# Patient Record
Sex: FEMALE | Race: WHITE | NOT HISPANIC OR LATINO | Employment: OTHER | URBAN - METROPOLITAN AREA
[De-identification: names, ages, dates, MRNs, and addresses within clinical notes are randomized per-mention and may not be internally consistent; named-entity substitution may affect disease eponyms.]

---

## 2017-05-25 ENCOUNTER — HOSPITAL ENCOUNTER (OUTPATIENT)
Dept: FAMILY MEDICINE CLINIC | Facility: CLINIC | Age: 82
Setting detail: SPECIMEN
Discharge: HOME | End: 2017-05-25
Attending: FAMILY MEDICINE | Admitting: FAMILY MEDICINE

## 2017-05-25 LAB
ALBUMIN SERPL-MCNC: 3.6 G/DL (ref 3.5–4.8)
ALBUMIN/GLOB SERPL: 1.1 {RATIO} (ref 1–1.7)
ALP SERPL-CCNC: 85 IU/L (ref 32–91)
ALT SERPL-CCNC: 15 IU/L (ref 14–54)
ANION GAP SERPL CALC-SCNC: 12.6 MMOL/L (ref 10–20)
AST SERPL-CCNC: 23 IU/L (ref 15–41)
BASOPHILS # BLD AUTO: 0 10*3/UL (ref 0–0.2)
BASOPHILS NFR BLD AUTO: 1 % (ref 0–2)
BILIRUB SERPL-MCNC: 0.4 MG/DL (ref 0.3–1.2)
BUN SERPL-MCNC: 23 MG/DL (ref 8–20)
BUN/CREAT SERPL: 17.7 (ref 5.4–26.2)
CALCIUM SERPL-MCNC: 9.3 MG/DL (ref 8.9–10.3)
CHLORIDE SERPL-SCNC: 105 MMOL/L (ref 101–111)
CHOLEST SERPL-MCNC: 161 MG/DL
CHOLEST/HDLC SERPL: 2.1 {RATIO}
CONV CO2: 27 MMOL/L (ref 22–32)
CONV LDL CHOLESTEROL DIRECT: 62 MG/DL (ref 0–100)
CONV TOTAL PROTEIN: 6.8 G/DL (ref 6.1–7.9)
CREAT UR-MCNC: 1.3 MG/DL (ref 0.4–1)
DIFFERENTIAL METHOD BLD: (no result)
EOSINOPHIL # BLD AUTO: 0.2 10*3/UL (ref 0–0.3)
EOSINOPHIL # BLD AUTO: 2 % (ref 0–3)
ERYTHROCYTE [DISTWIDTH] IN BLOOD BY AUTOMATED COUNT: 15.9 % (ref 11.5–14.5)
GLOBULIN UR ELPH-MCNC: 3.2 G/DL (ref 2.5–3.8)
GLUCOSE SERPL-MCNC: 126 MG/DL (ref 65–99)
HCT VFR BLD AUTO: 35.6 % (ref 35–49)
HDLC SERPL-MCNC: 77 MG/DL
HGB BLD-MCNC: 11.6 G/DL (ref 12–15)
LDLC/HDLC SERPL: 0.8 {RATIO}
LIPID INTERPRETATION: ABNORMAL
LYMPHOCYTES # BLD AUTO: 1.6 10*3/UL (ref 0.8–4.8)
LYMPHOCYTES NFR BLD AUTO: 24 % (ref 18–42)
MCH RBC QN AUTO: 27 PG (ref 26–32)
MCHC RBC AUTO-ENTMCNC: 32.5 G/DL (ref 32–36)
MCV RBC AUTO: 83.1 FL (ref 80–94)
MONOCYTES # BLD AUTO: 0.5 10*3/UL (ref 0.1–1.3)
MONOCYTES NFR BLD AUTO: 7 % (ref 2–11)
NEUTROPHILS # BLD AUTO: 4.3 10*3/UL (ref 2.3–8.6)
NEUTROPHILS NFR BLD AUTO: 66 % (ref 50–75)
NRBC BLD AUTO-RTO: 0 /100{WBCS}
NRBC/RBC NFR BLD MANUAL: 0 10*3/UL
PLATELET # BLD AUTO: 233 10*3/UL (ref 150–450)
PMV BLD AUTO: 9.8 FL (ref 7.4–10.4)
POTASSIUM SERPL-SCNC: 4.6 MMOL/L (ref 3.6–5.1)
RBC # BLD AUTO: 4.28 10*6/UL (ref 4–5.4)
SODIUM SERPL-SCNC: 140 MMOL/L (ref 136–144)
TRIGL SERPL-MCNC: 185 MG/DL
VIT B12 SERPL-MCNC: 401 PG/ML (ref 180–914)
VLDLC SERPL CALC-MCNC: 22.3 MG/DL
WBC # BLD AUTO: 6.6 10*3/UL (ref 4.5–11.5)

## 2017-06-12 ENCOUNTER — HOSPITAL ENCOUNTER (OUTPATIENT)
Dept: FAMILY MEDICINE CLINIC | Facility: CLINIC | Age: 82
Setting detail: SPECIMEN
Discharge: HOME | End: 2017-06-12
Attending: FAMILY MEDICINE | Admitting: FAMILY MEDICINE

## 2017-06-12 LAB
ALBUMIN SERPL-MCNC: 3.8 G/DL (ref 3.5–4.8)
ALBUMIN/GLOB SERPL: 1.2 {RATIO} (ref 1–1.7)
ALP SERPL-CCNC: 84 IU/L (ref 32–91)
ALT SERPL-CCNC: 17 IU/L (ref 14–54)
ANION GAP SERPL CALC-SCNC: 12.9 MMOL/L (ref 10–20)
AST SERPL-CCNC: 25 IU/L (ref 15–41)
BASOPHILS # BLD AUTO: 0.1 10*3/UL (ref 0–0.2)
BASOPHILS NFR BLD AUTO: 1 % (ref 0–2)
BILIRUB SERPL-MCNC: 0.7 MG/DL (ref 0.3–1.2)
BUN SERPL-MCNC: 23 MG/DL (ref 8–20)
BUN/CREAT SERPL: 19.2 (ref 5.4–26.2)
CALCIUM SERPL-MCNC: 9.7 MG/DL (ref 8.9–10.3)
CHLORIDE SERPL-SCNC: 106 MMOL/L (ref 101–111)
CONV CO2: 25 MMOL/L (ref 22–32)
CONV TOTAL PROTEIN: 6.9 G/DL (ref 6.1–7.9)
CREAT UR-MCNC: 1.2 MG/DL (ref 0.4–1)
DIFFERENTIAL METHOD BLD: (no result)
EOSINOPHIL # BLD AUTO: 0.2 10*3/UL (ref 0–0.3)
EOSINOPHIL # BLD AUTO: 2 % (ref 0–3)
ERYTHROCYTE [DISTWIDTH] IN BLOOD BY AUTOMATED COUNT: 15.7 % (ref 11.5–14.5)
GLOBULIN UR ELPH-MCNC: 3.1 G/DL (ref 2.5–3.8)
GLUCOSE SERPL-MCNC: 128 MG/DL (ref 65–99)
HCT VFR BLD AUTO: 37.2 % (ref 35–49)
HGB BLD-MCNC: 11.9 G/DL (ref 12–15)
LYMPHOCYTES # BLD AUTO: 2 10*3/UL (ref 0.8–4.8)
LYMPHOCYTES NFR BLD AUTO: 30 % (ref 18–42)
MCH RBC QN AUTO: 26.3 PG (ref 26–32)
MCHC RBC AUTO-ENTMCNC: 31.9 G/DL (ref 32–36)
MCV RBC AUTO: 82.3 FL (ref 80–94)
MONOCYTES # BLD AUTO: 0.6 10*3/UL (ref 0.1–1.3)
MONOCYTES NFR BLD AUTO: 8 % (ref 2–11)
NEUTROPHILS # BLD AUTO: 4.1 10*3/UL (ref 2.3–8.6)
NEUTROPHILS NFR BLD AUTO: 59 % (ref 50–75)
NRBC BLD AUTO-RTO: 0 /100{WBCS}
NRBC/RBC NFR BLD MANUAL: 0 10*3/UL
PLATELET # BLD AUTO: 264 10*3/UL (ref 150–450)
PMV BLD AUTO: 9.6 FL (ref 7.4–10.4)
POTASSIUM SERPL-SCNC: 3.9 MMOL/L (ref 3.6–5.1)
RBC # BLD AUTO: 4.52 10*6/UL (ref 4–5.4)
SODIUM SERPL-SCNC: 140 MMOL/L (ref 136–144)
WBC # BLD AUTO: 6.9 10*3/UL (ref 4.5–11.5)

## 2017-07-07 ENCOUNTER — HOSPITAL ENCOUNTER (OUTPATIENT)
Dept: OTHER | Facility: HOSPITAL | Age: 82
Discharge: HOME | End: 2017-07-07
Attending: FAMILY MEDICINE | Admitting: FAMILY MEDICINE

## 2017-07-07 LAB
ANION GAP SERPL CALC-SCNC: 16.9 MMOL/L (ref 10–20)
BASOPHILS # BLD AUTO: 0.1 10*3/UL (ref 0–0.2)
BASOPHILS NFR BLD AUTO: 1 % (ref 0–2)
BNP SERPL-MCNC: 166 PG/ML
BUN SERPL-MCNC: 20 MG/DL (ref 8–20)
BUN/CREAT SERPL: 15.4 (ref 5.4–26.2)
CALCIUM SERPL-MCNC: 9.5 MG/DL (ref 8.9–10.3)
CHLORIDE SERPL-SCNC: 101 MMOL/L (ref 101–111)
CONV CO2: 24 MMOL/L (ref 22–32)
CREAT UR-MCNC: 1.3 MG/DL (ref 0.4–1)
DIFFERENTIAL METHOD BLD: (no result)
EOSINOPHIL # BLD AUTO: 0.1 10*3/UL (ref 0–0.3)
EOSINOPHIL # BLD AUTO: 1 % (ref 0–3)
ERYTHROCYTE [DISTWIDTH] IN BLOOD BY AUTOMATED COUNT: 15.3 % (ref 11.5–14.5)
GLUCOSE SERPL-MCNC: 124 MG/DL (ref 65–99)
HCT VFR BLD AUTO: 36.2 % (ref 35–49)
HGB BLD-MCNC: 11.6 G/DL (ref 12–15)
LYMPHOCYTES # BLD AUTO: 1.8 10*3/UL (ref 0.8–4.8)
LYMPHOCYTES NFR BLD AUTO: 21 % (ref 18–42)
MCH RBC QN AUTO: 26.4 PG (ref 26–32)
MCHC RBC AUTO-ENTMCNC: 32.1 G/DL (ref 32–36)
MCV RBC AUTO: 82.1 FL (ref 80–94)
MONOCYTES # BLD AUTO: 0.7 10*3/UL (ref 0.1–1.3)
MONOCYTES NFR BLD AUTO: 9 % (ref 2–11)
NEUTROPHILS # BLD AUTO: 5.8 10*3/UL (ref 2.3–8.6)
NEUTROPHILS NFR BLD AUTO: 68 % (ref 50–75)
NRBC BLD AUTO-RTO: 0 /100{WBCS}
NRBC/RBC NFR BLD MANUAL: 0 10*3/UL
PLATELET # BLD AUTO: 303 10*3/UL (ref 150–450)
PMV BLD AUTO: 8.7 FL (ref 7.4–10.4)
POTASSIUM SERPL-SCNC: 3.9 MMOL/L (ref 3.6–5.1)
RBC # BLD AUTO: 4.4 10*6/UL (ref 4–5.4)
SODIUM SERPL-SCNC: 138 MMOL/L (ref 136–144)
WBC # BLD AUTO: 8.4 10*3/UL (ref 4.5–11.5)

## 2017-07-29 ENCOUNTER — HOSPITAL ENCOUNTER (OUTPATIENT)
Dept: URGENT CARE | Facility: CLINIC | Age: 82
Discharge: HOME | End: 2017-07-29
Attending: FAMILY MEDICINE | Admitting: FAMILY MEDICINE

## 2017-09-12 ENCOUNTER — HOSPITAL ENCOUNTER (OUTPATIENT)
Dept: FAMILY MEDICINE CLINIC | Facility: CLINIC | Age: 82
Setting detail: SPECIMEN
Discharge: HOME | End: 2017-09-12
Attending: FAMILY MEDICINE | Admitting: FAMILY MEDICINE

## 2018-03-27 ENCOUNTER — HOSPITAL ENCOUNTER (OUTPATIENT)
Dept: FAMILY MEDICINE CLINIC | Facility: CLINIC | Age: 83
Setting detail: SPECIMEN
Discharge: HOME | End: 2018-03-27
Attending: FAMILY MEDICINE | Admitting: FAMILY MEDICINE

## 2018-04-02 ENCOUNTER — OFFICE (AMBULATORY)
Dept: URBAN - METROPOLITAN AREA CLINIC 64 | Facility: CLINIC | Age: 83
End: 2018-04-02

## 2018-04-02 VITALS
HEIGHT: 65 IN | HEART RATE: 82 BPM | DIASTOLIC BLOOD PRESSURE: 90 MMHG | SYSTOLIC BLOOD PRESSURE: 133 MMHG | WEIGHT: 188 LBS

## 2018-04-02 DIAGNOSIS — R15.0 INCOMPLETE DEFECATION: ICD-10-CM

## 2018-04-02 DIAGNOSIS — Z85.038 PERSONAL HISTORY OF OTHER MALIGNANT NEOPLASM OF LARGE INTEST: ICD-10-CM

## 2018-04-02 DIAGNOSIS — R15.1 FECAL SMEARING: ICD-10-CM

## 2018-04-02 PROCEDURE — 99203 OFFICE O/P NEW LOW 30 MIN: CPT | Performed by: NURSE PRACTITIONER

## 2018-04-03 ENCOUNTER — HOSPITAL ENCOUNTER (OUTPATIENT)
Dept: CT IMAGING | Facility: HOSPITAL | Age: 83
Discharge: HOME | End: 2018-04-03
Attending: INTERNAL MEDICINE | Admitting: INTERNAL MEDICINE

## 2018-04-16 ENCOUNTER — ON CAMPUS - OUTPATIENT (AMBULATORY)
Dept: URBAN - METROPOLITAN AREA HOSPITAL 2 | Facility: HOSPITAL | Age: 83
End: 2018-04-16
Payer: COMMERCIAL

## 2018-04-16 VITALS
HEIGHT: 65 IN | HEART RATE: 128 BPM | HEART RATE: 100 BPM | TEMPERATURE: 97.8 F | DIASTOLIC BLOOD PRESSURE: 59 MMHG | RESPIRATION RATE: 18 BRPM | SYSTOLIC BLOOD PRESSURE: 135 MMHG | SYSTOLIC BLOOD PRESSURE: 122 MMHG | RESPIRATION RATE: 16 BRPM | SYSTOLIC BLOOD PRESSURE: 102 MMHG | OXYGEN SATURATION: 99 % | DIASTOLIC BLOOD PRESSURE: 78 MMHG | HEART RATE: 101 BPM | HEART RATE: 113 BPM | SYSTOLIC BLOOD PRESSURE: 137 MMHG | OXYGEN SATURATION: 96 % | HEART RATE: 88 BPM | DIASTOLIC BLOOD PRESSURE: 74 MMHG | OXYGEN SATURATION: 98 % | SYSTOLIC BLOOD PRESSURE: 145 MMHG | SYSTOLIC BLOOD PRESSURE: 158 MMHG | DIASTOLIC BLOOD PRESSURE: 62 MMHG | DIASTOLIC BLOOD PRESSURE: 98 MMHG | HEART RATE: 87 BPM | DIASTOLIC BLOOD PRESSURE: 60 MMHG | DIASTOLIC BLOOD PRESSURE: 49 MMHG | OXYGEN SATURATION: 94 % | SYSTOLIC BLOOD PRESSURE: 147 MMHG | WEIGHT: 181 LBS | SYSTOLIC BLOOD PRESSURE: 114 MMHG | DIASTOLIC BLOOD PRESSURE: 92 MMHG | HEART RATE: 86 BPM | OXYGEN SATURATION: 100 % | OXYGEN SATURATION: 85 %

## 2018-04-16 DIAGNOSIS — R10.13 EPIGASTRIC PAIN: ICD-10-CM

## 2018-04-16 DIAGNOSIS — R93.5 ABNORMAL FINDINGS ON DIAGNOSTIC IMAGING OF OTHER ABDOMINAL R: ICD-10-CM

## 2018-04-16 DIAGNOSIS — K57.30 DIVERTICULOSIS OF LARGE INTESTINE WITHOUT PERFORATION OR ABS: ICD-10-CM

## 2018-04-16 DIAGNOSIS — K44.9 DIAPHRAGMATIC HERNIA WITHOUT OBSTRUCTION OR GANGRENE: ICD-10-CM

## 2018-04-16 DIAGNOSIS — Z85.038 PERSONAL HISTORY OF OTHER MALIGNANT NEOPLASM OF LARGE INTEST: ICD-10-CM

## 2018-04-16 PROCEDURE — 43235 EGD DIAGNOSTIC BRUSH WASH: CPT | Performed by: INTERNAL MEDICINE

## 2018-04-16 PROCEDURE — G0105 COLORECTAL SCRN; HI RISK IND: HCPCS | Performed by: INTERNAL MEDICINE

## 2018-04-16 RX ADMIN — PROPOFOL: 10 INJECTION, EMULSION INTRAVENOUS at 14:17

## 2018-07-02 ENCOUNTER — HOSPITAL ENCOUNTER (OUTPATIENT)
Dept: FAMILY MEDICINE CLINIC | Facility: CLINIC | Age: 83
Setting detail: SPECIMEN
Discharge: HOME | End: 2018-07-02
Attending: FAMILY MEDICINE | Admitting: FAMILY MEDICINE

## 2018-07-02 LAB
ALBUMIN SERPL-MCNC: 3.8 G/DL (ref 3.5–4.8)
ALBUMIN/GLOB SERPL: 1 {RATIO} (ref 1–1.7)
ALP SERPL-CCNC: 95 IU/L (ref 32–91)
ALT SERPL-CCNC: 15 IU/L (ref 14–54)
ANION GAP SERPL CALC-SCNC: 11.3 MMOL/L (ref 10–20)
AST SERPL-CCNC: 22 IU/L (ref 15–41)
BASOPHILS # BLD AUTO: 0.1 10*3/UL (ref 0–0.2)
BASOPHILS NFR BLD AUTO: 1 % (ref 0–2)
BILIRUB SERPL-MCNC: 0.7 MG/DL (ref 0.3–1.2)
BUN SERPL-MCNC: 33 MG/DL (ref 8–20)
BUN/CREAT SERPL: 23.6 (ref 5.4–26.2)
CALCIUM SERPL-MCNC: 9.6 MG/DL (ref 8.9–10.3)
CHLORIDE SERPL-SCNC: 104 MMOL/L (ref 101–111)
CONV CO2: 25 MMOL/L (ref 22–32)
CONV TOTAL PROTEIN: 7.6 G/DL (ref 6.1–7.9)
CREAT UR-MCNC: 1.4 MG/DL (ref 0.4–1)
DIFFERENTIAL METHOD BLD: (no result)
EOSINOPHIL # BLD AUTO: 0.2 10*3/UL (ref 0–0.3)
EOSINOPHIL # BLD AUTO: 3 % (ref 0–3)
ERYTHROCYTE [DISTWIDTH] IN BLOOD BY AUTOMATED COUNT: 15.7 % (ref 11.5–14.5)
GLOBULIN UR ELPH-MCNC: 3.8 G/DL (ref 2.5–3.8)
GLUCOSE SERPL-MCNC: 118 MG/DL (ref 65–99)
HCT VFR BLD AUTO: 33.9 % (ref 35–49)
HGB BLD-MCNC: 10.5 G/DL (ref 12–15)
LYMPHOCYTES # BLD AUTO: 2.5 10*3/UL (ref 0.8–4.8)
LYMPHOCYTES NFR BLD AUTO: 33 % (ref 18–42)
MCH RBC QN AUTO: 24.6 PG (ref 26–32)
MCHC RBC AUTO-ENTMCNC: 31.1 G/DL (ref 32–36)
MCV RBC AUTO: 79.1 FL (ref 80–94)
MONOCYTES # BLD AUTO: 0.8 10*3/UL (ref 0.1–1.3)
MONOCYTES NFR BLD AUTO: 10 % (ref 2–11)
NEUTROPHILS # BLD AUTO: 4.1 10*3/UL (ref 2.3–8.6)
NEUTROPHILS NFR BLD AUTO: 53 % (ref 50–75)
NRBC BLD AUTO-RTO: 0 /100{WBCS}
NRBC/RBC NFR BLD MANUAL: 0 10*3/UL
PLATELET # BLD AUTO: 302 10*3/UL (ref 150–450)
PMV BLD AUTO: 8.9 FL (ref 7.4–10.4)
POTASSIUM SERPL-SCNC: 4.3 MMOL/L (ref 3.6–5.1)
RBC # BLD AUTO: 4.29 10*6/UL (ref 4–5.4)
SODIUM SERPL-SCNC: 136 MMOL/L (ref 136–144)
WBC # BLD AUTO: 7.8 10*3/UL (ref 4.5–11.5)

## 2018-07-10 ENCOUNTER — HOSPITAL ENCOUNTER (OUTPATIENT)
Dept: URGENT CARE | Facility: CLINIC | Age: 83
Discharge: HOME | End: 2018-07-10
Attending: FAMILY MEDICINE | Admitting: FAMILY MEDICINE

## 2018-09-24 ENCOUNTER — HOSPITAL ENCOUNTER (OUTPATIENT)
Dept: OTHER | Facility: HOSPITAL | Age: 83
Discharge: HOME | End: 2018-09-24
Attending: INTERNAL MEDICINE | Admitting: INTERNAL MEDICINE

## 2018-09-24 LAB
ANION GAP SERPL CALC-SCNC: 13.6 MMOL/L (ref 10–20)
APTT BLD: 20.2 SEC (ref 24–31)
BASOPHILS # BLD AUTO: 0 10*3/UL (ref 0–0.2)
BASOPHILS NFR BLD AUTO: 1 % (ref 0–2)
BUN SERPL-MCNC: 20 MG/DL (ref 8–20)
BUN/CREAT SERPL: 15.4 (ref 5.4–26.2)
CALCIUM SERPL-MCNC: 9.2 MG/DL (ref 8.9–10.3)
CHLORIDE SERPL-SCNC: 102 MMOL/L (ref 101–111)
CONV CO2: 28 MMOL/L (ref 22–32)
CREAT UR-MCNC: 1.3 MG/DL (ref 0.4–1)
DIFFERENTIAL METHOD BLD: (no result)
EOSINOPHIL # BLD AUTO: 0.1 10*3/UL (ref 0–0.3)
EOSINOPHIL # BLD AUTO: 1 % (ref 0–3)
ERYTHROCYTE [DISTWIDTH] IN BLOOD BY AUTOMATED COUNT: 17.6 % (ref 11.5–14.5)
GLUCOSE SERPL-MCNC: 141 MG/DL (ref 65–99)
HCT VFR BLD AUTO: 34.5 % (ref 35–49)
HGB BLD-MCNC: 10.9 G/DL (ref 12–15)
INR PPP: 0.9
LYMPHOCYTES # BLD AUTO: 1.8 10*3/UL (ref 0.8–4.8)
LYMPHOCYTES NFR BLD AUTO: 22 % (ref 18–42)
MCH RBC QN AUTO: 25.4 PG (ref 26–32)
MCHC RBC AUTO-ENTMCNC: 31.7 G/DL (ref 32–36)
MCV RBC AUTO: 80.2 FL (ref 80–94)
MONOCYTES # BLD AUTO: 0.6 10*3/UL (ref 0.1–1.3)
MONOCYTES NFR BLD AUTO: 8 % (ref 2–11)
NEUTROPHILS # BLD AUTO: 5.6 10*3/UL (ref 2.3–8.6)
NEUTROPHILS NFR BLD AUTO: 68 % (ref 50–75)
NRBC BLD AUTO-RTO: 0 /100{WBCS}
NRBC/RBC NFR BLD MANUAL: 0 10*3/UL
PLATELET # BLD AUTO: 287 10*3/UL (ref 150–450)
PMV BLD AUTO: 8.1 FL (ref 7.4–10.4)
POTASSIUM SERPL-SCNC: 4.6 MMOL/L (ref 3.6–5.1)
PROTHROMBIN TIME: 10 SEC (ref 9.6–11.7)
RBC # BLD AUTO: 4.3 10*6/UL (ref 4–5.4)
SODIUM SERPL-SCNC: 139 MMOL/L (ref 136–144)
WBC # BLD AUTO: 8.1 10*3/UL (ref 4.5–11.5)

## 2018-12-26 ENCOUNTER — HOSPITAL ENCOUNTER (OUTPATIENT)
Dept: FAMILY MEDICINE CLINIC | Facility: CLINIC | Age: 83
Setting detail: SPECIMEN
Discharge: HOME | End: 2018-12-26
Attending: FAMILY MEDICINE | Admitting: FAMILY MEDICINE

## 2018-12-26 LAB
ALBUMIN SERPL-MCNC: 3.3 G/DL (ref 3.5–4.8)
ALBUMIN/GLOB SERPL: 1.1 {RATIO} (ref 1–1.7)
ALP SERPL-CCNC: 82 IU/L (ref 32–91)
ALT SERPL-CCNC: 19 IU/L (ref 14–54)
ANION GAP SERPL CALC-SCNC: 14.3 MMOL/L (ref 10–20)
AST SERPL-CCNC: 25 IU/L (ref 15–41)
BASOPHILS # BLD AUTO: 0.1 10*3/UL (ref 0–0.2)
BASOPHILS NFR BLD AUTO: 2 % (ref 0–2)
BILIRUB SERPL-MCNC: 0.2 MG/DL (ref 0.3–1.2)
BNP SERPL-MCNC: 304 PG/ML
BUN SERPL-MCNC: 11 MG/DL (ref 8–20)
BUN/CREAT SERPL: 10 (ref 5.4–26.2)
CALCIUM SERPL-MCNC: 8.7 MG/DL (ref 8.9–10.3)
CHLORIDE SERPL-SCNC: 104 MMOL/L (ref 101–111)
CONV CO2: 26 MMOL/L (ref 22–32)
CONV TOTAL PROTEIN: 6.4 G/DL (ref 6.1–7.9)
CREAT UR-MCNC: 1.1 MG/DL (ref 0.4–1)
DIFFERENTIAL METHOD BLD: (no result)
EOSINOPHIL # BLD AUTO: 0.1 10*3/UL (ref 0–0.3)
EOSINOPHIL # BLD AUTO: 1 % (ref 0–3)
ERYTHROCYTE [DISTWIDTH] IN BLOOD BY AUTOMATED COUNT: 16.6 % (ref 11.5–14.5)
GLOBULIN UR ELPH-MCNC: 3.1 G/DL (ref 2.5–3.8)
GLUCOSE SERPL-MCNC: 148 MG/DL (ref 65–99)
HCT VFR BLD AUTO: 26.4 % (ref 35–49)
HGB BLD-MCNC: 8.2 G/DL (ref 12–15)
LYMPHOCYTES # BLD AUTO: 1.4 10*3/UL (ref 0.8–4.8)
LYMPHOCYTES NFR BLD AUTO: 18 % (ref 18–42)
MCH RBC QN AUTO: 24.3 PG (ref 26–32)
MCHC RBC AUTO-ENTMCNC: 31.1 G/DL (ref 32–36)
MCV RBC AUTO: 78 FL (ref 80–94)
MONOCYTES # BLD AUTO: 0.7 10*3/UL (ref 0.1–1.3)
MONOCYTES NFR BLD AUTO: 8 % (ref 2–11)
NEUTROPHILS # BLD AUTO: 5.5 10*3/UL (ref 2.3–8.6)
NEUTROPHILS NFR BLD AUTO: 71 % (ref 50–75)
NRBC BLD AUTO-RTO: 0 /100{WBCS}
NRBC/RBC NFR BLD MANUAL: 0 10*3/UL
PLATELET # BLD AUTO: 333 10*3/UL (ref 150–450)
PMV BLD AUTO: 8.5 FL (ref 7.4–10.4)
POTASSIUM SERPL-SCNC: 3.3 MMOL/L (ref 3.6–5.1)
RBC # BLD AUTO: 3.39 10*6/UL (ref 4–5.4)
SODIUM SERPL-SCNC: 141 MMOL/L (ref 136–144)
WBC # BLD AUTO: 7.8 10*3/UL (ref 4.5–11.5)

## 2018-12-28 ENCOUNTER — HOSPITAL ENCOUNTER (OUTPATIENT)
Dept: CARDIOLOGY | Facility: HOSPITAL | Age: 83
Discharge: HOME | End: 2018-12-28
Attending: FAMILY MEDICINE | Admitting: FAMILY MEDICINE

## 2018-12-31 ENCOUNTER — HOSPITAL ENCOUNTER (OUTPATIENT)
Dept: FAMILY MEDICINE CLINIC | Facility: CLINIC | Age: 83
Setting detail: SPECIMEN
Discharge: HOME | End: 2018-12-31
Attending: FAMILY MEDICINE | Admitting: FAMILY MEDICINE

## 2018-12-31 LAB
CONV ANISOCYTES: SLIGHT
CONV MICROCYTES IN BLOOD BY LIGHT MICROSCOPY: SLIGHT
CONV OVALOCYTES IN BLOOD BY LIGHT MICROSCOPY: (no result)
CONV PLATELETS GIANT IN BLOOD BY LIGHT MICROSCOPY: (no result)
CONV POIKILOCYTOSIS IN BLOOD BY LIGHT MICROSCOPY: SLIGHT
CONV POLYCHROMASIA IN BLOOD BY LIGHT MICROSCOPY: SLIGHT
DIFFERENTIAL METHOD BLD: (no result)
EOSINOPHIL # BLD AUTO: 0.2 10*3/UL (ref 0–0.3)
EOSINOPHIL # BLD AUTO: 2 % (ref 0–3)
ERYTHROCYTE [DISTWIDTH] IN BLOOD BY AUTOMATED COUNT: 17.1 % (ref 11.5–14.5)
HCT VFR BLD AUTO: 26.6 % (ref 35–49)
HGB BLD-MCNC: 8.3 G/DL (ref 12–15)
LYMPHOCYTES # BLD AUTO: 1.7 10*3/UL (ref 0.8–4.8)
LYMPHOCYTES NFR BLD AUTO: 22 % (ref 18–42)
MCH RBC QN AUTO: 24.1 PG (ref 26–32)
MCHC RBC AUTO-ENTMCNC: 31.2 G/DL (ref 32–36)
MCV RBC AUTO: 77.2 FL (ref 80–94)
MONOCYTES # BLD AUTO: 0.7 10*3/UL (ref 0.1–1.3)
MONOCYTES NFR BLD AUTO: 9 % (ref 2–11)
NEUTROPHILS # BLD AUTO: 4.9 10*3/UL (ref 2.3–8.6)
NEUTROPHILS NFR BLD AUTO: 67 % (ref 50–75)
PLATELET # BLD AUTO: 380 10*3/UL (ref 150–450)
PMV BLD AUTO: 8.4 FL (ref 7.4–10.4)
RBC # BLD AUTO: 3.44 10*6/UL (ref 4–5.4)
WBC # BLD AUTO: 7.5 10*3/UL (ref 4.5–11.5)

## 2019-01-09 ENCOUNTER — HOSPITAL ENCOUNTER (OUTPATIENT)
Dept: FAMILY MEDICINE CLINIC | Facility: CLINIC | Age: 84
Setting detail: SPECIMEN
Discharge: HOME | End: 2019-01-09
Attending: FAMILY MEDICINE | Admitting: FAMILY MEDICINE

## 2019-01-09 LAB
ANION GAP SERPL CALC-SCNC: 15.6 MMOL/L (ref 10–20)
BASOPHILS # BLD AUTO: 0 10*3/UL (ref 0–0.2)
BASOPHILS NFR BLD AUTO: 1 % (ref 0–2)
BUN SERPL-MCNC: 22 MG/DL (ref 8–20)
BUN/CREAT SERPL: 16.9 (ref 5.4–26.2)
CALCIUM SERPL-MCNC: 9.2 MG/DL (ref 8.9–10.3)
CHLORIDE SERPL-SCNC: 102 MMOL/L (ref 101–111)
CONV CO2: 26 MMOL/L (ref 22–32)
CREAT UR-MCNC: 1.3 MG/DL (ref 0.4–1)
DIFFERENTIAL METHOD BLD: (no result)
EOSINOPHIL # BLD AUTO: 0.2 10*3/UL (ref 0–0.3)
EOSINOPHIL # BLD AUTO: 2 % (ref 0–3)
ERYTHROCYTE [DISTWIDTH] IN BLOOD BY AUTOMATED COUNT: 21.1 % (ref 11.5–14.5)
GLUCOSE SERPL-MCNC: 146 MG/DL (ref 65–99)
HCT VFR BLD AUTO: 33.4 % (ref 35–49)
HGB BLD-MCNC: (no result) G/DL (ref 12–15)
LYMPHOCYTES # BLD AUTO: 1.7 10*3/UL (ref 0.8–4.8)
LYMPHOCYTES NFR BLD AUTO: 21 % (ref 18–42)
MCH RBC QN AUTO: 24.4 PG (ref 26–32)
MCHC RBC AUTO-ENTMCNC: 31 G/DL (ref 32–36)
MCV RBC AUTO: 78.8 FL (ref 80–94)
MONOCYTES # BLD AUTO: 0.9 10*3/UL (ref 0.1–1.3)
MONOCYTES NFR BLD AUTO: 10 % (ref 2–11)
NEUTROPHILS # BLD AUTO: 5.4 10*3/UL (ref 2.3–8.6)
NEUTROPHILS NFR BLD AUTO: 66 % (ref 50–75)
NRBC BLD AUTO-RTO: 0 /100{WBCS}
NRBC/RBC NFR BLD MANUAL: 0 10*3/UL
PLATELET # BLD AUTO: 348 10*3/UL (ref 150–450)
PMV BLD AUTO: 9.3 FL (ref 7.4–10.4)
POTASSIUM SERPL-SCNC: 4.6 MMOL/L (ref 3.6–5.1)
RBC # BLD AUTO: (no result) 10*6/UL (ref 4–5.4)
SODIUM SERPL-SCNC: 139 MMOL/L (ref 136–144)
WBC # BLD AUTO: 8.2 10*3/UL (ref 4.5–11.5)

## 2019-02-13 ENCOUNTER — HOSPITAL ENCOUNTER (OUTPATIENT)
Dept: FAMILY MEDICINE CLINIC | Facility: CLINIC | Age: 84
Setting detail: SPECIMEN
Discharge: HOME | End: 2019-02-13
Attending: FAMILY MEDICINE | Admitting: FAMILY MEDICINE

## 2019-02-13 LAB
BASOPHILS # BLD AUTO: 0.1 10*3/UL (ref 0–0.2)
BASOPHILS NFR BLD AUTO: 2 % (ref 0–2)
DIFFERENTIAL METHOD BLD: (no result)
EOSINOPHIL # BLD AUTO: 0.2 10*3/UL (ref 0–0.3)
EOSINOPHIL # BLD AUTO: 3 % (ref 0–3)
ERYTHROCYTE [DISTWIDTH] IN BLOOD BY AUTOMATED COUNT: 21.8 % (ref 11.5–14.5)
HCT VFR BLD AUTO: 36.1 % (ref 35–49)
HGB BLD-MCNC: 11.2 G/DL (ref 12–15)
LYMPHOCYTES # BLD AUTO: 2.2 10*3/UL (ref 0.8–4.8)
LYMPHOCYTES NFR BLD AUTO: 32 % (ref 18–42)
MCH RBC QN AUTO: 24.6 PG (ref 26–32)
MCHC RBC AUTO-ENTMCNC: 31.1 G/DL (ref 32–36)
MCV RBC AUTO: 79.3 FL (ref 80–94)
MONOCYTES # BLD AUTO: 0.6 10*3/UL (ref 0.1–1.3)
MONOCYTES NFR BLD AUTO: 9 % (ref 2–11)
NEUTROPHILS # BLD AUTO: 3.6 10*3/UL (ref 2.3–8.6)
NEUTROPHILS NFR BLD AUTO: 54 % (ref 50–75)
NRBC BLD AUTO-RTO: 0 /100{WBCS}
NRBC/RBC NFR BLD MANUAL: 0 10*3/UL
PLATELET # BLD AUTO: 287 10*3/UL (ref 150–450)
PMV BLD AUTO: 8.7 FL (ref 7.4–10.4)
RBC # BLD AUTO: 4.55 10*6/UL (ref 4–5.4)
WBC # BLD AUTO: 6.8 10*3/UL (ref 4.5–11.5)

## 2019-05-16 ENCOUNTER — HOSPITAL ENCOUNTER (OUTPATIENT)
Dept: FAMILY MEDICINE CLINIC | Facility: CLINIC | Age: 84
Setting detail: SPECIMEN
Discharge: HOME | End: 2019-05-16
Attending: FAMILY MEDICINE | Admitting: FAMILY MEDICINE

## 2019-05-16 LAB
ALBUMIN SERPL-MCNC: 3.6 G/DL (ref 3.5–4.8)
ALBUMIN/GLOB SERPL: 1.1 {RATIO} (ref 1–1.7)
ALP SERPL-CCNC: 85 IU/L (ref 32–91)
ALT SERPL-CCNC: 15 IU/L (ref 14–54)
ANION GAP SERPL CALC-SCNC: 16.2 MMOL/L (ref 10–20)
AST SERPL-CCNC: 21 IU/L (ref 15–41)
BASOPHILS # BLD AUTO: 0.1 10*3/UL (ref 0–0.2)
BASOPHILS NFR BLD AUTO: 1 % (ref 0–2)
BILIRUB SERPL-MCNC: 0.7 MG/DL (ref 0.3–1.2)
BNP SERPL-MCNC: 399 PG/ML
BUN SERPL-MCNC: 18 MG/DL (ref 8–20)
BUN/CREAT SERPL: 18 (ref 5.4–26.2)
CALCIUM SERPL-MCNC: 9.3 MG/DL (ref 8.9–10.3)
CHLORIDE SERPL-SCNC: 105 MMOL/L (ref 101–111)
CHOLEST SERPL-MCNC: 145 MG/DL
CHOLEST/HDLC SERPL: 2.1 {RATIO}
CONV CO2: 23 MMOL/L (ref 22–32)
CONV LDL CHOLESTEROL DIRECT: 69 MG/DL (ref 0–100)
CONV TOTAL PROTEIN: 6.9 G/DL (ref 6.1–7.9)
CREAT UR-MCNC: 1 MG/DL (ref 0.4–1)
DIFFERENTIAL METHOD BLD: (no result)
EOSINOPHIL # BLD AUTO: 0.2 10*3/UL (ref 0–0.3)
EOSINOPHIL # BLD AUTO: 3 % (ref 0–3)
ERYTHROCYTE [DISTWIDTH] IN BLOOD BY AUTOMATED COUNT: 17.8 % (ref 11.5–14.5)
GLOBULIN UR ELPH-MCNC: 3.3 G/DL (ref 2.5–3.8)
GLUCOSE SERPL-MCNC: 132 MG/DL (ref 65–99)
HBA1C MFR BLD: 7.2 % (ref 0–5.6)
HCT VFR BLD AUTO: 27.1 % (ref 35–49)
HDLC SERPL-MCNC: 70 MG/DL
HGB BLD-MCNC: 8.3 G/DL (ref 12–15)
LDLC/HDLC SERPL: 1 {RATIO}
LIPID INTERPRETATION: NORMAL
LYMPHOCYTES # BLD AUTO: 1.7 10*3/UL (ref 0.8–4.8)
LYMPHOCYTES NFR BLD AUTO: 29 % (ref 18–42)
MCH RBC QN AUTO: 23.5 PG (ref 26–32)
MCHC RBC AUTO-ENTMCNC: 30.7 G/DL (ref 32–36)
MCV RBC AUTO: 76.6 FL (ref 80–94)
MONOCYTES # BLD AUTO: 0.7 10*3/UL (ref 0.1–1.3)
MONOCYTES NFR BLD AUTO: 11 % (ref 2–11)
NEUTROPHILS # BLD AUTO: 3.3 10*3/UL (ref 2.3–8.6)
NEUTROPHILS NFR BLD AUTO: 56 % (ref 50–75)
NRBC BLD AUTO-RTO: 0 /100{WBCS}
NRBC/RBC NFR BLD MANUAL: 0 10*3/UL
PLATELET # BLD AUTO: 449 10*3/UL (ref 150–450)
PMV BLD AUTO: 8.6 FL (ref 7.4–10.4)
POTASSIUM SERPL-SCNC: 4.2 MMOL/L (ref 3.6–5.1)
RBC # BLD AUTO: 3.54 10*6/UL (ref 4–5.4)
SODIUM SERPL-SCNC: 140 MMOL/L (ref 136–144)
TRIGL SERPL-MCNC: 111 MG/DL
VLDLC SERPL CALC-MCNC: 6 MG/DL
WBC # BLD AUTO: 5.9 10*3/UL (ref 4.5–11.5)

## 2019-05-17 LAB — 25(OH)D3 SERPL-MCNC: 54 NG/ML (ref 30–100)

## 2019-05-23 ENCOUNTER — INPATIENT HOSPITAL (AMBULATORY)
Dept: URBAN - METROPOLITAN AREA HOSPITAL 84 | Facility: HOSPITAL | Age: 84
End: 2019-05-23

## 2019-05-23 DIAGNOSIS — K25.9 GASTRIC ULCER, UNSPECIFIED AS ACUTE OR CHRONIC, WITHOUT HEMO: ICD-10-CM

## 2019-05-23 DIAGNOSIS — K31.89 OTHER DISEASES OF STOMACH AND DUODENUM: ICD-10-CM

## 2019-05-23 DIAGNOSIS — D50.9 IRON DEFICIENCY ANEMIA, UNSPECIFIED: ICD-10-CM

## 2019-05-23 DIAGNOSIS — K44.9 DIAPHRAGMATIC HERNIA WITHOUT OBSTRUCTION OR GANGRENE: ICD-10-CM

## 2019-05-23 PROCEDURE — 43239 EGD BIOPSY SINGLE/MULTIPLE: CPT | Performed by: INTERNAL MEDICINE

## 2019-06-03 ENCOUNTER — CONVERSION ENCOUNTER (OUTPATIENT)
Dept: FAMILY MEDICINE CLINIC | Facility: CLINIC | Age: 84
End: 2019-06-03

## 2019-06-03 ENCOUNTER — HOSPITAL ENCOUNTER (OUTPATIENT)
Dept: FAMILY MEDICINE CLINIC | Facility: CLINIC | Age: 84
Setting detail: SPECIMEN
Discharge: HOME | End: 2019-06-03
Attending: FAMILY MEDICINE | Admitting: FAMILY MEDICINE

## 2019-06-03 LAB
ANION GAP SERPL CALC-SCNC: 18.4 MMOL/L (ref 10–20)
BASOPHILS # BLD AUTO: 0.1 10*3/UL (ref 0–0.2)
BASOPHILS NFR BLD AUTO: 1 % (ref 0–2)
BUN SERPL-MCNC: 16 MG/DL (ref 8–20)
BUN/CREAT SERPL: 10.7 (ref 5.4–26.2)
CALCIUM SERPL-MCNC: 9.4 MG/DL (ref 8.9–10.3)
CHLORIDE SERPL-SCNC: 98 MMOL/L (ref 101–111)
CONV CO2: 24 MMOL/L (ref 22–32)
CREAT UR-MCNC: 1.5 MG/DL (ref 0.4–1)
DIFFERENTIAL METHOD BLD: (no result)
EOSINOPHIL # BLD AUTO: 0.2 10*3/UL (ref 0–0.3)
EOSINOPHIL # BLD AUTO: 2 % (ref 0–3)
ERYTHROCYTE [DISTWIDTH] IN BLOOD BY AUTOMATED COUNT: 23.4 % (ref 11.5–14.5)
GLUCOSE SERPL-MCNC: 164 MG/DL (ref 65–99)
HCT VFR BLD AUTO: 34.7 % (ref 35–49)
HGB BLD-MCNC: 10.6 G/DL (ref 12–15)
LYMPHOCYTES # BLD AUTO: 1.4 10*3/UL (ref 0.8–4.8)
LYMPHOCYTES NFR BLD AUTO: 14 % (ref 18–42)
MCH RBC QN AUTO: 24.7 PG (ref 26–32)
MCHC RBC AUTO-ENTMCNC: 30.4 G/DL (ref 32–36)
MCV RBC AUTO: 81.3 FL (ref 80–94)
MONOCYTES # BLD AUTO: 0.5 10*3/UL (ref 0.1–1.3)
MONOCYTES NFR BLD AUTO: 5 % (ref 2–11)
NEUTROPHILS # BLD AUTO: 7.4 10*3/UL (ref 2.3–8.6)
NEUTROPHILS NFR BLD AUTO: 78 % (ref 50–75)
NRBC BLD AUTO-RTO: 0 /100{WBCS}
NRBC/RBC NFR BLD MANUAL: 0 10*3/UL
PLATELET # BLD AUTO: 314 10*3/UL (ref 150–450)
PMV BLD AUTO: 8.9 FL (ref 7.4–10.4)
POTASSIUM SERPL-SCNC: 3.4 MMOL/L (ref 3.6–5.1)
RBC # BLD AUTO: 4.27 10*6/UL (ref 4–5.4)
SODIUM SERPL-SCNC: 137 MMOL/L (ref 136–144)
WBC # BLD AUTO: 9.5 10*3/UL (ref 4.5–11.5)

## 2019-06-05 VITALS
WEIGHT: 185.25 LBS | SYSTOLIC BLOOD PRESSURE: 134 MMHG | OXYGEN SATURATION: 95 % | DIASTOLIC BLOOD PRESSURE: 68 MMHG | BODY MASS INDEX: 31.8 KG/M2 | RESPIRATION RATE: 16 BRPM | HEART RATE: 60 BPM

## 2019-06-06 NOTE — PROGRESS NOTES
Vital Signs:    Patient Profile:    85 Years Old Female  Height:     64 inches (165.1 cm)  Weight:     185.25 pounds  BMI:        31.79     O2 Sat:     95 %  Temp:       98.1 degrees F oral  Pulse rate: 60 / minute  Resp:       16 per minute  BP Sittin / 68  (left arm)        Problems: Active problems were reviewed with the patient during this visit.  Medications: Medications were reviewed with the patient during this visit.  Allergies: Allergies were reviewed with the patient during this visit.        Vitals Entered By: Judi PICKENS  ([place]  3, 2019 9:56 AM)      Referring Provider:  Denny Field MD  Primary Provider:  Rashida APPIAH MD    CC:  pacemaker placement.    History of Present Illness:  1) patient was hospitalized on 19 for CP, severe vomiting, severe anemia with coronary erosions, HALEIGH. Received 1 unit pRBC's. Patient also with hypokalemia, hypomagnesemia. Patient had leadless pacemaker placement placed. No CP. No dizziness. Patient   can walk better.      Past Medical History:     Reviewed history from 2017 and no changes required:        Endocrine Hx: diabetes, (Type II)        Cardiovascular Hx: angina, (Prinzmetal's)        Health Maintenence: EGD (last done 4/10)        Health Maintenence: Colonoscopy (last done 4/10)        Cardiovascular Hx: hypertension        Cardiovascular Hx: CAD        Musculoskeletal Hx: osteoarthritis        Cardiovascular Hx: TIA; left CVA, interrupted TPA; AS (moderate-severe)        GI Hx: Luis's erosions        Psychiatric Hx: depression,        Immunologic Hx: fatigue        Cancer: colon cancer        Cardiovascular Hx: hyperlipidemia, atrial fibrillation w/ventricular response        GI Hx: hiatal hernia, GERD        Hematologic / Lymphatic Hx: B12 def        GI Hx: antral erosions        Hematologic / Lymphatic Hx: 40% lesion LAD        Hospitalizations: cellulitis on face (2011)        Respiratory Hx: STORMY- not treating        HALEIGH         white coat htn        1.4 cm roght kidney mas- following urology        Sick Sinus Syndrome        Atrial fibrillation        stroke         vestibular disease     Past Surgical History:     Reviewed history from 10/05/2018 and no changes required:        cholecystectomy        hysterectomy        bladder repair        partial colectomy        cardiac cath. (2010)        colonoscopy (2018,negative)        EGD (2018,negative)        Pacemaker: Dual Chamber - 2010; RA Lead Revision - 2012 - BS        Exercise myoview, normal (2015)        Pacemaker gen change 2018    Family History Summary:      Reviewed history Last on 2019 and no changes required:2019  Mother - Has Family History Unknown - Entered On: 2016  Father - Has Family History Unknown - Entered On: 2016    General Comments - FH:      Mother-  age 83 - cad and DM,HTN  Father -  age 78 form cva  FH Diabetes  FH Heart Disease  FH Hypertension  FH Stroke      Social History:     Reviewed history from 2018 and no changes required:        Patient has never smoked.        Passive Smoke: N        Alcohol Use: N        Drug Use: N        HIV/High Risk: N        Regular Exercise: Y        Hx Domestic Abuse: N        Sikhism Affecting Care: N                Risk Factors:     Smoked Tobacco Use:  Never smoker  Smokeless Tobacco Use:  Never      Review of Systems          The patient complains of fatigue, weakness, sleep disorder, dyspnea on exertion, joint pain, stiffness, arthritis and difficulty walking.  The patient denies fever, chills, chest pain, palpitations, syncope, hemoptysis, abdominal pain, melena,   hematochezia, jaundice, dysuria, hematuria and depression.        Physical Exam   Weight:  185.6  BP:  134/68 mm HG    Medication List:  KLOR-CON 10 10 MEQ ORAL TABLET EXTENDED RELEASE (POTASSIUM CHLORIDE) 2 po q d  AMBIEN 10 MG ORAL TABLET (ZOLPIDEM TARTRATE) 1 po q hs  prn  CARAFATE 1 GM ORAL TABLET (SUCRALFATE) 1 po ac  FE TABS 325 (65 FE) MG ORAL TABLET DELAYED RELEASE (FERROUS SULFATE) 1 po bid  FUROSEMIDE 40 MG ORAL TABLET (FUROSEMIDE) 1.5 po q d  CARTIA  MG CAPSULE (DILTIAZEM HCL COATED BEADS) 1 po bid  VITAMIN D3 2000 UNIT ORAL CAPSULE (CHOLECALCIFEROL) 1 PO Q D  XARELTO 15 MG TABLET (RIVAROXABAN) TAKE ONE TABLET BY MOUTH DAILY  FLUOXETINE HCL 40 MG CAPSULE (FLUOXETINE HCL) TAKE ONE CAPSULE BY MOUTH DAILY  ATORVASTATIN 10 MG TABLET (ATORVASTATIN CALCIUM) TAKE ONE TABLET BY MOUTH DAILY  BYSTOLIC 10 MG TABLET (NEBIVOLOL HCL) TAKE ONE TABLET BY MOUTH DAILY  PANTOPRAZOLE SODIUM 40 MG ORAL TABLET DELAYED RELEASE (PANTOPRAZOLE SODIUM) 1 PO BID      Surgical History   cholecystectomy  hysterectomy  bladder repair  partial colectomy  cardiac cath. (5/2010)  colonoscopy (4/16/2018,negative)  EGD (4/16/2018,negative)  Pacemaker: Dual Chamber - 1/22/2010; RA Lead Revision - 5/7/2012 - BS  Exercise myoview, normal (4/25/2015)  Pacemaker gen change 9/2018  ,    Risk Factors  Tobacco Use: Never smoker  Passive smoke exposure: no  Exercise: yes  Type of Exercise: tries to exercise  Illicit Drug use: no      Orientation     Language   Alert 1  Total MMSE Score: 30  List of providers and suppliers caring for patient:    Dr Wilson---Cardiology  Dr Falcon---Urology    Physical Examination   General Appearance   In no acute distress.  Alert & oriented.  Behavior and affect appropriate to situation  Skin   No suspicious lesions, moles or rashes . Turgor good  HEENT   PERRLA, EOMI, TM's normal.  Pharynx clear, PERRLA, EOMI, TM's normal.  Pharynx clear  Neck   Supple.  No adenopathy  Cardiovascular   Regular rate and rhythm  Lungs   Clear to auscultation  Abdomen   Soft, non-tender.  Bowel sounds present.  No hepatosplenomegaly  Musculoskeletal   no C/C/E  Neurologic   Cranial nerves 2-12 grossly intact.  DTRs normal and symmetric.  Extremity sensation normal and symmetrical to light  touch  Psych   Alert and cooperative; normal mood and affect; normal attention span and concentration        Impression & Recommendations:    Problem # 1:  Iron deficiency anemia (ICD-280.9) (ESW70-Y77.9)  Assessment: Improved    Orders:  Transitional Care Mngmt Svcs, moderate complex within 14 day dischrg (09697)  [facility] CBC W/DIFF; PATH REVIEW IF INDICATED (CBC)      Problem # 2:  Congestive Heart Failure (ICD-428.0) (CBW19-J29.9)  Assessment: Unchanged    Her updated medication list for this problem includes:     Furosemide 40 Mg Oral Tablet (Furosemide) ..... 1.5 po q d     Bystolic 10 Mg Tablet (Nebivolol hcl) ..... Take one tablet by mouth daily      Problem # 3:  HYPERTENSION (ICD-401.9) (MTF91-R88)  Assessment: Improved    Her updated medication list for this problem includes:     Furosemide 40 Mg Oral Tablet (Furosemide) ..... 1.5 po q d     Cartia Xt 240 Mg Capsule (Diltiazem hcl coated beads) ..... 1 po bid     Bystolic 10 Mg Tablet (Nebivolol hcl) ..... Take one tablet by mouth daily    Orders:  Transitional Care Mngmt Svcs, moderate complex within 14 day dischrg (92244)      Problem # 4:  HYPOKALEMIA (ICD-276.8) (PEA50-M44.6)  Assessment: Improved    Orders:  Transitional Care Mngmt Svcs, moderate complex within 14 day dischrg (18391)      Medications Added to Medication List This Visit:  1)  Klor-con 10 10 Meq Oral Tablet Extended Release (Potassium chloride) .... 2 po q d  2)  Vitamin D3 2000 Unit Oral Capsule (Cholecalciferol) .... 1 po q d  3)  Pantoprazole Sodium 40 Mg Oral Tablet Delayed Release (Pantoprazole sodium) .... 1 po bid    Other Orders:  [facility] BASIC METABOLIC PANEL (BMP) (MPB)      Patient Instructions:  1)  Discussed importance of regular exercise and recommended starting or continuing a regular exercise program for good health.  2)  The patient was encouraged to lose weight for better health.  3)  During this office visit we discussed the pertinent aspects of the visit and the treatment  recommendations.  Patient was given the opportunity to ask questions and discuss other concerns.  4)  Check labs today.  5)  Restart Klor-Con 10 mEq 2 p.o. q.day.  6)  Refilled meds.  7)  F/U 6 weeks (7/15/19 8:00 am).    ...................................................................Collette Torres MA  June 4, 2019 9:51 AM                Medication Administration    Orders Added:  1)  Transitional Care Mngmt Svcs, moderate complex within 14 day dischrg [65017]  2)  [facility] CBC W/DIFF; PATH REVIEW IF INDICATED [CBC]  3)  [facility] BASIC METABOLIC PANEL (BMP) [MPB]          Medications:  KLOR-CON 10 10 MEQ ORAL TABLET EXTENDED RELEASE (POTASSIUM CHLORIDE) 2 po q d  #60[Tablet] x 11      Entered and Authorized by:  Denny Field MD      Electronically signed by:   Denny Field MD on 06/03/2019      Method used:    Electronically to               [facility] (retail)              [address] [place] [address]              Ph: [phone]              Fax: [phone]      Note to Pharmacy: Route: ORAL;       RxID:   [id number]  BYSTOLIC 10 MG TABLET (NEBIVOLOL HCL) TAKE ONE TABLET BY MOUTH DAILY  #30[Tablet] x 11      Entered and Authorized by:  Denny Field MD      Electronically signed by:   Denny Field MD on 06/03/2019      Method used:    Electronically to               [facility] (retail)              [address] [place] [address]              Ph: [phone]              Fax: [phone]      RxID:   [id number]  PANTOPRAZOLE SODIUM 40 MG ORAL TABLET DELAYED RELEASE (PANTOPRAZOLE SODIUM) 1 PO BID  #30[Tablet] x 11      Entered and Authorized by:  Denny Field MD      Electronically signed by:   Denny Field MD on 06/03/2019      Method used:    Electronically to               [facility] ([facility]) [address] [place]  [address]              Ph: [phone]               Fax: [phone]      Note to Pharmacy: Route: ORAL;       RxID:   [id number]  FLUOXETINE HCL 40 MG CAPSULE (FLUOXETINE HCL) TAKE ONE CAPSULE BY MOUTH DAILY  #30[Capsule] x 10      Entered and Authorized by:  Denny Field MD      Electronically signed by:   Denny Field MD on 06/03/2019      Method used:    Electronically to               [facility]* (retail)              [address] [place] [address]              Ph: [phone]              Fax: [phone]      RxID:   [id number]  CARAFATE 1 GM ORAL TABLET (SUCRALFATE) 1 po ac  #90[Tablet] x 5      Entered and Authorized by:  Denny Field MD      Electronically signed by:   Denny Field MD on 06/03/2019      Method used:    Electronically to               [facility] (retail)              [address] [place] [address]              Ph: [phone]              Fax: [phone]      Note to Pharmacy: Route: ORAL;       RxID:   [id number]          Electronically signed by Denny Field MD on 06/05/2019 at 7:45 AM  ________________________________________________________________________       Disclaimer: Converted Note message may not contain all data elements that existed in the legacy source system. Please see [employer] for the original note details.

## 2019-06-18 ENCOUNTER — OFFICE VISIT (OUTPATIENT)
Dept: CARDIOLOGY | Facility: CLINIC | Age: 84
End: 2019-06-18

## 2019-06-18 VITALS
HEIGHT: 65 IN | BODY MASS INDEX: 30.82 KG/M2 | WEIGHT: 185 LBS | HEART RATE: 76 BPM | SYSTOLIC BLOOD PRESSURE: 132 MMHG | DIASTOLIC BLOOD PRESSURE: 78 MMHG

## 2019-06-18 DIAGNOSIS — Z95.0 PACEMAKER: ICD-10-CM

## 2019-06-18 DIAGNOSIS — I48.20 ATRIAL FIBRILLATION, CHRONIC (HCC): ICD-10-CM

## 2019-06-18 DIAGNOSIS — I25.10 CORONARY ARTERY DISEASE INVOLVING NATIVE CORONARY ARTERY OF NATIVE HEART WITHOUT ANGINA PECTORIS: ICD-10-CM

## 2019-06-18 DIAGNOSIS — I49.5 TACHY-BRADY SYNDROME (HCC): Primary | ICD-10-CM

## 2019-06-18 PROBLEM — I48.21 PERMANENT ATRIAL FIBRILLATION: Status: ACTIVE | Noted: 2019-06-18

## 2019-06-18 PROCEDURE — 93279 PRGRMG DEV EVAL PM/LDLS PM: CPT | Performed by: NURSE PRACTITIONER

## 2019-06-18 PROCEDURE — 99024 POSTOP FOLLOW-UP VISIT: CPT | Performed by: NURSE PRACTITIONER

## 2019-06-18 RX ORDER — PANTOPRAZOLE SODIUM 40 MG/1
40 TABLET, DELAYED RELEASE ORAL 2 TIMES DAILY
Status: ON HOLD | COMMUNITY
Start: 2019-05-25 | End: 2019-10-03

## 2019-06-18 RX ORDER — SUCRALFATE 1 G/1
TABLET ORAL 2 TIMES DAILY
COMMUNITY
Start: 2019-05-25 | End: 2019-10-11 | Stop reason: HOSPADM

## 2019-06-18 RX ORDER — BLOOD SUGAR DIAGNOSTIC
STRIP MISCELLANEOUS
COMMUNITY
Start: 2019-04-08 | End: 2019-07-15

## 2019-06-18 RX ORDER — FUROSEMIDE 40 MG/1
1.5 TABLET ORAL DAILY PRN
Status: ON HOLD | COMMUNITY
Start: 2019-05-18 | End: 2019-10-08 | Stop reason: SDUPTHER

## 2019-06-18 RX ORDER — POTASSIUM CHLORIDE 750 MG/1
2 TABLET, FILM COATED, EXTENDED RELEASE ORAL DAILY
COMMUNITY
Start: 2019-06-03 | End: 2021-11-04

## 2019-06-18 RX ORDER — LANCETS 33 GAUGE
EACH MISCELLANEOUS
COMMUNITY
Start: 2019-04-08 | End: 2019-07-15

## 2019-06-18 RX ORDER — RIVAROXABAN 15 MG/1
TABLET, FILM COATED ORAL DAILY
COMMUNITY
Start: 2019-05-25 | End: 2019-07-12 | Stop reason: SDUPTHER

## 2019-06-18 RX ORDER — NEBIVOLOL HYDROCHLORIDE 10 MG/1
10 TABLET ORAL DAILY
COMMUNITY
Start: 2019-06-04 | End: 2020-07-01

## 2019-06-18 RX ORDER — ATORVASTATIN CALCIUM 10 MG/1
TABLET, FILM COATED ORAL DAILY
COMMUNITY
Start: 2019-05-11 | End: 2019-07-12 | Stop reason: SDUPTHER

## 2019-06-18 RX ORDER — ZOLPIDEM TARTRATE 10 MG/1
TABLET ORAL DAILY
COMMUNITY
Start: 2019-04-05 | End: 2019-07-15

## 2019-06-18 RX ORDER — DILTIAZEM HYDROCHLORIDE 240 MG/1
CAPSULE, EXTENDED RELEASE ORAL 2 TIMES DAILY
COMMUNITY
Start: 2019-05-16 | End: 2020-08-13

## 2019-06-18 RX ORDER — FLUOXETINE HYDROCHLORIDE 40 MG/1
40 CAPSULE ORAL DAILY
COMMUNITY
Start: 2019-05-11 | End: 2020-07-01

## 2019-06-18 RX ORDER — ACETAMINOPHEN 160 MG
1 TABLET,DISINTEGRATING ORAL EVERY 24 HOURS
COMMUNITY
Start: 2018-03-27 | End: 2020-10-13

## 2019-06-18 NOTE — PROGRESS NOTES
Subjective  HPI    Cherie Hinton is a 85 y.o. female well-known to our office who has a history of chronic persistent atrial fibrillation.  Additional past medical history is significant for hypertension diabetes and previous [employer] pacemaker implant.  She underwent generator replacement in September 2018.    Her [employer] pacemaker was initially implanted in 2004 it was a dual-chamber device.  In 2009 she had an RV lead fracture and new RV lead was placed in the existing RV lead was capped off.  Her generator replacement was in 2018.  She was in the hospital in late May and we had received an alert from her home Lat[employer] monitor saying that she had a right ventricular lead impedance alert the device had no capture at max output.  She was evaluated by Dr. Chpaman and ultimately underwent implant of a micro-leadless pacemaker.    Echocardiogram done during that hospitalization showed ejection fraction of 55 to 60% no significant aortic stenosis mild mitral regurgitation mild tricuspid regurgitation with RVSP of 55 mmHg  Patient is also interested in being evaluated for watchman device.           Past Medical History:   Diagnosis Date   • Antral erosion    • Atrial fibrillation (CMS/HCC)    • Atrial fibrillation with controlled ventricular response (CMS/HCC)    • B12 deficiency    • CAD (coronary artery disease)    • Cellulitis 04/2011    on face    • Colon cancer (CMS/HCC)    • Depression    • Diabetes mellitus, type 2 (CMS/HCC)    • Fatigue    • GERD (gastroesophageal reflux disease)    • Hiatal hernia    • History of colonoscopy 04/2010    last done 4/10   • History of esophagogastroduodenoscopy (EGD) 04/2010    last done 4/10   • Hyperlipidemia    • Hypertension     white coat htn   • HALEIGH (iron deficiency anemia)    • LAD (lymphadenopathy)     40% lesion LAD    • Left pontine CVA (CMS/HCC)    • STORMY (obstructive sleep apnea)     not treating   • Osteoarthritis    • Prinzmetal's angina  (CMS/HCC)    • Right kidney mass     1.4 cm- following urology    • Sick sinus syndrome (CMS/HCC)    • Stroke (CMS/HCC) 11/2016   • TIA (transient ischemic attack)     left CVA, interrupted TPA; As (moderate-severe)    • Vestibular nerve disease 2017       Past Surgical History:   Procedure Laterality Date   • BLADDER REPAIR     • CARDIAC CATHETERIZATION  05/2010   • CHOLECYSTECTOMY     • COLECTOMY PARTIAL / TOTAL     • COLONOSCOPY  04/16/2018    negative    • ENDOSCOPY  04/16/2018    negative    • HYSTERECTOMY     • INSERT / REPLACE / REMOVE PACEMAKER  09/2018    Pacemaker gen change 9/2018    • OTHER SURGICAL HISTORY  04/25/2015    Exercise myoview, normal    • PACEMAKER IMPLANTATION  01/22/2010    Dual Chamber - 1/22/2010; RA Lead Revision- 5/7/2012- BS          Current Outpatient Medications:   •  Cholecalciferol (VITAMIN D3) 2000 units capsule, 1 capsule Daily., Disp: , Rfl:   •  ferrous sulfate (FE TABS) 325 (65 FE) MG EC tablet, FE TABS 325 (65 Fe) MG TBEC, Disp: , Rfl:   •  atorvastatin (LIPITOR) 10 MG tablet, Daily., Disp: , Rfl:   •  BYSTOLIC 10 MG tablet, Daily., Disp: , Rfl:   •  CARTIA  MG 24 hr capsule, 2 (Two) Times a Day., Disp: , Rfl:   •  FLUoxetine (PROzac) 40 MG capsule, Daily., Disp: , Rfl:   •  furosemide (LASIX) 40 MG tablet, 1.5 tablets Daily., Disp: , Rfl:   •  ONETOUCH DELICA LANCETS 33G misc, , Disp: , Rfl:   •  ONETOUCH VERIO test strip, , Disp: , Rfl:   •  pantoprazole (PROTONIX) 40 MG EC tablet, Daily., Disp: , Rfl:   •  potassium chloride (K-DUR) 10 MEQ CR tablet, 2 tablets Daily., Disp: , Rfl:   •  sucralfate (CARAFATE) 1 g tablet, 2 (Two) Times a Day., Disp: , Rfl:   •  XARELTO 15 MG tablet, Daily., Disp: , Rfl:   •  zolpidem (AMBIEN) 10 MG tablet, Daily., Disp: , Rfl:     Social History     Socioeconomic History   • Marital status:      Spouse name: Not on file   • Number of children: Not on file   • Years of education: Not on file   • Highest education level: Not on  "file   Tobacco Use   • Smoking status: Never Smoker   Substance and Sexual Activity   • Alcohol use: No     Frequency: Never   • Drug use: No       Family History   Problem Relation Age of Onset   • Hypertension Mother    • Diabetes Mother    • Coronary artery disease Mother    • Other Father         CVA   • Heart disease Other    • Stroke Other        The following portions of the patient's history were reviewed and updated as appropriate: allergies, current medications, past family history, past medical history, past social history, past surgical history and problem list.    Review of Systems   Constitution: Negative for decreased appetite, diaphoresis and weakness.   HENT: Negative for congestion, hearing loss and nosebleeds.    Cardiovascular: Negative for chest pain, claudication, dyspnea on exertion, irregular heartbeat, leg swelling, near-syncope, orthopnea, palpitations, paroxysmal nocturnal dyspnea and syncope.   Respiratory: Negative for cough, shortness of breath and sleep disturbances due to breathing.    Endocrine: Negative for polyuria.   Hematologic/Lymphatic: Does not bruise/bleed easily.   Skin: Negative for itching and rash.   Musculoskeletal: Negative for back pain, muscle weakness and myalgias.   Gastrointestinal: Negative for abdominal pain, change in bowel habit and nausea.   Genitourinary: Negative for dysuria, flank pain, frequency and hesitancy.   Neurological: Negative for dizziness and tremors.   Psychiatric/Behavioral: Negative for altered mental status. The patient does not have insomnia.      /78   Pulse 76   Ht 165.1 cm (65\")   Wt 83.9 kg (185 lb)   BMI 30.79 kg/m² .  Objective     Physical Exam   Constitutional: She is oriented to person, place, and time. She appears well-developed and well-nourished. No distress.   HENT:   Head: Normocephalic and atraumatic.   Eyes: Pupils are equal, round, and reactive to light.   Neck: Normal range of motion. Neck supple. No JVD present. "   Cardiovascular: Normal rate, S1 normal, S2 normal, normal heart sounds and intact distal pulses. An irregularly irregular rhythm present.   No murmur heard.  Pulmonary/Chest: Effort normal and breath sounds normal.   Abdominal: Soft. Normal appearance. She exhibits no distension. There is no tenderness.   Musculoskeletal: Normal range of motion. She exhibits no edema.   Neurological: She is alert and oriented to person, place, and time. Gait normal.   Skin: Skin is warm and dry.   Psychiatric: She has a normal mood and affect. Her speech is normal and behavior is normal. Thought content normal.         EKG: normal EKG, normal sinus rhythm, unchanged from previous tracings, atrial fibrillation, rate 76, incomplete right bundle branch block, intermittent paced beats.    In Office Device Interrogation:     In office device interrogation.  Device Company: [employer]  Battery Status Stable.   Charge time if applicable:    Time to ZULEMA: 8 years      A: Nonapplicable  V: Greater than 20, 710 ohms, 0.38 V at 0.24 ms    Atrial Pace Percentage: Nonapplicable  Ventricular Pace Percentage: 29.5      Arrhythmia Logbook Reviewed: Nonapplicable      Device function Stable, No significant arrhythmia burden.      Next Device interrogation due: 3 months         Assessment:      Tachy-morgan syndrome (CMS/HCC)    Pacemaker status post leadless micro-pacemaker stable device function    Atrial fibrillation, chronic (CMS/HCC)  -     Ambulatory Referral to Cardiac Electrophysiology wishes to be evaluated for watchman    Coronary artery disease involving native coronary artery of native heart without angina pectoris no signs and symptoms of angina                    Orders Placed This Encounter   Procedures   • Ambulatory Referral to Cardiac Electrophysiology     Referral Priority:   Routine     Referral Type:   Consultation     Referral Reason:   Specialty Services Required     Requested Specialty:   Cardiac Electrophysiology     Number of  Visits Requested:   1       Next follow-up appointment

## 2019-06-19 ENCOUNTER — DOCUMENTATION (OUTPATIENT)
Dept: CARDIOLOGY | Facility: CLINIC | Age: 84
End: 2019-06-19

## 2019-06-20 ENCOUNTER — OFFICE (AMBULATORY)
Dept: URBAN - METROPOLITAN AREA CLINIC 64 | Facility: CLINIC | Age: 84
End: 2019-06-20

## 2019-06-20 VITALS
HEIGHT: 65 IN | HEART RATE: 93 BPM | SYSTOLIC BLOOD PRESSURE: 178 MMHG | DIASTOLIC BLOOD PRESSURE: 99 MMHG | WEIGHT: 184 LBS

## 2019-06-20 DIAGNOSIS — K21.9 GASTRO-ESOPHAGEAL REFLUX DISEASE WITHOUT ESOPHAGITIS: ICD-10-CM

## 2019-06-20 DIAGNOSIS — D50.0 IRON DEFICIENCY ANEMIA SECONDARY TO BLOOD LOSS (CHRONIC): ICD-10-CM

## 2019-06-20 PROCEDURE — 99213 OFFICE O/P EST LOW 20 MIN: CPT | Performed by: NURSE PRACTITIONER

## 2019-06-20 RX ORDER — OMEPRAZOLE 40 MG/1
80 CAPSULE, DELAYED RELEASE ORAL
Qty: 60 | Refills: 12 | Status: ACTIVE
Start: 2019-06-20

## 2019-06-28 ENCOUNTER — OFFICE VISIT (OUTPATIENT)
Dept: CARDIOLOGY | Facility: CLINIC | Age: 84
End: 2019-06-28

## 2019-06-28 ENCOUNTER — PREP FOR SURGERY (OUTPATIENT)
Dept: OTHER | Facility: HOSPITAL | Age: 84
End: 2019-06-28

## 2019-06-28 VITALS
RESPIRATION RATE: 18 BRPM | WEIGHT: 186.8 LBS | DIASTOLIC BLOOD PRESSURE: 72 MMHG | HEART RATE: 75 BPM | SYSTOLIC BLOOD PRESSURE: 166 MMHG | BODY MASS INDEX: 31.09 KG/M2 | OXYGEN SATURATION: 96 %

## 2019-06-28 DIAGNOSIS — Z95.0 PACEMAKER: ICD-10-CM

## 2019-06-28 DIAGNOSIS — I48.19 PERSISTENT ATRIAL FIBRILLATION (HCC): Primary | ICD-10-CM

## 2019-06-28 DIAGNOSIS — E78.2 MIXED HYPERLIPIDEMIA: ICD-10-CM

## 2019-06-28 DIAGNOSIS — I48.20 ATRIAL FIBRILLATION, CHRONIC (HCC): Primary | ICD-10-CM

## 2019-06-28 DIAGNOSIS — I10 ESSENTIAL HYPERTENSION: ICD-10-CM

## 2019-06-28 PROCEDURE — 99024 POSTOP FOLLOW-UP VISIT: CPT | Performed by: INTERNAL MEDICINE

## 2019-06-28 RX ORDER — SODIUM CHLORIDE 9 MG/ML
80 INJECTION, SOLUTION INTRAVENOUS CONTINUOUS
Status: CANCELLED | OUTPATIENT
Start: 2019-06-28

## 2019-06-28 NOTE — PROGRESS NOTES
Cc--atrial fibrillation, GI bleed    Cherie Hinton is a 85 y.o. female well-known to our office who has a history of chronic persistent atrial fibrillation.  Additional past medical history is significant for hypertension diabetes and previous [employer] pacemaker implant.  She underwent generator replacement in September 2018.--Her [employer] pacemaker was initially implanted in 2004 it was a dual-chamber device.  In 2009 she had an RV lead fracture and new RV lead was placed in the existing RV lead was capped off.  Her generator replacement was in 2018.  She was in the hospital in late May and we had received an alert from her home Lat[employer] monitor saying that she had a right ventricular lead impedance alert the device had no capture at max output.She was evaluated by me and ultimately underwent implant of a micra-leadless pacemaker.  Echocardiogram done during that hospitalization showed ejection fraction of 55 to 60% no significant aortic stenosis mild mitral regurgitation mild tricuspid regurgitation with RVSP of 55 mmHg  Patient is also interested in being evaluated for watchman device.  Patient also had iron deficiency anemia and work-up revealed large hiatal hernia active ulcerations--patient wants alternate options for long-term medical regulation and came back for reevaluation  Denies any hemoptysis hematemesis or melena  Chads Vasc--6  HASBLED--4           Past Medical History:   Diagnosis Date   • Antral erosion    • Atrial fibrillation (CMS/HCC)    • Atrial fibrillation with controlled ventricular response (CMS/HCC)    • B12 deficiency    • CAD (coronary artery disease)    • Cellulitis 04/2011    on face    • Colon cancer (CMS/HCC)    • Depression    • Diabetes mellitus, type 2 (CMS/HCC)    • Fatigue    • GERD (gastroesophageal reflux disease)    • Hiatal hernia    • History of colonoscopy 04/2010    last done 4/10   • History of esophagogastroduodenoscopy (EGD) 04/2010    last done  4/10   • Hyperlipidemia    • Hypertension     white coat htn   • HALEIGH (iron deficiency anemia)    • LAD (lymphadenopathy)     40% lesion LAD    • Left pontine CVA (CMS/HCC)    • STORMY (obstructive sleep apnea)     not treating   • Osteoarthritis    • 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CMS/HCC)    • Right kidney mass     1.4 cm- following urology    • Sick sinus syndrome (CMS/HCC)    • Stroke (CMS/HCC) 11/2016   • TIA (transient ischemic attack)     left CVA, interrupted TPA; As (moderate-severe)    • Vestibular nerve disease 2017       Past Surgical History:   Procedure Laterality Date   • BLADDER REPAIR     • CARDIAC CATHETERIZATION  05/2010   • CHOLECYSTECTOMY     • COLECTOMY PARTIAL / TOTAL     • COLONOSCOPY  04/16/2018    negative    • ENDOSCOPY  04/16/2018    negative    • HYSTERECTOMY     • INSERT / REPLACE / REMOVE PACEMAKER  09/2018    Pacemaker gen change 9/2018    • OTHER SURGICAL HISTORY  04/25/2015    Exercise myoview, normal    • PACEMAKER IMPLANTATION  01/22/2010    Dual Chamber - 1/22/2010; RA Lead Revision- 5/7/2012- BS            Social History     Socioeconomic History   • Marital status:      Spouse name: Not on file   • Number of children: Not on file   • Years of education: Not on file   • Highest education level: Not on file   Tobacco Use   • Smoking status: Never Smoker   Substance and Sexual Activity   • Alcohol use: No     Frequency: Never   • Drug use: No       Family History   Problem Relation Age of Onset   • Hypertension Mother    • Diabetes Mother    • Coronary artery disease Mother    • Other Father         CVA   • Heart disease Other    • Stroke Other        Review of Systems  General: Negative  for fatigue and tiredness  Eyes: No redness  Cardiovascular: No chest pain, positive for palpitations  Respiratory:   No  shortness of breath  Gastrointestinal: No nausea or vomiting, bleeding  Genitourinary: no hematuria or dysuria  Musculoskeletal: No arthralgia or myalgia  Skin: No rash  Neurologic:  No numbness, tingling, syncope  Hematologic/Lymphatic: No abnormal bleeding      Physical Exam    General:      well developed, well nourished, in no acute distress.    Head:      normocephalic and atraumatic.    Eyes:      PERRL/EOM intact, conjunctiva and sclera clear with out nystagmus.    Neck:      no masses, thyromegaly, trachea central with normal respiratory effort  Lungs:      clear bilaterally to auscultation.    Heart:      non-displaced PMI, chest non-tender; irregular rate and rhythm, S1, S2 without murmurs, rubs, or gallops  Skin:      intact without lesions or rashes.    Psych:      alert and cooperative; normal mood and affect; normal attention span and concentration.      Extremities with trace ankle edema without any cyanosis or clubbing    Muscular skeletal patient walks with a cane with mild kyphosis    Neurological no focal deficits and alert oriented x3    Abdomen soft nontender no hepatomegaly        Assessment plan    Persistent atrial fibrillation  History of GI bleed with iron deficiency anemia  Hypertension  Sick sinus syndrome with failed transvenous pacing rate status post leadless pacemaker implantation without complications  Hyperlipidemia  Prior history of stroke    Options like watchman device implantation educated and patient scheduled for a preliminary DHEERAJ to evaluate left atrial appendage and then decide on watchman device implantation

## 2019-06-28 NOTE — PATIENT INSTRUCTIONS
Left Atrial Appendage Closure Device Implantation  Left atrial appendage (MARIANELA) closure device implantation is a procedure that is done to place a small device in the MARIANELA of the heart. The left atrium is one of the heart's two upper chambers, and the MARIANELA is a small sac in the wall of the left atrium. The device closes the MARIANELA.  This procedure can help prevent a stroke caused by atrial fibrillation. Atrial fibrillation is a type of irregular or rapid heartbeat (arrhythmia). When the heart does not beat normally and the heartbeat is irregular, there is an increased risk of blood clots and stroke. A blood clot can form in the MARIANELA.  Tell a health care provider about:  · Any allergies you have.  · All medicines you are taking, including vitamins, herbs, eye drops, creams, and over-the-counter medicines.  · Any problems you or family members have had with anesthetic medicines.  · Any blood disorders you have.  · Any surgeries you have had.  · Any medical conditions you have.  · Whether you are pregnant or may be pregnant.  What are the risks?  Generally, this is a safe procedure. However, problems may occur, including:  · Infection.  · Bleeding.  · Allergic reactions to medicines or dyes.  · Damage to nearby structures or organs.  · Heart attack.  · Stroke.  · Blood clots.  · Changes in heart rhythm.  · Device failure.    What happens before the procedure?  Medicines  · Ask your health care provider about:  ? Changing or stopping your regular medicines. This is especially important if you are taking diabetes medicines or blood thinners.  ? Taking medicines such as aspirin and ibuprofen. These medicines can thin your blood. Do not take these medicines unless your health care provider tells you to take them.  ? Taking over-the-counter medicines, vitamins, herbs, and supplements.  · You may be given antibiotic medicine to help prevent an infection.  Staying hydrated  Follow instructions from your health care provider about  hydration, which may include:  · Up to 2 hours before the procedure - you may continue to drink clear liquids, such as water, clear fruit juice, black coffee, and plain tea.    Eating and drinking restrictions  Follow instructions from your health care provider about eating and drinking, which may include:  · 8 hours before the procedure - stop eating heavy meals or foods such as meat, fried foods, or fatty foods.  · 6 hours before the procedure - stop eating light meals or foods, such as toast or cereal.  · 6 hours before the procedure - stop drinking milk or drinks that contain milk.  · 2 hours before the procedure - stop drinking clear liquids.    General instructions  · Do not use any products that contain nicotine or tobacco, such as cigarettes and e-cigarettes. If you need help quitting, ask your health care provider.  · You will have blood tests and a physical exam.  · You will have a test called an electrocardiogram (ECG) to check your heart's electrical patterns and rhythms.  · You may be asked to shower with a germ-killing soap.  · Plan to have someone take you home from the hospital or clinic.  · Plan to have a responsible adult care for you for at least 24 hours after you leave the hospital or clinic. This is important.  What happens during the procedure?  · To lower your risk of infection:  ? Your health care team will wash or sanitize their hands.  ? Hair may be removed from the surgical area.  ? Your skin will be washed with soap.  · An IV will be inserted into one of your veins.  · You will be given one or more of the following:  ? A medicine to help you relax (sedative).  ? A medicine to make you fall asleep (general anesthetic).  · A small incision will be made in your groin area.  · A small wire will be put through the incision and into a blood vessel.  · X-ray dye may be injected so X-rays can be used to guide the wire through the blood vessel.  · A long, thin tube (catheter) will be put over the  small wire and moved up through the blood vessel to reach your heart.  · The closure device will be moved through the catheter until it reaches your heart.  · A small hole will be made in the septum (transseptal puncture). The septum is a thin tissue that separates the upper two chambers of the heart.  · The device will be placed so that it closes the MARIANELA. X-rays will be done to make sure the device is in the right place.  · The catheter and wire will be removed. The closure device will remain in your heart.  · A bandage (dressing) will be placed over the site where the catheter was inserted. Pressure will be applied to prevent any bleeding.  The procedure may vary among health care providers and hospitals.  What happens after the procedure?  · Your blood pressure, heart rate, breathing rate, and blood oxygen level will be monitored until the medicines you were given have worn off.  · You may have to wear compression stockings. These stockings help to prevent blood clots and reduce swelling in your legs.  · Do not drive for 24 hours if you were given a sedative. Ask your health care provider when it is safe for you to drive.  · You may be given pain medicine.  · You may need to drink more fluids to wash (flush) the X-ray dye out of your body.  · Take over-the-counter and prescription medicines only as told by your health care provider. This is especially important if you were given blood thinners.  Summary  · Left atrial appendage (MARIANELA) closure device implantation is a surgery that is done to put a small device in one of the heart's upper chambers (left atrium). The device is placed in a small sac in the wall of the left atrium (left atrial appendage).  · The device closes the MARIANELA to prevent strokes and other problems.  · Follow instructions from your health care provider before and after the procedure.  This information is not intended to replace advice given to you by your health care provider. Make sure you  discuss any questions you have with your health care provider.  Document Released: 05/01/2018 Document Revised: 05/01/2018 Document Reviewed: 05/01/2018  [employer] Interactive Patient Education © 2019 [employer]

## 2019-07-12 RX ORDER — ATORVASTATIN CALCIUM 10 MG/1
TABLET, FILM COATED ORAL
Qty: 30 TABLET | Refills: 9 | Status: SHIPPED | OUTPATIENT
Start: 2019-07-12 | End: 2020-08-19

## 2019-07-12 RX ORDER — OMEPRAZOLE 40 MG/1
CAPSULE, DELAYED RELEASE ORAL
Qty: 30 CAPSULE | Refills: 9 | Status: ON HOLD | OUTPATIENT
Start: 2019-07-12 | End: 2019-10-04 | Stop reason: SDUPTHER

## 2019-07-12 RX ORDER — RIVAROXABAN 15 MG/1
TABLET, FILM COATED ORAL
Qty: 30 TABLET | Refills: 9 | Status: SHIPPED | OUTPATIENT
Start: 2019-07-12 | End: 2019-10-11 | Stop reason: HOSPADM

## 2019-07-15 ENCOUNTER — OFFICE VISIT (OUTPATIENT)
Dept: FAMILY MEDICINE CLINIC | Facility: CLINIC | Age: 84
End: 2019-07-15

## 2019-07-15 VITALS
BODY MASS INDEX: 31.12 KG/M2 | DIASTOLIC BLOOD PRESSURE: 80 MMHG | TEMPERATURE: 98 F | WEIGHT: 187 LBS | SYSTOLIC BLOOD PRESSURE: 160 MMHG | RESPIRATION RATE: 8 BRPM | HEART RATE: 68 BPM

## 2019-07-15 DIAGNOSIS — I10 ESSENTIAL HYPERTENSION: ICD-10-CM

## 2019-07-15 DIAGNOSIS — D50.0 IRON DEFICIENCY ANEMIA DUE TO CHRONIC BLOOD LOSS: Primary | ICD-10-CM

## 2019-07-15 DIAGNOSIS — I50.23 ACUTE ON CHRONIC SYSTOLIC CONGESTIVE HEART FAILURE (HCC): ICD-10-CM

## 2019-07-15 DIAGNOSIS — N28.9 RENAL INSUFFICIENCY, MILD: ICD-10-CM

## 2019-07-15 PROBLEM — R27.0 ATAXIA: Status: ACTIVE | Noted: 2017-01-06

## 2019-07-15 PROBLEM — D50.9 IRON DEFICIENCY ANEMIA: Status: ACTIVE | Noted: 2018-12-26

## 2019-07-15 PROBLEM — R33.9 URINARY RETENTION: Status: ACTIVE | Noted: 2018-03-27

## 2019-07-15 PROBLEM — I50.9 CONGESTIVE HEART FAILURE: Status: ACTIVE | Noted: 2018-12-26

## 2019-07-15 PROBLEM — I35.1 AORTIC VALVE REGURGITATION: Status: ACTIVE | Noted: 2018-12-26

## 2019-07-15 PROBLEM — H81.10 BENIGN POSITIONAL VERTIGO: Status: ACTIVE | Noted: 2017-06-12

## 2019-07-15 LAB
ANION GAP SERPL CALCULATED.3IONS-SCNC: 14.5 MMOL/L (ref 5–15)
BASOPHILS # BLD AUTO: 0.1 10*3/MM3 (ref 0–0.2)
BASOPHILS NFR BLD AUTO: 0.8 % (ref 0–1.5)
BUN BLD-MCNC: 16 MG/DL (ref 8–20)
BUN/CREAT SERPL: 13.3 (ref 5.4–26.2)
CALCIUM SPEC-SCNC: 9.2 MG/DL (ref 8.9–10.3)
CHLORIDE SERPL-SCNC: 101 MMOL/L (ref 101–111)
CO2 SERPL-SCNC: 27 MMOL/L (ref 22–32)
CREAT BLD-MCNC: 1.2 MG/DL (ref 0.4–1)
DEPRECATED RDW RBC AUTO: 63 FL (ref 37–54)
EOSINOPHIL # BLD AUTO: 0.2 10*3/MM3 (ref 0–0.4)
EOSINOPHIL NFR BLD AUTO: 3.4 % (ref 0.3–6.2)
ERYTHROCYTE [DISTWIDTH] IN BLOOD BY AUTOMATED COUNT: 21.7 % (ref 12.3–15.4)
GFR SERPL CREATININE-BSD FRML MDRD: 43 ML/MIN/1.73
GLUCOSE BLD-MCNC: 147 MG/DL (ref 65–99)
HCT VFR BLD AUTO: 34 % (ref 34–46.6)
HGB BLD-MCNC: 10.9 G/DL (ref 12–15.9)
LYMPHOCYTES # BLD AUTO: 1.5 10*3/MM3 (ref 0.7–3.1)
LYMPHOCYTES NFR BLD AUTO: 22.6 % (ref 19.6–45.3)
MCH RBC QN AUTO: 27.1 PG (ref 26.6–33)
MCHC RBC AUTO-ENTMCNC: 31.9 G/DL (ref 31.5–35.7)
MCV RBC AUTO: 84.9 FL (ref 79–97)
MONOCYTES # BLD AUTO: 0.7 10*3/MM3 (ref 0.1–0.9)
MONOCYTES NFR BLD AUTO: 10.5 % (ref 5–12)
NEUTROPHILS # BLD AUTO: 4 10*3/MM3 (ref 1.7–7)
NEUTROPHILS NFR BLD AUTO: 62.7 % (ref 42.7–76)
NRBC BLD AUTO-RTO: 0.1 /100 WBC (ref 0–0.2)
PLATELET # BLD AUTO: 224 10*3/MM3 (ref 140–450)
PMV BLD AUTO: 9 FL (ref 6–12)
POTASSIUM BLD-SCNC: 3.5 MMOL/L (ref 3.6–5.1)
RBC # BLD AUTO: 4.01 10*6/MM3 (ref 3.77–5.28)
SODIUM BLD-SCNC: 139 MMOL/L (ref 136–144)
WBC NRBC COR # BLD: 6.4 10*3/MM3 (ref 3.4–10.8)

## 2019-07-15 PROCEDURE — 99213 OFFICE O/P EST LOW 20 MIN: CPT | Performed by: FAMILY MEDICINE

## 2019-07-15 PROCEDURE — 85025 COMPLETE CBC W/AUTO DIFF WBC: CPT | Performed by: FAMILY MEDICINE

## 2019-07-15 PROCEDURE — 80048 BASIC METABOLIC PNL TOTAL CA: CPT | Performed by: FAMILY MEDICINE

## 2019-07-15 RX ORDER — HYDROCODONE BITARTRATE AND ACETAMINOPHEN 5; 325 MG/1; MG/1
1 TABLET ORAL EVERY 8 HOURS PRN
Qty: 30 TABLET | Refills: 0 | Status: ON HOLD | OUTPATIENT
Start: 2019-07-15 | End: 2019-10-03

## 2019-07-15 NOTE — PATIENT INSTRUCTIONS
Fall Prevention in the Home, Adult  Falls can cause injuries. They can happen to people of all ages. There are many things you can do to make your home safe and to help prevent falls. Ask for help when making these changes, if needed.  What actions can I take to prevent falls?  General Instructions  · Use good lighting in all rooms. Replace any light bulbs that burn out.  · Turn on the lights when you go into a dark area. Use night-lights.  · Keep items that you use often in easy-to-reach places. Lower the shelves around your home if necessary.  · Set up your furniture so you have a clear path. Avoid moving your furniture around.  · Do not have throw rugs and other things on the floor that can make you trip.  · Avoid walking on wet floors.  · If any of your floors are uneven, fix them.  · Add color or contrast paint or tape to clearly benigno and help you see:  ? Any grab bars or handrails.  ? First and last steps of stairways.  ? Where the edge of each step is.  · If you use a stepladder:  ? Make sure that it is fully opened. Do not climb a closed stepladder.  ? Make sure that both sides of the stepladder are locked into place.  ? Ask someone to hold the stepladder for you while you use it.  · If there are any pets around you, be aware of where they are.  What can I do in the bathroom?  · Keep the floor dry. Clean up any water that spills onto the floor as soon as it happens.  · Remove soap buildup in the tub or shower regularly.  · Use non-skid mats or decals on the floor of the tub or shower.  · Attach bath mats securely with double-sided, non-slip rug tape.  · If you need to sit down in the shower, use a plastic, non-slip stool.  · Install grab bars by the toilet and in the tub and shower. Do not use towel bars as grab bars.  What can I do in the bedroom?  · Make sure that you have a light by your bed that is easy to reach.  · Do not use any sheets or blankets that are too big for your bed. They should not hang  down onto the floor.  · Have a firm chair that has side arms. You can use this for support while you get dressed.  What can I do in the kitchen?  · Clean up any spills right away.  · If you need to reach something above you, use a strong step stool that has a grab bar.  · Keep electrical cords out of the way.  · Do not use floor polish or wax that makes floors slippery. If you must use wax, use non-skid floor wax.  What can I do with my stairs?  · Do not leave any items on the stairs.  · Make sure that you have a light switch at the top of the stairs and the bottom of the stairs. If you do not have them, ask someone to add them for you.  · Make sure that there are handrails on both sides of the stairs, and use them. Fix handrails that are broken or loose. Make sure that handrails are as long as the stairways.  · Install non-slip stair treads on all stairs in your home.  · Avoid having throw rugs at the top or bottom of the stairs. If you do have throw rugs, attach them to the floor with carpet tape.  · Choose a carpet that does not hide the edge of the steps on the stairway.  · Check any carpeting to make sure that it is firmly attached to the stairs. Fix any carpet that is loose or worn.  What can I do on the outside of my home?  · Use bright outdoor lighting.  · Regularly fix the edges of walkways and driveways and fix any cracks.  · Remove anything that might make you trip as you walk through a door, such as a raised step or threshold.  · Trim any bushes or trees on the path to your home.  · Regularly check to see if handrails are loose or broken. Make sure that both sides of any steps have handrails.  · Install guardrails along the edges of any raised decks and porches.  · Clear walking paths of anything that might make someone trip, such as tools or rocks.  · Have any leaves, snow, or ice cleared regularly.  · Use sand or salt on walking paths during winter.  · Clean up any spills in your garage right away.  This includes grease or oil spills.  What other actions can I take?  · Wear shoes that:  ? Have a low heel. Do not wear high heels.  ? Have rubber bottoms.  ? Are comfortable and fit you well.  ? Are closed at the toe. Do not wear open-toe sandals.  · Use tools that help you move around (mobility aids) if they are needed. These include:  ? Canes.  ? Walkers.  ? Scooters.  ? Crutches.  · Review your medicines with your doctor. Some medicines can make you feel dizzy. This can increase your chance of falling.  Ask your doctor what other things you can do to help prevent falls.  Where to find more information  · Centers for Disease Control and Prevention, [place]: https://cdc.gov  · National [place] on Aging: https://ar4ysxp.malina.nih.gov  Contact a doctor if:  · You are afraid of falling at home.  · You feel weak, drowsy, or dizzy at home.  · You fall at home.  Summary  · There are many simple things that you can do to make your home safe and to help prevent falls.  · Ways to make your home safe include removing tripping hazards and installing grab bars in the bathroom.  · Ask for help when making these changes in your home.  This information is not intended to replace advice given to you by your health care provider. Make sure you discuss any questions you have with your health care provider.  Document Released: 10/14/2010 Document Revised: 08/02/2018 Document Reviewed: 08/02/2018  [employer] Interactive Patient Education © 2019 [employer]    Exercising to Stay Healthy  Exercising regularly is important. It has many health benefits, such as:  · Improving your overall fitness, flexibility, and endurance.  · Increasing your bone density.  · Helping with weight control.  · Decreasing your body fat.  · Increasing your muscle strength.  · Reducing stress and tension.  · Improving your overall health.    In order to become healthy and stay healthy, it is recommended that you do moderate-intensity and vigorous-intensity  exercise. You can tell that you are exercising at a moderate intensity if you have a higher heart rate and faster breathing, but you are still able to hold a conversation. You can tell that you are exercising at a vigorous intensity if you are breathing much harder and faster and cannot hold a conversation while exercising.  How often should I exercise?  Choose an activity that you enjoy and set realistic goals. Your health care provider can help you to make an activity plan that works for you. Exercise regularly as directed by your health care provider. This may include:  · Doing resistance training twice each week, such as:  ? Push-ups.  ? Sit-ups.  ? Lifting weights.  ? Using resistance bands.  · Doing a given intensity of exercise for a given amount of time. Choose from these options:  ? 150 minutes of moderate-intensity exercise every week.  ? 75 minutes of vigorous-intensity exercise every week.  ? A mix of moderate-intensity and vigorous-intensity exercise every week.    Children, pregnant women, people who are out of shape, people who are overweight, and older adults may need to consult a health care provider for individual recommendations. If you have any sort of medical condition, be sure to consult your health care provider before starting a new exercise program.  What are some exercise ideas?  Some moderate-intensity exercise ideas include:  · Walking at a rate of 1 mile in 15 minutes.  · Biking.  · Hiking.  · Golfing.  · Dancing.    Some vigorous-intensity exercise ideas include:  · Walking at a rate of at least 4.5 miles per hour.  · Jogging or running at a rate of 5 miles per hour.  · Biking at a rate of at least 10 miles per hour.  · Lap swimming.  · Roller-skating or in-line skating.  · Cross-country skiing.  · Vigorous competitive sports, such as football, basketball, and soccer.  · Jumping rope.  · Aerobic dancing.    What are some everyday activities that can help me to get exercise?  · Yard  work, such as:  ? Pushing a .  ? Raking and bagging leaves.  · Washing and waxing your car.  · Pushing a stroller.  · Shoveling snow.  · Gardening.  · Washing windows or floors.  How can I be more active in my day-to-day activities?  · Use the stairs instead of the elevator.  · Take a walk during your lunch break.  · If you drive, park your car farther away from work or school.  · If you take public transportation, get off one stop early and walk the rest of the way.  · Make all of your phone calls while standing up and walking around.  · Get up, stretch, and walk around every 30 minutes throughout the day.  What guidelines should I follow while exercising?  · Do not exercise so much that you hurt yourself, feel dizzy, or get very short of breath.  · Consult your health care provider before starting a new exercise program.  · Wear comfortable clothes and shoes with good support.  · Drink plenty of water while you exercise to prevent dehydration or heat stroke. Body water is lost during exercise and must be replaced.  · Work out until you breathe faster and your heart beats faster.  This information is not intended to replace advice given to you by your health care provider. Make sure you discuss any questions you have with your health care provider.  Document Released: 01/20/2012 Document Revised: 05/25/2017 Document Reviewed: 05/21/2015  [employer] Interactive Patient Education © 2018 [employer]

## 2019-07-15 NOTE — PROGRESS NOTES
Rooming Tab(CC,VS,Pt Hx,Fall Screen)  Chief Complaint   Patient presents with   • Follow-up       Subjective Patient fell last after slipping on a walnut and twisted her left knee couple of days ago.  She did not lose consciousness or hit her head.  There is no syncope.  He is not sure that she hit her knee but more twisted it.  It is painful.  Patient has atrial fibrillation recurrent and is supposed to see Dr. Villar for consideration of the watchman.  She is going for a transesophageal echo.  She would like to get off of blood thinners.  She is also here for follow-up of her blood pressure.  He does not check her blood pressure at home.  Patient is also here for follow-up of her iron deficiency.  She does not note any blood in her stool, no melena or hematemesis or hematochezia.  She has chronic renal insufficiency has no urinary complaints.    I have reviewed and updated her medications, medical history and problem list during today's office visit.     Patient Care Team:  Provider, No Known as PCP - General  Provider, No Known as PCP - Family Medicine    Problem List Tab  Medications Tab  Synopsis Tab  Chart Review Tab  Care Everywhere Tab  Immunizations Tab  Patient History Tab    Social History     Tobacco Use   • Smoking status: Never Smoker   • Smokeless tobacco: Never Used   Substance Use Topics   • Alcohol use: No     Frequency: Never       Review of Systems   Constitutional: Positive for fatigue. Negative for chills and fever.   HENT: Negative for nosebleeds.    Cardiovascular: Negative for chest pain and palpitations.   Gastrointestinal: Negative for abdominal pain and blood in stool.   Genitourinary: Negative for dysuria and hematuria.   Musculoskeletal: Positive for arthralgias, back pain and joint swelling.   Neurological: Negative for dizziness, seizures and syncope.   Psychiatric/Behavioral: Negative for suicidal ideas and depressed mood. The patient is not nervous/anxious.        Objective      Rooming Tab(CC,VS,Pt Hx,Fall Screen)  /80 (BP Location: Right arm, Patient Position: Sitting, Cuff Size: Large Adult)   Pulse 68   Temp 98 °F (36.7 °C) (Oral)   Resp 8   Wt 84.8 kg (187 lb)   BMI 31.12 kg/m²     Body mass index is 31.12 kg/m².    Physical Exam   Constitutional: She is oriented to person, place, and time. She appears well-developed and well-nourished. No distress.   Eyes: Conjunctivae, EOM and lids are normal. Pupils are equal, round, and reactive to light.   Neck: Trachea normal and normal range of motion. Neck supple. No JVD present. Carotid bruit is not present. No thyroid mass and no thyromegaly present.   Cardiovascular: Normal rate, regular rhythm and normal heart sounds.   Pulmonary/Chest: Effort normal and breath sounds normal. She has no rales.   Musculoskeletal: Normal range of motion. She exhibits no edema.   Neurological: She is alert and oriented to person, place, and time.   Skin: Skin is warm and dry.   Psychiatric: She has a normal mood and affect. Her speech is normal and behavior is normal. Judgment and thought content normal. She is attentive.   Nursing note and vitals reviewed.       Statin Choice Calculator  Data Reviewed:               Lab Results   Component Value Date    BUN 16 07/15/2019    CREATININE 1.20 (H) 07/15/2019    EGFRIFNONA 43 (L) 07/15/2019     07/15/2019    K 3.5 (L) 07/15/2019     07/15/2019    CALCIUM 9.2 07/15/2019    ALBUMIN 3.2 (L) 05/24/2019    BILITOT 1.2 05/24/2019    ALKPHOS 84 05/24/2019    AST 19 05/24/2019    ALT 14 05/24/2019    TRIG 111 05/16/2019    HDL 70 05/16/2019    LDL 69 05/16/2019    LDLHDL 1.0 05/16/2019    WBC 6.40 07/15/2019    RBC 4.01 07/15/2019    HCT 34.0 07/15/2019    MCV 84.9 07/15/2019    MCH 27.1 07/15/2019    INR 1.0 05/24/2019    DMLV70NG 54 05/16/2019      Assessment/Plan   Order Review Tab  Health Maintenance Tab  Patient Plan/Order Tab  Diagnoses and all orders for this visit:    1. Iron deficiency  anemia due to chronic blood loss (Primary)  Assessment & Plan:  Improved with treatment, continue iron    Orders:  -     CBC & Differential  -     CBC Auto Differential    2. Essential hypertension  Assessment & Plan:  Hypertension is improving with treatment.  Continue current treatment regimen.  Blood pressure will be reassessed in 3 months.      3. Acute on chronic systolic congestive heart failure (CMS/HCC)  Assessment & Plan:  Congestive heart failure due to hypertension.  Heart failure is improving with treatment.  NYHA Class III.  Continue current treatment regimen.  Dietary sodium restriction.  Continue current medications.  Heart failure will be reassessed next scheduled.      4. Renal insufficiency, mild  Assessment & Plan:  Renal condition is unchanged.  Continue current treatment regimen.  Fluid restriction.  Regular aerobic exercise.  Continue current medications.  Renal condition will be reassessed next scheduled.    Orders:  -     Basic metabolic panel    Other orders  -     HYDROcodone-acetaminophen (NORCO) 5-325 MG per tablet; Take 1 tablet by mouth Every 8 (Eight) Hours As Needed for Moderate Pain .  Dispense: 30 tablet; Refill: 0    Patient was given instructions and counseling regarding her condition or for health maintenance advice. Please see specific information pulled into the AVS by me.   Wrapup Tab  Return for Next scheduled follow up.

## 2019-07-15 NOTE — ASSESSMENT & PLAN NOTE
Renal condition is unchanged.  Continue current treatment regimen.  Fluid restriction.  Regular aerobic exercise.  Continue current medications.  Renal condition will be reassessed next scheduled.

## 2019-07-15 NOTE — ASSESSMENT & PLAN NOTE
Congestive heart failure due to hypertension.  Heart failure is improving with treatment.  NYHA Class III.  Continue current treatment regimen.  Dietary sodium restriction.  Continue current medications.  Heart failure will be reassessed next scheduled.

## 2019-07-16 ENCOUNTER — ANESTHESIA (OUTPATIENT)
Dept: CARDIOLOGY | Facility: HOSPITAL | Age: 84
End: 2019-07-16

## 2019-07-16 ENCOUNTER — ANESTHESIA EVENT (OUTPATIENT)
Dept: CARDIOLOGY | Facility: HOSPITAL | Age: 84
End: 2019-07-16

## 2019-07-16 ENCOUNTER — HOSPITAL ENCOUNTER (OUTPATIENT)
Dept: CARDIOLOGY | Facility: HOSPITAL | Age: 84
Discharge: HOME | End: 2019-07-16
Admitting: INTERNAL MEDICINE

## 2019-07-16 VITALS
HEART RATE: 85 BPM | RESPIRATION RATE: 21 BRPM | TEMPERATURE: 98.2 F | BODY MASS INDEX: 34.16 KG/M2 | WEIGHT: 185.63 LBS | OXYGEN SATURATION: 96 % | HEIGHT: 62 IN | DIASTOLIC BLOOD PRESSURE: 85 MMHG | SYSTOLIC BLOOD PRESSURE: 170 MMHG

## 2019-07-16 DIAGNOSIS — I48.20 ATRIAL FIBRILLATION, CHRONIC (HCC): ICD-10-CM

## 2019-07-16 DIAGNOSIS — Z95.0 PACEMAKER: ICD-10-CM

## 2019-07-16 LAB
BH CV ECHO MEAS - AVA PLANIMETRY TRACED: 1 CM2
BH CV ECHO MEAS - BSA(HAYCOCK): 2 M^2
BH CV ECHO MEAS - BSA: 1.8 M^2
BH CV ECHO MEAS - BZI_BMI: 33.8 KILOGRAMS/M^2
BH CV ECHO MEAS - BZI_METRIC_HEIGHT: 157.5 CM
BH CV ECHO MEAS - BZI_METRIC_WEIGHT: 83.9 KG
BH CV ECHO MEAS - EF(MOD-BP): 60 %

## 2019-07-16 PROCEDURE — 93320 DOPPLER ECHO COMPLETE: CPT

## 2019-07-16 PROCEDURE — 25010000002 PROPOFOL 10 MG/ML EMULSION: Performed by: ANESTHESIOLOGY

## 2019-07-16 PROCEDURE — 93312 ECHO TRANSESOPHAGEAL: CPT

## 2019-07-16 PROCEDURE — 93325 DOPPLER ECHO COLOR FLOW MAPG: CPT

## 2019-07-16 PROCEDURE — 93312 ECHO TRANSESOPHAGEAL: CPT | Performed by: INTERNAL MEDICINE

## 2019-07-16 PROCEDURE — 93320 DOPPLER ECHO COMPLETE: CPT | Performed by: INTERNAL MEDICINE

## 2019-07-16 PROCEDURE — 93325 DOPPLER ECHO COLOR FLOW MAPG: CPT | Performed by: INTERNAL MEDICINE

## 2019-07-16 RX ORDER — PROPOFOL 10 MG/ML
VIAL (ML) INTRAVENOUS AS NEEDED
Status: DISCONTINUED | OUTPATIENT
Start: 2019-07-16 | End: 2019-07-16 | Stop reason: SURG

## 2019-07-16 RX ORDER — SODIUM CHLORIDE 9 MG/ML
30 INJECTION, SOLUTION INTRAVENOUS CONTINUOUS
Status: DISCONTINUED | OUTPATIENT
Start: 2019-07-16 | End: 2019-07-17 | Stop reason: HOSPADM

## 2019-07-16 RX ADMIN — PROPOFOL 100 MG: 10 INJECTION, EMULSION INTRAVENOUS at 16:10

## 2019-07-16 RX ADMIN — SODIUM CHLORIDE 30 ML/HR: 900 INJECTION, SOLUTION INTRAVENOUS at 14:32

## 2019-07-16 RX ADMIN — PROPOFOL 40 MG: 10 INJECTION, EMULSION INTRAVENOUS at 16:15

## 2019-07-16 NOTE — ANESTHESIA POSTPROCEDURE EVALUATION
Patient: Cherie Hinton    Procedure Summary     Date:  07/16/19 Room / Location:  [facility]    Anesthesia Start:  1606 Anesthesia Stop:  1624    Procedure:  ADULT TRANSESOPHAGEAL ECHO (DHEERAJ) W/ CONT IF NECESSARY PER PROTOCOL Diagnosis:       Atrial fibrillation, chronic (CMS/HCC)      Pacemaker      (Arrhythmia)    Scheduled Providers:  Richie Chapman MD Provider:  Daniel Bustos DO    Anesthesia Type:  MAC ASA Status:  3          Anesthesia Type: MAC  Last vitals  BP   141/64 (07/16/19 1635)   Temp   98.2 °F (36.8 °C) (07/16/19 1310)   Pulse   84 (07/16/19 1635)   Resp   21 (07/16/19 1622)     SpO2   96 % (07/16/19 1635)     Post Anesthesia Care and Evaluation    Patient location during evaluation: PACU  Patient participation: complete - patient participated  Level of consciousness: awake  Pain scale: See nurse's notes for pain score.  Pain management: adequate  Airway patency: patent  Anesthetic complications: No anesthetic complications  PONV Status: none  Cardiovascular status: acceptable  Respiratory status: acceptable  Hydration status: acceptable    Comments: Patient seen and examined postoperatively; vital signs stable; SpO2 greater than or equal to 90%; cardiopulmonary status stable; nausea/vomiting adequately controlled; pain adequately controlled; no apparent anesthesia complications; patient discharged from anesthesia care when discharge criteria were met       no

## 2019-07-16 NOTE — ANESTHESIA PREPROCEDURE EVALUATION
Anesthesia Evaluation     NPO Solid Status: > 4 hours  NPO Liquid Status: > 4 hours           Airway   Mallampati: II  TM distance: >3 FB  Neck ROM: full  No difficulty expected  Dental - normal exam     Pulmonary - normal exam   (+) sleep apnea,   Cardiovascular     Rhythm: irregular  Rate: abnormal    (+) hypertension well controlled, valvular problems/murmurs, CAD, dysrhythmias Atrial Fib, angina with exertion, CHF, hyperlipidemia,       Neuro/Psych  (+) TIA, CVA, numbness, psychiatric history,     GI/Hepatic/Renal/Endo    (+)  hiatal hernia, GERD well controlled,      Musculoskeletal     Abdominal   (+) obese,    Substance History      OB/GYN          Other   (+) arthritis   history of cancer                    Anesthesia Plan    ASA 3     MAC     intravenous induction   Anesthetic plan, all risks, benefits, and alternatives have been provided, discussed and informed consent has been obtained with: patient.

## 2019-07-16 NOTE — DISCHARGE INSTRUCTIONS
DHEERAJ DC Instructions    1) The medication, which was used to put the patient to sleep, will be acting in your body for the next twenty-four (24) hours, so you might feel a little sleepy; this feeling will slowly wear off.  Because the medicine is still in your system for the next twenty-four (24) hours, the adult patient SHOULD NOT:    a. Drive a car, operate machinery, or power tools  b. Drink any alcoholic drinks (not even beer)  c. Make any important decisions such as to sign important papers    2) We strongly suggest that a responsible adult be with the patient the rest of the day.    3) Any problems with:    a. EXCESSIVE MUCOUS  b. SPITTING UP BLOOD  c. SORE THROAT AT MORE THAN 72 HOURS    NOTIFY DR. Chapman -[phone] OR CALL THE [facility] -[phone].

## 2019-08-06 ENCOUNTER — TELEPHONE (OUTPATIENT)
Dept: CARDIOLOGY | Facility: CLINIC | Age: 84
End: 2019-08-06

## 2019-08-06 NOTE — TELEPHONE ENCOUNTER
Returned patient's call.  Pt states she is ready to move forward with the Watchman procedure.  Pt made aware she needs to be scheduled for shared decision making appointment.  Pt made aware the office will call her to schedule appointment with Dr. Arevalo.  Pt verbalized understanding.

## 2019-08-21 ENCOUNTER — OFFICE VISIT (OUTPATIENT)
Dept: CARDIOLOGY | Facility: CLINIC | Age: 84
End: 2019-08-21

## 2019-08-21 VITALS
BODY MASS INDEX: 32.92 KG/M2 | DIASTOLIC BLOOD PRESSURE: 76 MMHG | HEART RATE: 113 BPM | WEIGHT: 180 LBS | OXYGEN SATURATION: 94 % | SYSTOLIC BLOOD PRESSURE: 130 MMHG

## 2019-08-21 DIAGNOSIS — I10 ESSENTIAL HYPERTENSION: ICD-10-CM

## 2019-08-21 DIAGNOSIS — I25.10 CORONARY ARTERY DISEASE INVOLVING NATIVE CORONARY ARTERY OF NATIVE HEART WITHOUT ANGINA PECTORIS: ICD-10-CM

## 2019-08-21 DIAGNOSIS — I50.82 BIVENTRICULAR CONGESTIVE HEART FAILURE (HCC): ICD-10-CM

## 2019-08-21 DIAGNOSIS — I48.20 CHRONIC ATRIAL FIBRILLATION (HCC): Primary | ICD-10-CM

## 2019-08-21 PROCEDURE — 99214 OFFICE O/P EST MOD 30 MIN: CPT | Performed by: INTERNAL MEDICINE

## 2019-08-21 NOTE — PROGRESS NOTES
Dea Hoyt--patient being seen for shared decision-making regarding watchman device.  History of persistent atrial fibrillation, GI bleed, previous stroke.      Mrs. Cherie Hinton is a 85 y.o. f lady regular patient of Dr. Jc Wilson who has been seen by Dr. Chapman with  history of chronic persistent atrial fibrillation.    She is a known hypertensive with diabetes  and previous [employer] pacemaker implant.  She underwent generator replacement in September 2018.--Her [employer] pacemaker was initially implanted in 2004 it was a dual-chamber device.  In 2009 she had an RV lead fracture and new RV lead was placed in the existing RV lead was capped off.  Her generator replacement was in 2018.  She was in the hospital in late May and we had received an alert from her home Latitude monitor saying that she had a right ventricular lead impedance alert the device had no capture at max output.She was evaluated by  Dr. Chapman and ultimately underwent implant of a micra-leadless pacemaker.  She has a history of mild coronary artery disease which was nonobstructive.      Echocardiogram done during that hospitalization showed ejection fraction of 55 to 60% no significant aortic stenosis mild mitral regurgitation mild tricuspid regurgitation with moderate pulmonary hypertension and RVSP of 55 mmHg     She is interested in getting watchman device since she has had previous GI bleeding secondary to colon cancer.           Medical History        Past Medical History:   Diagnosis Date   • Antral erosion     • Atrial fibrillation (CMS/HCC)     • Atrial fibrillation with controlled ventricular response (CMS/HCC)     • B12 deficiency     • CAD (coronary artery disease)     • Cellulitis 04/2011     on face    • Colon cancer (CMS/HCC)     • Depression     • Diabetes mellitus, type 2 (CMS/HCC)     • Fatigue     • GERD (gastroesophageal reflux disease)     • Hiatal hernia     • History of colonoscopy  04/2010     last done 4/10   • History of esophagogastroduodenoscopy (EGD) 04/2010     last done 4/10   • Hyperlipidemia     • Hypertension       white coat htn   • HALEIGH (iron deficiency anemia)     • LAD (lymphadenopathy)       40% lesion LAD    • Left pontine CVA (CMS/HCC)     • STORMY (obstructive sleep apnea)       not treating   • Osteoarthritis     • 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CMS/HCC)     • Right kidney mass       1.4 cm- following urology    • Sick sinus syndrome (CMS/HCC)     • Stroke (CMS/HCC) 11/2016   • TIA (transient ischemic attack)       left CVA, interrupted TPA; As (moderate-severe)    • Vestibular nerve disease 2017            Surgical History         Past Surgical History:   Procedure Laterality Date   • BLADDER REPAIR       • CARDIAC CATHETERIZATION   05/2010   • CHOLECYSTECTOMY       • COLECTOMY PARTIAL / TOTAL       • COLONOSCOPY   04/16/2018     negative    • ENDOSCOPY   04/16/2018     negative    • HYSTERECTOMY       • INSERT / REPLACE / REMOVE PACEMAKER   09/2018     Pacemaker gen change 9/2018    • OTHER SURGICAL HISTORY   04/25/2015     Exercise myoview, normal    • PACEMAKER IMPLANTATION   01/22/2010     Dual Chamber - 1/22/2010; RA Lead Revision- 5/7/2012- BS                   Social History   Social History            Socioeconomic History   • Marital status:        Spouse name: Not on file   • Number of children: Not on file   • Years of education: Not on file   • Highest education level: Not on file   Tobacco Use   • Smoking status: Never Smoker   Substance and Sexual Activity   • Alcohol use: No       Frequency: Never   • Drug use: No                  Family History   Problem Relation Age of Onset   • Hypertension Mother     • Diabetes Mother     • Coronary artery disease Mother     • Other Father           CVA   • Heart disease Other     • Stroke Other           Review of Systems  General: Negative  for fatigue and tiredness  Eyes: No redness  Cardiovascular: No chest pain, positive  for palpitations  Respiratory:   No  shortness of breath  Gastrointestinal: No nausea or vomiting, bleeding  Genitourinary: no hematuria or dysuria  Musculoskeletal: No arthralgia or myalgia  Skin: No rash  Neurologic: No numbness, tingling, syncope  Hematologic/Lymphatic: No abnormal bleeding        Physical Exam     General:      well developed, well nourished, in no acute distress.    Head:      normocephalic and atraumatic.    Eyes:      PERRL/EOM intact, conjunctiva and sclera clear with out nystagmus.    Neck:      no masses, thyromegaly, trachea central with normal respiratory effort  Lungs:      clear bilaterally to auscultation.    Heart:      non-displaced PMI, chest non-tender; irregular rate and rhythm, S1, S2 without murmurs, rubs, or gallops  Skin:      intact without lesions or rashes.    Psych:      alert and cooperative; normal mood and affect; normal attention span and concentration.       Extremities with trace ankle edema without any cyanosis or clubbing     Muscular skeletal patient walks with a cane with mild kyphosis     Neurological no focal deficits and alert oriented x3     Abdomen soft nontender no hepatomegaly           Assessment plan     Persistent atrial fibrillation   Her [id number] score was 8.  Her HASBLED score was5  History of GI bleed with iron deficiency anemia  Hypertension  Sick sinus syndrome with failed transvenous pacing rate status post leadless pacemaker implantation without complications  Hyperlipidemia  Prior history of stroke     The watchman device has been carefully explained to the patient and her granddaughter in detail with the pros and cons alternative therapies and potential complications and risks and she gave informed consent.      I

## 2019-09-09 ENCOUNTER — CLINICAL SUPPORT NO REQUIREMENTS (OUTPATIENT)
Dept: CARDIOLOGY | Facility: CLINIC | Age: 84
End: 2019-09-09

## 2019-09-09 DIAGNOSIS — I49.5 TACHY-BRADY SYNDROME (HCC): Primary | ICD-10-CM

## 2019-09-09 DIAGNOSIS — Z95.0 PACEMAKER: ICD-10-CM

## 2019-09-09 PROCEDURE — 93294 REM INTERROG EVL PM/LDLS PM: CPT | Performed by: NURSE PRACTITIONER

## 2019-09-09 PROCEDURE — 93296 REM INTERROG EVL PM/IDS: CPT | Performed by: NURSE PRACTITIONER

## 2019-09-12 NOTE — PROGRESS NOTES
REMOTE DEVICE INTERROGATION    Remote device interrogation is reviewed.     Device Company: [employer]    Battery Stable.  Time to ZULEMA greater than 8 years    Presenting EGM: Not available    Arrhythmia Logbook Reviewed: None detected    Device function appears Stable    Continue remote device interrogations every 3 months.

## 2019-09-16 ENCOUNTER — OFFICE VISIT (OUTPATIENT)
Dept: FAMILY MEDICINE CLINIC | Facility: CLINIC | Age: 84
End: 2019-09-16

## 2019-09-16 VITALS
BODY MASS INDEX: 33.29 KG/M2 | HEART RATE: 84 BPM | SYSTOLIC BLOOD PRESSURE: 168 MMHG | TEMPERATURE: 97.8 F | RESPIRATION RATE: 8 BRPM | WEIGHT: 182 LBS | DIASTOLIC BLOOD PRESSURE: 84 MMHG

## 2019-09-16 DIAGNOSIS — I50.23 ACUTE ON CHRONIC SYSTOLIC CONGESTIVE HEART FAILURE (HCC): Primary | ICD-10-CM

## 2019-09-16 DIAGNOSIS — K31.819 GASTRIC AVM: ICD-10-CM

## 2019-09-16 DIAGNOSIS — E53.8 B12 DEFICIENCY: ICD-10-CM

## 2019-09-16 DIAGNOSIS — D50.0 IRON DEFICIENCY ANEMIA DUE TO CHRONIC BLOOD LOSS: ICD-10-CM

## 2019-09-16 DIAGNOSIS — E11.9 TYPE 2 DIABETES MELLITUS WITHOUT COMPLICATION, WITHOUT LONG-TERM CURRENT USE OF INSULIN (HCC): ICD-10-CM

## 2019-09-16 LAB
ALBUMIN SERPL-MCNC: 3.6 G/DL (ref 3.5–4.8)
ALBUMIN UR-MCNC: 5 MG/L
ALBUMIN/GLOB SERPL: 1.3 G/DL (ref 1–1.7)
ALP SERPL-CCNC: 81 U/L (ref 32–91)
ALT SERPL W P-5'-P-CCNC: 12 U/L (ref 14–54)
ANION GAP SERPL CALCULATED.3IONS-SCNC: 15.6 MMOL/L (ref 5–15)
AST SERPL-CCNC: 19 U/L (ref 15–41)
BASOPHILS # BLD AUTO: 0.1 10*3/MM3 (ref 0–0.2)
BASOPHILS NFR BLD AUTO: 1.4 % (ref 0–1.5)
BILIRUB SERPL-MCNC: 0.5 MG/DL (ref 0.3–1.2)
BUN BLD-MCNC: 24 MG/DL (ref 8–20)
BUN/CREAT SERPL: 20 (ref 5.4–26.2)
CALCIUM SPEC-SCNC: 9.4 MG/DL (ref 8.9–10.3)
CHLORIDE SERPL-SCNC: 105 MMOL/L (ref 101–111)
CO2 SERPL-SCNC: 25 MMOL/L (ref 22–32)
CREAT BLD-MCNC: 1.2 MG/DL (ref 0.4–1)
CREAT UR-MCNC: 150.5 MG/DL
DEPRECATED RDW RBC AUTO: 47.7 FL (ref 37–54)
EOSINOPHIL # BLD AUTO: 0.2 10*3/MM3 (ref 0–0.4)
EOSINOPHIL NFR BLD AUTO: 4 % (ref 0.3–6.2)
ERYTHROCYTE [DISTWIDTH] IN BLOOD BY AUTOMATED COUNT: 15.4 % (ref 12.3–15.4)
GFR SERPL CREATININE-BSD FRML MDRD: 43 ML/MIN/1.73
GLOBULIN UR ELPH-MCNC: 2.8 GM/DL (ref 2.5–3.8)
GLUCOSE BLD-MCNC: 140 MG/DL (ref 65–99)
HBA1C MFR BLD: 6.9 % (ref 3.5–5.6)
HCT VFR BLD AUTO: 34.7 % (ref 34–46.6)
HGB BLD-MCNC: 11.5 G/DL (ref 12–15.9)
LYMPHOCYTES # BLD AUTO: 1.7 10*3/MM3 (ref 0.7–3.1)
LYMPHOCYTES NFR BLD AUTO: 32.3 % (ref 19.6–45.3)
MCH RBC QN AUTO: 28.9 PG (ref 26.6–33)
MCHC RBC AUTO-ENTMCNC: 33.1 G/DL (ref 31.5–35.7)
MCV RBC AUTO: 87.2 FL (ref 79–97)
MICROALBUMIN/CREAT UR: 3.3 UG/MG
MONOCYTES # BLD AUTO: 0.5 10*3/MM3 (ref 0.1–0.9)
MONOCYTES NFR BLD AUTO: 10.2 % (ref 5–12)
NEUTROPHILS # BLD AUTO: 2.7 10*3/MM3 (ref 1.7–7)
NEUTROPHILS NFR BLD AUTO: 52.1 % (ref 42.7–76)
NRBC BLD AUTO-RTO: 0.1 /100 WBC (ref 0–0.2)
PLATELET # BLD AUTO: 244 10*3/MM3 (ref 140–450)
PMV BLD AUTO: 8.9 FL (ref 6–12)
POTASSIUM BLD-SCNC: 4.6 MMOL/L (ref 3.6–5.1)
PROT SERPL-MCNC: 6.4 G/DL (ref 6.1–7.9)
RBC # BLD AUTO: 3.98 10*6/MM3 (ref 3.77–5.28)
SODIUM BLD-SCNC: 141 MMOL/L (ref 136–144)
VIT B12 BLD-MCNC: 188 PG/ML (ref 180–914)
WBC NRBC COR # BLD: 5.2 10*3/MM3 (ref 3.4–10.8)

## 2019-09-16 PROCEDURE — 83036 HEMOGLOBIN GLYCOSYLATED A1C: CPT | Performed by: FAMILY MEDICINE

## 2019-09-16 PROCEDURE — 99214 OFFICE O/P EST MOD 30 MIN: CPT | Performed by: FAMILY MEDICINE

## 2019-09-16 PROCEDURE — 82570 ASSAY OF URINE CREATININE: CPT | Performed by: FAMILY MEDICINE

## 2019-09-16 PROCEDURE — 80053 COMPREHEN METABOLIC PANEL: CPT | Performed by: FAMILY MEDICINE

## 2019-09-16 PROCEDURE — 82043 UR ALBUMIN QUANTITATIVE: CPT | Performed by: FAMILY MEDICINE

## 2019-09-16 PROCEDURE — 85025 COMPLETE CBC W/AUTO DIFF WBC: CPT | Performed by: FAMILY MEDICINE

## 2019-09-16 PROCEDURE — 82607 VITAMIN B-12: CPT | Performed by: FAMILY MEDICINE

## 2019-09-16 RX ORDER — LANOLIN ALCOHOL/MO/W.PET/CERES
1000 CREAM (GRAM) TOPICAL DAILY
Qty: 30 TABLET | Refills: 5 | Status: SHIPPED | OUTPATIENT
Start: 2019-09-16 | End: 2020-05-26

## 2019-09-16 NOTE — ASSESSMENT & PLAN NOTE
Diabetes is improving with lifestyle modifications.   Reminded to bring in blood sugar diary at next visit.  Dietary recommendations for ADA diet.  Regular aerobic exercise.  Discussed foot care.  Diabetes will be reassessed 4 months.

## 2019-09-16 NOTE — PROGRESS NOTES
Rooming Tab(CC,VS,Pt Hx,Fall Screen)  Chief Complaint   Patient presents with   • Follow-up       Subjective 85-year-old here for follow-up of her hypertension.  Patient states her blood pressure at home is normal.  She is felt really good over the last several weeks and has no complaints today.  Has a history of iron deficiency anemia and denies any blood in her stool or melena.  She wants a stent again stopped Carafate on her own and every time she seems to do that she has bleeding from duodenal AVMs or gastric AVMs.  Try to get her to take her Carafate and stay on it.  The patient has diabetes mellitus but does not check her sugars.  She denies any numbness in her feet.  Does check her feet daily but has not seen an ophthalmologist.  She eats basically what she wants.  Patient has a history of congestive heart failure but denies any trouble breathing or orthopnea or PND.  Her energy levels are good and she has had no chest pain.    I have reviewed and updated her medications, medical history and problem list during today's office visit.     Patient Care Team:  Denny Field MD as PCP - General (Family Medicine)    Problem List Tab  Medications Tab  Synopsis Tab  Chart Review Tab  Care Everywhere Tab  Immunizations Tab  Patient History Tab    Social History     Tobacco Use   • Smoking status: Never Smoker   • Smokeless tobacco: Never Used   Substance Use Topics   • Alcohol use: No     Frequency: Never       Review of Systems   Constitutional: Negative for chills, fatigue and fever.   HENT: Negative for nosebleeds.    Eyes: Negative for double vision.   Respiratory: Negative for chest tightness and shortness of breath.    Cardiovascular: Negative for chest pain and palpitations.   Gastrointestinal: Negative for blood in stool.   Genitourinary: Negative for dysuria and hematuria.   Neurological: Negative for dizziness and syncope.   Psychiatric/Behavioral: Negative for depressed mood.       Objective     Rooming  Tab(CC,VS,Pt Hx,Fall Screen)  /84   Pulse 84   Temp 97.8 °F (36.6 °C) (Oral)   Resp 8   Wt 82.6 kg (182 lb)   BMI 33.29 kg/m²     Body mass index is 33.29 kg/m².    Physical Exam   Constitutional: She is oriented to person, place, and time. She appears well-developed and well-nourished. No distress.   HENT:   Head: Normocephalic and atraumatic.   Nose: Nose normal.   Mouth/Throat: Oropharynx is clear and moist.   Eyes: Conjunctivae, EOM and lids are normal. Pupils are equal, round, and reactive to light.   Neck: Trachea normal and normal range of motion. Neck supple. No JVD present. Carotid bruit is not present. No thyroid mass and no thyromegaly present.   No carotid bruits   Cardiovascular: Normal rate, regular rhythm, normal heart sounds and intact distal pulses.   Pulmonary/Chest: Effort normal and breath sounds normal.   Musculoskeletal:   No c/c/e   Neurological: She is alert and oriented to person, place, and time. No cranial nerve deficit.   Skin: Skin is warm and dry.   Psychiatric: She has a normal mood and affect. Her speech is normal and behavior is normal. She is attentive.   Nursing note and vitals reviewed.       Statin Choice Calculator  Data Reviewed:               Lab Results   Component Value Date    BUN 24 (H) 09/16/2019    CREATININE 1.20 (H) 09/16/2019    EGFRIFNONA 43 (L) 09/16/2019     09/16/2019    K 4.6 09/16/2019     09/16/2019    CALCIUM 9.4 09/16/2019    ALBUMIN 3.60 09/16/2019    BILITOT 0.5 09/16/2019    ALKPHOS 81 09/16/2019    AST 19 09/16/2019    ALT 12 (L) 09/16/2019    MICROALBUR 5.0 09/16/2019    WBC 5.20 09/16/2019    RBC 3.98 09/16/2019    HCT 34.7 09/16/2019    MCV 87.2 09/16/2019    MCH 28.9 09/16/2019      Assessment/Plan   Order Review Tab  Health Maintenance Tab  Patient Plan/Order Tab  Diagnoses and all orders for this visit:    1. Acute on chronic systolic congestive heart failure (CMS/HCC) (Primary)  Assessment & Plan:  Congestive heart failure  due to hypertension.  Heart failure is improving with treatment.  NYHA Class III.  Continue current treatment regimen.  Dietary sodium restriction.  Continue current medications.  Heart failure will be reassessed 4 months.      2. B12 deficiency  Assessment & Plan:  recheck    Orders:  -     Vitamin B12    3. Iron deficiency anemia due to chronic blood loss  Assessment & Plan:  recheck    Orders:  -     CBC & Differential  -     CBC Auto Differential    4. Gastric AVM  Assessment & Plan:  Needs to take carafate bid      5. Type 2 diabetes mellitus without complication, without long-term current use of insulin (CMS/[facility])  Assessment & Plan:  Diabetes is improving with lifestyle modifications.   Reminded to bring in blood sugar diary at next visit.  Dietary recommendations for ADA diet.  Regular aerobic exercise.  Discussed foot care.  Diabetes will be reassessed 4 months.    Orders:  -     Comprehensive Metabolic Panel  -     Hemoglobin A1c  -     Microalbumin / Creatinine Urine Ratio - Urine, Clean Catch    Other orders  -     vitamin B-12 (CYANOCOBALAMIN) 1000 MCG tablet; Take 1 tablet by mouth Daily.  Dispense: 30 tablet; Refill: 5      Wrapup Tab  Return in about 3 months (around 12/16/2019) for Recheck.

## 2019-09-16 NOTE — ASSESSMENT & PLAN NOTE
Congestive heart failure due to hypertension.  Heart failure is improving with treatment.  NYHA Class III.  Continue current treatment regimen.  Dietary sodium restriction.  Continue current medications.  Heart failure will be reassessed 4 months.

## 2019-09-17 ENCOUNTER — TELEPHONE (OUTPATIENT)
Dept: CARDIOLOGY | Facility: CLINIC | Age: 84
End: 2019-09-17

## 2019-09-17 NOTE — TELEPHONE ENCOUNTER
Patient states she is ready to be scheduled for Watchman LAAC procedure.  Pt to be scheduled for 10/03/19.  Pt made aware I will call her closer to her case date with specific instructions.  Pt verbalized understanding.

## 2019-09-25 ENCOUNTER — TELEPHONE (OUTPATIENT)
Dept: CARDIOLOGY | Facility: CLINIC | Age: 84
End: 2019-09-25

## 2019-09-25 ENCOUNTER — PREP FOR SURGERY (OUTPATIENT)
Dept: OTHER | Facility: HOSPITAL | Age: 84
End: 2019-09-25

## 2019-09-25 DIAGNOSIS — K31.819 GASTRIC AVM: ICD-10-CM

## 2019-09-25 DIAGNOSIS — I48.20 ATRIAL FIBRILLATION, CHRONIC (HCC): ICD-10-CM

## 2019-09-25 DIAGNOSIS — D50.0 IRON DEFICIENCY ANEMIA DUE TO CHRONIC BLOOD LOSS: Primary | ICD-10-CM

## 2019-09-25 RX ORDER — SODIUM CHLORIDE 0.9 % (FLUSH) 0.9 %
3 SYRINGE (ML) INJECTION EVERY 12 HOURS SCHEDULED
Status: CANCELLED | OUTPATIENT
Start: 2019-09-25

## 2019-09-25 RX ORDER — SODIUM CHLORIDE 9 MG/ML
80 INJECTION, SOLUTION INTRAVENOUS CONTINUOUS
Status: CANCELLED | OUTPATIENT
Start: 2019-09-25

## 2019-09-25 RX ORDER — SODIUM CHLORIDE 0.9 % (FLUSH) 0.9 %
10 SYRINGE (ML) INJECTION AS NEEDED
Status: CANCELLED | OUTPATIENT
Start: 2019-09-25

## 2019-09-25 NOTE — TELEPHONE ENCOUNTER
I spoke with Ofe, the patient's granddaughter, and went over the date and time of the watchman procedure. She knows labs are to be done the day before, and that the xarelto needs to be held the day before the procedure and the water pill the morning of the procedure. She knows NPO after midnight

## 2019-09-25 NOTE — TELEPHONE ENCOUNTER
I left a voicemail informing the patient the watchman procedure would be on October 3, and asked her to give us a call to go over the times and instructions.

## 2019-09-25 NOTE — TELEPHONE ENCOUNTER
Patients granddaughter  Ofe antony Wants to know her if her pacer readings are ok She is feeling weak and off  She has been trying to get her to go to the hospital She refuses to go  She wanted to see if everything was ok with her pacer

## 2019-09-25 NOTE — TELEPHONE ENCOUNTER
They need to send a transmission for me to get information by pushing the button on home monitor.     Otherwise everything was ok on last transmission from 9/18/2019

## 2019-09-30 ENCOUNTER — OFFICE VISIT (OUTPATIENT)
Dept: CARDIOLOGY | Facility: CLINIC | Age: 84
End: 2019-09-30

## 2019-09-30 ENCOUNTER — CLINICAL SUPPORT NO REQUIREMENTS (OUTPATIENT)
Dept: CARDIOLOGY | Facility: CLINIC | Age: 84
End: 2019-09-30

## 2019-09-30 VITALS
HEART RATE: 69 BPM | HEIGHT: 65 IN | DIASTOLIC BLOOD PRESSURE: 62 MMHG | BODY MASS INDEX: 30.92 KG/M2 | SYSTOLIC BLOOD PRESSURE: 122 MMHG | WEIGHT: 185.6 LBS

## 2019-09-30 DIAGNOSIS — I48.21 PERMANENT ATRIAL FIBRILLATION (HCC): ICD-10-CM

## 2019-09-30 DIAGNOSIS — Z95.818 PRESENCE OF WATCHMAN LEFT ATRIAL APPENDAGE CLOSURE DEVICE: ICD-10-CM

## 2019-09-30 DIAGNOSIS — Z95.0 PACEMAKER: ICD-10-CM

## 2019-09-30 DIAGNOSIS — I49.5 TACHY-BRADY SYNDROME (HCC): Primary | ICD-10-CM

## 2019-09-30 DIAGNOSIS — I63.9 LEFT PONTINE CVA (HCC): ICD-10-CM

## 2019-09-30 DIAGNOSIS — I35.1 AORTIC VALVE INSUFFICIENCY, ETIOLOGY OF CARDIAC VALVE DISEASE UNSPECIFIED: Primary | ICD-10-CM

## 2019-09-30 DIAGNOSIS — I10 ESSENTIAL HYPERTENSION: ICD-10-CM

## 2019-09-30 DIAGNOSIS — I34.0 MITRAL VALVE INSUFFICIENCY, UNSPECIFIED ETIOLOGY: ICD-10-CM

## 2019-09-30 PROCEDURE — 93000 ELECTROCARDIOGRAM COMPLETE: CPT | Performed by: INTERNAL MEDICINE

## 2019-09-30 PROCEDURE — 93279 PRGRMG DEV EVAL PM/LDLS PM: CPT | Performed by: NURSE PRACTITIONER

## 2019-09-30 PROCEDURE — 99213 OFFICE O/P EST LOW 20 MIN: CPT | Performed by: INTERNAL MEDICINE

## 2019-09-30 NOTE — PROGRESS NOTES
DEVICE INTERROGATION:  IN OFFICE    DEVICE TYPE:   [employer]    :   Single-chamber micra-pacemaker    BATTERY:  Stable    TIME TO ELECTIVE REPLACEMENT INDICATORS:   79 years    CHARGE TIME:   Not applicable        LEAD DATA:        Ventricular:     Greater than 20 mV, 590 ohms, 0.38 V@0.24 ms    LV:      Atrial pacing percentage: Not applicable %    Ventricular pacing percentage: 13.5 %      Arrhythmia Logbook Reviewed: No events detected        Summary:    Stable Device Function    No significant arhythmia burden.     Battery status is stable.      NEXT IN OFFICE DEVICE CHECK DUE: 1 year    REMOTE DEVICE INTERROGATIONS: 3 months

## 2019-10-02 ENCOUNTER — LAB (OUTPATIENT)
Dept: LAB | Facility: HOSPITAL | Age: 84
End: 2019-10-02

## 2019-10-02 ENCOUNTER — ANESTHESIA EVENT (OUTPATIENT)
Dept: CARDIOLOGY | Facility: HOSPITAL | Age: 84
End: 2019-10-02

## 2019-10-02 ENCOUNTER — HOSPITAL ENCOUNTER (OUTPATIENT)
Dept: CARDIOLOGY | Facility: HOSPITAL | Age: 84
Discharge: HOME | End: 2019-10-02
Admitting: INTERNAL MEDICINE

## 2019-10-02 DIAGNOSIS — I48.20 ATRIAL FIBRILLATION, CHRONIC (HCC): ICD-10-CM

## 2019-10-02 DIAGNOSIS — K31.819 GASTRIC AVM: ICD-10-CM

## 2019-10-02 DIAGNOSIS — D50.0 IRON DEFICIENCY ANEMIA DUE TO CHRONIC BLOOD LOSS: ICD-10-CM

## 2019-10-02 LAB
ALBUMIN SERPL-MCNC: 3 G/DL (ref 3.5–4.8)
ALBUMIN/GLOB SERPL: 1 G/DL (ref 1–1.7)
ALP SERPL-CCNC: 81 U/L (ref 32–91)
ALT SERPL W P-5'-P-CCNC: 12 U/L (ref 14–54)
ANION GAP SERPL CALCULATED.3IONS-SCNC: 10.4 MMOL/L (ref 5–15)
AST SERPL-CCNC: 17 U/L (ref 15–41)
BASOPHILS # BLD AUTO: 0.1 10*3/MM3 (ref 0–0.2)
BASOPHILS NFR BLD AUTO: 1.2 % (ref 0–1.5)
BILIRUB SERPL-MCNC: 0.8 MG/DL (ref 0.3–1.2)
BUN BLD-MCNC: 19 MG/DL (ref 8–20)
BUN/CREAT SERPL: 19 (ref 5.4–26.2)
CALCIUM SPEC-SCNC: 8.6 MG/DL (ref 8.9–10.3)
CHLORIDE SERPL-SCNC: 108 MMOL/L (ref 101–111)
CO2 SERPL-SCNC: 24 MMOL/L (ref 22–32)
CREAT BLD-MCNC: 1 MG/DL (ref 0.4–1)
DEPRECATED RDW RBC AUTO: 45.5 FL (ref 37–54)
EOSINOPHIL # BLD AUTO: 0.1 10*3/MM3 (ref 0–0.4)
EOSINOPHIL NFR BLD AUTO: 2 % (ref 0.3–6.2)
ERYTHROCYTE [DISTWIDTH] IN BLOOD BY AUTOMATED COUNT: 14.8 % (ref 12.3–15.4)
GFR SERPL CREATININE-BSD FRML MDRD: 53 ML/MIN/1.73
GLOBULIN UR ELPH-MCNC: 3 GM/DL (ref 2.5–3.8)
GLUCOSE BLD-MCNC: 139 MG/DL (ref 65–99)
HCT VFR BLD AUTO: 22 % (ref 34–46.6)
HGB BLD-MCNC: 7 G/DL (ref 12–15.9)
LYMPHOCYTES # BLD AUTO: 1.1 10*3/MM3 (ref 0.7–3.1)
LYMPHOCYTES NFR BLD AUTO: 17.8 % (ref 19.6–45.3)
MAGNESIUM SERPL-MCNC: 1.5 MG/DL (ref 1.8–2.5)
MCH RBC QN AUTO: 27.5 PG (ref 26.6–33)
MCHC RBC AUTO-ENTMCNC: 32 G/DL (ref 31.5–35.7)
MCV RBC AUTO: 85.8 FL (ref 79–97)
MONOCYTES # BLD AUTO: 0.5 10*3/MM3 (ref 0.1–0.9)
MONOCYTES NFR BLD AUTO: 8.4 % (ref 5–12)
NEUTROPHILS # BLD AUTO: 4.3 10*3/MM3 (ref 1.7–7)
NEUTROPHILS NFR BLD AUTO: 70.6 % (ref 42.7–76)
NRBC BLD AUTO-RTO: 0.2 /100 WBC (ref 0–0.2)
PLATELET # BLD AUTO: 324 10*3/MM3 (ref 140–450)
PMV BLD AUTO: 8.6 FL (ref 6–12)
POTASSIUM BLD-SCNC: 3.4 MMOL/L (ref 3.6–5.1)
PROT SERPL-MCNC: 6 G/DL (ref 6.1–7.9)
RBC # BLD AUTO: 2.56 10*6/MM3 (ref 3.77–5.28)
SODIUM BLD-SCNC: 139 MMOL/L (ref 136–144)
WBC NRBC COR # BLD: 6.1 10*3/MM3 (ref 3.4–10.8)

## 2019-10-02 PROCEDURE — 80053 COMPREHEN METABOLIC PANEL: CPT

## 2019-10-02 PROCEDURE — 83735 ASSAY OF MAGNESIUM: CPT

## 2019-10-02 PROCEDURE — 85025 COMPLETE CBC W/AUTO DIFF WBC: CPT

## 2019-10-02 PROCEDURE — 36415 COLL VENOUS BLD VENIPUNCTURE: CPT

## 2019-10-02 RX ORDER — LIDOCAINE HYDROCHLORIDE 10 MG/ML
0.5 INJECTION, SOLUTION EPIDURAL; INFILTRATION; INTRACAUDAL; PERINEURAL ONCE AS NEEDED
Status: CANCELLED | OUTPATIENT
Start: 2019-10-02

## 2019-10-02 RX ORDER — ZOLPIDEM TARTRATE 10 MG/1
TABLET ORAL
Qty: 30 TABLET | Refills: 4 | Status: ON HOLD | OUTPATIENT
Start: 2019-10-02 | End: 2019-10-03

## 2019-10-02 RX ORDER — SODIUM CHLORIDE 0.9 % (FLUSH) 0.9 %
3-10 SYRINGE (ML) INJECTION AS NEEDED
Status: CANCELLED | OUTPATIENT
Start: 2019-10-02

## 2019-10-02 RX ORDER — SODIUM CHLORIDE 9 MG/ML
9 INJECTION, SOLUTION INTRAVENOUS CONTINUOUS PRN
Status: CANCELLED | OUTPATIENT
Start: 2019-10-02

## 2019-10-02 RX ORDER — SODIUM CHLORIDE 0.9 % (FLUSH) 0.9 %
3 SYRINGE (ML) INJECTION EVERY 12 HOURS SCHEDULED
Status: CANCELLED | OUTPATIENT
Start: 2019-10-02

## 2019-10-03 ENCOUNTER — INPATIENT HOSPITAL (AMBULATORY)
Dept: URBAN - METROPOLITAN AREA HOSPITAL 84 | Facility: HOSPITAL | Age: 84
End: 2019-10-03

## 2019-10-03 ENCOUNTER — ANESTHESIA (OUTPATIENT)
Dept: CARDIOLOGY | Facility: HOSPITAL | Age: 84
End: 2019-10-03

## 2019-10-03 ENCOUNTER — HOSPITAL ENCOUNTER (OUTPATIENT)
Dept: CARDIOLOGY | Facility: HOSPITAL | Age: 84
Discharge: HOME | End: 2019-10-03

## 2019-10-03 ENCOUNTER — HOSPITAL ENCOUNTER (INPATIENT)
Facility: HOSPITAL | Age: 84
LOS: 8 days | Discharge: HOME | End: 2019-10-11
Attending: INTERNAL MEDICINE | Admitting: INTERNAL MEDICINE

## 2019-10-03 DIAGNOSIS — Z85.038 PERSONAL HISTORY OF OTHER MALIGNANT NEOPLASM OF LARGE INTEST: ICD-10-CM

## 2019-10-03 DIAGNOSIS — K31.819 GASTRIC AVM: ICD-10-CM

## 2019-10-03 DIAGNOSIS — K44.9 DIAPHRAGMATIC HERNIA WITHOUT OBSTRUCTION OR GANGRENE: ICD-10-CM

## 2019-10-03 DIAGNOSIS — I48.20 ATRIAL FIBRILLATION, CHRONIC (HCC): ICD-10-CM

## 2019-10-03 DIAGNOSIS — I48.21 PERMANENT ATRIAL FIBRILLATION (HCC): Primary | ICD-10-CM

## 2019-10-03 DIAGNOSIS — D50.0 IRON DEFICIENCY ANEMIA DUE TO CHRONIC BLOOD LOSS: ICD-10-CM

## 2019-10-03 DIAGNOSIS — I48.21 PERMANENT ATRIAL FIBRILLATION (HCC): ICD-10-CM

## 2019-10-03 DIAGNOSIS — K21.9 GASTRO-ESOPHAGEAL REFLUX DISEASE WITHOUT ESOPHAGITIS: ICD-10-CM

## 2019-10-03 DIAGNOSIS — R19.7 DIARRHEA, UNSPECIFIED: ICD-10-CM

## 2019-10-03 DIAGNOSIS — D50.9 IRON DEFICIENCY ANEMIA, UNSPECIFIED: ICD-10-CM

## 2019-10-03 LAB
ABO GROUP BLD: NORMAL
ALBUMIN SERPL-MCNC: 3.1 G/DL (ref 3.5–4.8)
ALBUMIN/GLOB SERPL: 1.1 G/DL (ref 1–1.7)
ALP SERPL-CCNC: 76 U/L (ref 32–91)
ALT SERPL W P-5'-P-CCNC: 11 U/L (ref 14–54)
ANION GAP SERPL CALCULATED.3IONS-SCNC: 11.5 MMOL/L (ref 5–15)
APTT PPP: 20.2 SECONDS (ref 24–31)
AST SERPL-CCNC: 18 U/L (ref 15–41)
BASOPHILS # BLD AUTO: 0.1 10*3/MM3 (ref 0–0.2)
BASOPHILS NFR BLD AUTO: 1.1 % (ref 0–1.5)
BILIRUB SERPL-MCNC: 0.6 MG/DL (ref 0.3–1.2)
BLD GP AB SCN SERPL QL: NEGATIVE
BUN BLD-MCNC: 17 MG/DL (ref 8–20)
BUN/CREAT SERPL: 17 (ref 5.4–26.2)
CALCIUM SPEC-SCNC: 8.5 MG/DL (ref 8.9–10.3)
CHLORIDE SERPL-SCNC: 110 MMOL/L (ref 101–111)
CO2 SERPL-SCNC: 24 MMOL/L (ref 22–32)
CREAT BLD-MCNC: 1 MG/DL (ref 0.4–1)
DEPRECATED RDW RBC AUTO: 47.3 FL (ref 37–54)
EOSINOPHIL # BLD AUTO: 0.2 10*3/MM3 (ref 0–0.4)
EOSINOPHIL NFR BLD AUTO: 3.6 % (ref 0.3–6.2)
ERYTHROCYTE [DISTWIDTH] IN BLOOD BY AUTOMATED COUNT: 15.5 % (ref 12.3–15.4)
FERRITIN SERPL-MCNC: 7 NG/ML (ref 11–307)
FIBRINOGEN PPP-MCNC: 368 MG/DL (ref 210–450)
GFR SERPL CREATININE-BSD FRML MDRD: 53 ML/MIN/1.73
GLOBULIN UR ELPH-MCNC: 2.8 GM/DL (ref 2.5–3.8)
GLUCOSE BLD-MCNC: 118 MG/DL (ref 65–99)
GLUCOSE BLDC GLUCOMTR-MCNC: 105 MG/DL (ref 70–105)
GLUCOSE BLDC GLUCOMTR-MCNC: 83 MG/DL (ref 70–105)
HCT VFR BLD AUTO: 21.9 % (ref 34–46.6)
HCT VFR BLD AUTO: 25.1 % (ref 34–46.6)
HGB BLD-MCNC: 7.1 G/DL (ref 12–15.9)
HGB BLD-MCNC: 8.3 G/DL (ref 12–15.9)
INR PPP: 0.98 (ref 0.9–1.1)
IRON 24H UR-MRATE: 11 MCG/DL (ref 28–170)
LYMPHOCYTES # BLD AUTO: 1.4 10*3/MM3 (ref 0.7–3.1)
LYMPHOCYTES NFR BLD AUTO: 24.6 % (ref 19.6–45.3)
MCH RBC QN AUTO: 27.7 PG (ref 26.6–33)
MCHC RBC AUTO-ENTMCNC: 32.5 G/DL (ref 31.5–35.7)
MCV RBC AUTO: 85.3 FL (ref 79–97)
MONOCYTES # BLD AUTO: 0.5 10*3/MM3 (ref 0.1–0.9)
MONOCYTES NFR BLD AUTO: 9.4 % (ref 5–12)
NEUTROPHILS # BLD AUTO: 3.4 10*3/MM3 (ref 1.7–7)
NEUTROPHILS NFR BLD AUTO: 61.3 % (ref 42.7–76)
NRBC BLD AUTO-RTO: 0.3 /100 WBC (ref 0–0.2)
PLATELET # BLD AUTO: 334 10*3/MM3 (ref 140–450)
PMV BLD AUTO: 7.9 FL (ref 6–12)
POTASSIUM BLD-SCNC: 3.5 MMOL/L (ref 3.6–5.1)
PROT SERPL-MCNC: 5.9 G/DL (ref 6.1–7.9)
PROTHROMBIN TIME: 10.3 SECONDS (ref 9.6–11.7)
RBC # BLD AUTO: 2.57 10*6/MM3 (ref 3.77–5.28)
RH BLD: POSITIVE
SODIUM BLD-SCNC: 142 MMOL/L (ref 136–144)
T&S EXPIRATION DATE: NORMAL
WBC NRBC COR # BLD: 5.6 10*3/MM3 (ref 3.4–10.8)

## 2019-10-03 PROCEDURE — 99222 1ST HOSP IP/OBS MODERATE 55: CPT | Performed by: INTERNAL MEDICINE

## 2019-10-03 PROCEDURE — 82728 ASSAY OF FERRITIN: CPT | Performed by: NURSE PRACTITIONER

## 2019-10-03 PROCEDURE — 25010000002 IRON SUCROSE PER 1 MG: Performed by: NURSE PRACTITIONER

## 2019-10-03 PROCEDURE — 86900 BLOOD TYPING SEROLOGIC ABO: CPT | Performed by: NURSE PRACTITIONER

## 2019-10-03 PROCEDURE — 85025 COMPLETE CBC W/AUTO DIFF WBC: CPT | Performed by: INTERNAL MEDICINE

## 2019-10-03 PROCEDURE — 85018 HEMOGLOBIN: CPT | Performed by: INTERNAL MEDICINE

## 2019-10-03 PROCEDURE — 63710000001 DIPHENHYDRAMINE PER 50 MG: Performed by: INTERNAL MEDICINE

## 2019-10-03 PROCEDURE — 85384 FIBRINOGEN ACTIVITY: CPT | Performed by: INTERNAL MEDICINE

## 2019-10-03 PROCEDURE — 86923 COMPATIBILITY TEST ELECTRIC: CPT

## 2019-10-03 PROCEDURE — 86900 BLOOD TYPING SEROLOGIC ABO: CPT

## 2019-10-03 PROCEDURE — 86901 BLOOD TYPING SEROLOGIC RH(D): CPT | Performed by: NURSE PRACTITIONER

## 2019-10-03 PROCEDURE — 85610 PROTHROMBIN TIME: CPT | Performed by: INTERNAL MEDICINE

## 2019-10-03 PROCEDURE — 99222 1ST HOSP IP/OBS MODERATE 55: CPT | Performed by: FAMILY MEDICINE

## 2019-10-03 PROCEDURE — 83540 ASSAY OF IRON: CPT | Performed by: NURSE PRACTITIONER

## 2019-10-03 PROCEDURE — 36430 TRANSFUSION BLD/BLD COMPNT: CPT

## 2019-10-03 PROCEDURE — 80053 COMPREHEN METABOLIC PANEL: CPT | Performed by: NURSE PRACTITIONER

## 2019-10-03 PROCEDURE — 99222 1ST HOSP IP/OBS MODERATE 55: CPT | Performed by: NURSE PRACTITIONER

## 2019-10-03 PROCEDURE — 86850 RBC ANTIBODY SCREEN: CPT | Performed by: NURSE PRACTITIONER

## 2019-10-03 PROCEDURE — 85014 HEMATOCRIT: CPT | Performed by: INTERNAL MEDICINE

## 2019-10-03 PROCEDURE — 86901 BLOOD TYPING SEROLOGIC RH(D): CPT

## 2019-10-03 PROCEDURE — P9016 RBC LEUKOCYTES REDUCED: HCPCS

## 2019-10-03 PROCEDURE — 85730 THROMBOPLASTIN TIME PARTIAL: CPT | Performed by: INTERNAL MEDICINE

## 2019-10-03 PROCEDURE — 82962 GLUCOSE BLOOD TEST: CPT

## 2019-10-03 RX ORDER — DILTIAZEM HYDROCHLORIDE 240 MG/1
240 CAPSULE, COATED, EXTENDED RELEASE ORAL 2 TIMES DAILY
Status: DISCONTINUED | OUTPATIENT
Start: 2019-10-03 | End: 2019-10-11 | Stop reason: HOSPADM

## 2019-10-03 RX ORDER — FERROUS SULFATE TAB EC 324 MG (65 MG FE EQUIVALENT) 324 (65 FE) MG
324 TABLET DELAYED RESPONSE ORAL
Status: DISCONTINUED | OUTPATIENT
Start: 2019-10-04 | End: 2019-10-05

## 2019-10-03 RX ORDER — ZOLPIDEM TARTRATE 5 MG/1
5 TABLET ORAL NIGHTLY PRN
Status: ON HOLD | COMMUNITY
End: 2019-10-08 | Stop reason: SDUPTHER

## 2019-10-03 RX ORDER — MELATONIN
2000 DAILY
Status: DISCONTINUED | OUTPATIENT
Start: 2019-10-04 | End: 2019-10-11 | Stop reason: HOSPADM

## 2019-10-03 RX ORDER — FLUOXETINE HYDROCHLORIDE 20 MG/1
40 CAPSULE ORAL DAILY
Status: DISCONTINUED | OUTPATIENT
Start: 2019-10-04 | End: 2019-10-11 | Stop reason: HOSPADM

## 2019-10-03 RX ORDER — NITROGLYCERIN 0.4 MG/1
0.4 TABLET SUBLINGUAL
Status: DISCONTINUED | OUTPATIENT
Start: 2019-10-03 | End: 2019-10-11 | Stop reason: HOSPADM

## 2019-10-03 RX ORDER — PANTOPRAZOLE SODIUM 40 MG/1
40 TABLET, DELAYED RELEASE ORAL EVERY MORNING
Status: DISCONTINUED | OUTPATIENT
Start: 2019-10-04 | End: 2019-10-03

## 2019-10-03 RX ORDER — PANTOPRAZOLE SODIUM 40 MG/10ML
80 INJECTION, POWDER, LYOPHILIZED, FOR SOLUTION INTRAVENOUS
Status: DISCONTINUED | OUTPATIENT
Start: 2019-10-03 | End: 2019-10-05

## 2019-10-03 RX ORDER — SUCRALFATE 1 G/1
1 TABLET ORAL 2 TIMES DAILY
Status: DISCONTINUED | OUTPATIENT
Start: 2019-10-03 | End: 2019-10-03

## 2019-10-03 RX ORDER — ACETAMINOPHEN 325 MG/1
650 TABLET ORAL EVERY 4 HOURS PRN
Status: DISCONTINUED | OUTPATIENT
Start: 2019-10-03 | End: 2019-10-11 | Stop reason: HOSPADM

## 2019-10-03 RX ORDER — ZOLPIDEM TARTRATE 5 MG/1
5 TABLET ORAL NIGHTLY PRN
Status: DISCONTINUED | OUTPATIENT
Start: 2019-10-03 | End: 2019-10-11 | Stop reason: HOSPADM

## 2019-10-03 RX ORDER — POTASSIUM CHLORIDE 20 MEQ/1
20 TABLET, EXTENDED RELEASE ORAL 2 TIMES DAILY WITH MEALS
Status: DISCONTINUED | OUTPATIENT
Start: 2019-10-03 | End: 2019-10-11 | Stop reason: HOSPADM

## 2019-10-03 RX ORDER — ACETAMINOPHEN 650 MG/1
650 SUPPOSITORY RECTAL EVERY 4 HOURS PRN
Status: DISCONTINUED | OUTPATIENT
Start: 2019-10-03 | End: 2019-10-11 | Stop reason: HOSPADM

## 2019-10-03 RX ORDER — ACETAMINOPHEN 325 MG/1
650 TABLET ORAL ONCE
Status: COMPLETED | OUTPATIENT
Start: 2019-10-03 | End: 2019-10-03

## 2019-10-03 RX ORDER — ACETAMINOPHEN 650 MG/1
650 SUPPOSITORY RECTAL ONCE
Status: COMPLETED | OUTPATIENT
Start: 2019-10-03 | End: 2019-10-03

## 2019-10-03 RX ORDER — FERROUS SULFATE TAB EC 324 MG (65 MG FE EQUIVALENT) 324 (65 FE) MG
324 TABLET DELAYED RESPONSE ORAL
COMMUNITY
End: 2020-10-13

## 2019-10-03 RX ORDER — ATORVASTATIN CALCIUM 10 MG/1
10 TABLET, FILM COATED ORAL DAILY
Status: DISCONTINUED | OUTPATIENT
Start: 2019-10-04 | End: 2019-10-11 | Stop reason: HOSPADM

## 2019-10-03 RX ORDER — SODIUM CHLORIDE 0.9 % (FLUSH) 0.9 %
10 SYRINGE (ML) INJECTION AS NEEDED
Status: DISCONTINUED | OUTPATIENT
Start: 2019-10-03 | End: 2019-10-11 | Stop reason: HOSPADM

## 2019-10-03 RX ORDER — LANOLIN ALCOHOL/MO/W.PET/CERES
1000 CREAM (GRAM) TOPICAL DAILY
Status: DISCONTINUED | OUTPATIENT
Start: 2019-10-04 | End: 2019-10-11 | Stop reason: HOSPADM

## 2019-10-03 RX ORDER — PANTOPRAZOLE SODIUM 40 MG/1
40 TABLET, DELAYED RELEASE ORAL
Status: DISCONTINUED | OUTPATIENT
Start: 2019-10-03 | End: 2019-10-03

## 2019-10-03 RX ORDER — NEBIVOLOL 10 MG/1
10 TABLET ORAL DAILY
Status: DISCONTINUED | OUTPATIENT
Start: 2019-10-04 | End: 2019-10-11 | Stop reason: HOSPADM

## 2019-10-03 RX ORDER — SODIUM CHLORIDE 0.9 % (FLUSH) 0.9 %
10 SYRINGE (ML) INJECTION EVERY 12 HOURS SCHEDULED
Status: DISCONTINUED | OUTPATIENT
Start: 2019-10-03 | End: 2019-10-11 | Stop reason: HOSPADM

## 2019-10-03 RX ORDER — ACETAMINOPHEN 160 MG/5ML
650 SOLUTION ORAL EVERY 4 HOURS PRN
Status: DISCONTINUED | OUTPATIENT
Start: 2019-10-03 | End: 2019-10-11 | Stop reason: HOSPADM

## 2019-10-03 RX ORDER — DIPHENHYDRAMINE HCL 25 MG
25 TABLET ORAL ONCE
Status: COMPLETED | OUTPATIENT
Start: 2019-10-03 | End: 2019-10-03

## 2019-10-03 RX ORDER — SUCRALFATE 1 G/1
1 TABLET ORAL
Status: DISCONTINUED | OUTPATIENT
Start: 2019-10-03 | End: 2019-10-11 | Stop reason: HOSPADM

## 2019-10-03 RX ORDER — ONDANSETRON 2 MG/ML
4 INJECTION INTRAMUSCULAR; INTRAVENOUS EVERY 6 HOURS PRN
Status: DISCONTINUED | OUTPATIENT
Start: 2019-10-03 | End: 2019-10-11 | Stop reason: HOSPADM

## 2019-10-03 RX ADMIN — SUCRALFATE 1 G: 1 TABLET ORAL at 18:39

## 2019-10-03 RX ADMIN — SODIUM CHLORIDE, PRESERVATIVE FREE 10 ML: 5 INJECTION INTRAVENOUS at 20:30

## 2019-10-03 RX ADMIN — ACETAMINOPHEN 650 MG: 325 TABLET ORAL at 14:03

## 2019-10-03 RX ADMIN — DIPHENHYDRAMINE HCL 25 MG: 25 TABLET ORAL at 14:03

## 2019-10-03 RX ADMIN — SUCRALFATE 1 G: 1 TABLET ORAL at 20:29

## 2019-10-03 RX ADMIN — PANTOPRAZOLE SODIUM 80 MG: 40 INJECTION, POWDER, FOR SOLUTION INTRAVENOUS at 18:34

## 2019-10-03 RX ADMIN — DILTIAZEM HYDROCHLORIDE 240 MG: 240 CAPSULE, COATED, EXTENDED RELEASE ORAL at 20:29

## 2019-10-03 RX ADMIN — SODIUM CHLORIDE, PRESERVATIVE FREE 10 ML: 5 INJECTION INTRAVENOUS at 09:45

## 2019-10-03 RX ADMIN — IRON SUCROSE 200 MG: 20 INJECTION, SOLUTION INTRAVENOUS at 11:49

## 2019-10-03 RX ADMIN — POTASSIUM CHLORIDE 20 MEQ: 1500 TABLET, EXTENDED RELEASE ORAL at 18:37

## 2019-10-03 RX ADMIN — ZOLPIDEM TARTRATE 5 MG: 5 TABLET, COATED ORAL at 20:29

## 2019-10-04 ENCOUNTER — APPOINTMENT (OUTPATIENT)
Dept: GENERAL RADIOLOGY | Facility: HOSPITAL | Age: 84
End: 2019-10-04

## 2019-10-04 LAB
ABO + RH BLD: NORMAL
ANION GAP SERPL CALCULATED.3IONS-SCNC: 11.7 MMOL/L (ref 5–15)
BASOPHILS # BLD AUTO: 0 10*3/MM3 (ref 0–0.2)
BASOPHILS NFR BLD AUTO: 0.7 % (ref 0–1.5)
BH BB BLOOD EXPIRATION DATE: NORMAL
BH BB BLOOD TYPE BARCODE: 6200
BH BB DISPENSE STATUS: NORMAL
BH BB PRODUCT CODE: NORMAL
BH BB UNIT NUMBER: NORMAL
BUN BLD-MCNC: 16 MG/DL (ref 8–20)
BUN/CREAT SERPL: 16 (ref 5.4–26.2)
CALCIUM SPEC-SCNC: 8.3 MG/DL (ref 8.9–10.3)
CHLORIDE SERPL-SCNC: 110 MMOL/L (ref 101–111)
CO2 SERPL-SCNC: 23 MMOL/L (ref 22–32)
CREAT BLD-MCNC: 1 MG/DL (ref 0.4–1)
DEPRECATED RDW RBC AUTO: 46.8 FL (ref 37–54)
EOSINOPHIL # BLD AUTO: 0.3 10*3/MM3 (ref 0–0.4)
EOSINOPHIL NFR BLD AUTO: 3.7 % (ref 0.3–6.2)
ERYTHROCYTE [DISTWIDTH] IN BLOOD BY AUTOMATED COUNT: 15.4 % (ref 12.3–15.4)
GFR SERPL CREATININE-BSD FRML MDRD: 53 ML/MIN/1.73
GLUCOSE BLD-MCNC: 119 MG/DL (ref 65–99)
GLUCOSE BLDC GLUCOMTR-MCNC: 116 MG/DL (ref 70–105)
GLUCOSE BLDC GLUCOMTR-MCNC: 117 MG/DL (ref 70–105)
GLUCOSE BLDC GLUCOMTR-MCNC: 117 MG/DL (ref 70–105)
GLUCOSE BLDC GLUCOMTR-MCNC: 150 MG/DL (ref 70–105)
HCT VFR BLD AUTO: 22.2 % (ref 34–46.6)
HCT VFR BLD AUTO: 23.3 % (ref 34–46.6)
HCT VFR BLD AUTO: 23.4 % (ref 34–46.6)
HCT VFR BLD AUTO: 23.4 % (ref 34–46.6)
HCT VFR BLD AUTO: 24.3 % (ref 34–46.6)
HCT VFR BLD AUTO: 24.6 % (ref 34–46.6)
HEMOCCULT STL QL IA: NEGATIVE
HGB BLD-MCNC: 7.1 G/DL (ref 12–15.9)
HGB BLD-MCNC: 7.6 G/DL (ref 12–15.9)
HGB BLD-MCNC: 7.8 G/DL (ref 12–15.9)
HGB BLD-MCNC: 8.2 G/DL (ref 12–15.9)
LYMPHOCYTES # BLD AUTO: 1.5 10*3/MM3 (ref 0.7–3.1)
LYMPHOCYTES NFR BLD AUTO: 22 % (ref 19.6–45.3)
MCH RBC QN AUTO: 27.8 PG (ref 26.6–33)
MCHC RBC AUTO-ENTMCNC: 32.4 G/DL (ref 31.5–35.7)
MCV RBC AUTO: 85.7 FL (ref 79–97)
MONOCYTES # BLD AUTO: 0.7 10*3/MM3 (ref 0.1–0.9)
MONOCYTES NFR BLD AUTO: 10.4 % (ref 5–12)
NEUTROPHILS # BLD AUTO: 4.4 10*3/MM3 (ref 1.7–7)
NEUTROPHILS NFR BLD AUTO: 63.2 % (ref 42.7–76)
NRBC BLD AUTO-RTO: 0.3 /100 WBC (ref 0–0.2)
PLATELET # BLD AUTO: 299 10*3/MM3 (ref 140–450)
PMV BLD AUTO: 8.4 FL (ref 6–12)
POTASSIUM BLD-SCNC: 3.7 MMOL/L (ref 3.6–5.1)
RBC # BLD AUTO: 2.73 10*6/MM3 (ref 3.77–5.28)
SODIUM BLD-SCNC: 141 MMOL/L (ref 136–144)
UNIT  ABO: NORMAL
UNIT  RH: NORMAL
WBC NRBC COR # BLD: 6.9 10*3/MM3 (ref 3.4–10.8)

## 2019-10-04 PROCEDURE — 71045 X-RAY EXAM CHEST 1 VIEW: CPT

## 2019-10-04 PROCEDURE — 99231 SBSQ HOSP IP/OBS SF/LOW 25: CPT | Performed by: NURSE PRACTITIONER

## 2019-10-04 PROCEDURE — 80048 BASIC METABOLIC PNL TOTAL CA: CPT | Performed by: PHYSICIAN ASSISTANT

## 2019-10-04 PROCEDURE — 85018 HEMOGLOBIN: CPT | Performed by: INTERNAL MEDICINE

## 2019-10-04 PROCEDURE — 82274 ASSAY TEST FOR BLOOD FECAL: CPT | Performed by: NURSE PRACTITIONER

## 2019-10-04 PROCEDURE — 85025 COMPLETE CBC W/AUTO DIFF WBC: CPT | Performed by: INTERNAL MEDICINE

## 2019-10-04 PROCEDURE — 85014 HEMATOCRIT: CPT | Performed by: INTERNAL MEDICINE

## 2019-10-04 PROCEDURE — 82962 GLUCOSE BLOOD TEST: CPT

## 2019-10-04 PROCEDURE — 99232 SBSQ HOSP IP/OBS MODERATE 35: CPT | Performed by: FAMILY MEDICINE

## 2019-10-04 PROCEDURE — 25010000002 IRON SUCROSE PER 1 MG: Performed by: NURSE PRACTITIONER

## 2019-10-04 PROCEDURE — 99232 SBSQ HOSP IP/OBS MODERATE 35: CPT | Performed by: NURSE PRACTITIONER

## 2019-10-04 RX ORDER — SUCRALFATE 1 G/1
1 TABLET ORAL
Qty: 120 TABLET | Refills: 2 | Status: SHIPPED | OUTPATIENT
Start: 2019-10-04 | End: 2019-11-03

## 2019-10-04 RX ORDER — OMEPRAZOLE 40 MG/1
40 CAPSULE, DELAYED RELEASE ORAL 2 TIMES DAILY
Qty: 60 CAPSULE | Refills: 9 | Status: SHIPPED | OUTPATIENT
Start: 2019-10-04 | End: 2020-11-30

## 2019-10-04 RX ADMIN — POTASSIUM CHLORIDE 20 MEQ: 1500 TABLET, EXTENDED RELEASE ORAL at 17:20

## 2019-10-04 RX ADMIN — FLUOXETINE 40 MG: 20 CAPSULE ORAL at 08:21

## 2019-10-04 RX ADMIN — PANTOPRAZOLE SODIUM 80 MG: 40 INJECTION, POWDER, FOR SOLUTION INTRAVENOUS at 17:20

## 2019-10-04 RX ADMIN — POTASSIUM CHLORIDE 20 MEQ: 1500 TABLET, EXTENDED RELEASE ORAL at 08:22

## 2019-10-04 RX ADMIN — SUCRALFATE 1 G: 1 TABLET ORAL at 08:22

## 2019-10-04 RX ADMIN — FERROUS SULFATE TAB EC 324 MG (65 MG FE EQUIVALENT) 324 MG: 324 (65 FE) TABLET DELAYED RESPONSE at 08:21

## 2019-10-04 RX ADMIN — PANTOPRAZOLE SODIUM 80 MG: 40 INJECTION, POWDER, FOR SOLUTION INTRAVENOUS at 08:22

## 2019-10-04 RX ADMIN — CYANOCOBALAMIN TAB 1000 MCG 1000 MCG: 1000 TAB at 08:21

## 2019-10-04 RX ADMIN — SUCRALFATE 1 G: 1 TABLET ORAL at 13:13

## 2019-10-04 RX ADMIN — SODIUM CHLORIDE, PRESERVATIVE FREE 10 ML: 5 INJECTION INTRAVENOUS at 21:01

## 2019-10-04 RX ADMIN — SUCRALFATE 1 G: 1 TABLET ORAL at 17:20

## 2019-10-04 RX ADMIN — NEBIVOLOL HYDROCHLORIDE 10 MG: 10 TABLET ORAL at 08:21

## 2019-10-04 RX ADMIN — IRON SUCROSE 200 MG: 20 INJECTION, SOLUTION INTRAVENOUS at 13:12

## 2019-10-04 RX ADMIN — SODIUM CHLORIDE, PRESERVATIVE FREE 10 ML: 5 INJECTION INTRAVENOUS at 08:22

## 2019-10-04 RX ADMIN — ATORVASTATIN CALCIUM 10 MG: 10 TABLET, FILM COATED ORAL at 08:22

## 2019-10-04 RX ADMIN — SUCRALFATE 1 G: 1 TABLET ORAL at 21:01

## 2019-10-04 RX ADMIN — DILTIAZEM HYDROCHLORIDE 240 MG: 240 CAPSULE, COATED, EXTENDED RELEASE ORAL at 21:01

## 2019-10-04 RX ADMIN — MELATONIN 2000 UNITS: at 08:21

## 2019-10-04 RX ADMIN — ZOLPIDEM TARTRATE 5 MG: 5 TABLET, COATED ORAL at 21:05

## 2019-10-04 RX ADMIN — DILTIAZEM HYDROCHLORIDE 240 MG: 240 CAPSULE, COATED, EXTENDED RELEASE ORAL at 08:22

## 2019-10-05 LAB
ALBUMIN SERPL-MCNC: 3 G/DL (ref 3.5–4.8)
ALP SERPL-CCNC: 78 U/L (ref 32–91)
ALT SERPL W P-5'-P-CCNC: 10 U/L (ref 14–54)
ANION GAP SERPL CALCULATED.3IONS-SCNC: 12.2 MMOL/L (ref 5–15)
AST SERPL-CCNC: 16 U/L (ref 15–41)
BASOPHILS # BLD AUTO: 0.1 10*3/MM3 (ref 0–0.2)
BASOPHILS NFR BLD AUTO: 1 % (ref 0–1.5)
BILIRUB CONJ SERPL-MCNC: 0.2 MG/DL (ref 0.1–0.5)
BILIRUB INDIRECT SERPL-MCNC: 0.4 MG/DL (ref 0–1.1)
BILIRUB SERPL-MCNC: 0.6 MG/DL (ref 0.3–1.2)
BUN BLD-MCNC: 14 MG/DL (ref 8–20)
BUN/CREAT SERPL: 11.7 (ref 5.4–26.2)
CALCIUM SPEC-SCNC: 8.4 MG/DL (ref 8.9–10.3)
CHLORIDE SERPL-SCNC: 106 MMOL/L (ref 101–111)
CO2 SERPL-SCNC: 23 MMOL/L (ref 22–32)
CREAT BLD-MCNC: 1.2 MG/DL (ref 0.4–1)
D-LACTATE SERPL-SCNC: 1.4 MMOL/L (ref 0.5–2.2)
DEPRECATED RDW RBC AUTO: 48.6 FL (ref 37–54)
EOSINOPHIL # BLD AUTO: 0.2 10*3/MM3 (ref 0–0.4)
EOSINOPHIL NFR BLD AUTO: 1.9 % (ref 0.3–6.2)
ERYTHROCYTE [DISTWIDTH] IN BLOOD BY AUTOMATED COUNT: 15.9 % (ref 12.3–15.4)
GFR SERPL CREATININE-BSD FRML MDRD: 43 ML/MIN/1.73
GLUCOSE BLD-MCNC: 144 MG/DL (ref 65–99)
HCT VFR BLD AUTO: 22.7 % (ref 34–46.6)
HCT VFR BLD AUTO: 23 % (ref 34–46.6)
HCT VFR BLD AUTO: 24.2 % (ref 34–46.6)
HGB BLD-MCNC: 7.4 G/DL (ref 12–15.9)
HGB BLD-MCNC: 7.5 G/DL (ref 12–15.9)
HGB BLD-MCNC: 8.1 G/DL (ref 12–15.9)
LYMPHOCYTES # BLD AUTO: 1.5 10*3/MM3 (ref 0.7–3.1)
LYMPHOCYTES NFR BLD AUTO: 11.8 % (ref 19.6–45.3)
MCH RBC QN AUTO: 28.7 PG (ref 26.6–33)
MCHC RBC AUTO-ENTMCNC: 33.3 G/DL (ref 31.5–35.7)
MCV RBC AUTO: 86.2 FL (ref 79–97)
MONOCYTES # BLD AUTO: 0.9 10*3/MM3 (ref 0.1–0.9)
MONOCYTES NFR BLD AUTO: 7 % (ref 5–12)
NEUTROPHILS # BLD AUTO: 9.8 10*3/MM3 (ref 1.7–7)
NEUTROPHILS NFR BLD AUTO: 78.3 % (ref 42.7–76)
NRBC BLD AUTO-RTO: 0.7 /100 WBC (ref 0–0.2)
PLATELET # BLD AUTO: 308 10*3/MM3 (ref 140–450)
PMV BLD AUTO: 8.3 FL (ref 6–12)
POTASSIUM BLD-SCNC: 4.2 MMOL/L (ref 3.6–5.1)
PROT SERPL-MCNC: 5.5 G/DL (ref 6.1–7.9)
RBC # BLD AUTO: 2.8 10*6/MM3 (ref 3.77–5.28)
SODIUM BLD-SCNC: 137 MMOL/L (ref 136–144)
TROPONIN I SERPL-MCNC: <0.03 NG/ML (ref 0–0.03)
WBC NRBC COR # BLD: 12.5 10*3/MM3 (ref 3.4–10.8)

## 2019-10-05 PROCEDURE — 36430 TRANSFUSION BLD/BLD COMPNT: CPT

## 2019-10-05 PROCEDURE — 80048 BASIC METABOLIC PNL TOTAL CA: CPT | Performed by: PHYSICIAN ASSISTANT

## 2019-10-05 PROCEDURE — 99232 SBSQ HOSP IP/OBS MODERATE 35: CPT | Performed by: FAMILY MEDICINE

## 2019-10-05 PROCEDURE — 99232 SBSQ HOSP IP/OBS MODERATE 35: CPT | Performed by: INTERNAL MEDICINE

## 2019-10-05 PROCEDURE — 85014 HEMATOCRIT: CPT | Performed by: INTERNAL MEDICINE

## 2019-10-05 PROCEDURE — 83010 ASSAY OF HAPTOGLOBIN QUANT: CPT | Performed by: FAMILY MEDICINE

## 2019-10-05 PROCEDURE — P9016 RBC LEUKOCYTES REDUCED: HCPCS

## 2019-10-05 PROCEDURE — 85025 COMPLETE CBC W/AUTO DIFF WBC: CPT | Performed by: INTERNAL MEDICINE

## 2019-10-05 PROCEDURE — 93005 ELECTROCARDIOGRAM TRACING: CPT | Performed by: FAMILY MEDICINE

## 2019-10-05 PROCEDURE — 83605 ASSAY OF LACTIC ACID: CPT | Performed by: FAMILY MEDICINE

## 2019-10-05 PROCEDURE — 85018 HEMOGLOBIN: CPT | Performed by: INTERNAL MEDICINE

## 2019-10-05 PROCEDURE — 80076 HEPATIC FUNCTION PANEL: CPT | Performed by: FAMILY MEDICINE

## 2019-10-05 PROCEDURE — 86900 BLOOD TYPING SEROLOGIC ABO: CPT

## 2019-10-05 PROCEDURE — 84484 ASSAY OF TROPONIN QUANT: CPT | Performed by: FAMILY MEDICINE

## 2019-10-05 RX ORDER — SODIUM CHLORIDE, SODIUM LACTATE, POTASSIUM CHLORIDE, CALCIUM CHLORIDE 600; 310; 30; 20 MG/100ML; MG/100ML; MG/100ML; MG/100ML
50 INJECTION, SOLUTION INTRAVENOUS CONTINUOUS
Status: DISCONTINUED | OUTPATIENT
Start: 2019-10-05 | End: 2019-10-06

## 2019-10-05 RX ORDER — FERROUS SULFATE TAB EC 324 MG (65 MG FE EQUIVALENT) 324 (65 FE) MG
324 TABLET DELAYED RESPONSE ORAL
Status: DISCONTINUED | OUTPATIENT
Start: 2019-10-05 | End: 2019-10-11 | Stop reason: HOSPADM

## 2019-10-05 RX ORDER — PANTOPRAZOLE SODIUM 40 MG/1
40 TABLET, DELAYED RELEASE ORAL
Status: DISCONTINUED | OUTPATIENT
Start: 2019-10-05 | End: 2019-10-11 | Stop reason: HOSPADM

## 2019-10-05 RX ADMIN — FERROUS SULFATE TAB EC 324 MG (65 MG FE EQUIVALENT) 324 MG: 324 (65 FE) TABLET DELAYED RESPONSE at 09:15

## 2019-10-05 RX ADMIN — SUCRALFATE 1 G: 1 TABLET ORAL at 20:03

## 2019-10-05 RX ADMIN — POTASSIUM CHLORIDE 20 MEQ: 1500 TABLET, EXTENDED RELEASE ORAL at 09:16

## 2019-10-05 RX ADMIN — SODIUM CHLORIDE, PRESERVATIVE FREE 10 ML: 5 INJECTION INTRAVENOUS at 20:03

## 2019-10-05 RX ADMIN — DILTIAZEM HYDROCHLORIDE 240 MG: 240 CAPSULE, COATED, EXTENDED RELEASE ORAL at 09:16

## 2019-10-05 RX ADMIN — PANTOPRAZOLE SODIUM 80 MG: 40 INJECTION, POWDER, FOR SOLUTION INTRAVENOUS at 09:13

## 2019-10-05 RX ADMIN — SODIUM CHLORIDE, SODIUM LACTATE, POTASSIUM CHLORIDE, AND CALCIUM CHLORIDE 50 ML/HR: 600; 310; 30; 20 INJECTION, SOLUTION INTRAVENOUS at 20:01

## 2019-10-05 RX ADMIN — DILTIAZEM HYDROCHLORIDE 240 MG: 240 CAPSULE, COATED, EXTENDED RELEASE ORAL at 20:03

## 2019-10-05 RX ADMIN — SODIUM CHLORIDE, PRESERVATIVE FREE 10 ML: 5 INJECTION INTRAVENOUS at 09:18

## 2019-10-05 RX ADMIN — CYANOCOBALAMIN TAB 1000 MCG 1000 MCG: 1000 TAB at 09:16

## 2019-10-05 RX ADMIN — MELATONIN 2000 UNITS: at 09:16

## 2019-10-05 RX ADMIN — FLUOXETINE 40 MG: 20 CAPSULE ORAL at 09:17

## 2019-10-05 RX ADMIN — ZOLPIDEM TARTRATE 5 MG: 5 TABLET, COATED ORAL at 20:06

## 2019-10-05 RX ADMIN — NEBIVOLOL HYDROCHLORIDE 10 MG: 10 TABLET ORAL at 09:17

## 2019-10-05 RX ADMIN — PANTOPRAZOLE SODIUM 40 MG: 40 TABLET, DELAYED RELEASE ORAL at 17:03

## 2019-10-05 RX ADMIN — ATORVASTATIN CALCIUM 10 MG: 10 TABLET, FILM COATED ORAL at 09:16

## 2019-10-05 RX ADMIN — SUCRALFATE 1 G: 1 TABLET ORAL at 09:15

## 2019-10-05 RX ADMIN — FERROUS SULFATE TAB EC 324 MG (65 MG FE EQUIVALENT) 324 MG: 324 (65 FE) TABLET DELAYED RESPONSE at 17:03

## 2019-10-05 RX ADMIN — POTASSIUM CHLORIDE 20 MEQ: 1500 TABLET, EXTENDED RELEASE ORAL at 17:03

## 2019-10-05 RX ADMIN — SUCRALFATE 1 G: 1 TABLET ORAL at 17:04

## 2019-10-06 ENCOUNTER — APPOINTMENT (OUTPATIENT)
Dept: GENERAL RADIOLOGY | Facility: HOSPITAL | Age: 84
End: 2019-10-06

## 2019-10-06 LAB
ABO + RH BLD: NORMAL
ANION GAP SERPL CALCULATED.3IONS-SCNC: 12.4 MMOL/L (ref 5–15)
ARTERIAL PATENCY WRIST A: POSITIVE
ARTERIAL PATENCY WRIST A: POSITIVE
ATMOSPHERIC PRESS: ABNORMAL MM[HG]
ATMOSPHERIC PRESS: ABNORMAL MM[HG]
BASE EXCESS BLDA CALC-SCNC: -3.6 MMOL/L (ref 0–3)
BASE EXCESS BLDA CALC-SCNC: -4.4 MMOL/L (ref 0–3)
BASOPHILS # BLD AUTO: 0.1 10*3/MM3 (ref 0–0.2)
BASOPHILS NFR BLD AUTO: 0.8 % (ref 0–1.5)
BDY SITE: ABNORMAL
BDY SITE: ABNORMAL
BH BB BLOOD EXPIRATION DATE: NORMAL
BH BB BLOOD TYPE BARCODE: 6200
BH BB DISPENSE STATUS: NORMAL
BH BB PRODUCT CODE: NORMAL
BH BB UNIT NUMBER: NORMAL
BUN BLD-MCNC: 14 MG/DL (ref 8–20)
BUN/CREAT SERPL: 14 (ref 5.4–26.2)
CALCIUM SPEC-SCNC: 8.9 MG/DL (ref 8.9–10.3)
CHLORIDE SERPL-SCNC: 107 MMOL/L (ref 101–111)
CO2 BLDA-SCNC: 21.5 MMOL/L (ref 22–29)
CO2 BLDA-SCNC: 21.6 MMOL/L (ref 22–29)
CO2 SERPL-SCNC: 23 MMOL/L (ref 22–32)
CREAT BLD-MCNC: 1 MG/DL (ref 0.4–1)
DEPRECATED RDW RBC AUTO: 47.3 FL (ref 37–54)
EOSINOPHIL # BLD AUTO: 0.1 10*3/MM3 (ref 0–0.4)
EOSINOPHIL NFR BLD AUTO: 1.1 % (ref 0.3–6.2)
ERYTHROCYTE [DISTWIDTH] IN BLOOD BY AUTOMATED COUNT: 15.7 % (ref 12.3–15.4)
GFR SERPL CREATININE-BSD FRML MDRD: 53 ML/MIN/1.73
GLUCOSE BLD-MCNC: 153 MG/DL (ref 65–99)
HCO3 BLDA-SCNC: 20.4 MMOL/L (ref 21–28)
HCO3 BLDA-SCNC: 20.6 MMOL/L (ref 21–28)
HCT VFR BLD AUTO: 25.5 % (ref 34–46.6)
HCT VFR BLD AUTO: 25.5 % (ref 34–46.6)
HCT VFR BLD AUTO: 25.7 % (ref 34–46.6)
HCT VFR BLD AUTO: 26.6 % (ref 34–46.6)
HCT VFR BLD AUTO: 27.2 % (ref 34–46.6)
HEMODILUTION: NO
HEMODILUTION: NO
HGB BLD-MCNC: 8.4 G/DL (ref 12–15.9)
HGB BLD-MCNC: 8.7 G/DL (ref 12–15.9)
HGB BLD-MCNC: 8.9 G/DL (ref 12–15.9)
HOROWITZ INDEX BLD+IHG-RTO: 24 %
HOROWITZ INDEX BLD+IHG-RTO: 32 %
LDH SERPL-CCNC: 198 U/L (ref 98–192)
LYMPHOCYTES # BLD AUTO: 1.1 10*3/MM3 (ref 0.7–3.1)
LYMPHOCYTES NFR BLD AUTO: 8.7 % (ref 19.6–45.3)
MCH RBC QN AUTO: 28.6 PG (ref 26.6–33)
MCHC RBC AUTO-ENTMCNC: 33.1 G/DL (ref 31.5–35.7)
MCV RBC AUTO: 86.2 FL (ref 79–97)
MODALITY: ABNORMAL
MODALITY: ABNORMAL
MONOCYTES # BLD AUTO: 0.9 10*3/MM3 (ref 0.1–0.9)
MONOCYTES NFR BLD AUTO: 7.3 % (ref 5–12)
NEUTROPHILS # BLD AUTO: 10.1 10*3/MM3 (ref 1.7–7)
NEUTROPHILS NFR BLD AUTO: 82.1 % (ref 42.7–76)
NRBC BLD AUTO-RTO: 0.5 /100 WBC (ref 0–0.2)
PCO2 BLDA: 33.1 MM HG (ref 35–48)
PCO2 BLDA: 35.2 MM HG (ref 35–48)
PH BLDA: 7.37 PH UNITS (ref 7.35–7.45)
PH BLDA: 7.4 PH UNITS (ref 7.35–7.45)
PLATELET # BLD AUTO: 289 10*3/MM3 (ref 140–450)
PMV BLD AUTO: 8.4 FL (ref 6–12)
PO2 BLDA: 67.5 MM HG (ref 83–108)
PO2 BLDA: 79.5 MM HG (ref 83–108)
POTASSIUM BLD-SCNC: 4.4 MMOL/L (ref 3.6–5.1)
RBC # BLD AUTO: 2.96 10*6/MM3 (ref 3.77–5.28)
SAO2 % BLDCOA: 93.4 % (ref 94–98)
SAO2 % BLDCOA: 95.4 % (ref 94–98)
SODIUM BLD-SCNC: 138 MMOL/L (ref 136–144)
TROPONIN I SERPL-MCNC: <0.03 NG/ML (ref 0–0.03)
UNIT  ABO: NORMAL
UNIT  RH: NORMAL
WBC NRBC COR # BLD: 12.3 10*3/MM3 (ref 3.4–10.8)

## 2019-10-06 PROCEDURE — 83615 LACTATE (LD) (LDH) ENZYME: CPT | Performed by: FAMILY MEDICINE

## 2019-10-06 PROCEDURE — 94799 UNLISTED PULMONARY SVC/PX: CPT

## 2019-10-06 PROCEDURE — 25010000002 FUROSEMIDE PER 20 MG: Performed by: FAMILY MEDICINE

## 2019-10-06 PROCEDURE — 80048 BASIC METABOLIC PNL TOTAL CA: CPT | Performed by: PHYSICIAN ASSISTANT

## 2019-10-06 PROCEDURE — 99232 SBSQ HOSP IP/OBS MODERATE 35: CPT | Performed by: INTERNAL MEDICINE

## 2019-10-06 PROCEDURE — 85014 HEMATOCRIT: CPT | Performed by: INTERNAL MEDICINE

## 2019-10-06 PROCEDURE — 93005 ELECTROCARDIOGRAM TRACING: CPT | Performed by: FAMILY MEDICINE

## 2019-10-06 PROCEDURE — 85018 HEMOGLOBIN: CPT | Performed by: INTERNAL MEDICINE

## 2019-10-06 PROCEDURE — 82803 BLOOD GASES ANY COMBINATION: CPT

## 2019-10-06 PROCEDURE — 94640 AIRWAY INHALATION TREATMENT: CPT

## 2019-10-06 PROCEDURE — 36600 WITHDRAWAL OF ARTERIAL BLOOD: CPT

## 2019-10-06 PROCEDURE — 85025 COMPLETE CBC W/AUTO DIFF WBC: CPT | Performed by: INTERNAL MEDICINE

## 2019-10-06 PROCEDURE — 99233 SBSQ HOSP IP/OBS HIGH 50: CPT | Performed by: FAMILY MEDICINE

## 2019-10-06 PROCEDURE — 84484 ASSAY OF TROPONIN QUANT: CPT | Performed by: FAMILY MEDICINE

## 2019-10-06 PROCEDURE — 93010 ELECTROCARDIOGRAM REPORT: CPT | Performed by: INTERNAL MEDICINE

## 2019-10-06 PROCEDURE — 71045 X-RAY EXAM CHEST 1 VIEW: CPT

## 2019-10-06 PROCEDURE — 25010000002 ONDANSETRON PER 1 MG: Performed by: INTERNAL MEDICINE

## 2019-10-06 RX ORDER — FUROSEMIDE 10 MG/ML
20 INJECTION INTRAMUSCULAR; INTRAVENOUS ONCE
Status: COMPLETED | OUTPATIENT
Start: 2019-10-06 | End: 2019-10-06

## 2019-10-06 RX ORDER — IPRATROPIUM BROMIDE AND ALBUTEROL SULFATE 2.5; .5 MG/3ML; MG/3ML
3 SOLUTION RESPIRATORY (INHALATION)
Status: DISCONTINUED | OUTPATIENT
Start: 2019-10-06 | End: 2019-10-11 | Stop reason: HOSPADM

## 2019-10-06 RX ORDER — FUROSEMIDE 10 MG/ML
40 INJECTION INTRAMUSCULAR; INTRAVENOUS EVERY 12 HOURS
Status: DISCONTINUED | OUTPATIENT
Start: 2019-10-06 | End: 2019-10-10

## 2019-10-06 RX ADMIN — POTASSIUM CHLORIDE 20 MEQ: 1500 TABLET, EXTENDED RELEASE ORAL at 07:58

## 2019-10-06 RX ADMIN — SUCRALFATE 1 G: 1 TABLET ORAL at 07:59

## 2019-10-06 RX ADMIN — SODIUM CHLORIDE, PRESERVATIVE FREE 10 ML: 5 INJECTION INTRAVENOUS at 21:42

## 2019-10-06 RX ADMIN — ATORVASTATIN CALCIUM 10 MG: 10 TABLET, FILM COATED ORAL at 08:00

## 2019-10-06 RX ADMIN — POTASSIUM CHLORIDE 20 MEQ: 1500 TABLET, EXTENDED RELEASE ORAL at 17:38

## 2019-10-06 RX ADMIN — SUCRALFATE 1 G: 1 TABLET ORAL at 17:38

## 2019-10-06 RX ADMIN — FERROUS SULFATE TAB EC 324 MG (65 MG FE EQUIVALENT) 324 MG: 324 (65 FE) TABLET DELAYED RESPONSE at 12:02

## 2019-10-06 RX ADMIN — IPRATROPIUM BROMIDE AND ALBUTEROL SULFATE 3 ML: .5; 3 SOLUTION RESPIRATORY (INHALATION) at 10:12

## 2019-10-06 RX ADMIN — SUCRALFATE 1 G: 1 TABLET ORAL at 21:42

## 2019-10-06 RX ADMIN — ZOLPIDEM TARTRATE 5 MG: 5 TABLET, COATED ORAL at 21:47

## 2019-10-06 RX ADMIN — FERROUS SULFATE TAB EC 324 MG (65 MG FE EQUIVALENT) 324 MG: 324 (65 FE) TABLET DELAYED RESPONSE at 07:58

## 2019-10-06 RX ADMIN — IPRATROPIUM BROMIDE AND ALBUTEROL SULFATE 3 ML: .5; 3 SOLUTION RESPIRATORY (INHALATION) at 20:24

## 2019-10-06 RX ADMIN — FUROSEMIDE 20 MG: 10 INJECTION, SOLUTION INTRAMUSCULAR; INTRAVENOUS at 14:09

## 2019-10-06 RX ADMIN — MELATONIN 2000 UNITS: at 08:00

## 2019-10-06 RX ADMIN — PANTOPRAZOLE SODIUM 40 MG: 40 TABLET, DELAYED RELEASE ORAL at 07:58

## 2019-10-06 RX ADMIN — DILTIAZEM HYDROCHLORIDE 240 MG: 240 CAPSULE, COATED, EXTENDED RELEASE ORAL at 08:00

## 2019-10-06 RX ADMIN — PANTOPRAZOLE SODIUM 40 MG: 40 TABLET, DELAYED RELEASE ORAL at 17:38

## 2019-10-06 RX ADMIN — IPRATROPIUM BROMIDE AND ALBUTEROL SULFATE 3 ML: .5; 3 SOLUTION RESPIRATORY (INHALATION) at 14:17

## 2019-10-06 RX ADMIN — DILTIAZEM HYDROCHLORIDE 240 MG: 240 CAPSULE, COATED, EXTENDED RELEASE ORAL at 21:42

## 2019-10-06 RX ADMIN — ONDANSETRON 4 MG: 2 INJECTION INTRAMUSCULAR; INTRAVENOUS at 13:52

## 2019-10-06 RX ADMIN — FUROSEMIDE 20 MG: 10 INJECTION, SOLUTION INTRAMUSCULAR; INTRAVENOUS at 10:04

## 2019-10-06 RX ADMIN — FLUOXETINE 40 MG: 20 CAPSULE ORAL at 08:00

## 2019-10-06 RX ADMIN — NEBIVOLOL HYDROCHLORIDE 10 MG: 10 TABLET ORAL at 08:00

## 2019-10-06 RX ADMIN — CYANOCOBALAMIN TAB 1000 MCG 1000 MCG: 1000 TAB at 09:07

## 2019-10-06 RX ADMIN — FERROUS SULFATE TAB EC 324 MG (65 MG FE EQUIVALENT) 324 MG: 324 (65 FE) TABLET DELAYED RESPONSE at 17:38

## 2019-10-06 RX ADMIN — SUCRALFATE 1 G: 1 TABLET ORAL at 12:02

## 2019-10-07 ENCOUNTER — HOSPITAL ENCOUNTER (INPATIENT)
Dept: CARDIOLOGY | Facility: HOSPITAL | Age: 84
Discharge: HOME | End: 2019-10-07

## 2019-10-07 VITALS
BODY MASS INDEX: 34.2 KG/M2 | WEIGHT: 193 LBS | DIASTOLIC BLOOD PRESSURE: 49 MMHG | SYSTOLIC BLOOD PRESSURE: 135 MMHG | HEIGHT: 63 IN

## 2019-10-07 LAB
ANION GAP SERPL CALCULATED.3IONS-SCNC: 12.7 MMOL/L (ref 5–15)
BASOPHILS # BLD AUTO: 0.1 10*3/MM3 (ref 0–0.2)
BASOPHILS NFR BLD AUTO: 0.5 % (ref 0–1.5)
BUN BLD-MCNC: 12 MG/DL (ref 8–20)
BUN/CREAT SERPL: 10 (ref 5.4–26.2)
CALCIUM SPEC-SCNC: 8.3 MG/DL (ref 8.9–10.3)
CHLORIDE SERPL-SCNC: 106 MMOL/L (ref 101–111)
CO2 SERPL-SCNC: 24 MMOL/L (ref 22–32)
CREAT BLD-MCNC: 1.2 MG/DL (ref 0.4–1)
DEPRECATED RDW RBC AUTO: 49.4 FL (ref 37–54)
EOSINOPHIL # BLD AUTO: 0.3 10*3/MM3 (ref 0–0.4)
EOSINOPHIL NFR BLD AUTO: 2.4 % (ref 0.3–6.2)
ERYTHROCYTE [DISTWIDTH] IN BLOOD BY AUTOMATED COUNT: 16.2 % (ref 12.3–15.4)
GFR SERPL CREATININE-BSD FRML MDRD: 43 ML/MIN/1.73
GLUCOSE BLD-MCNC: 150 MG/DL (ref 65–99)
HAPTOGLOB SERPL-MCNC: 207 MG/DL (ref 36–195)
HCT VFR BLD AUTO: 25.1 % (ref 34–46.6)
HCT VFR BLD AUTO: 25.6 % (ref 34–46.6)
HCT VFR BLD AUTO: 26.5 % (ref 34–46.6)
HCT VFR BLD AUTO: 28.4 % (ref 34–46.6)
HGB BLD-MCNC: 8.1 G/DL (ref 12–15.9)
HGB BLD-MCNC: 8.2 G/DL (ref 12–15.9)
HGB BLD-MCNC: 8.6 G/DL (ref 12–15.9)
HGB BLD-MCNC: 9.2 G/DL (ref 12–15.9)
LYMPHOCYTES # BLD AUTO: 0.8 10*3/MM3 (ref 0.7–3.1)
LYMPHOCYTES NFR BLD AUTO: 7.5 % (ref 19.6–45.3)
MCH RBC QN AUTO: 28.5 PG (ref 26.6–33)
MCHC RBC AUTO-ENTMCNC: 32.5 G/DL (ref 31.5–35.7)
MCV RBC AUTO: 87.8 FL (ref 79–97)
MONOCYTES # BLD AUTO: 1.1 10*3/MM3 (ref 0.1–0.9)
MONOCYTES NFR BLD AUTO: 10 % (ref 5–12)
NEUTROPHILS # BLD AUTO: 8.5 10*3/MM3 (ref 1.7–7)
NEUTROPHILS NFR BLD AUTO: 79.6 % (ref 42.7–76)
NRBC BLD AUTO-RTO: 0.1 /100 WBC (ref 0–0.2)
PLATELET # BLD AUTO: 242 10*3/MM3 (ref 140–450)
PMV BLD AUTO: 8.3 FL (ref 6–12)
POTASSIUM BLD-SCNC: 3.7 MMOL/L (ref 3.6–5.1)
RBC # BLD AUTO: 2.86 10*6/MM3 (ref 3.77–5.28)
SODIUM BLD-SCNC: 139 MMOL/L (ref 136–144)
WBC NRBC COR # BLD: 10.7 10*3/MM3 (ref 3.4–10.8)

## 2019-10-07 PROCEDURE — 80048 BASIC METABOLIC PNL TOTAL CA: CPT | Performed by: PHYSICIAN ASSISTANT

## 2019-10-07 PROCEDURE — 85025 COMPLETE CBC W/AUTO DIFF WBC: CPT | Performed by: INTERNAL MEDICINE

## 2019-10-07 PROCEDURE — 85014 HEMATOCRIT: CPT | Performed by: INTERNAL MEDICINE

## 2019-10-07 PROCEDURE — 94799 UNLISTED PULMONARY SVC/PX: CPT

## 2019-10-07 PROCEDURE — 97162 PT EVAL MOD COMPLEX 30 MIN: CPT

## 2019-10-07 PROCEDURE — 93306 TTE W/DOPPLER COMPLETE: CPT

## 2019-10-07 PROCEDURE — 85018 HEMOGLOBIN: CPT | Performed by: INTERNAL MEDICINE

## 2019-10-07 PROCEDURE — 25010000002 FUROSEMIDE PER 20 MG: Performed by: FAMILY MEDICINE

## 2019-10-07 PROCEDURE — 99233 SBSQ HOSP IP/OBS HIGH 50: CPT | Performed by: INTERNAL MEDICINE

## 2019-10-07 PROCEDURE — 97530 THERAPEUTIC ACTIVITIES: CPT

## 2019-10-07 PROCEDURE — 99232 SBSQ HOSP IP/OBS MODERATE 35: CPT | Performed by: NURSE PRACTITIONER

## 2019-10-07 RX ADMIN — IPRATROPIUM BROMIDE AND ALBUTEROL SULFATE 3 ML: .5; 3 SOLUTION RESPIRATORY (INHALATION) at 21:11

## 2019-10-07 RX ADMIN — FERROUS SULFATE TAB EC 324 MG (65 MG FE EQUIVALENT) 324 MG: 324 (65 FE) TABLET DELAYED RESPONSE at 12:32

## 2019-10-07 RX ADMIN — CYANOCOBALAMIN TAB 1000 MCG 1000 MCG: 1000 TAB at 08:55

## 2019-10-07 RX ADMIN — DILTIAZEM HYDROCHLORIDE 240 MG: 240 CAPSULE, COATED, EXTENDED RELEASE ORAL at 08:55

## 2019-10-07 RX ADMIN — PANTOPRAZOLE SODIUM 40 MG: 40 TABLET, DELAYED RELEASE ORAL at 18:17

## 2019-10-07 RX ADMIN — DILTIAZEM HYDROCHLORIDE 240 MG: 240 CAPSULE, COATED, EXTENDED RELEASE ORAL at 21:53

## 2019-10-07 RX ADMIN — FLUOXETINE 40 MG: 20 CAPSULE ORAL at 08:55

## 2019-10-07 RX ADMIN — FERROUS SULFATE TAB EC 324 MG (65 MG FE EQUIVALENT) 324 MG: 324 (65 FE) TABLET DELAYED RESPONSE at 08:55

## 2019-10-07 RX ADMIN — SUCRALFATE 1 G: 1 TABLET ORAL at 18:17

## 2019-10-07 RX ADMIN — FUROSEMIDE 40 MG: 10 INJECTION, SOLUTION INTRAMUSCULAR; INTRAVENOUS at 01:29

## 2019-10-07 RX ADMIN — IPRATROPIUM BROMIDE AND ALBUTEROL SULFATE 3 ML: .5; 3 SOLUTION RESPIRATORY (INHALATION) at 06:39

## 2019-10-07 RX ADMIN — IPRATROPIUM BROMIDE AND ALBUTEROL SULFATE 3 ML: .5; 3 SOLUTION RESPIRATORY (INHALATION) at 10:21

## 2019-10-07 RX ADMIN — SUCRALFATE 1 G: 1 TABLET ORAL at 21:53

## 2019-10-07 RX ADMIN — PANTOPRAZOLE SODIUM 40 MG: 40 TABLET, DELAYED RELEASE ORAL at 08:55

## 2019-10-07 RX ADMIN — SODIUM CHLORIDE, PRESERVATIVE FREE 10 ML: 5 INJECTION INTRAVENOUS at 08:58

## 2019-10-07 RX ADMIN — NEBIVOLOL HYDROCHLORIDE 10 MG: 10 TABLET ORAL at 08:56

## 2019-10-07 RX ADMIN — SUCRALFATE 1 G: 1 TABLET ORAL at 12:32

## 2019-10-07 RX ADMIN — POTASSIUM CHLORIDE 20 MEQ: 1500 TABLET, EXTENDED RELEASE ORAL at 08:55

## 2019-10-07 RX ADMIN — SUCRALFATE 1 G: 1 TABLET ORAL at 08:55

## 2019-10-07 RX ADMIN — POTASSIUM CHLORIDE 20 MEQ: 1500 TABLET, EXTENDED RELEASE ORAL at 18:17

## 2019-10-07 RX ADMIN — FUROSEMIDE 40 MG: 10 INJECTION, SOLUTION INTRAMUSCULAR; INTRAVENOUS at 16:29

## 2019-10-07 RX ADMIN — ATORVASTATIN CALCIUM 10 MG: 10 TABLET, FILM COATED ORAL at 08:55

## 2019-10-07 RX ADMIN — ZOLPIDEM TARTRATE 5 MG: 5 TABLET, COATED ORAL at 21:53

## 2019-10-07 RX ADMIN — MELATONIN 2000 UNITS: at 08:55

## 2019-10-07 RX ADMIN — FERROUS SULFATE TAB EC 324 MG (65 MG FE EQUIVALENT) 324 MG: 324 (65 FE) TABLET DELAYED RESPONSE at 18:17

## 2019-10-08 LAB
ANION GAP SERPL CALCULATED.3IONS-SCNC: 14 MMOL/L (ref 5–15)
BASOPHILS # BLD AUTO: 0.1 10*3/MM3 (ref 0–0.2)
BASOPHILS NFR BLD AUTO: 0.9 % (ref 0–1.5)
BUN BLD-MCNC: 15 MG/DL (ref 8–20)
BUN/CREAT SERPL: 11.5 (ref 5.4–26.2)
CALCIUM SPEC-SCNC: 8.6 MG/DL (ref 8.9–10.3)
CHLORIDE SERPL-SCNC: 101 MMOL/L (ref 101–111)
CO2 SERPL-SCNC: 28 MMOL/L (ref 22–32)
CREAT BLD-MCNC: 1.3 MG/DL (ref 0.4–1)
DEPRECATED RDW RBC AUTO: 48.1 FL (ref 37–54)
EOSINOPHIL # BLD AUTO: 0.3 10*3/MM3 (ref 0–0.4)
EOSINOPHIL NFR BLD AUTO: 3.9 % (ref 0.3–6.2)
ERYTHROCYTE [DISTWIDTH] IN BLOOD BY AUTOMATED COUNT: 15.7 % (ref 12.3–15.4)
GFR SERPL CREATININE-BSD FRML MDRD: 39 ML/MIN/1.73
GLUCOSE BLD-MCNC: 143 MG/DL (ref 65–99)
HCT VFR BLD AUTO: 24.4 % (ref 34–46.6)
HCT VFR BLD AUTO: 24.6 % (ref 34–46.6)
HCT VFR BLD AUTO: 24.6 % (ref 34–46.6)
HCT VFR BLD AUTO: 26.4 % (ref 34–46.6)
HCT VFR BLD AUTO: 29 % (ref 34–46.6)
HGB BLD-MCNC: 8 G/DL (ref 12–15.9)
HGB BLD-MCNC: 8 G/DL (ref 12–15.9)
HGB BLD-MCNC: 8.1 G/DL (ref 12–15.9)
HGB BLD-MCNC: 8.6 G/DL (ref 12–15.9)
HGB BLD-MCNC: 9.3 G/DL (ref 12–15.9)
LYMPHOCYTES # BLD AUTO: 0.9 10*3/MM3 (ref 0.7–3.1)
LYMPHOCYTES NFR BLD AUTO: 11.3 % (ref 19.6–45.3)
MCH RBC QN AUTO: 28.2 PG (ref 26.6–33)
MCHC RBC AUTO-ENTMCNC: 32.5 G/DL (ref 31.5–35.7)
MCV RBC AUTO: 86.8 FL (ref 79–97)
MONOCYTES # BLD AUTO: 0.8 10*3/MM3 (ref 0.1–0.9)
MONOCYTES NFR BLD AUTO: 10.1 % (ref 5–12)
NEUTROPHILS # BLD AUTO: 5.9 10*3/MM3 (ref 1.7–7)
NEUTROPHILS NFR BLD AUTO: 73.8 % (ref 42.7–76)
NRBC BLD AUTO-RTO: 0.1 /100 WBC (ref 0–0.2)
PLATELET # BLD AUTO: 263 10*3/MM3 (ref 140–450)
PMV BLD AUTO: 8.7 FL (ref 6–12)
POTASSIUM BLD-SCNC: 4 MMOL/L (ref 3.6–5.1)
RBC # BLD AUTO: 2.83 10*6/MM3 (ref 3.77–5.28)
SODIUM BLD-SCNC: 139 MMOL/L (ref 136–144)
WBC NRBC COR # BLD: 7.9 10*3/MM3 (ref 3.4–10.8)

## 2019-10-08 PROCEDURE — 80048 BASIC METABOLIC PNL TOTAL CA: CPT | Performed by: PHYSICIAN ASSISTANT

## 2019-10-08 PROCEDURE — 85014 HEMATOCRIT: CPT | Performed by: INTERNAL MEDICINE

## 2019-10-08 PROCEDURE — 25010000002 EPOETIN ALFA-EPBX 10000 UNIT/ML SOLUTION: Performed by: INTERNAL MEDICINE

## 2019-10-08 PROCEDURE — 85018 HEMOGLOBIN: CPT | Performed by: INTERNAL MEDICINE

## 2019-10-08 PROCEDURE — 99233 SBSQ HOSP IP/OBS HIGH 50: CPT | Performed by: INTERNAL MEDICINE

## 2019-10-08 PROCEDURE — 94799 UNLISTED PULMONARY SVC/PX: CPT

## 2019-10-08 PROCEDURE — 25010000002 FUROSEMIDE PER 20 MG: Performed by: FAMILY MEDICINE

## 2019-10-08 PROCEDURE — 94618 PULMONARY STRESS TESTING: CPT

## 2019-10-08 PROCEDURE — 99231 SBSQ HOSP IP/OBS SF/LOW 25: CPT | Performed by: INTERNAL MEDICINE

## 2019-10-08 PROCEDURE — 85025 COMPLETE CBC W/AUTO DIFF WBC: CPT | Performed by: INTERNAL MEDICINE

## 2019-10-08 RX ORDER — FUROSEMIDE 40 MG/1
40 TABLET ORAL 2 TIMES DAILY
Start: 2019-10-08 | End: 2019-10-11 | Stop reason: SDUPTHER

## 2019-10-08 RX ORDER — IPRATROPIUM BROMIDE AND ALBUTEROL SULFATE 2.5; .5 MG/3ML; MG/3ML
3 SOLUTION RESPIRATORY (INHALATION)
Qty: 360 ML
Start: 2019-10-08 | End: 2019-11-26 | Stop reason: HOSPADM

## 2019-10-08 RX ORDER — ZOLPIDEM TARTRATE 5 MG/1
5 TABLET ORAL NIGHTLY PRN
Qty: 5 TABLET | Refills: 0 | Status: SHIPPED | OUTPATIENT
Start: 2019-10-08 | End: 2020-10-13

## 2019-10-08 RX ORDER — NITROGLYCERIN 0.4 MG/1
0.4 TABLET SUBLINGUAL
Refills: 12
Start: 2019-10-08 | End: 2020-10-13

## 2019-10-08 RX ADMIN — POTASSIUM CHLORIDE 20 MEQ: 1500 TABLET, EXTENDED RELEASE ORAL at 17:32

## 2019-10-08 RX ADMIN — FUROSEMIDE 40 MG: 10 INJECTION, SOLUTION INTRAMUSCULAR; INTRAVENOUS at 02:20

## 2019-10-08 RX ADMIN — POTASSIUM CHLORIDE 20 MEQ: 1500 TABLET, EXTENDED RELEASE ORAL at 08:48

## 2019-10-08 RX ADMIN — IPRATROPIUM BROMIDE AND ALBUTEROL SULFATE 3 ML: .5; 3 SOLUTION RESPIRATORY (INHALATION) at 07:16

## 2019-10-08 RX ADMIN — SODIUM CHLORIDE, PRESERVATIVE FREE 10 ML: 5 INJECTION INTRAVENOUS at 08:49

## 2019-10-08 RX ADMIN — EPOETIN ALFA-EPBX 10000 UNITS: 10000 INJECTION, SOLUTION INTRAVENOUS; SUBCUTANEOUS at 17:32

## 2019-10-08 RX ADMIN — SUCRALFATE 1 G: 1 TABLET ORAL at 12:17

## 2019-10-08 RX ADMIN — DILTIAZEM HYDROCHLORIDE 240 MG: 240 CAPSULE, COATED, EXTENDED RELEASE ORAL at 21:16

## 2019-10-08 RX ADMIN — SODIUM CHLORIDE, PRESERVATIVE FREE 10 ML: 5 INJECTION INTRAVENOUS at 21:16

## 2019-10-08 RX ADMIN — FLUOXETINE 40 MG: 20 CAPSULE ORAL at 08:48

## 2019-10-08 RX ADMIN — IPRATROPIUM BROMIDE AND ALBUTEROL SULFATE 3 ML: .5; 3 SOLUTION RESPIRATORY (INHALATION) at 20:12

## 2019-10-08 RX ADMIN — ZOLPIDEM TARTRATE 5 MG: 5 TABLET, COATED ORAL at 21:16

## 2019-10-08 RX ADMIN — PANTOPRAZOLE SODIUM 40 MG: 40 TABLET, DELAYED RELEASE ORAL at 08:48

## 2019-10-08 RX ADMIN — FUROSEMIDE 40 MG: 10 INJECTION, SOLUTION INTRAMUSCULAR; INTRAVENOUS at 14:08

## 2019-10-08 RX ADMIN — SUCRALFATE 1 G: 1 TABLET ORAL at 21:16

## 2019-10-08 RX ADMIN — NEBIVOLOL HYDROCHLORIDE 10 MG: 10 TABLET ORAL at 08:48

## 2019-10-08 RX ADMIN — DILTIAZEM HYDROCHLORIDE 240 MG: 240 CAPSULE, COATED, EXTENDED RELEASE ORAL at 08:46

## 2019-10-08 RX ADMIN — FERROUS SULFATE TAB EC 324 MG (65 MG FE EQUIVALENT) 324 MG: 324 (65 FE) TABLET DELAYED RESPONSE at 08:48

## 2019-10-08 RX ADMIN — ATORVASTATIN CALCIUM 10 MG: 10 TABLET, FILM COATED ORAL at 08:48

## 2019-10-08 RX ADMIN — FERROUS SULFATE TAB EC 324 MG (65 MG FE EQUIVALENT) 324 MG: 324 (65 FE) TABLET DELAYED RESPONSE at 12:17

## 2019-10-08 RX ADMIN — SUCRALFATE 1 G: 1 TABLET ORAL at 17:32

## 2019-10-08 RX ADMIN — SODIUM CHLORIDE, PRESERVATIVE FREE 10 ML: 5 INJECTION INTRAVENOUS at 00:47

## 2019-10-08 RX ADMIN — CYANOCOBALAMIN TAB 1000 MCG 1000 MCG: 1000 TAB at 08:48

## 2019-10-08 RX ADMIN — MELATONIN 2000 UNITS: at 08:48

## 2019-10-08 RX ADMIN — FERROUS SULFATE TAB EC 324 MG (65 MG FE EQUIVALENT) 324 MG: 324 (65 FE) TABLET DELAYED RESPONSE at 17:32

## 2019-10-08 RX ADMIN — PANTOPRAZOLE SODIUM 40 MG: 40 TABLET, DELAYED RELEASE ORAL at 17:32

## 2019-10-08 RX ADMIN — SUCRALFATE 1 G: 1 TABLET ORAL at 08:48

## 2019-10-08 RX ADMIN — IPRATROPIUM BROMIDE AND ALBUTEROL SULFATE 3 ML: .5; 3 SOLUTION RESPIRATORY (INHALATION) at 11:13

## 2019-10-09 LAB
ABO GROUP BLD: NORMAL
ANION GAP SERPL CALCULATED.3IONS-SCNC: 16.7 MMOL/L (ref 5–15)
BASOPHILS # BLD AUTO: 0 10*3/MM3 (ref 0–0.2)
BASOPHILS NFR BLD AUTO: 0.2 % (ref 0–1.5)
BLD GP AB SCN SERPL QL: NEGATIVE
BUN BLD-MCNC: 20 MG/DL (ref 8–20)
BUN/CREAT SERPL: 15.4 (ref 5.4–26.2)
CALCIUM SPEC-SCNC: 8.6 MG/DL (ref 8.9–10.3)
CHLORIDE SERPL-SCNC: 100 MMOL/L (ref 101–111)
CO2 SERPL-SCNC: 27 MMOL/L (ref 22–32)
CREAT BLD-MCNC: 1.3 MG/DL (ref 0.4–1)
DEPRECATED RDW RBC AUTO: 48.6 FL (ref 37–54)
EOSINOPHIL # BLD AUTO: 0.3 10*3/MM3 (ref 0–0.4)
EOSINOPHIL NFR BLD AUTO: 3.9 % (ref 0.3–6.2)
ERYTHROCYTE [DISTWIDTH] IN BLOOD BY AUTOMATED COUNT: 16.2 % (ref 12.3–15.4)
GFR SERPL CREATININE-BSD FRML MDRD: 39 ML/MIN/1.73
GLUCOSE BLD-MCNC: 159 MG/DL (ref 65–99)
HCT VFR BLD AUTO: 25 % (ref 34–46.6)
HCT VFR BLD AUTO: 25.3 % (ref 34–46.6)
HGB BLD-MCNC: 8.3 G/DL (ref 12–15.9)
HGB BLD-MCNC: 8.3 G/DL (ref 12–15.9)
LYMPHOCYTES # BLD AUTO: 1 10*3/MM3 (ref 0.7–3.1)
LYMPHOCYTES NFR BLD AUTO: 14.1 % (ref 19.6–45.3)
MCH RBC QN AUTO: 28.4 PG (ref 26.6–33)
MCHC RBC AUTO-ENTMCNC: 33 G/DL (ref 31.5–35.7)
MCV RBC AUTO: 86 FL (ref 79–97)
MONOCYTES # BLD AUTO: 0.7 10*3/MM3 (ref 0.1–0.9)
MONOCYTES NFR BLD AUTO: 10 % (ref 5–12)
NEUTROPHILS # BLD AUTO: 5.3 10*3/MM3 (ref 1.7–7)
NEUTROPHILS NFR BLD AUTO: 71.8 % (ref 42.7–76)
NRBC BLD AUTO-RTO: 0.2 /100 WBC (ref 0–0.2)
PLATELET # BLD AUTO: 297 10*3/MM3 (ref 140–450)
PMV BLD AUTO: 7.9 FL (ref 6–12)
POTASSIUM BLD-SCNC: 4.7 MMOL/L (ref 3.6–5.1)
RBC # BLD AUTO: 2.94 10*6/MM3 (ref 3.77–5.28)
RH BLD: POSITIVE
SODIUM BLD-SCNC: 139 MMOL/L (ref 136–144)
T&S EXPIRATION DATE: NORMAL
WBC NRBC COR # BLD: 7.4 10*3/MM3 (ref 3.4–10.8)

## 2019-10-09 PROCEDURE — 82525 ASSAY OF COPPER: CPT | Performed by: NURSE PRACTITIONER

## 2019-10-09 PROCEDURE — 86340 INTRINSIC FACTOR ANTIBODY: CPT | Performed by: NURSE PRACTITIONER

## 2019-10-09 PROCEDURE — 99223 1ST HOSP IP/OBS HIGH 75: CPT | Performed by: INTERNAL MEDICINE

## 2019-10-09 PROCEDURE — 80048 BASIC METABOLIC PNL TOTAL CA: CPT | Performed by: PHYSICIAN ASSISTANT

## 2019-10-09 PROCEDURE — 86923 COMPATIBILITY TEST ELECTRIC: CPT

## 2019-10-09 PROCEDURE — 97116 GAIT TRAINING THERAPY: CPT

## 2019-10-09 PROCEDURE — 25010000002 FUROSEMIDE PER 20 MG: Performed by: FAMILY MEDICINE

## 2019-10-09 PROCEDURE — 94760 N-INVAS EAR/PLS OXIMETRY 1: CPT

## 2019-10-09 PROCEDURE — 86850 RBC ANTIBODY SCREEN: CPT | Performed by: NURSE PRACTITIONER

## 2019-10-09 PROCEDURE — 94799 UNLISTED PULMONARY SVC/PX: CPT

## 2019-10-09 PROCEDURE — 86900 BLOOD TYPING SEROLOGIC ABO: CPT | Performed by: NURSE PRACTITIONER

## 2019-10-09 PROCEDURE — 97530 THERAPEUTIC ACTIVITIES: CPT

## 2019-10-09 PROCEDURE — 99231 SBSQ HOSP IP/OBS SF/LOW 25: CPT | Performed by: INTERNAL MEDICINE

## 2019-10-09 PROCEDURE — 86901 BLOOD TYPING SEROLOGIC RH(D): CPT | Performed by: NURSE PRACTITIONER

## 2019-10-09 PROCEDURE — 84155 ASSAY OF PROTEIN SERUM: CPT | Performed by: NURSE PRACTITIONER

## 2019-10-09 PROCEDURE — 82668 ASSAY OF ERYTHROPOIETIN: CPT | Performed by: NURSE PRACTITIONER

## 2019-10-09 PROCEDURE — 84165 PROTEIN E-PHORESIS SERUM: CPT | Performed by: NURSE PRACTITIONER

## 2019-10-09 PROCEDURE — P9016 RBC LEUKOCYTES REDUCED: HCPCS

## 2019-10-09 PROCEDURE — 83516 IMMUNOASSAY NONANTIBODY: CPT | Performed by: NURSE PRACTITIONER

## 2019-10-09 PROCEDURE — 86900 BLOOD TYPING SEROLOGIC ABO: CPT

## 2019-10-09 PROCEDURE — 85025 COMPLETE CBC W/AUTO DIFF WBC: CPT | Performed by: INTERNAL MEDICINE

## 2019-10-09 PROCEDURE — 36430 TRANSFUSION BLD/BLD COMPNT: CPT

## 2019-10-09 PROCEDURE — 99232 SBSQ HOSP IP/OBS MODERATE 35: CPT | Performed by: NURSE PRACTITIONER

## 2019-10-09 RX ADMIN — DILTIAZEM HYDROCHLORIDE 240 MG: 240 CAPSULE, COATED, EXTENDED RELEASE ORAL at 20:42

## 2019-10-09 RX ADMIN — DILTIAZEM HYDROCHLORIDE 240 MG: 240 CAPSULE, COATED, EXTENDED RELEASE ORAL at 09:34

## 2019-10-09 RX ADMIN — FUROSEMIDE 40 MG: 10 INJECTION, SOLUTION INTRAMUSCULAR; INTRAVENOUS at 01:38

## 2019-10-09 RX ADMIN — CYANOCOBALAMIN TAB 1000 MCG 1000 MCG: 1000 TAB at 09:34

## 2019-10-09 RX ADMIN — POTASSIUM CHLORIDE 20 MEQ: 1500 TABLET, EXTENDED RELEASE ORAL at 17:41

## 2019-10-09 RX ADMIN — SUCRALFATE 1 G: 1 TABLET ORAL at 17:41

## 2019-10-09 RX ADMIN — FUROSEMIDE 40 MG: 10 INJECTION, SOLUTION INTRAMUSCULAR; INTRAVENOUS at 13:54

## 2019-10-09 RX ADMIN — SUCRALFATE 1 G: 1 TABLET ORAL at 20:42

## 2019-10-09 RX ADMIN — POTASSIUM CHLORIDE 20 MEQ: 1500 TABLET, EXTENDED RELEASE ORAL at 09:34

## 2019-10-09 RX ADMIN — NEBIVOLOL HYDROCHLORIDE 10 MG: 10 TABLET ORAL at 09:34

## 2019-10-09 RX ADMIN — SUCRALFATE 1 G: 1 TABLET ORAL at 13:54

## 2019-10-09 RX ADMIN — IPRATROPIUM BROMIDE AND ALBUTEROL SULFATE 3 ML: .5; 3 SOLUTION RESPIRATORY (INHALATION) at 15:26

## 2019-10-09 RX ADMIN — IPRATROPIUM BROMIDE AND ALBUTEROL SULFATE 3 ML: .5; 3 SOLUTION RESPIRATORY (INHALATION) at 18:34

## 2019-10-09 RX ADMIN — ATORVASTATIN CALCIUM 10 MG: 10 TABLET, FILM COATED ORAL at 09:34

## 2019-10-09 RX ADMIN — IPRATROPIUM BROMIDE AND ALBUTEROL SULFATE 3 ML: .5; 3 SOLUTION RESPIRATORY (INHALATION) at 11:50

## 2019-10-09 RX ADMIN — ZOLPIDEM TARTRATE 5 MG: 5 TABLET, COATED ORAL at 20:42

## 2019-10-09 RX ADMIN — SODIUM CHLORIDE, PRESERVATIVE FREE 10 ML: 5 INJECTION INTRAVENOUS at 20:42

## 2019-10-09 RX ADMIN — FLUOXETINE 40 MG: 20 CAPSULE ORAL at 09:34

## 2019-10-09 RX ADMIN — PANTOPRAZOLE SODIUM 40 MG: 40 TABLET, DELAYED RELEASE ORAL at 09:34

## 2019-10-09 RX ADMIN — FERROUS SULFATE TAB EC 324 MG (65 MG FE EQUIVALENT) 324 MG: 324 (65 FE) TABLET DELAYED RESPONSE at 17:41

## 2019-10-09 RX ADMIN — FERROUS SULFATE TAB EC 324 MG (65 MG FE EQUIVALENT) 324 MG: 324 (65 FE) TABLET DELAYED RESPONSE at 09:35

## 2019-10-09 RX ADMIN — MELATONIN 2000 UNITS: at 09:34

## 2019-10-09 RX ADMIN — IPRATROPIUM BROMIDE AND ALBUTEROL SULFATE 3 ML: .5; 3 SOLUTION RESPIRATORY (INHALATION) at 07:25

## 2019-10-09 RX ADMIN — SODIUM CHLORIDE, PRESERVATIVE FREE 10 ML: 5 INJECTION INTRAVENOUS at 09:35

## 2019-10-09 RX ADMIN — FERROUS SULFATE TAB EC 324 MG (65 MG FE EQUIVALENT) 324 MG: 324 (65 FE) TABLET DELAYED RESPONSE at 13:43

## 2019-10-09 RX ADMIN — SUCRALFATE 1 G: 1 TABLET ORAL at 09:34

## 2019-10-09 RX ADMIN — PANTOPRAZOLE SODIUM 40 MG: 40 TABLET, DELAYED RELEASE ORAL at 17:41

## 2019-10-10 ENCOUNTER — ANESTHESIA (OUTPATIENT)
Dept: CARDIOLOGY | Facility: HOSPITAL | Age: 84
End: 2019-10-10

## 2019-10-10 ENCOUNTER — HOSPITAL ENCOUNTER (OUTPATIENT)
Dept: CARDIOLOGY | Facility: HOSPITAL | Age: 84
Discharge: HOME | End: 2019-10-10
Admitting: INTERNAL MEDICINE

## 2019-10-10 ENCOUNTER — ANESTHESIA EVENT (OUTPATIENT)
Dept: CARDIOLOGY | Facility: HOSPITAL | Age: 84
End: 2019-10-10

## 2019-10-10 DIAGNOSIS — D50.0 IRON DEFICIENCY ANEMIA DUE TO CHRONIC BLOOD LOSS: ICD-10-CM

## 2019-10-10 DIAGNOSIS — K31.819 GASTRIC AVM: ICD-10-CM

## 2019-10-10 DIAGNOSIS — I48.20 ATRIAL FIBRILLATION, CHRONIC (HCC): ICD-10-CM

## 2019-10-10 LAB
ACT BLD: 131 SECONDS (ref 89–137)
ACT BLD: 263 SECONDS (ref 89–137)
ACT BLD: 279 SECONDS (ref 89–137)
ACT BLD: 450 SECONDS (ref 89–137)
ANION GAP SERPL CALCULATED.3IONS-SCNC: 16.2 MMOL/L (ref 5–15)
APTT PPP: 21.7 SECONDS (ref 24–31)
BASOPHILS # BLD AUTO: 0.1 10*3/MM3 (ref 0–0.2)
BASOPHILS NFR BLD AUTO: 1.1 % (ref 0–1.5)
BUN BLD-MCNC: 15 MG/DL (ref 8–20)
BUN/CREAT SERPL: 12.5 (ref 5.4–26.2)
CALCIUM SPEC-SCNC: 8.7 MG/DL (ref 8.9–10.3)
CHLORIDE SERPL-SCNC: 97 MMOL/L (ref 101–111)
CO2 SERPL-SCNC: 32 MMOL/L (ref 22–32)
CREAT BLD-MCNC: 1.2 MG/DL (ref 0.4–1)
DEPRECATED RDW RBC AUTO: 48.6 FL (ref 37–54)
EOSINOPHIL # BLD AUTO: 0.3 10*3/MM3 (ref 0–0.4)
EOSINOPHIL NFR BLD AUTO: 4.9 % (ref 0.3–6.2)
ERYTHROCYTE [DISTWIDTH] IN BLOOD BY AUTOMATED COUNT: 15.9 % (ref 12.3–15.4)
GFR SERPL CREATININE-BSD FRML MDRD: 43 ML/MIN/1.73
GLUCOSE BLD-MCNC: 122 MG/DL (ref 65–99)
HCT VFR BLD AUTO: 29 % (ref 34–46.6)
HCT VFR BLD AUTO: 32.4 % (ref 34–46.6)
HCT VFR BLD AUTO: 34.2 % (ref 34–46.6)
HCT VFR BLD AUTO: 34.7 % (ref 34–46.6)
HCT VFR BLD AUTO: 35.7 % (ref 34–46.6)
HGB BLD-MCNC: 10.7 G/DL (ref 12–15.9)
HGB BLD-MCNC: 11 G/DL (ref 12–15.9)
HGB BLD-MCNC: 11.6 G/DL (ref 12–15.9)
HGB BLD-MCNC: 12 G/DL (ref 12–15.9)
HGB BLD-MCNC: 9.5 G/DL (ref 12–15.9)
INR PPP: 1.04 (ref 0.9–1.1)
LYMPHOCYTES # BLD AUTO: 1.2 10*3/MM3 (ref 0.7–3.1)
LYMPHOCYTES NFR BLD AUTO: 18.4 % (ref 19.6–45.3)
MCH RBC QN AUTO: 28.2 PG (ref 26.6–33)
MCHC RBC AUTO-ENTMCNC: 32.7 G/DL (ref 31.5–35.7)
MCV RBC AUTO: 86.4 FL (ref 79–97)
MONOCYTES # BLD AUTO: 0.7 10*3/MM3 (ref 0.1–0.9)
MONOCYTES NFR BLD AUTO: 10.3 % (ref 5–12)
NEUTROPHILS # BLD AUTO: 4.3 10*3/MM3 (ref 1.7–7)
NEUTROPHILS NFR BLD AUTO: 65.3 % (ref 42.7–76)
NRBC BLD AUTO-RTO: 0.2 /100 WBC (ref 0–0.2)
PLATELET # BLD AUTO: 287 10*3/MM3 (ref 140–450)
PMV BLD AUTO: 8.6 FL (ref 6–12)
POTASSIUM BLD-SCNC: 4.2 MMOL/L (ref 3.6–5.1)
PROTHROMBIN TIME: 10.9 SECONDS (ref 9.6–11.7)
RBC # BLD AUTO: 3.36 10*6/MM3 (ref 3.77–5.28)
SODIUM BLD-SCNC: 141 MMOL/L (ref 136–144)
WBC NRBC COR # BLD: 6.5 10*3/MM3 (ref 3.4–10.8)

## 2019-10-10 PROCEDURE — 33340 PERQ CLSR TCAT L ATR APNDGE: CPT | Performed by: INTERNAL MEDICINE

## 2019-10-10 PROCEDURE — 25010000002 FUROSEMIDE PER 20 MG: Performed by: FAMILY MEDICINE

## 2019-10-10 PROCEDURE — 85014 HEMATOCRIT: CPT | Performed by: NURSE PRACTITIONER

## 2019-10-10 PROCEDURE — 94799 UNLISTED PULMONARY SVC/PX: CPT

## 2019-10-10 PROCEDURE — 99231 SBSQ HOSP IP/OBS SF/LOW 25: CPT | Performed by: INTERNAL MEDICINE

## 2019-10-10 PROCEDURE — 25010000002 FENTANYL CITRATE (PF) 100 MCG/2ML SOLUTION: Performed by: ANESTHESIOLOGY

## 2019-10-10 PROCEDURE — 36430 TRANSFUSION BLD/BLD COMPNT: CPT

## 2019-10-10 PROCEDURE — 0 IOPAMIDOL PER 1 ML: Performed by: INTERNAL MEDICINE

## 2019-10-10 PROCEDURE — 25010000002 PROPOFOL 10 MG/ML EMULSION: Performed by: ANESTHESIOLOGY

## 2019-10-10 PROCEDURE — 85730 THROMBOPLASTIN TIME PARTIAL: CPT | Performed by: INTERNAL MEDICINE

## 2019-10-10 PROCEDURE — 85610 PROTHROMBIN TIME: CPT | Performed by: INTERNAL MEDICINE

## 2019-10-10 PROCEDURE — 25010000002 DEXAMETHASONE PER 1 MG: Performed by: ANESTHESIOLOGY

## 2019-10-10 PROCEDURE — C1769 GUIDE WIRE: HCPCS | Performed by: INTERNAL MEDICINE

## 2019-10-10 PROCEDURE — 93355 ECHO TRANSESOPHAGEAL (TEE): CPT

## 2019-10-10 PROCEDURE — 02L73DK OCCLUSION OF LEFT ATRIAL APPENDAGE WITH INTRALUMINAL DEVICE, PERCUTANEOUS APPROACH: ICD-10-PCS | Performed by: INTERNAL MEDICINE

## 2019-10-10 PROCEDURE — 25010000002 DIPHENHYDRAMINE PER 50 MG: Performed by: ANESTHESIOLOGY

## 2019-10-10 PROCEDURE — 85014 HEMATOCRIT: CPT | Performed by: INTERNAL MEDICINE

## 2019-10-10 PROCEDURE — 25010000002 ONDANSETRON PER 1 MG: Performed by: ANESTHESIOLOGY

## 2019-10-10 PROCEDURE — 99232 SBSQ HOSP IP/OBS MODERATE 35: CPT | Performed by: INTERNAL MEDICINE

## 2019-10-10 PROCEDURE — 85018 HEMOGLOBIN: CPT | Performed by: NURSE PRACTITIONER

## 2019-10-10 PROCEDURE — 85347 COAGULATION TIME ACTIVATED: CPT

## 2019-10-10 PROCEDURE — 93320 DOPPLER ECHO COMPLETE: CPT

## 2019-10-10 PROCEDURE — 25010000002 HYDROCORTISONE SODIUM SUCCINATE 100 MG RECONSTITUTED SOLUTION: Performed by: ANESTHESIOLOGY

## 2019-10-10 PROCEDURE — 85018 HEMOGLOBIN: CPT | Performed by: INTERNAL MEDICINE

## 2019-10-10 PROCEDURE — 93325 DOPPLER ECHO COLOR FLOW MAPG: CPT

## 2019-10-10 PROCEDURE — 86900 BLOOD TYPING SEROLOGIC ABO: CPT

## 2019-10-10 PROCEDURE — 25010000002 PROTAMINE SULFATE PER 10 MG: Performed by: ANESTHESIOLOGY

## 2019-10-10 PROCEDURE — 25010000002 HEPARIN (PORCINE) PER 1000 UNITS: Performed by: ANESTHESIOLOGY

## 2019-10-10 PROCEDURE — 80048 BASIC METABOLIC PNL TOTAL CA: CPT | Performed by: PHYSICIAN ASSISTANT

## 2019-10-10 PROCEDURE — C1894 INTRO/SHEATH, NON-LASER: HCPCS | Performed by: INTERNAL MEDICINE

## 2019-10-10 PROCEDURE — 85025 COMPLETE CBC W/AUTO DIFF WBC: CPT | Performed by: INTERNAL MEDICINE

## 2019-10-10 PROCEDURE — P9016 RBC LEUKOCYTES REDUCED: HCPCS

## 2019-10-10 PROCEDURE — C1893 INTRO/SHEATH, FIXED,NON-PEEL: HCPCS | Performed by: INTERNAL MEDICINE

## 2019-10-10 PROCEDURE — 25010000002 MIDAZOLAM PER 1 MG: Performed by: ANESTHESIOLOGY

## 2019-10-10 DEVICE — LEFT ATRIAL APPENDAGE CLOSURE DEVICE WITH DELIVERY SYSTEM
Type: IMPLANTABLE DEVICE | Site: HEART | Status: FUNCTIONAL
Brand: WATCHMAN®

## 2019-10-10 DEVICE — CAP DEV PROC WATCHMAN LAA: Type: IMPLANTABLE DEVICE | Status: FUNCTIONAL

## 2019-10-10 RX ORDER — SODIUM CHLORIDE 0.9 % (FLUSH) 0.9 %
10 SYRINGE (ML) INJECTION AS NEEDED
Status: DISCONTINUED | OUTPATIENT
Start: 2019-10-10 | End: 2019-10-10 | Stop reason: HOSPADM

## 2019-10-10 RX ORDER — FENTANYL CITRATE 50 UG/ML
INJECTION, SOLUTION INTRAMUSCULAR; INTRAVENOUS AS NEEDED
Status: DISCONTINUED | OUTPATIENT
Start: 2019-10-10 | End: 2019-10-10 | Stop reason: SURG

## 2019-10-10 RX ORDER — MORPHINE SULFATE 4 MG/ML
1 INJECTION, SOLUTION INTRAMUSCULAR; INTRAVENOUS EVERY 4 HOURS PRN
Status: DISCONTINUED | OUTPATIENT
Start: 2019-10-10 | End: 2019-10-11 | Stop reason: HOSPADM

## 2019-10-10 RX ORDER — DEXAMETHASONE SODIUM PHOSPHATE 4 MG/ML
INJECTION, SOLUTION INTRA-ARTICULAR; INTRALESIONAL; INTRAMUSCULAR; INTRAVENOUS; SOFT TISSUE AS NEEDED
Status: DISCONTINUED | OUTPATIENT
Start: 2019-10-10 | End: 2019-10-10 | Stop reason: SURG

## 2019-10-10 RX ORDER — HYDROMORPHONE HCL 110MG/55ML
1 PATIENT CONTROLLED ANALGESIA SYRINGE INTRAVENOUS
Status: CANCELLED | OUTPATIENT
Start: 2019-10-10

## 2019-10-10 RX ORDER — PROPOFOL 10 MG/ML
VIAL (ML) INTRAVENOUS AS NEEDED
Status: DISCONTINUED | OUTPATIENT
Start: 2019-10-10 | End: 2019-10-10 | Stop reason: SURG

## 2019-10-10 RX ORDER — ONDANSETRON 2 MG/ML
4 INJECTION INTRAMUSCULAR; INTRAVENOUS EVERY 6 HOURS PRN
Status: DISCONTINUED | OUTPATIENT
Start: 2019-10-10 | End: 2019-10-10 | Stop reason: SDUPTHER

## 2019-10-10 RX ORDER — MIDAZOLAM HYDROCHLORIDE 1 MG/ML
INJECTION INTRAMUSCULAR; INTRAVENOUS AS NEEDED
Status: DISCONTINUED | OUTPATIENT
Start: 2019-10-10 | End: 2019-10-10 | Stop reason: SURG

## 2019-10-10 RX ORDER — OXYCODONE HYDROCHLORIDE 5 MG/1
5 TABLET ORAL ONCE AS NEEDED
Status: DISCONTINUED | OUTPATIENT
Start: 2019-10-10 | End: 2019-10-10 | Stop reason: HOSPADM

## 2019-10-10 RX ORDER — DIPHENHYDRAMINE HCL 25 MG
50 TABLET ORAL ONCE
Status: DISCONTINUED | OUTPATIENT
Start: 2019-10-10 | End: 2019-10-10 | Stop reason: HOSPADM

## 2019-10-10 RX ORDER — DIPHENHYDRAMINE HYDROCHLORIDE 50 MG/ML
INJECTION INTRAMUSCULAR; INTRAVENOUS AS NEEDED
Status: DISCONTINUED | OUTPATIENT
Start: 2019-10-10 | End: 2019-10-10 | Stop reason: SURG

## 2019-10-10 RX ORDER — LIDOCAINE HYDROCHLORIDE 20 MG/ML
INJECTION, SOLUTION INFILTRATION; PERINEURAL AS NEEDED
Status: DISCONTINUED | OUTPATIENT
Start: 2019-10-10 | End: 2019-10-10 | Stop reason: HOSPADM

## 2019-10-10 RX ORDER — SODIUM CHLORIDE 9 MG/ML
INJECTION, SOLUTION INTRAVENOUS CONTINUOUS PRN
Status: DISCONTINUED | OUTPATIENT
Start: 2019-10-10 | End: 2019-10-10 | Stop reason: SURG

## 2019-10-10 RX ORDER — ROCURONIUM BROMIDE 10 MG/ML
INJECTION, SOLUTION INTRAVENOUS AS NEEDED
Status: DISCONTINUED | OUTPATIENT
Start: 2019-10-10 | End: 2019-10-10 | Stop reason: SURG

## 2019-10-10 RX ORDER — NALOXONE HCL 0.4 MG/ML
0.4 VIAL (ML) INJECTION
Status: DISCONTINUED | OUTPATIENT
Start: 2019-10-10 | End: 2019-10-11 | Stop reason: HOSPADM

## 2019-10-10 RX ORDER — FUROSEMIDE 10 MG/ML
40 INJECTION INTRAMUSCULAR; INTRAVENOUS DAILY
Status: DISCONTINUED | OUTPATIENT
Start: 2019-10-11 | End: 2019-10-11 | Stop reason: HOSPADM

## 2019-10-10 RX ORDER — WARFARIN SODIUM 3 MG/1
3 TABLET ORAL
Status: DISCONTINUED | OUTPATIENT
Start: 2019-10-10 | End: 2019-10-11 | Stop reason: HOSPADM

## 2019-10-10 RX ORDER — HEPARIN SODIUM 1000 [USP'U]/ML
INJECTION, SOLUTION INTRAVENOUS; SUBCUTANEOUS AS NEEDED
Status: DISCONTINUED | OUTPATIENT
Start: 2019-10-10 | End: 2019-10-10 | Stop reason: SURG

## 2019-10-10 RX ORDER — ONDANSETRON 2 MG/ML
INJECTION INTRAMUSCULAR; INTRAVENOUS AS NEEDED
Status: DISCONTINUED | OUTPATIENT
Start: 2019-10-10 | End: 2019-10-10 | Stop reason: SURG

## 2019-10-10 RX ORDER — PROTAMINE SULFATE 10 MG/ML
INJECTION, SOLUTION INTRAVENOUS AS NEEDED
Status: DISCONTINUED | OUTPATIENT
Start: 2019-10-10 | End: 2019-10-10 | Stop reason: SURG

## 2019-10-10 RX ORDER — SODIUM CHLORIDE 0.9 % (FLUSH) 0.9 %
3 SYRINGE (ML) INJECTION EVERY 12 HOURS SCHEDULED
Status: DISCONTINUED | OUTPATIENT
Start: 2019-10-10 | End: 2019-10-10 | Stop reason: HOSPADM

## 2019-10-10 RX ORDER — OXYCODONE HYDROCHLORIDE 5 MG/1
5 TABLET ORAL ONCE AS NEEDED
Status: CANCELLED | OUTPATIENT
Start: 2019-10-10

## 2019-10-10 RX ORDER — HYDROMORPHONE HCL 110MG/55ML
1 PATIENT CONTROLLED ANALGESIA SYRINGE INTRAVENOUS
Status: DISCONTINUED | OUTPATIENT
Start: 2019-10-10 | End: 2019-10-10 | Stop reason: HOSPADM

## 2019-10-10 RX ADMIN — DILTIAZEM HYDROCHLORIDE 240 MG: 240 CAPSULE, COATED, EXTENDED RELEASE ORAL at 21:29

## 2019-10-10 RX ADMIN — ZOLPIDEM TARTRATE 5 MG: 5 TABLET, COATED ORAL at 21:32

## 2019-10-10 RX ADMIN — DIPHENHYDRAMINE HYDROCHLORIDE 25 MG: 50 INJECTION, SOLUTION INTRAMUSCULAR; INTRAVENOUS at 10:34

## 2019-10-10 RX ADMIN — FENTANYL CITRATE 25 MCG: 50 INJECTION, SOLUTION INTRAMUSCULAR; INTRAVENOUS at 10:30

## 2019-10-10 RX ADMIN — DEXAMETHASONE SODIUM PHOSPHATE 4 MG: 4 INJECTION, SOLUTION INTRAMUSCULAR; INTRAVENOUS at 10:30

## 2019-10-10 RX ADMIN — HEPARIN SODIUM 3000 UNITS: 1000 INJECTION, SOLUTION INTRAVENOUS; SUBCUTANEOUS at 11:10

## 2019-10-10 RX ADMIN — HEPARIN SODIUM 5000 UNITS: 1000 INJECTION, SOLUTION INTRAVENOUS; SUBCUTANEOUS at 10:46

## 2019-10-10 RX ADMIN — ONDANSETRON 4 MG: 2 INJECTION INTRAMUSCULAR; INTRAVENOUS at 11:37

## 2019-10-10 RX ADMIN — SUCRALFATE 1 G: 1 TABLET ORAL at 21:32

## 2019-10-10 RX ADMIN — PROPOFOL 150 MG: 10 INJECTION, EMULSION INTRAVENOUS at 10:26

## 2019-10-10 RX ADMIN — PROPOFOL 50 MG: 10 INJECTION, EMULSION INTRAVENOUS at 11:39

## 2019-10-10 RX ADMIN — IPRATROPIUM BROMIDE AND ALBUTEROL SULFATE 3 ML: .5; 3 SOLUTION RESPIRATORY (INHALATION) at 06:43

## 2019-10-10 RX ADMIN — FUROSEMIDE 40 MG: 10 INJECTION, SOLUTION INTRAMUSCULAR; INTRAVENOUS at 01:14

## 2019-10-10 RX ADMIN — PANTOPRAZOLE SODIUM 40 MG: 40 TABLET, DELAYED RELEASE ORAL at 18:38

## 2019-10-10 RX ADMIN — ROCURONIUM BROMIDE 30 MG: 10 INJECTION, SOLUTION INTRAVENOUS at 10:27

## 2019-10-10 RX ADMIN — MIDAZOLAM 2 MG: 1 INJECTION INTRAMUSCULAR; INTRAVENOUS at 10:19

## 2019-10-10 RX ADMIN — HEPARIN SODIUM 8000 UNITS: 1000 INJECTION, SOLUTION INTRAVENOUS; SUBCUTANEOUS at 10:59

## 2019-10-10 RX ADMIN — HYDROCORTISONE SODIUM SUCCINATE 100 MG: 100 INJECTION, POWDER, FOR SOLUTION INTRAMUSCULAR; INTRAVENOUS at 10:33

## 2019-10-10 RX ADMIN — FERROUS SULFATE TAB EC 324 MG (65 MG FE EQUIVALENT) 324 MG: 324 (65 FE) TABLET DELAYED RESPONSE at 18:38

## 2019-10-10 RX ADMIN — SODIUM CHLORIDE, PRESERVATIVE FREE 10 ML: 5 INJECTION INTRAVENOUS at 08:00

## 2019-10-10 RX ADMIN — FAMOTIDINE 20 MG: 10 INJECTION, SOLUTION INTRAVENOUS at 10:34

## 2019-10-10 RX ADMIN — WARFARIN SODIUM 3 MG: 3 TABLET ORAL at 18:38

## 2019-10-10 RX ADMIN — SODIUM CHLORIDE: 0.9 INJECTION, SOLUTION INTRAVENOUS at 10:18

## 2019-10-10 RX ADMIN — SUCRALFATE 1 G: 1 TABLET ORAL at 18:38

## 2019-10-10 RX ADMIN — POTASSIUM CHLORIDE 20 MEQ: 1500 TABLET, EXTENDED RELEASE ORAL at 18:39

## 2019-10-10 RX ADMIN — FENTANYL CITRATE 25 MCG: 50 INJECTION, SOLUTION INTRAMUSCULAR; INTRAVENOUS at 10:25

## 2019-10-10 RX ADMIN — PROTAMINE SULFATE 100 MG: 10 INJECTION, SOLUTION INTRAVENOUS at 11:37

## 2019-10-10 RX ADMIN — SODIUM CHLORIDE, PRESERVATIVE FREE 10 ML: 5 INJECTION INTRAVENOUS at 21:32

## 2019-10-10 RX ADMIN — FENTANYL CITRATE 50 MCG: 50 INJECTION, SOLUTION INTRAMUSCULAR; INTRAVENOUS at 11:12

## 2019-10-10 NOTE — ANESTHESIA PREPROCEDURE EVALUATION
Anesthesia Evaluation     NPO Solid Status: > 8 hours  NPO Liquid Status: > 8 hours           Airway   Mallampati: II  TM distance: >3 FB  Neck ROM: full  Dental      Pulmonary     breath sounds clear to auscultation  (+) sleep apnea on CPAP,   Cardiovascular     Rhythm: irregular  Rate: abnormal    (+) hypertension well controlled 2 medications or greater, valvular problems/murmurs murmur, CAD, dysrhythmias Atrial Fib, angina with exertion, hyperlipidemia,       Neuro/Psych  (+) TIA, CVA, numbness, psychiatric history Anxiety,     GI/Hepatic/Renal/Endo    (+)  hiatal hernia, GERD well controlled, PUD,  diabetes mellitus type 2 well controlled,     Musculoskeletal     Abdominal    Substance History      OB/GYN          Other   (+) arthritis                     Anesthesia Plan    ASA 4     general     intravenous induction   Anesthetic plan, all risks, benefits, and alternatives have been provided, discussed and informed consent has been obtained with: patient.

## 2019-10-10 NOTE — ANESTHESIA PROCEDURE NOTES
Airway  Urgency: elective    Date/Time: 10/10/2019 10:29 AM  Airway not difficult    General Information and Staff    Patient location during procedure: OR    Indications and Patient Condition    Preoxygenated: yes  Mask difficulty assessment: 1 - vent by mask    Final Airway Details  Final airway type: endotracheal airway      Successful airway: ETT    Successful intubation technique: direct laryngoscopy  Endotracheal tube insertion site: oral  Blade: Lisandra  Blade size: 3  ETT size (mm): 7.0  Cormack-Lehane Classification: grade I - full view of glottis  Placement verified by: chest auscultation and capnometry   Measured from: gums  ETT/EBT to gums (cm): 21  Number of attempts at approach: 1  Assessment: lips, teeth, and gum same as pre-op and atraumatic intubation

## 2019-10-10 NOTE — ANESTHESIA POSTPROCEDURE EVALUATION
Patient: Cherie Hinton    Procedure Summary     Date:  10/10/19 Room / Location:  [facility]    Anesthesia Start:  1018 Anesthesia Stop:  1156    Procedure:  ADULT TRANSESOPHAGEAL ECHO (DHEERAJ) W/ CONT IF NECESSARY PER PROTOCOL Diagnosis:       Iron deficiency anemia due to chronic blood loss      Gastric AVM      Atrial fibrillation, chronic      (Arrhythmia)    Scheduled Providers:   Provider:  Daniel Bustos DO    Anesthesia Type:  general ASA Status:  4          Anesthesia Type: general  Last vitals  BP       Temp       Pulse       Resp         SpO2         Post Anesthesia Care and Evaluation    Patient location during evaluation: PACU  Patient participation: complete - patient participated  Level of consciousness: awake  Pain scale: See nurse's notes for pain score.  Pain management: adequate  Airway patency: patent  Anesthetic complications: No anesthetic complications  PONV Status: none  Cardiovascular status: acceptable  Respiratory status: acceptable  Hydration status: acceptable    Comments: Patient seen and examined postoperatively; vital signs stable; SpO2 greater than or equal to 90%; cardiopulmonary status stable; nausea/vomiting adequately controlled; pain adequately controlled; no apparent anesthesia complications; patient discharged from anesthesia care when discharge criteria were met

## 2019-10-11 VITALS
RESPIRATION RATE: 17 BRPM | HEIGHT: 63 IN | WEIGHT: 190.26 LBS | OXYGEN SATURATION: 96 % | BODY MASS INDEX: 33.71 KG/M2 | TEMPERATURE: 97.3 F | SYSTOLIC BLOOD PRESSURE: 148 MMHG | DIASTOLIC BLOOD PRESSURE: 69 MMHG | HEART RATE: 72 BPM

## 2019-10-11 LAB
ABO + RH BLD: NORMAL
ABO + RH BLD: NORMAL
ALBUMIN SERPL-MCNC: 3.1 G/DL (ref 2.9–4.4)
ALBUMIN/GLOB SERPL: 0.8 {RATIO} (ref 0.7–1.7)
ALPHA1 GLOB FLD ELPH-MCNC: 0.5 G/DL (ref 0–0.4)
ALPHA2 GLOB SERPL ELPH-MCNC: 1.3 G/DL (ref 0.4–1)
ANION GAP SERPL CALCULATED.3IONS-SCNC: 14 MMOL/L (ref 5–15)
B-GLOBULIN SERPL ELPH-MCNC: 1.2 G/DL (ref 0.7–1.3)
BASOPHILS # BLD AUTO: 0 10*3/MM3 (ref 0–0.2)
BASOPHILS NFR BLD AUTO: 0.5 % (ref 0–1.5)
BH BB BLOOD EXPIRATION DATE: NORMAL
BH BB BLOOD EXPIRATION DATE: NORMAL
BH BB BLOOD TYPE BARCODE: 6200
BH BB BLOOD TYPE BARCODE: 6200
BH BB DISPENSE STATUS: NORMAL
BH BB DISPENSE STATUS: NORMAL
BH BB PRODUCT CODE: NORMAL
BH BB PRODUCT CODE: NORMAL
BH BB UNIT NUMBER: NORMAL
BH BB UNIT NUMBER: NORMAL
BUN BLD-MCNC: 17 MG/DL (ref 8–20)
BUN/CREAT SERPL: 15.5 (ref 5.4–26.2)
CALCIUM SPEC-SCNC: 8.8 MG/DL (ref 8.9–10.3)
CHLORIDE SERPL-SCNC: 98 MMOL/L (ref 101–111)
CO2 SERPL-SCNC: 31 MMOL/L (ref 22–32)
CREAT BLD-MCNC: 1.1 MG/DL (ref 0.4–1)
DEPRECATED RDW RBC AUTO: 51.6 FL (ref 37–54)
EOSINOPHIL # BLD AUTO: 0 10*3/MM3 (ref 0–0.4)
EOSINOPHIL NFR BLD AUTO: 0 % (ref 0.3–6.2)
ERYTHROCYTE [DISTWIDTH] IN BLOOD BY AUTOMATED COUNT: 16.9 % (ref 12.3–15.4)
ETHNIC BACKGROUND STATED: 94.8 MIU/ML (ref 2.6–18.5)
GAMMA GLOB SERPL ELPH-MCNC: 0.8 G/DL (ref 0.4–1.8)
GFR SERPL CREATININE-BSD FRML MDRD: 47 ML/MIN/1.73
GLOBULIN SER CALC-MCNC: 3.8 G/DL (ref 2.2–3.9)
GLUCOSE BLD-MCNC: 187 MG/DL (ref 65–99)
HCT VFR BLD AUTO: 33.5 % (ref 34–46.6)
HCT VFR BLD AUTO: 36.7 % (ref 34–46.6)
HGB BLD-MCNC: 11 G/DL (ref 12–15.9)
HGB BLD-MCNC: 11.9 G/DL (ref 12–15.9)
INR PPP: 1.07 (ref 2–3)
LYMPHOCYTES # BLD AUTO: 0.5 10*3/MM3 (ref 0.7–3.1)
LYMPHOCYTES NFR BLD AUTO: 7.6 % (ref 19.6–45.3)
Lab: ABNORMAL
M-SPIKE: ABNORMAL G/DL
MCH RBC QN AUTO: 28.2 PG (ref 26.6–33)
MCHC RBC AUTO-ENTMCNC: 32.8 G/DL (ref 31.5–35.7)
MCV RBC AUTO: 85.9 FL (ref 79–97)
MONOCYTES # BLD AUTO: 0.3 10*3/MM3 (ref 0.1–0.9)
MONOCYTES NFR BLD AUTO: 5.1 % (ref 5–12)
NEUTROPHILS # BLD AUTO: 5.9 10*3/MM3 (ref 1.7–7)
NEUTROPHILS NFR BLD AUTO: 86.8 % (ref 42.7–76)
NRBC BLD AUTO-RTO: 0.1 /100 WBC (ref 0–0.2)
PCA AB SER-ACNC: 1.2 UNITS (ref 0–20)
PLATELET # BLD AUTO: 271 10*3/MM3 (ref 140–450)
PMV BLD AUTO: 8.3 FL (ref 6–12)
POTASSIUM BLD-SCNC: 4 MMOL/L (ref 3.6–5.1)
PROT PATTERN SERPL ELPH-IMP: ABNORMAL
PROT SERPL-MCNC: 6.9 G/DL (ref 6–8.5)
PROTHROMBIN TIME: 11.1 SECONDS (ref 19.4–28.5)
RBC # BLD AUTO: 3.9 10*6/MM3 (ref 3.77–5.28)
SODIUM BLD-SCNC: 139 MMOL/L (ref 136–144)
UNIT  ABO: NORMAL
UNIT  ABO: NORMAL
UNIT  RH: NORMAL
UNIT  RH: NORMAL
WBC NRBC COR # BLD: 6.8 10*3/MM3 (ref 3.4–10.8)

## 2019-10-11 PROCEDURE — 80048 BASIC METABOLIC PNL TOTAL CA: CPT | Performed by: PHYSICIAN ASSISTANT

## 2019-10-11 PROCEDURE — 25010000002 FUROSEMIDE PER 20 MG: Performed by: INTERNAL MEDICINE

## 2019-10-11 PROCEDURE — 99232 SBSQ HOSP IP/OBS MODERATE 35: CPT | Performed by: INTERNAL MEDICINE

## 2019-10-11 PROCEDURE — 85025 COMPLETE CBC W/AUTO DIFF WBC: CPT | Performed by: INTERNAL MEDICINE

## 2019-10-11 PROCEDURE — 85018 HEMOGLOBIN: CPT | Performed by: NURSE PRACTITIONER

## 2019-10-11 PROCEDURE — 99239 HOSP IP/OBS DSCHRG MGMT >30: CPT | Performed by: INTERNAL MEDICINE

## 2019-10-11 PROCEDURE — 99233 SBSQ HOSP IP/OBS HIGH 50: CPT | Performed by: NURSE PRACTITIONER

## 2019-10-11 PROCEDURE — 85610 PROTHROMBIN TIME: CPT | Performed by: INTERNAL MEDICINE

## 2019-10-11 PROCEDURE — 97530 THERAPEUTIC ACTIVITIES: CPT

## 2019-10-11 PROCEDURE — 85014 HEMATOCRIT: CPT | Performed by: NURSE PRACTITIONER

## 2019-10-11 PROCEDURE — 97116 GAIT TRAINING THERAPY: CPT

## 2019-10-11 RX ORDER — WARFARIN SODIUM 3 MG/1
TABLET ORAL
Qty: 60 TABLET | Refills: 5 | Status: SHIPPED | OUTPATIENT
Start: 2019-10-11 | End: 2019-11-26 | Stop reason: HOSPADM

## 2019-10-11 RX ORDER — WARFARIN SODIUM 3 MG/1
TABLET ORAL
Qty: 60 TABLET | Refills: 5 | Status: SHIPPED | OUTPATIENT
Start: 2019-10-11 | End: 2019-10-11

## 2019-10-11 RX ORDER — FUROSEMIDE 40 MG/1
40 TABLET ORAL DAILY
Qty: 30 TABLET | Refills: 0 | Status: SHIPPED | OUTPATIENT
Start: 2019-10-11 | End: 2019-11-10

## 2019-10-11 RX ADMIN — ATORVASTATIN CALCIUM 10 MG: 10 TABLET, FILM COATED ORAL at 09:00

## 2019-10-11 RX ADMIN — NEBIVOLOL HYDROCHLORIDE 10 MG: 10 TABLET ORAL at 08:58

## 2019-10-11 RX ADMIN — CYANOCOBALAMIN TAB 1000 MCG 1000 MCG: 1000 TAB at 08:58

## 2019-10-11 RX ADMIN — FLUOXETINE 40 MG: 20 CAPSULE ORAL at 08:58

## 2019-10-11 RX ADMIN — POTASSIUM CHLORIDE 20 MEQ: 1500 TABLET, EXTENDED RELEASE ORAL at 09:08

## 2019-10-11 RX ADMIN — SUCRALFATE 1 G: 1 TABLET ORAL at 08:59

## 2019-10-11 RX ADMIN — PANTOPRAZOLE SODIUM 40 MG: 40 TABLET, DELAYED RELEASE ORAL at 08:59

## 2019-10-11 RX ADMIN — MELATONIN 2000 UNITS: at 08:59

## 2019-10-11 RX ADMIN — FERROUS SULFATE TAB EC 324 MG (65 MG FE EQUIVALENT) 324 MG: 324 (65 FE) TABLET DELAYED RESPONSE at 08:58

## 2019-10-11 RX ADMIN — SODIUM CHLORIDE, PRESERVATIVE FREE 10 ML: 5 INJECTION INTRAVENOUS at 08:58

## 2019-10-11 RX ADMIN — FUROSEMIDE 40 MG: 10 INJECTION, SOLUTION INTRAVENOUS at 08:59

## 2019-10-11 RX ADMIN — DILTIAZEM HYDROCHLORIDE 240 MG: 240 CAPSULE, COATED, EXTENDED RELEASE ORAL at 08:58

## 2019-10-12 ENCOUNTER — READMISSION MANAGEMENT (OUTPATIENT)
Dept: CALL CENTER | Facility: HOSPITAL | Age: 84
End: 2019-10-12

## 2019-10-12 LAB
COPPER SERPL-MCNC: 141 UG/DL (ref 72–166)
IF BLOCK AB SER-ACNC: 1.1 AU/ML (ref 0–1.1)

## 2019-10-13 LAB
BH CV ECHO MEAS - BSA(HAYCOCK): 2 M^2
BH CV ECHO MEAS - BSA: 1.9 M^2
BH CV ECHO MEAS - BZI_BMI: 33.7 KILOGRAMS/M^2
BH CV ECHO MEAS - BZI_METRIC_HEIGHT: 160 CM
BH CV ECHO MEAS - BZI_METRIC_WEIGHT: 86.2 KG
LV EF 2D ECHO EST: 55 %

## 2019-10-13 PROCEDURE — 93355 ECHO TRANSESOPHAGEAL (TEE): CPT | Performed by: INTERNAL MEDICINE

## 2019-10-13 NOTE — OUTREACH NOTE
Prep Survey      Responses   Facility patient discharged from?  [facility]   Is patient eligible?  Yes   Discharge diagnosis  Atrial appendage occlusion   Does the patient have one of the following disease processes/diagnoses(primary or secondary)?  General Surgery   Does the patient have Home health ordered?  No   Is there a DME ordered?  No   Prep survey completed?  Yes          Luisa Maldonado RN

## 2019-10-14 ENCOUNTER — READMISSION MANAGEMENT (OUTPATIENT)
Dept: CALL CENTER | Facility: HOSPITAL | Age: 84
End: 2019-10-14

## 2019-10-14 NOTE — OUTREACH NOTE
General Surgery Week 1 Survey      Responses   Facility patient discharged from?  [facility]   Does the patient have one of the following disease processes/diagnoses(primary or secondary)?  General Surgery   Is there a successful TCM telephone encounter documented?  No   Week 1 attempt successful?  Yes   Call start time  1429   Call end time  1432   Meds reviewed with patient/caregiver?  Yes   Is the patient having any side effects they believe may be caused by any medication additions or changes?  No   Does the patient have all medications related to this admission filled (includes all antibiotics, pain medications, etc.)  Yes   Is the patient taking all medications as directed (includes completed medication regime)?  Yes   Does the patient have a follow up appointment scheduled with their surgeon?  Yes   Has the patient kept scheduled appointments due by today?  N/A   Has home health visited the patient within 72 hours of discharge?  N/A   Did the patient receive a copy of their discharge instructions?  Yes   Nursing interventions  Reviewed instructions with patient   What is the patient's perception of their health status since discharge?  Improving   Nursing interventions  Nurse provided patient education   Is the patient /caregiver able to teach back basic post-op care?  Continue use of incentive spirometry at least 1 week post discharge, Practice 'cough and deep breath', Drive as instructed by MD in discharge instructions, Take showers only when approved by MD-sponge bathe until then, No tub bath, swimming, or hot tub until instructed by MD, Keep incision areas clean,dry and protected, Do not remove steri-strips, Lifting as instructed by MD in discharge instructions   Is the patient/caregiver able to teach back signs and symptoms of incisional infection?  Increased redness, swelling or pain at the incisonal site, Increased drainage or bleeding, Incisional warmth, Pus or odor from incision, Fever   Is the  patient/caregiver able to teach back steps to recovery at home?  Set small, achievable goals for return to baseline health, Rest and rebuild strength, gradually increase activity, Make a list of questions for surgeon's appointment   Is the patient/caregiver able to teach back the hierarchy of who to call/visit for symptoms/problems? PCP, Specialist, Home health nurse, Urgent Care, ED, 911  Yes   Week 1 call completed?  Yes          Francesca Fitzgerald LPN

## 2019-10-15 ENCOUNTER — TELEPHONE (OUTPATIENT)
Dept: FAMILY MEDICINE CLINIC | Facility: CLINIC | Age: 84
End: 2019-10-15

## 2019-10-15 ENCOUNTER — ANTICOAGULATION VISIT (OUTPATIENT)
Dept: CARDIOLOGY | Facility: CLINIC | Age: 84
End: 2019-10-15

## 2019-10-15 DIAGNOSIS — I48.11 LONGSTANDING PERSISTENT ATRIAL FIBRILLATION (HCC): Primary | ICD-10-CM

## 2019-10-15 LAB — INR PPP: 1.6 (ref 0.9–1.1)

## 2019-10-15 PROCEDURE — 36416 COLLJ CAPILLARY BLOOD SPEC: CPT | Performed by: INTERNAL MEDICINE

## 2019-10-15 PROCEDURE — 85610 PROTHROMBIN TIME: CPT | Performed by: INTERNAL MEDICINE

## 2019-10-15 NOTE — TELEPHONE ENCOUNTER
Patient was discharged from the hospital on 10/11.  Please call grand daughter to schedule follow up appt with [facility].

## 2019-10-16 NOTE — TELEPHONE ENCOUNTER
Gave message to patient's granddaughter at 11:00am and scheduled an appt with [facility] on 10/21/2019 at 2:15pm.

## 2019-10-17 ENCOUNTER — PREP FOR SURGERY (OUTPATIENT)
Dept: OTHER | Facility: HOSPITAL | Age: 84
End: 2019-10-17

## 2019-10-17 ENCOUNTER — CLINICAL SUPPORT NO REQUIREMENTS (OUTPATIENT)
Dept: CARDIOLOGY | Facility: CLINIC | Age: 84
End: 2019-10-17

## 2019-10-17 ENCOUNTER — TELEPHONE (OUTPATIENT)
Dept: ONCOLOGY | Facility: CLINIC | Age: 84
End: 2019-10-17

## 2019-10-17 ENCOUNTER — TELEPHONE (OUTPATIENT)
Dept: FAMILY MEDICINE CLINIC | Facility: CLINIC | Age: 84
End: 2019-10-17

## 2019-10-17 VITALS
WEIGHT: 177 LBS | HEART RATE: 64 BPM | RESPIRATION RATE: 18 BRPM | OXYGEN SATURATION: 96 % | SYSTOLIC BLOOD PRESSURE: 150 MMHG | DIASTOLIC BLOOD PRESSURE: 80 MMHG | BODY MASS INDEX: 31.35 KG/M2

## 2019-10-17 DIAGNOSIS — I48.19 ATRIAL FIBRILLATION, PERSISTENT (HCC): Primary | ICD-10-CM

## 2019-10-17 DIAGNOSIS — D50.0 IRON DEFICIENCY ANEMIA DUE TO CHRONIC BLOOD LOSS: Primary | ICD-10-CM

## 2019-10-17 DIAGNOSIS — I48.21 PERMANENT ATRIAL FIBRILLATION (HCC): Primary | ICD-10-CM

## 2019-10-17 PROCEDURE — 99213 OFFICE O/P EST LOW 20 MIN: CPT | Performed by: NURSE PRACTITIONER

## 2019-10-17 NOTE — TELEPHONE ENCOUNTER
Ms. Hinton left message to cancel her lab appt.  Returned call to reschedule and schedule MD appt.  Voice mail full, could not leave message.  Accidentally cancelled appt w/ [facility], rescheduled that at same time.

## 2019-10-17 NOTE — TELEPHONE ENCOUNTER
Spoke w/ pt and informed her that I spoke with Dr Field's office and they will draw her CBC while the pt is there on Monday.  Pt v/u.

## 2019-10-18 ENCOUNTER — APPOINTMENT (OUTPATIENT)
Dept: LAB | Facility: HOSPITAL | Age: 84
End: 2019-10-18

## 2019-10-18 ENCOUNTER — ANTICOAGULATION VISIT (OUTPATIENT)
Dept: CARDIOLOGY | Facility: CLINIC | Age: 84
End: 2019-10-18

## 2019-10-18 DIAGNOSIS — I48.19 PERSISTENT ATRIAL FIBRILLATION (HCC): Primary | ICD-10-CM

## 2019-10-18 PROBLEM — I35.1 AORTIC VALVE REGURGITATION: Status: ACTIVE | Noted: 2018-12-26

## 2019-10-18 PROBLEM — H93.3X9: Status: ACTIVE | Noted: 2017-01-01

## 2019-10-18 LAB
BH CV ECHO MEAS - EF(MOD-BP): 56 %
BH CV ECHO MEAS - LA DIMENSION(2D): 4.4 CM
INR PPP: 1.5 (ref 0.9–1.1)
MAXIMAL PREDICTED HEART RATE: 135 BPM
STRESS TARGET HR: 115 BPM

## 2019-10-18 PROCEDURE — 36416 COLLJ CAPILLARY BLOOD SPEC: CPT | Performed by: INTERNAL MEDICINE

## 2019-10-18 PROCEDURE — 85610 PROTHROMBIN TIME: CPT | Performed by: INTERNAL MEDICINE

## 2019-10-18 PROCEDURE — 93306 TTE W/DOPPLER COMPLETE: CPT | Performed by: INTERNAL MEDICINE

## 2019-10-21 ENCOUNTER — LAB (OUTPATIENT)
Dept: FAMILY MEDICINE CLINIC | Facility: CLINIC | Age: 84
End: 2019-10-21

## 2019-10-21 ENCOUNTER — OFFICE VISIT (OUTPATIENT)
Dept: FAMILY MEDICINE CLINIC | Facility: CLINIC | Age: 84
End: 2019-10-21

## 2019-10-21 VITALS
RESPIRATION RATE: 16 BRPM | HEART RATE: 76 BPM | HEIGHT: 63 IN | BODY MASS INDEX: 31.18 KG/M2 | DIASTOLIC BLOOD PRESSURE: 70 MMHG | WEIGHT: 176 LBS | TEMPERATURE: 98 F | SYSTOLIC BLOOD PRESSURE: 120 MMHG

## 2019-10-21 DIAGNOSIS — Z23 NEED FOR VACCINATION: ICD-10-CM

## 2019-10-21 DIAGNOSIS — I10 ESSENTIAL HYPERTENSION: ICD-10-CM

## 2019-10-21 DIAGNOSIS — K25.7: ICD-10-CM

## 2019-10-21 DIAGNOSIS — D50.0 IRON DEFICIENCY ANEMIA DUE TO CHRONIC BLOOD LOSS: ICD-10-CM

## 2019-10-21 DIAGNOSIS — D50.0 IRON DEFICIENCY ANEMIA DUE TO CHRONIC BLOOD LOSS: Primary | ICD-10-CM

## 2019-10-21 DIAGNOSIS — I48.21 PERMANENT ATRIAL FIBRILLATION (HCC): ICD-10-CM

## 2019-10-21 LAB
BASOPHILS # BLD AUTO: 0.07 10*3/MM3 (ref 0–0.2)
BASOPHILS NFR BLD AUTO: 1 % (ref 0–1.5)
DEPRECATED RDW RBC AUTO: 45.8 FL (ref 37–54)
EOSINOPHIL # BLD AUTO: 0.14 10*3/MM3 (ref 0–0.4)
EOSINOPHIL NFR BLD AUTO: 2 % (ref 0.3–6.2)
ERYTHROCYTE [DISTWIDTH] IN BLOOD BY AUTOMATED COUNT: 14.8 % (ref 12.3–15.4)
HCT VFR BLD AUTO: 39.3 % (ref 34–46.6)
HGB BLD-MCNC: 12.1 G/DL (ref 12–15.9)
IMM GRANULOCYTES # BLD AUTO: 0.02 10*3/MM3 (ref 0–0.05)
IMM GRANULOCYTES NFR BLD AUTO: 0.3 % (ref 0–0.5)
LYMPHOCYTES # BLD AUTO: 1.81 10*3/MM3 (ref 0.7–3.1)
LYMPHOCYTES NFR BLD AUTO: 25.4 % (ref 19.6–45.3)
MCH RBC QN AUTO: 26.5 PG (ref 26.6–33)
MCHC RBC AUTO-ENTMCNC: 30.8 G/DL (ref 31.5–35.7)
MCV RBC AUTO: 86.2 FL (ref 79–97)
MONOCYTES # BLD AUTO: 0.56 10*3/MM3 (ref 0.1–0.9)
MONOCYTES NFR BLD AUTO: 7.8 % (ref 5–12)
NEUTROPHILS # BLD AUTO: 4.54 10*3/MM3 (ref 1.7–7)
NEUTROPHILS NFR BLD AUTO: 63.5 % (ref 42.7–76)
NRBC BLD AUTO-RTO: 0 /100 WBC (ref 0–0.2)
PLATELET # BLD AUTO: 566 10*3/MM3 (ref 140–450)
PMV BLD AUTO: 11.2 FL (ref 6–12)
RBC # BLD AUTO: 4.56 10*6/MM3 (ref 3.77–5.28)
WBC NRBC COR # BLD: 7.14 10*3/MM3 (ref 3.4–10.8)

## 2019-10-21 PROCEDURE — G0008 ADMIN INFLUENZA VIRUS VAC: HCPCS | Performed by: FAMILY MEDICINE

## 2019-10-21 PROCEDURE — 99495 TRANSJ CARE MGMT MOD F2F 14D: CPT | Performed by: FAMILY MEDICINE

## 2019-10-21 PROCEDURE — 90674 CCIIV4 VAC NO PRSV 0.5 ML IM: CPT | Performed by: FAMILY MEDICINE

## 2019-10-21 PROCEDURE — 80048 BASIC METABOLIC PNL TOTAL CA: CPT | Performed by: FAMILY MEDICINE

## 2019-10-21 PROCEDURE — 85025 COMPLETE CBC W/AUTO DIFF WBC: CPT | Performed by: FAMILY MEDICINE

## 2019-10-21 PROCEDURE — 36415 COLL VENOUS BLD VENIPUNCTURE: CPT | Performed by: FAMILY MEDICINE

## 2019-10-21 NOTE — ASSESSMENT & PLAN NOTE
In sinus rythym now   Watchman has been placed, hopefully will come off Coumadin in 45 days or less now

## 2019-10-21 NOTE — ASSESSMENT & PLAN NOTE
Recheck cbc, her hgb dropped b/t visit here  And her procedure. Needs to be more mindful of her symptoms of anemia and let us know   The patient tends to know when she has symptoms because she developed severe fatigue and weakness and dizziness with SEE and melena.  I am not certain why she does not come to be evaluated when she develops the symptoms.  I stressed the importance of compliance and follow-up with the patient especially when she becomes symptomatic

## 2019-10-21 NOTE — PROGRESS NOTES
Rooming Tab(CC,VS,Pt Hx,Fall Screen)  Chief Complaint   Patient presents with   • Transitional Care Management       Subjective 85-year-old here for follow-up, the patient had a watchman placed but was hospitalized 10/3/2019 to 10/11/2019.  She was noted on admission to have iron deficiency anemia with a hemoglobin of 7.1.  Approximately 2 weeks prior to that her hemoglobin was 11.5 here in the office.  Apparently shortly after she had her hemoglobin done here, she had an episode where she developed nausea and vomiting and abdominal discomfort and states she had some dark tarry stools but did not really tell anyone.  Her hemoglobin then dropped from 11.5 on 9/16/2019 two 7.1 on 10/3/2019.  She required transfusion of packed red blood cells and IV iron.  She denies any further nausea or vomiting or diarrhea.  She has dark stools because she takes iron every day anyway.  She was able to get her watchman placed in currently is still on Coumadin although the goal is to get her off of Coumadin in the long-term.  And also had noted on her last set of blood work to have B12 deficiency and she is on B12 at thousand micrograms daily.  She has been placed on Coumadin by the cardiologist and is on 3 mg 1 p.o. every other day alternating with 1/2 tablet every other day.  She is feeling rather well and denies any dizziness or syncope.  She is really had no melena.  Denies any diarrhea or abdominal discomfort.  She is taking her Carafate which has helped in the past prevent antral ulcers which she seems to develop anytime she goes off Carafate, she tends to go off of Carafate on her own quite a bit because she does not like to take it.    I have reviewed and updated her medications, medical history and problem list during today's office visit.     Patient Care Team:  Denny Field MD as PCP - General (Family Medicine)    Problem List Tab  Medications Tab  Synopsis Tab  Chart Review Tab  Care Everywhere Tab  Immunizations  "Tab  Patient History Tab    Social History     Tobacco Use   • Smoking status: Never Smoker   • Smokeless tobacco: Never Used   Substance Use Topics   • Alcohol use: No     Frequency: Never       Review of Systems   Constitutional: Negative for chills, fatigue and fever.   HENT: Negative for nosebleeds.    Eyes: Negative for double vision.   Respiratory: Negative for chest tightness and shortness of breath.    Cardiovascular: Negative for chest pain and palpitations.   Gastrointestinal: Negative for blood in stool.   Genitourinary: Negative for dysuria and hematuria.   Neurological: Negative for dizziness and syncope.   Psychiatric/Behavioral: Negative for depressed mood.       Objective     Rooming Tab(CC,VS,Pt Hx,Fall Screen)  /70 (BP Location: Left arm, Patient Position: Sitting, Cuff Size: Large Adult)   Pulse 76   Temp 98 °F (36.7 °C) (Oral)   Resp 16   Ht 160 cm (63\")   Wt 79.8 kg (176 lb)   BMI 31.18 kg/m²     Body mass index is 31.18 kg/m².    Physical Exam   Constitutional: She is oriented to person, place, and time. She appears well-developed and well-nourished. No distress.   Pleasant elderly female who is certainly in no acute distress.   HENT:   Head: Normocephalic and atraumatic.   Nose: Nose normal.   Mouth/Throat: Oropharynx is clear and moist.   Eyes: Conjunctivae, EOM and lids are normal. Pupils are equal, round, and reactive to light.   Neck: Trachea normal and normal range of motion. Neck supple. No JVD present. Carotid bruit is not present. No thyroid mass and no thyromegaly present.   No carotid bruits   Cardiovascular: Normal rate, regular rhythm, normal heart sounds and intact distal pulses.   Pulmonary/Chest: Effort normal and breath sounds normal.   Abdominal: Soft. Bowel sounds are normal. There is no tenderness.   Musculoskeletal:   No c/c/e   Neurological: She is alert and oriented to person, place, and time. No cranial nerve deficit.   Skin: Skin is warm and dry. "   Psychiatric: She has a normal mood and affect. Her speech is normal and behavior is normal. She is attentive.   Nursing note and vitals reviewed.       Statin Choice Calculator  Data Reviewed:    Xr Chest 1 View    Result Date: 10/6/2019  Impression: 1. Small bilateral pleural effusions and basilar lung densities have developed since prior exam. This could be due to mild CHF, pneumonia, or atelectasis with pleural effusions. 2. Mild cardiomegaly.  Electronically Signed By-Minna Ahmadi On:10/6/2019 11:01 AM This report was finalized on [id number] by  Minna Ahmadi, .    Xr Chest 1 View    Result Date: 10/4/2019  Impression: New trace left pleural effusion since prior study. 2. Stable cardiomegaly without evidence of pulmonary edema. 3. Loop recorder projects of the left chest, a new finding since 05/21/2019.  Electronically Signed By-Dr. Nohemi Hurd MD On:10/4/2019 10:36 AM This report was finalized on [id number] by Dr. Nohemi Hurd MD.            Lab Results   Component Value Date    BUN 17 10/11/2019    CREATININE 1.10 (H) 10/11/2019    EGFRIFNONA 47 (L) 10/11/2019     10/11/2019    K 4.0 10/11/2019    CL 98 (L) 10/11/2019    CALCIUM 8.8 (L) 10/11/2019    ALBUMIN 3.1 10/09/2019    ALBUMIN 3.00 (L) 10/05/2019    BILITOT 0.6 10/05/2019    ALKPHOS 78 10/05/2019    AST 16 10/05/2019    ALT 10 (L) 10/05/2019    MICROALBUR 5.0 09/16/2019    WBC 6.80 10/11/2019    RBC 3.90 10/11/2019    HCT 36.7 10/11/2019    MCV 85.9 10/11/2019    MCH 28.2 10/11/2019    INR 1.50 (A) 10/18/2019    INR 1.07 (L) 10/11/2019      Assessment/Plan   Order Review Tab  Health Maintenance Tab  Patient Plan/Order Tab  Diagnoses and all orders for this visit:    1. Iron deficiency anemia due to chronic blood loss (Primary)  Assessment & Plan:  Recheck cbc, her hgb dropped b/t visit here  And her procedure. Needs to be more mindful of her symptoms of anemia and let us know   The patient tends to know when she has symptoms because  she developed severe fatigue and weakness and dizziness with SEE and melena.  I am not certain why she does not come to be evaluated when she develops the symptoms.  I stressed the importance of compliance and follow-up with the patient especially when she becomes symptomatic    Orders:  -     Cancel: CBC & Differential  -     Basic Metabolic Panel    2. Permanent atrial fibrillation  Assessment & Plan:  In sinus rythym now   Watchman has been placed, hopefully will come off Coumadin in 45 days or less now        3. Chronic erosion of pyloric antrum  Assessment & Plan:  Recurrent  Stay on carafate 1 g before meals and nightly.  recheckj cbc      4. Essential hypertension  Assessment & Plan:  Hypertension is improving with treatment.  Continue current treatment regimen.  Dietary sodium restriction.  Weight loss.  Regular aerobic exercise.  Continue current medications.  Blood pressure will be reassessed at the next regular appointment.      5. Need for vaccination  -     Flucelvax Quad=>4Years ([id number])      Wrapup Tab  Return for Next scheduled follow up.

## 2019-10-22 ENCOUNTER — READMISSION MANAGEMENT (OUTPATIENT)
Dept: CALL CENTER | Facility: HOSPITAL | Age: 84
End: 2019-10-22

## 2019-10-22 LAB
ANION GAP SERPL CALCULATED.3IONS-SCNC: 14.3 MMOL/L (ref 5–15)
BUN BLD-MCNC: 14 MG/DL (ref 8–23)
BUN/CREAT SERPL: 11.5 (ref 7–25)
CALCIUM SPEC-SCNC: 9.7 MG/DL (ref 8.6–10.5)
CHLORIDE SERPL-SCNC: 97 MMOL/L (ref 98–107)
CO2 SERPL-SCNC: 27.7 MMOL/L (ref 22–29)
CREAT BLD-MCNC: 1.22 MG/DL (ref 0.57–1)
GFR SERPL CREATININE-BSD FRML MDRD: 42 ML/MIN/1.73
GLUCOSE BLD-MCNC: 105 MG/DL (ref 65–99)
POTASSIUM BLD-SCNC: 4.5 MMOL/L (ref 3.5–5.2)
SODIUM BLD-SCNC: 139 MMOL/L (ref 136–145)

## 2019-10-22 NOTE — OUTREACH NOTE
General Surgery Week 2 Survey      Responses   Facility patient discharged from?  [facility]   Does the patient have one of the following disease processes/diagnoses(primary or secondary)?  General Surgery   Week 2 attempt successful?  No   Rescheduled  Revoked   Revoke  Decline to participate [NO ANSWER, NO VM]          Francesca Fitzgerald LPN

## 2019-10-24 ENCOUNTER — TELEPHONE (OUTPATIENT)
Dept: CARDIOLOGY | Facility: CLINIC | Age: 84
End: 2019-10-24

## 2019-10-25 ENCOUNTER — ANTICOAGULATION VISIT (OUTPATIENT)
Dept: CARDIOLOGY | Facility: CLINIC | Age: 84
End: 2019-10-25

## 2019-10-25 DIAGNOSIS — I48.91 ATRIAL FIBRILLATION, UNSPECIFIED TYPE (HCC): Primary | ICD-10-CM

## 2019-10-25 LAB — INR PPP: 1.3 (ref 0.9–1.1)

## 2019-10-25 PROCEDURE — 85610 PROTHROMBIN TIME: CPT | Performed by: INTERNAL MEDICINE

## 2019-10-25 PROCEDURE — 36416 COLLJ CAPILLARY BLOOD SPEC: CPT | Performed by: INTERNAL MEDICINE

## 2019-10-28 PROCEDURE — 93010 ELECTROCARDIOGRAM REPORT: CPT | Performed by: INTERNAL MEDICINE

## 2019-11-01 ENCOUNTER — TELEPHONE (OUTPATIENT)
Dept: CARDIOLOGY | Facility: CLINIC | Age: 84
End: 2019-11-01

## 2019-11-01 NOTE — TELEPHONE ENCOUNTER
Otto 1 week follow up interview done.   Pt states she has noticed she has been urinating more since her procedure, but states she has started to take her furosemide 40 mg daily routinely now.  Pt states no other complaints.  For further documentation see Otto Procedure: Telephone Follow-up form.  MRS=0; Barthel Ljzka=[id number].

## 2019-11-05 ENCOUNTER — ANTICOAGULATION VISIT (OUTPATIENT)
Dept: CARDIOLOGY | Facility: CLINIC | Age: 84
End: 2019-11-05

## 2019-11-05 DIAGNOSIS — I48.21 PERMANENT ATRIAL FIBRILLATION (HCC): Primary | ICD-10-CM

## 2019-11-05 LAB — INR PPP: 1.1 (ref 0.9–1.1)

## 2019-11-05 PROCEDURE — 85610 PROTHROMBIN TIME: CPT | Performed by: INTERNAL MEDICINE

## 2019-11-05 PROCEDURE — 36416 COLLJ CAPILLARY BLOOD SPEC: CPT | Performed by: INTERNAL MEDICINE

## 2019-11-12 ENCOUNTER — CLINICAL SUPPORT NO REQUIREMENTS (OUTPATIENT)
Dept: CARDIOLOGY | Facility: CLINIC | Age: 84
End: 2019-11-12

## 2019-11-12 DIAGNOSIS — I49.5 TACHY-BRADY SYNDROME (HCC): Primary | ICD-10-CM

## 2019-11-12 DIAGNOSIS — Z95.0 PACEMAKER: ICD-10-CM

## 2019-11-19 ENCOUNTER — ANTICOAGULATION VISIT (OUTPATIENT)
Dept: CARDIOLOGY | Facility: CLINIC | Age: 84
End: 2019-11-19

## 2019-11-19 DIAGNOSIS — I48.21 PERMANENT ATRIAL FIBRILLATION (HCC): Primary | ICD-10-CM

## 2019-11-19 LAB — INR PPP: 1.2 (ref 0.9–1.1)

## 2019-11-19 PROCEDURE — 36416 COLLJ CAPILLARY BLOOD SPEC: CPT | Performed by: INTERNAL MEDICINE

## 2019-11-19 PROCEDURE — 85610 PROTHROMBIN TIME: CPT | Performed by: INTERNAL MEDICINE

## 2019-11-22 ENCOUNTER — ANTICOAGULATION VISIT (OUTPATIENT)
Dept: CARDIOLOGY | Facility: CLINIC | Age: 84
End: 2019-11-22

## 2019-11-22 DIAGNOSIS — I48.11 LONGSTANDING PERSISTENT ATRIAL FIBRILLATION (HCC): Primary | ICD-10-CM

## 2019-11-22 LAB — INR PPP: 1.7 (ref 0.9–1.1)

## 2019-11-22 PROCEDURE — 85610 PROTHROMBIN TIME: CPT | Performed by: INTERNAL MEDICINE

## 2019-11-22 PROCEDURE — 36416 COLLJ CAPILLARY BLOOD SPEC: CPT | Performed by: INTERNAL MEDICINE

## 2019-11-25 ENCOUNTER — TELEPHONE (OUTPATIENT)
Dept: CARDIOLOGY | Facility: CLINIC | Age: 84
End: 2019-11-25

## 2019-11-25 NOTE — TELEPHONE ENCOUNTER
Pt called stating she had bronchitis, and has been ill for the last few days.  She stated she has not ran any fever, and wants to be sure it is ok to have her DHEERAJ done tomorrow.  I spoke with Dr Chapman he said it should be ok as long as she is up for it, if not she can reschedule a couple weeks out.  Pt states she would like to have done tomorrow, and will call with any further concerns.

## 2019-11-26 ENCOUNTER — ANESTHESIA (OUTPATIENT)
Dept: CARDIOLOGY | Facility: HOSPITAL | Age: 84
End: 2019-11-26

## 2019-11-26 ENCOUNTER — ANESTHESIA EVENT (OUTPATIENT)
Dept: CARDIOLOGY | Facility: HOSPITAL | Age: 84
End: 2019-11-26

## 2019-11-26 ENCOUNTER — HOSPITAL ENCOUNTER (OUTPATIENT)
Dept: CARDIOLOGY | Facility: HOSPITAL | Age: 84
Discharge: HOME | End: 2019-11-26
Admitting: INTERNAL MEDICINE

## 2019-11-26 VITALS
HEART RATE: 62 BPM | WEIGHT: 171.96 LBS | DIASTOLIC BLOOD PRESSURE: 54 MMHG | HEIGHT: 63 IN | SYSTOLIC BLOOD PRESSURE: 131 MMHG | BODY MASS INDEX: 30.47 KG/M2 | OXYGEN SATURATION: 98 % | TEMPERATURE: 98 F | RESPIRATION RATE: 13 BRPM

## 2019-11-26 VITALS — SYSTOLIC BLOOD PRESSURE: 105 MMHG | OXYGEN SATURATION: 95 % | DIASTOLIC BLOOD PRESSURE: 40 MMHG

## 2019-11-26 DIAGNOSIS — Z95.0 PACEMAKER: ICD-10-CM

## 2019-11-26 DIAGNOSIS — I48.19 ATRIAL FIBRILLATION, PERSISTENT (HCC): ICD-10-CM

## 2019-11-26 DIAGNOSIS — I48.20 ATRIAL FIBRILLATION, CHRONIC (HCC): ICD-10-CM

## 2019-11-26 DIAGNOSIS — D50.0 IRON DEFICIENCY ANEMIA DUE TO CHRONIC BLOOD LOSS: ICD-10-CM

## 2019-11-26 DIAGNOSIS — K31.819 GASTRIC AVM: ICD-10-CM

## 2019-11-26 DIAGNOSIS — I48.21 PERMANENT ATRIAL FIBRILLATION (HCC): ICD-10-CM

## 2019-11-26 LAB
BASOPHILS # BLD AUTO: 0 10*3/MM3 (ref 0–0.2)
BASOPHILS NFR BLD AUTO: 0.4 % (ref 0–1.5)
BH CV ECHO MEAS - EF(MOD-BP): 60 %
DEPRECATED RDW RBC AUTO: 53.8 FL (ref 37–54)
EOSINOPHIL # BLD AUTO: 0.4 10*3/MM3 (ref 0–0.4)
EOSINOPHIL NFR BLD AUTO: 4.8 % (ref 0.3–6.2)
ERYTHROCYTE [DISTWIDTH] IN BLOOD BY AUTOMATED COUNT: 18 % (ref 12.3–15.4)
HCT VFR BLD AUTO: 40.1 % (ref 34–46.6)
HGB BLD-MCNC: 13.2 G/DL (ref 12–15.9)
INR PPP: 2.9 (ref 2–3)
LV EF 2D ECHO EST: 60 %
LYMPHOCYTES # BLD AUTO: 1.6 10*3/MM3 (ref 0.7–3.1)
LYMPHOCYTES NFR BLD AUTO: 21.8 % (ref 19.6–45.3)
MCH RBC QN AUTO: 28.2 PG (ref 26.6–33)
MCHC RBC AUTO-ENTMCNC: 33 G/DL (ref 31.5–35.7)
MCV RBC AUTO: 85.3 FL (ref 79–97)
MONOCYTES # BLD AUTO: 0.6 10*3/MM3 (ref 0.1–0.9)
MONOCYTES NFR BLD AUTO: 8.2 % (ref 5–12)
NEUTROPHILS # BLD AUTO: 4.8 10*3/MM3 (ref 1.7–7)
NEUTROPHILS NFR BLD AUTO: 64.8 % (ref 42.7–76)
NRBC BLD AUTO-RTO: 0.1 /100 WBC (ref 0–0.2)
PLATELET # BLD AUTO: 310 10*3/MM3 (ref 140–450)
PMV BLD AUTO: 8.2 FL (ref 6–12)
PROTHROMBIN TIME: 27.3 SECONDS (ref 19.4–28.5)
RBC # BLD AUTO: 4.7 10*6/MM3 (ref 3.77–5.28)
WBC NRBC COR # BLD: 7.4 10*3/MM3 (ref 3.4–10.8)

## 2019-11-26 PROCEDURE — 85025 COMPLETE CBC W/AUTO DIFF WBC: CPT | Performed by: NURSE PRACTITIONER

## 2019-11-26 PROCEDURE — 93325 DOPPLER ECHO COLOR FLOW MAPG: CPT

## 2019-11-26 PROCEDURE — 93312 ECHO TRANSESOPHAGEAL: CPT

## 2019-11-26 PROCEDURE — 25010000002 PROPOFOL 500 MG/50ML EMULSION: Performed by: ANESTHESIOLOGY

## 2019-11-26 PROCEDURE — 93325 DOPPLER ECHO COLOR FLOW MAPG: CPT | Performed by: INTERNAL MEDICINE

## 2019-11-26 PROCEDURE — 85610 PROTHROMBIN TIME: CPT | Performed by: NURSE PRACTITIONER

## 2019-11-26 PROCEDURE — 93320 DOPPLER ECHO COMPLETE: CPT

## 2019-11-26 PROCEDURE — 93312 ECHO TRANSESOPHAGEAL: CPT | Performed by: INTERNAL MEDICINE

## 2019-11-26 PROCEDURE — 93320 DOPPLER ECHO COMPLETE: CPT | Performed by: INTERNAL MEDICINE

## 2019-11-26 RX ORDER — LIDOCAINE HYDROCHLORIDE 10 MG/ML
0.5 INJECTION, SOLUTION EPIDURAL; INFILTRATION; INTRACAUDAL; PERINEURAL ONCE AS NEEDED
Status: DISCONTINUED | OUTPATIENT
Start: 2019-11-26 | End: 2019-11-27 | Stop reason: HOSPADM

## 2019-11-26 RX ORDER — LIDOCAINE HYDROCHLORIDE 10 MG/ML
0.5 INJECTION, SOLUTION EPIDURAL; INFILTRATION; INTRACAUDAL; PERINEURAL ONCE AS NEEDED
Status: DISCONTINUED | OUTPATIENT
Start: 2019-11-26 | End: 2019-11-26

## 2019-11-26 RX ORDER — SODIUM CHLORIDE 0.9 % (FLUSH) 0.9 %
3-10 SYRINGE (ML) INJECTION AS NEEDED
Status: DISCONTINUED | OUTPATIENT
Start: 2019-11-26 | End: 2019-11-26

## 2019-11-26 RX ORDER — SODIUM CHLORIDE 9 MG/ML
80 INJECTION, SOLUTION INTRAVENOUS CONTINUOUS
Status: DISCONTINUED | OUTPATIENT
Start: 2019-11-26 | End: 2019-11-26

## 2019-11-26 RX ORDER — CLOPIDOGREL BISULFATE 75 MG/1
75 TABLET ORAL DAILY
Qty: 30 TABLET | Refills: 3 | Status: SHIPPED | OUTPATIENT
Start: 2019-11-26 | End: 2020-06-09

## 2019-11-26 RX ORDER — SODIUM CHLORIDE 9 MG/ML
9 INJECTION, SOLUTION INTRAVENOUS CONTINUOUS PRN
Status: DISCONTINUED | OUTPATIENT
Start: 2019-11-26 | End: 2019-11-27 | Stop reason: HOSPADM

## 2019-11-26 RX ORDER — SODIUM CHLORIDE 0.9 % (FLUSH) 0.9 %
3 SYRINGE (ML) INJECTION EVERY 12 HOURS SCHEDULED
Status: DISCONTINUED | OUTPATIENT
Start: 2019-11-26 | End: 2019-11-27 | Stop reason: HOSPADM

## 2019-11-26 RX ORDER — SODIUM CHLORIDE 0.9 % (FLUSH) 0.9 %
3 SYRINGE (ML) INJECTION EVERY 12 HOURS SCHEDULED
Status: DISCONTINUED | OUTPATIENT
Start: 2019-11-26 | End: 2019-11-26

## 2019-11-26 RX ORDER — SODIUM CHLORIDE 0.9 % (FLUSH) 0.9 %
3-10 SYRINGE (ML) INJECTION AS NEEDED
Status: DISCONTINUED | OUTPATIENT
Start: 2019-11-26 | End: 2019-11-27 | Stop reason: HOSPADM

## 2019-11-26 RX ORDER — ASPIRIN 81 MG/1
81 TABLET ORAL DAILY
Qty: 90 TABLET | Refills: 3 | Status: SHIPPED | OUTPATIENT
Start: 2019-11-26 | End: 2020-04-09

## 2019-11-26 RX ORDER — PROPOFOL 10 MG/ML
INJECTION, EMULSION INTRAVENOUS AS NEEDED
Status: DISCONTINUED | OUTPATIENT
Start: 2019-11-26 | End: 2019-11-26 | Stop reason: SURG

## 2019-11-26 RX ORDER — SODIUM CHLORIDE 9 MG/ML
80 INJECTION, SOLUTION INTRAVENOUS CONTINUOUS
Status: DISCONTINUED | OUTPATIENT
Start: 2019-11-26 | End: 2019-11-27 | Stop reason: HOSPADM

## 2019-11-26 RX ORDER — SODIUM CHLORIDE 9 MG/ML
9 INJECTION, SOLUTION INTRAVENOUS CONTINUOUS PRN
Status: DISCONTINUED | OUTPATIENT
Start: 2019-11-26 | End: 2019-11-26

## 2019-11-26 RX ADMIN — SODIUM CHLORIDE 80 ML/HR: 900 INJECTION, SOLUTION INTRAVENOUS at 09:04

## 2019-11-26 RX ADMIN — Medication 3 ML: at 09:03

## 2019-11-26 RX ADMIN — PROPOFOL 75 MG: 10 INJECTION, EMULSION INTRAVENOUS at 11:48

## 2019-11-26 NOTE — H&P
History of Present Illness         It was nice to see Cherie Hinton in follow-up for atrial fibrillation status post watchman placement. She is a 85 y.o. female with a history of recent hospitalization for chronic GI blood loss and iron deficiency anemia from hiatal hernia with Luis's erosions, persistent atrial fibrillation, diastolic heart failure, chronic kidney disease, sick sinus syndrome status post MICRA PPM, hypertension, CVA, GERD.  She comes in today for routine follow-up for right groin check status post watchman implant on 10/10/2019.  She denies shortness of breath, chest/back pain, syncopal or near syncopal events.  He is due for follow-up with hematology in the next few days, she has been checking her INR through our Coumadin clinic and continues to take Coumadin as per their dosing.        Medical History        Past Medical History:   Diagnosis Date   • Antral erosion     • Aortic valve regurgitation 12/26/2018   • Atrial fibrillation (CMS/HCC)     • Atrial fibrillation with controlled ventricular response (CMS/HCC)     • B12 deficiency     • CAD (coronary artery disease)     • Cellulitis 04/2011     on face    • Cerebrovascular accident (CVA) (CMS/HCC) 12/7/2016   • Colon cancer (CMS/HCC)     • Depression     • Diabetes mellitus, type 2 (CMS/HCC)     • Fatigue     • GERD (gastroesophageal reflux disease)     • Hiatal hernia     • History of colonoscopy 04/2010     last done 4/10   • History of esophagogastroduodenoscopy (EGD) 04/2010     last done 4/10   • Hyperlipidemia     • Hypertension       white coat htn   • HALEIGH (iron deficiency anemia)     • LAD (lymphadenopathy)       40% lesion LAD    • Left pontine CVA (CMS/HCC)     • Mitral regurgitation 5/21/2012   • STORMY (obstructive sleep apnea)       not treating   • Osteoarthritis     • 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CMS/HCC)     • Right kidney mass       1.4 cm- following urology    • Sick sinus syndrome (CMS/HCC)     • Stroke (CMS/HCC) 11/2016   • TIA  (transient ischemic attack)       left CVA, interrupted TPA; As (moderate-severe)    • Vestibular nerve disease 2017            Surgical History         Past Surgical History:   Procedure Laterality Date   • ATRIAL APPENDAGE EXCLUSION LEFT WITH TRANSESOPHAGEAL ECHOCARDIOGRAM N/A 10/10/2019     Procedure: Atrial Appendage Occlusion;  Surgeon: Richie Chapman MD;  Location: [facility] CATH INVASIVE LOCATION;  Service: Cardiovascular   • ATRIAL APPENDAGE EXCLUSION LEFT WITH TRANSESOPHAGEAL ECHOCARDIOGRAM N/A 10/10/2019     Procedure: Atrial Appendage Occlusion;  Surgeon: Je Rivera MD;  Location: [facility] CATH INVASIVE LOCATION;  Service: Cardiology   • BLADDER REPAIR       • CARDIAC CATHETERIZATION   05/2010   • CHOLECYSTECTOMY       • COLECTOMY PARTIAL / TOTAL       • COLONOSCOPY   04/16/2018     negative    • ENDOSCOPY   04/16/2018     negative    • HYSTERECTOMY       • INSERT / REPLACE / REMOVE PACEMAKER   09/2018     Pacemaker gen change 9/2018    • OTHER SURGICAL HISTORY   04/25/2015     Exercise myoview, normal    • PACEMAKER IMPLANTATION   01/22/2010     Dual Chamber - 1/22/2010; RA Lead Revision- 5/7/2012- BS    • TRANSESOPHAGEAL ECHOCARDIOGRAM (DHEERAJ)   07/2019               Current Outpatient Medications:   •  atorvastatin (LIPITOR) 10 MG tablet, TAKE ONE TABLET BY MOUTH DAILY, Disp: 30 tablet, Rfl: 9  •  BYSTOLIC 10 MG tablet, Take 10 mg by mouth Daily., Disp: , Rfl:   •  CARTIA  MG 24 hr capsule, 2 (Two) Times a Day., Disp: , Rfl:   •  Cholecalciferol (VITAMIN D3) 2000 units capsule, 1 capsule Daily., Disp: , Rfl:   •  ferrous sulfate 324 (65 Fe) MG tablet delayed-release EC tablet, Take 324 mg by mouth Daily With Breakfast., Disp: , Rfl:   •  FLUoxetine (PROzac) 40 MG capsule, Take 40 mg by mouth Daily., Disp: , Rfl:   •  furosemide (LASIX) 40 MG tablet, Take 1 tablet by mouth Daily for 30 days., Disp: 30 tablet, Rfl: 0  •  ipratropium-albuterol (DUO-NEB) 0.5-2.5 mg/3 ml nebulizer, Take 3  mL by nebulization 4 (Four) Times a Day., Disp: 360 mL, Rfl:   •  nitroglycerin (NITROSTAT) 0.4 MG SL tablet, Place 1 tablet under the tongue Every 5 (Five) Minutes As Needed for Chest Pain (Only if SBP Greater Than 100). more than 3 doses in 15 min, Disp: , Rfl: 12  •  omeprazole (priLOSEC) 40 MG capsule, Take 1 capsule by mouth 2 (Two) Times a Day., Disp: 60 capsule, Rfl: 9  •  potassium chloride (K-DUR) 10 MEQ CR tablet, 2 tablets Daily., Disp: , Rfl:   •  sucralfate (CARAFATE) 1 g tablet, Take 1 tablet by mouth 4 (Four) Times a Day Before Meals & at Bedtime for 30 days., Disp: 120 tablet, Rfl: 2  •  vitamin B-12 (CYANOCOBALAMIN) 1000 MCG tablet, Take 1 tablet by mouth Daily., Disp: 30 tablet, Rfl: 5  •  warfarin (COUMADIN) 3 MG tablet, Take 3 mg by mouth daily, then as directed by coumadin clinic, Disp: 60 tablet, Rfl: 5  •  zolpidem (AMBIEN) 5 MG tablet, Take 1 tablet by mouth At Night As Needed for Sleep., Disp: 5 tablet, Rfl: 0     Social History   Social History            Socioeconomic History   • Marital status:        Spouse name: Not on file   • Number of children: Not on file   • Years of education: Not on file   • Highest education level: Not on file   Tobacco Use   • Smoking status: Never Smoker   • Smokeless tobacco: Never Used   Substance and Sexual Activity   • Alcohol use: No       Frequency: Never   • Drug use: No                  Family History   Problem Relation Age of Onset   • Hypertension Mother     • Diabetes Mother     • Coronary artery disease Mother     • Other Father           CVA   • Heart disease Other     • Stroke Other           The following portions of the patient's history were reviewed and updated as appropriate: allergies, current medications, past family history, past medical history, past social history, past surgical history and problem list.           Review of Systems   Constitution: Negative for diaphoresis, malaise/fatigue, weight gain and weight loss.    Cardiovascular: Negative for chest pain, dyspnea on exertion, leg swelling, near-syncope, orthopnea, palpitations and syncope.   Respiratory: Negative for hemoptysis, shortness of breath, sputum production and wheezing.    Skin: Negative for rash.   Gastrointestinal: Negative for abdominal pain, hematemesis, hematochezia, nausea and vomiting.   Neurological: Negative for dizziness, light-headedness, numbness and seizures.   Psychiatric/Behavioral: Negative.    All other systems reviewed and are negative.     /80   Pulse 64   Resp 18   Wt 80.3 kg (177 lb)   SpO2 96%   BMI 31.35 kg/m² .     Objective         Physical Exam   Constitutional: She is oriented to person, place, and time. She appears well-developed and well-nourished. She is cooperative.   Neck: Normal carotid pulses and no JVD present. Carotid bruit is not present.   Cardiovascular: Normal rate, regular rhythm, normal heart sounds and intact distal pulses. Exam reveals no gallop and no friction rub.   No murmur heard.  Pulses:       Carotid pulses are 2+ on the right side, and 2+ on the left side.       Radial pulses are 2+ on the right side, and 2+ on the left side.        Posterior tibial pulses are 2+ on the right side, and 2+ on the left side.   Pulmonary/Chest: Effort normal and breath sounds normal. She has no decreased breath sounds. She has no wheezes. She has no rhonchi. She has no rales.   Abdominal: Soft. Bowel sounds are normal. She exhibits no distension and no mass. There is no hepatosplenomegaly. There is no tenderness. There is no guarding.   Musculoskeletal: She exhibits no edema.   Neurological: She is alert and oriented to person, place, and time.   Skin: Skin is warm and dry. No rash noted. No erythema. No pallor.   Psychiatric: She has a normal mood and affect. Her speech is normal and behavior is normal. Judgment and thought content normal.            Procedures            Assessment/Plan:     1.  Atrial fibrillation  status post watchman placement----continue Coumadin, Xarelto discontinued during last hospitalization  2.  Chronic diastolic heart failure----stable  3.  Chronic blood loss anemia------ stable symptomology     Agree with the above note and no changes compared to the above note

## 2019-11-26 NOTE — DISCHARGE INSTRUCTIONS
DHEERAJ DC Instructions    1) The medication, which was used to put the patient to sleep, will be acting in your body for the next twenty-four (24) hours, so you might feel a little sleepy; this feeling will slowly wear off.  Because the medicine is still in your system for the next twenty-four (24) hours, the adult patient SHOULD NOT:    a. Drive a car, operate machinery, or power tools  b. Drink any alcoholic drinks (not even beer)  c. Make any important decisions such as to sign important papers  d. Eat or Drink as tolerated. (It may be better to start with liquids such as soft drinks, then soups, and graduate up to solid foods)    2) We strongly suggest that a responsible adult be with the patient the rest of the day.    3) Any problems with:    a. EXCESSIVE MUCOUS  b. SPITTING UP BLOOD  c. SORE THROAT AT MORE THAN 72 HOURS    NOTIFY DR. ROWLEY -[phone] OR CALL THE [facility] -[phone].

## 2019-12-02 ENCOUNTER — TELEPHONE (OUTPATIENT)
Dept: FAMILY MEDICINE CLINIC | Facility: CLINIC | Age: 84
End: 2019-12-02

## 2019-12-02 RX ORDER — CEFUROXIME AXETIL 250 MG/1
250 TABLET ORAL 2 TIMES DAILY
Qty: 14 TABLET | Refills: 0 | Status: SHIPPED | OUTPATIENT
Start: 2019-12-02 | End: 2019-12-09

## 2019-12-02 RX ORDER — PREDNISONE 20 MG/1
TABLET ORAL
Qty: 15 TABLET | Refills: 0 | Status: SHIPPED | OUTPATIENT
Start: 2019-12-02 | End: 2019-12-09

## 2019-12-09 ENCOUNTER — OFFICE VISIT (OUTPATIENT)
Dept: FAMILY MEDICINE CLINIC | Facility: CLINIC | Age: 84
End: 2019-12-09

## 2019-12-09 DIAGNOSIS — I10 ESSENTIAL HYPERTENSION: Primary | ICD-10-CM

## 2019-12-09 DIAGNOSIS — D50.0 IRON DEFICIENCY ANEMIA DUE TO CHRONIC BLOOD LOSS: ICD-10-CM

## 2019-12-09 PROCEDURE — 99213 OFFICE O/P EST LOW 20 MIN: CPT | Performed by: FAMILY MEDICINE

## 2019-12-09 NOTE — PROGRESS NOTES
Rooming Tab(CC,VS,Pt Hx,Fall Screen)  Chief Complaint   Patient presents with   • Diabetes   • Hypertension   • Hyperlipidemia       Subjective 85-year-old here for follow-up of her hypertension.  She checks her blood pressure at home and it is normal.  She has no chest pain dizziness or syncope.  The patient has recurrent iron deficiency anemia but recently has had no melena and has had no bright red blood per rectum.  She has no abdominal pain no nausea or vomiting.  She has stayed on her Carafate, she has a history of gastric AVMs and history of being on blood thinners which she is currently off because she had a watchman and she is now off of Coumadin.  She is on Plavix.  Her recent hemoglobin of 13 she continues on iron.    I have reviewed and updated her medications, medical history and problem list during today's office visit.     Patient Care Team:  Denny Field MD as PCP - General (Family Medicine)    Problem List Tab  Medications Tab  Synopsis Tab  Chart Review Tab  Care Everywhere Tab  Immunizations Tab  Patient History Tab    Social History     Tobacco Use   • Smoking status: Never Smoker   • Smokeless tobacco: Never Used   Substance Use Topics   • Alcohol use: No     Frequency: Never       Review of Systems   Constitutional: Negative for chills, fatigue and fever.   HENT: Negative for nosebleeds.    Eyes: Negative for double vision.   Respiratory: Negative for chest tightness and shortness of breath.    Cardiovascular: Negative for chest pain and palpitations.   Gastrointestinal: Negative for blood in stool.   Genitourinary: Negative for dysuria and hematuria.   Neurological: Negative for dizziness and syncope.   Psychiatric/Behavioral: Negative for depressed mood.       Objective     Rooming Tab(CC,VS,Pt Hx,Fall Screen)  /70   Pulse 61   Resp 16   Wt 80.4 kg (177 lb 3.2 oz)   BMI 31.79 kg/m²     Body mass index is 31.79 kg/m².    Physical Exam   Constitutional: She is oriented to person,  place, and time. She appears well-developed and well-nourished. No distress.   HENT:   Head: Normocephalic and atraumatic.   Nose: Nose normal.   Mouth/Throat: Oropharynx is clear and moist.   Eyes: Pupils are equal, round, and reactive to light. Conjunctivae, EOM and lids are normal.   Neck: Trachea normal and normal range of motion. Neck supple. No JVD present. Carotid bruit is not present. No thyroid mass and no thyromegaly present.   No carotid bruits   Cardiovascular: Normal rate, regular rhythm, normal heart sounds and intact distal pulses.   Pulmonary/Chest: Effort normal and breath sounds normal.   Musculoskeletal:   No c/c/e   Neurological: She is alert and oriented to person, place, and time. No cranial nerve deficit.   Skin: Skin is warm and dry.   Psychiatric: She has a normal mood and affect. Her speech is normal and behavior is normal. She is attentive.   Nursing note and vitals reviewed.       Statin Choice Calculator  Data Reviewed:               Lab Results   Component Value Date    BUN 14 10/21/2019    CREATININE 1.22 (H) 10/21/2019    EGFRIFNONA 42 (L) 10/21/2019     10/21/2019    K 4.5 10/21/2019    CL 97 (L) 10/21/2019    CALCIUM 9.7 10/21/2019    ALBUMIN 3.1 10/09/2019    ALBUMIN 3.00 (L) 10/05/2019    BILITOT 0.6 10/05/2019    ALKPHOS 78 10/05/2019    AST 16 10/05/2019    ALT 10 (L) 10/05/2019    MICROALBUR 5.0 09/16/2019    WBC 7.40 11/26/2019    RBC 4.70 11/26/2019    HCT 40.1 11/26/2019    MCV 85.3 11/26/2019    MCH 28.2 11/26/2019    INR 2.90 11/26/2019      Assessment/Plan   Order Review Tab  Health Maintenance Tab  Patient Plan/Order Tab  Diagnoses and all orders for this visit:    1. Essential hypertension (Primary)  Assessment & Plan:  Hypertension is improving with treatment.  Continue current treatment regimen.  Dietary sodium restriction.  Regular aerobic exercise.  Continue current medications.  Blood pressure will be reassessed at the next regular appointment.      2. Iron  deficiency anemia due to chronic blood loss  Assessment & Plan:  Hold iron and watch for dark stool.  Follow-up in 3 months fasting.  I think that her iron deficiency anemia and leakage from her stomach AVMs is hopefully over with getting her off of blood thinners.        Wrapup Tab  Return in about 3 months (around 3/9/2020) for Annual physical, Medicare Wellness.

## 2019-12-10 VITALS
SYSTOLIC BLOOD PRESSURE: 150 MMHG | HEART RATE: 61 BPM | RESPIRATION RATE: 16 BRPM | WEIGHT: 177.2 LBS | DIASTOLIC BLOOD PRESSURE: 70 MMHG | BODY MASS INDEX: 31.79 KG/M2

## 2019-12-10 NOTE — ASSESSMENT & PLAN NOTE
Hold iron and watch for dark stool.  Follow-up in 3 months fasting.  I think that her iron deficiency anemia and leakage from her stomach AVMs is hopefully over with getting her off of blood thinners.

## 2019-12-11 ENCOUNTER — TELEPHONE (OUTPATIENT)
Dept: CARDIOLOGY | Facility: CLINIC | Age: 84
End: 2019-12-11

## 2019-12-11 NOTE — TELEPHONE ENCOUNTER
Otto 45 day follow up interview performed.  Pt confirms she has stopped taking coumadin.  Pt confirms she has started taking Plavix 75 mg daily. Pt confirms she continues to take aspirin 81 mg daily.  Pt confirms Otto 60 day follow up appointment with Dr. Chapman on 12/16/19 at 8:45 am.  For further documentation see Otto: 45-Day DHEERAJ Telephone Follow-up form.  MRS=0; Barthel Mqwij=[id number].

## 2020-01-04 PROBLEM — I35.1 AORTIC VALVE REGURGITATION: Status: RESOLVED | Noted: 2018-12-26 | Resolved: 2020-01-04

## 2020-01-04 PROBLEM — Z95.818 PRESENCE OF WATCHMAN LEFT ATRIAL APPENDAGE CLOSURE DEVICE: Status: ACTIVE | Noted: 2020-01-04

## 2020-01-13 ENCOUNTER — OFFICE VISIT (OUTPATIENT)
Dept: CARDIOLOGY | Facility: CLINIC | Age: 85
End: 2020-01-13

## 2020-01-13 VITALS
DIASTOLIC BLOOD PRESSURE: 71 MMHG | BODY MASS INDEX: 32.47 KG/M2 | HEART RATE: 76 BPM | WEIGHT: 181 LBS | SYSTOLIC BLOOD PRESSURE: 174 MMHG

## 2020-01-13 DIAGNOSIS — I48.21 PERMANENT ATRIAL FIBRILLATION (HCC): ICD-10-CM

## 2020-01-13 DIAGNOSIS — I10 ESSENTIAL HYPERTENSION: ICD-10-CM

## 2020-01-13 DIAGNOSIS — I48.19 ATRIAL FIBRILLATION, PERSISTENT (HCC): Primary | ICD-10-CM

## 2020-01-13 PROCEDURE — 93000 ELECTROCARDIOGRAM COMPLETE: CPT | Performed by: NURSE PRACTITIONER

## 2020-01-13 PROCEDURE — 99214 OFFICE O/P EST MOD 30 MIN: CPT | Performed by: NURSE PRACTITIONER

## 2020-01-13 NOTE — PROGRESS NOTES
Cardiology Office Follow Up Visit      Primary Care Provider:  Denny Field MD    Reason for f/u: Atrial fibrillation status post watchman placement       Denny Field MD    History of Present Illness     It was nice to see Cherie Hinton in follow-up for atrial fibrillation status post watchman placement on 10/10/2019. She is a 85 y.o. female of Dr. Wilson with a history of hospitalization 10/3/2019 for chronic GI blood loss, iron deficiency anemia from hiatal hernia with Luis's erosion while on Xarelto, persistent atrial fibrillation, diastolic heart failure, chronic kidney disease, sick sinus syndrome status post MICRA permanent pacemaker, hypertension, CVA with mild residual right-sided weakness, GERD.  She comes in today for routine follow-up for 60 days post watchman implant.  Her DHEERAJ on 11/26/2019 demonstrated an EF of 60%, severe dilated left atrial, no clot or thrombus noted on watchman, and watchman well sealed.  Her Coumadin was discontinued at that time.  She denies shortness of breath, palpitations, chest/back pain, syncopal or near syncopal events since implant.    Social history: She denies smoking, denies EtOH, denies illicits, denies herbal use, drinks caffeine occasionally.  Lives alone but is very active in the [employer] community.    Past Medical History:   Diagnosis Date   • Antral erosion    • Aortic valve regurgitation 12/26/2018   • Atrial fibrillation (CMS/HCC)    • Atrial fibrillation with controlled ventricular response (CMS/HCC)    • B12 deficiency    • CAD (coronary artery disease)    • Cellulitis 04/2011    on face    • Cerebrovascular accident (CVA) (CMS/HCC) 12/07/2016   • Colon cancer (CMS/HCC)    • Depression    • Diabetes mellitus, type 2 (CMS/HCC)    • Fatigue    • GERD (gastroesophageal reflux disease)    • Hiatal hernia    • History of colonoscopy 04/2010    last done 4/10   • History of esophagogastroduodenoscopy (EGD) 04/2010    last done 4/10   •  Hyperlipidemia    • Hypertension     white coat htn   • HALEIGH (iron deficiency anemia)    • LAD (lymphadenopathy)     40% lesion LAD    • Left pontine CVA (CMS/[facility])    • Mitral regurgitation 05/21/2012   • STORMY (obstructive sleep apnea)     not treating   • Osteoarthritis    • 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CMS/[facility])    • Right kidney mass     1.4 cm- following urology    • Sick sinus syndrome (CMS/[facility])    • Stroke (CMS/[facility]) 11/2016   • TIA (transient ischemic attack)     left CVA, interrupted TPA; As (moderate-severe)    • Vestibular nerve disease 2017       Past Surgical History:   Procedure Laterality Date   • ATRIAL APPENDAGE EXCLUSION LEFT WITH TRANSESOPHAGEAL ECHOCARDIOGRAM N/A 10/10/2019    Procedure: Atrial Appendage Occlusion;  Surgeon: Richie Chapman MD;  Location: [facility] CATH INVASIVE LOCATION;  Service: Cardiovascular   • ATRIAL APPENDAGE EXCLUSION LEFT WITH TRANSESOPHAGEAL ECHOCARDIOGRAM N/A 10/10/2019    Procedure: Atrial Appendage Occlusion;  Surgeon: Je Rivera MD;  Location:  [facility] CATH INVASIVE LOCATION;  Service: Cardiology   • BLADDER REPAIR     • CARDIAC CATHETERIZATION  05/2010   • CHOLECYSTECTOMY     • COLECTOMY PARTIAL / TOTAL     • COLONOSCOPY  04/16/2018    negative    • ENDOSCOPY  04/16/2018    negative    • HYSTERECTOMY     • INSERT / REPLACE / REMOVE PACEMAKER  09/2018    Pacemaker gen change 9/2018    • OTHER SURGICAL HISTORY  04/25/2015    Exercise myoview, normal    • PACEMAKER IMPLANTATION  01/22/2010    Dual Chamber - 1/22/2010; RA Lead Revision- 5/7/2012- BS    • TRANSESOPHAGEAL ECHOCARDIOGRAM (DHEERAJ)  07/2019         Current Outpatient Medications:   •  aspirin 81 MG EC tablet, Take 1 tablet by mouth Daily., Disp: 90 tablet, Rfl: 3  •  atorvastatin (LIPITOR) 10 MG tablet, TAKE ONE TABLET BY MOUTH DAILY, Disp: 30 tablet, Rfl: 9  •  BYSTOLIC 10 MG tablet, Take 10 mg by mouth Daily., Disp: , Rfl:   •  CARTIA  MG 24 hr capsule, 2 (Two) Times a Day., Disp: , Rfl:   •   Cholecalciferol (VITAMIN D3) 2000 units capsule, 1 capsule Daily., Disp: , Rfl:   •  clopidogrel (PLAVIX) 75 MG tablet, Take 1 tablet by mouth Daily., Disp: 30 tablet, Rfl: 3  •  ferrous sulfate 324 (65 Fe) MG tablet delayed-release EC tablet, Take 324 mg by mouth Daily With Breakfast., Disp: , Rfl:   •  FLUoxetine (PROzac) 40 MG capsule, Take 40 mg by mouth Daily., Disp: , Rfl:   •  nitroglycerin (NITROSTAT) 0.4 MG SL tablet, Place 1 tablet under the tongue Every 5 (Five) Minutes As Needed for Chest Pain (Only if SBP Greater Than 100). more than 3 doses in 15 min, Disp: , Rfl: 12  •  omeprazole (priLOSEC) 40 MG capsule, Take 1 capsule by mouth 2 (Two) Times a Day., Disp: 60 capsule, Rfl: 9  •  potassium chloride (K-DUR) 10 MEQ CR tablet, 2 tablets Daily., Disp: , Rfl:   •  vitamin B-12 (CYANOCOBALAMIN) 1000 MCG tablet, Take 1 tablet by mouth Daily., Disp: 30 tablet, Rfl: 5  •  zolpidem (AMBIEN) 5 MG tablet, Take 1 tablet by mouth At Night As Needed for Sleep., Disp: 5 tablet, Rfl: 0    Social History     Socioeconomic History   • Marital status:      Spouse name: Not on file   • Number of children: Not on file   • Years of education: Not on file   • Highest education level: Not on file   Tobacco Use   • Smoking status: Never Smoker   • Smokeless tobacco: Never Used   Substance and Sexual Activity   • Alcohol use: No     Frequency: Never   • Drug use: No   • Sexual activity: Defer       Family History   Problem Relation Age of Onset   • Hypertension Mother    • Diabetes Mother    • Coronary artery disease Mother    • Other Father         CVA   • Heart disease Other    • Stroke Other    • Hypertension Other        The following portions of the patient's history were reviewed and updated as appropriate: allergies, current medications, past family history, past medical history, past social history, past surgical history and problem list.        Review of Systems   Constitution: Negative for diaphoresis,  malaise/fatigue, weight gain and weight loss.   Cardiovascular: Negative for chest pain, dyspnea on exertion, leg swelling, near-syncope, orthopnea, palpitations and syncope.   Respiratory: Negative for hemoptysis, shortness of breath, sputum production and wheezing.    Skin: Negative for rash.   Gastrointestinal: Negative for abdominal pain, hematemesis, hematochezia, nausea and vomiting.   Neurological: Negative for dizziness, light-headedness, numbness and seizures.   Psychiatric/Behavioral: Negative.    All other systems reviewed and are negative.    /71   Pulse 76   Wt 82.1 kg (181 lb)   BMI 32.47 kg/m² .  Objective     Physical Exam   Constitutional: She is oriented to person, place, and time. She appears well-developed and well-nourished. She is cooperative.   Neck: Normal carotid pulses and no JVD present. Carotid bruit is not present.   Cardiovascular: Normal rate, regular rhythm, normal heart sounds and intact distal pulses. Exam reveals no gallop and no friction rub.   No murmur heard.  Pulses:       Carotid pulses are 2+ on the right side, and 2+ on the left side.       Radial pulses are 2+ on the right side, and 2+ on the left side.        Posterior tibial pulses are 2+ on the right side, and 2+ on the left side.   Pulmonary/Chest: Effort normal and breath sounds normal. She has no decreased breath sounds. She has no wheezes. She has no rhonchi. She has no rales.   Abdominal: Soft. Bowel sounds are normal. She exhibits no distension and no mass. There is no hepatosplenomegaly. There is no tenderness. There is no guarding.   Musculoskeletal: She exhibits no edema.   Neurological: She is alert and oriented to person, place, and time.   Skin: Skin is warm and dry. No rash noted. No erythema. No pallor.   Psychiatric: She has a normal mood and affect. Her speech is normal and behavior is normal. Judgment and thought content normal.           ECG 12 Lead  Date/Time: 1/13/2020 4:47 PM  Performed by:  Barbara Bojorquez APRN  Authorized by: Barbara Bojorquez APRN   Comparison: not compared with previous ECG   Rhythm: atrial fibrillation  BPM: 79              Assessment/Plan:    1.  Atrial fibrillation status post watchman placement 10/2019----continue Plavix and low-dose aspirin until 6 months, rate controlled  2.  Chronic diastolic heart failure----stable  3.  Chronic blood loss anemia----stable  4.  Hypertension----well-controlled normally, patient states that she is always elevated in the office setting    Continue current medications, return to clinic in 6 months for repeat evaluation, anticipate discontinuation of Plavix and increase to full dose aspirin at that time.    DAYANARA James  EP cardiology  **>25 minutes in face to face conversation regarding treatment plan, education, and answering any questions the patient may have had

## 2020-01-29 ENCOUNTER — ANTICOAGULATION VISIT (OUTPATIENT)
Dept: CARDIOLOGY | Facility: CLINIC | Age: 85
End: 2020-01-29

## 2020-01-29 DIAGNOSIS — I48.21 PERMANENT ATRIAL FIBRILLATION (HCC): Primary | ICD-10-CM

## 2020-01-30 ENCOUNTER — TELEPHONE (OUTPATIENT)
Dept: CARDIOLOGY | Facility: CLINIC | Age: 85
End: 2020-01-30

## 2020-02-21 RX ORDER — FUROSEMIDE 40 MG/1
40 TABLET ORAL DAILY
Qty: 30 TABLET | Refills: 3 | Status: SHIPPED | OUTPATIENT
Start: 2020-02-21 | End: 2020-08-19

## 2020-02-21 NOTE — TELEPHONE ENCOUNTER
Hospital gave pt lasix and now is needing refill   This is from patient at pharmacy asking to send to us.

## 2020-03-04 ENCOUNTER — TELEPHONE (OUTPATIENT)
Dept: CARDIOLOGY | Facility: CLINIC | Age: 85
End: 2020-03-04

## 2020-03-04 NOTE — TELEPHONE ENCOUNTER
Returned patient's upcoming dental appointment.  Pt made aware that her dentist will need to fax a clearance form to our office to address Plavix and aspirin.  Pt verbalized understanding.

## 2020-03-09 ENCOUNTER — TELEPHONE (OUTPATIENT)
Dept: CARDIOLOGY | Facility: CLINIC | Age: 85
End: 2020-03-09

## 2020-03-09 NOTE — TELEPHONE ENCOUNTER
Returned patient's call.  Pt made aware that her dental clearance form was sent an hour ago.  Pt verbalized understanding.

## 2020-04-08 ENCOUNTER — OFFICE VISIT (OUTPATIENT)
Dept: CARDIOLOGY | Facility: CLINIC | Age: 85
End: 2020-04-08

## 2020-04-08 VITALS — BODY MASS INDEX: 32.07 KG/M2 | WEIGHT: 181 LBS | HEIGHT: 63 IN

## 2020-04-08 DIAGNOSIS — Z95.0 PACEMAKER: ICD-10-CM

## 2020-04-08 DIAGNOSIS — I49.5 TACHY-BRADY SYNDROME (HCC): ICD-10-CM

## 2020-04-08 DIAGNOSIS — I48.21 PERMANENT ATRIAL FIBRILLATION (HCC): Primary | ICD-10-CM

## 2020-04-08 DIAGNOSIS — I10 ESSENTIAL HYPERTENSION: ICD-10-CM

## 2020-04-08 PROCEDURE — 93294 REM INTERROG EVL PM/LDLS PM: CPT | Performed by: NURSE PRACTITIONER

## 2020-04-08 PROCEDURE — 93296 REM INTERROG EVL PM/IDS: CPT | Performed by: NURSE PRACTITIONER

## 2020-04-08 PROCEDURE — 99212 OFFICE O/P EST SF 10 MIN: CPT | Performed by: NURSE PRACTITIONER

## 2020-04-08 NOTE — PROGRESS NOTES
CARDIOLOGY   TELE-VISIT        You have chosen to receive care through a telephone visit today. Do you consent to use a telephone visit for your medical care today? Yes        Primary Care Provider:   Denny Field MD        Reason for follow up:    Atrial fibrillation  Tachybradycardia syndrome  Hypertension  Leadless pacemaker      Subjective       CC:    Denies chest pain or dyspnea    History of Present Illness    Cherie Hinton is a 86 y.o. female she has a history of atrial fibrillation that is permanent.  She underwent watchman implant in October 2019 due to chronic GI blood loss iron deficiency anemia and intolerance to Xarelto.    Additional past medical history is significant for previous MICRA pacemaker, CVA, GERD, hypertension, CKD    Echocardiogram done in November showed ejection fraction of 60% with left atrial dilatation.    At this time the patient reports she has been feeling well she denies chest pain, dyspnea, PND, orthopnea, palpitations, near syncope, lower extremity edema or feelings of her heart racing.  She reports compliance with medical therapy.    Remote device interrogation is reviewed and shows stable device function with no tachyarrhythmias.  Atrial fibrillation is permanent battery function is stable.      Past Medical History:   Diagnosis Date   • Antral erosion    • Aortic valve regurgitation 12/26/2018   • Atrial fibrillation (CMS/[facility])    • Atrial fibrillation with controlled ventricular response (CMS/[facility])    • B12 deficiency    • CAD (coronary artery disease)    • Cellulitis 04/2011    on face    • Cerebrovascular accident (CVA) (CMS/HCC) 12/07/2016   • Colon cancer (CMS/[facility])    • Coronary artery disease involving native coronary artery of native heart without angina pectoris 6/18/2019   • Depression    • Diabetes mellitus, type 2 (CMS/HCC)    • Essential hypertension 2/13/2012   • Fatigue    • GERD (gastroesophageal reflux disease)    • Hiatal hernia    • History of  colonoscopy 04/2010    last done 4/10   • History of esophagogastroduodenoscopy (EGD) 04/2010    last done 4/10   • Hyperlipidemia    • Hyperlipidemia, mixed 2/13/2012   • Hypertension     white coat htn   • HALEIGH (iron deficiency anemia)    • LAD (lymphadenopathy)     40% lesion LAD    • Left pontine CVA (CMS/HCC)    • Mitral regurgitation 05/21/2012   • STORMY (obstructive sleep apnea)     not treating   • Osteoarthritis    • Pacemaker 6/18/2019   • Permanent atrial fibrillation 6/18/2019   • Presence of Watchman left atrial appendage closure device 1/4/2020   • 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CMS/HCC)    • Right kidney mass     1.4 cm- following urology    • Sick sinus syndrome (CMS/HCC)    • Stroke (CMS/HCC) 11/2016   • TIA (transient ischemic attack)     left CVA, interrupted TPA; As (moderate-severe)    • Vestibular nerve disease 2017       Past Surgical History:   Procedure Laterality Date   • ATRIAL APPENDAGE EXCLUSION LEFT WITH TRANSESOPHAGEAL ECHOCARDIOGRAM N/A 10/10/2019    Procedure: Atrial Appendage Occlusion;  Surgeon: Richie Chapman MD;  Location: [facility] CATH INVASIVE LOCATION;  Service: Cardiovascular   • ATRIAL APPENDAGE EXCLUSION LEFT WITH TRANSESOPHAGEAL ECHOCARDIOGRAM N/A 10/10/2019    Procedure: Atrial Appendage Occlusion;  Surgeon: Je Rivera MD;  Location: [facility] CATH INVASIVE LOCATION;  Service: Cardiology   • BLADDER REPAIR     • CARDIAC CATHETERIZATION  05/2010   • CHOLECYSTECTOMY     • COLECTOMY PARTIAL / TOTAL     • COLONOSCOPY  04/16/2018    negative    • ENDOSCOPY  04/16/2018    negative    • HYSTERECTOMY     • INSERT / REPLACE / REMOVE PACEMAKER  09/2018    Pacemaker gen change 9/2018    • OTHER SURGICAL HISTORY  04/25/2015    Exercise myoview, normal    • PACEMAKER IMPLANTATION  01/22/2010    Dual Chamber - 1/22/2010; RA Lead Revision- 5/7/2012- BS    • TRANSESOPHAGEAL ECHOCARDIOGRAM (DHEERAJ)  07/2019         Current Outpatient Medications:   •  aspirin 81 MG EC tablet, Take 1 tablet  by mouth Daily., Disp: 90 tablet, Rfl: 3  •  atorvastatin (LIPITOR) 10 MG tablet, TAKE ONE TABLET BY MOUTH DAILY, Disp: 30 tablet, Rfl: 9  •  BYSTOLIC 10 MG tablet, Take 10 mg by mouth Daily., Disp: , Rfl:   •  CARTIA  MG 24 hr capsule, 2 (Two) Times a Day., Disp: , Rfl:   •  Cholecalciferol (VITAMIN D3) 2000 units capsule, 1 capsule Daily., Disp: , Rfl:   •  clopidogrel (PLAVIX) 75 MG tablet, Take 1 tablet by mouth Daily., Disp: 30 tablet, Rfl: 3  •  ferrous sulfate 324 (65 Fe) MG tablet delayed-release EC tablet, Take 324 mg by mouth Daily With Breakfast., Disp: , Rfl:   •  FLUoxetine (PROzac) 40 MG capsule, Take 40 mg by mouth Daily., Disp: , Rfl:   •  furosemide (LASIX) 40 MG tablet, Take 1 tablet by mouth Daily., Disp: 30 tablet, Rfl: 3  •  nitroglycerin (NITROSTAT) 0.4 MG SL tablet, Place 1 tablet under the tongue Every 5 (Five) Minutes As Needed for Chest Pain (Only if SBP Greater Than 100). more than 3 doses in 15 min, Disp: , Rfl: 12  •  omeprazole (priLOSEC) 40 MG capsule, Take 1 capsule by mouth 2 (Two) Times a Day., Disp: 60 capsule, Rfl: 9  •  potassium chloride (K-DUR) 10 MEQ CR tablet, 2 tablets Daily., Disp: , Rfl:   •  vitamin B-12 (CYANOCOBALAMIN) 1000 MCG tablet, Take 1 tablet by mouth Daily., Disp: 30 tablet, Rfl: 5  •  zolpidem (AMBIEN) 5 MG tablet, Take 1 tablet by mouth At Night As Needed for Sleep., Disp: 5 tablet, Rfl: 0    Social History     Socioeconomic History   • Marital status:      Spouse name: Not on file   • Number of children: Not on file   • Years of education: Not on file   • Highest education level: Not on file   Tobacco Use   • Smoking status: Never Smoker   • Smokeless tobacco: Never Used   Substance and Sexual Activity   • Alcohol use: No     Frequency: Never   • Drug use: No   • Sexual activity: Defer       Family History   Problem Relation Age of Onset   • Hypertension Mother    • Diabetes Mother    • Coronary artery disease Mother    • Other Father         CVA  "  • Heart disease Other    • Stroke Other    • Hypertension Other        The following portions of the patient's history were reviewed and updated as appropriate:  allergies, current medications, family history, social history, surgical history and problem list.     Review of Systems   Constitution: Negative for decreased appetite and diaphoresis.   HENT: Negative for congestion, hearing loss and nosebleeds.    Cardiovascular: Negative for chest pain, claudication, dyspnea on exertion, irregular heartbeat, leg swelling, near-syncope, orthopnea, palpitations, paroxysmal nocturnal dyspnea and syncope.   Respiratory: Negative for cough, shortness of breath and sleep disturbances due to breathing.    Endocrine: Negative for polyuria.   Hematologic/Lymphatic: Does not bruise/bleed easily.   Skin: Negative for itching and rash.   Musculoskeletal: Negative for back pain, muscle weakness and myalgias.   Gastrointestinal: Negative for abdominal pain, change in bowel habit and nausea.   Genitourinary: Negative for dysuria, flank pain, frequency and hesitancy.   Neurological: Negative for dizziness, tremors and weakness.   Psychiatric/Behavioral: Negative for altered mental status. The patient does not have insomnia.        Ht 159 cm (62.6\")   Wt 82.1 kg (181 lb)   BMI 32.48 kg/m²     Objective         Diagnoses and all orders for this visit:    1. Permanent atrial fibrillation (Primary)  Comments:  Ventricular rates are stable.  She is status post watchman.    2. Pacemaker  Comments:  Stable device function noted on remote device interrogation    3. Essential hypertension  Comments:  Blood pressure has historically been stable on current medical therapy    4. Tachy-morgan syndrome (CMS/HCC)  Comments:  Stable since pacemaker implant      Plan:    The patient appears well compensated from a cardiovascular standpoint.  Remote device interrogation shows stable pacemaker function  I made no changes in her medical therapy  We " will continue current treatment.  The patient was evaluated via telephone encounter today to the Covid-19 pandemic.  We will reschedule her follow-up in office for 6 months.  If she has any new or worsening problems have asked her to contact her office sooner.    This visit has been rescheduled as a phone visit to comply with patient safety concerns in accordance with CDC recommendations. Total time of discussion was 12 minutes.

## 2020-04-09 ENCOUNTER — OFFICE VISIT (OUTPATIENT)
Dept: CARDIOLOGY | Facility: CLINIC | Age: 85
End: 2020-04-09

## 2020-04-09 VITALS — WEIGHT: 175 LBS | BODY MASS INDEX: 31.4 KG/M2

## 2020-04-09 DIAGNOSIS — I49.5 TACHY-BRADY SYNDROME (HCC): ICD-10-CM

## 2020-04-09 DIAGNOSIS — Z95.0 PACEMAKER: ICD-10-CM

## 2020-04-09 DIAGNOSIS — D50.0 IRON DEFICIENCY ANEMIA DUE TO CHRONIC BLOOD LOSS: ICD-10-CM

## 2020-04-09 DIAGNOSIS — I10 ESSENTIAL HYPERTENSION: ICD-10-CM

## 2020-04-09 DIAGNOSIS — I48.19 ATRIAL FIBRILLATION, PERSISTENT (HCC): Primary | ICD-10-CM

## 2020-04-09 PROCEDURE — 99422 OL DIG E/M SVC 11-20 MIN: CPT | Performed by: INTERNAL MEDICINE

## 2020-04-09 NOTE — PROGRESS NOTES
This is  a telephone electronic visit--consent obtained for telephone visit      CC--atrial fibrillation, hypertension and watchman device in situ with a leadless pacemaker in situ    Sub--patient is a delightful 86-year-old patient with history of GI blood loss, iron deficiency anemia and hiatal hernia with Luis's erosions, persistent atrial fibrillation, diastolic heart failure, chronic kidney disease, sick sinus syndrome status post MICRA PPM, hypertension, CVA, GERD.  She had watchman device implantation nearly 6 months ago and she is currently off anticoagulation and by protocol on dual antiplatelet agents.  She denies shortness of breath, chest/back pain, syncopal or near syncopal events.  Patient is doing remarkably well without anticoagulation and without recurrent GI bleed and denies TIA or stroke and currently maintaining social distancing for ongoing pandemic  She denies any angina, chest pain, cough, fever, dyspnea or abdominal pain or diarrhea  She is very compliant with her medications  Her pacemaker was interrogated by transtelephonic transmission in the office of Dr. Wilson        Past Medical History:   Diagnosis Date   • Antral erosion    • Aortic valve regurgitation 12/26/2018   • Atrial fibrillation (CMS/[facility])    • Atrial fibrillation with controlled ventricular response (CMS/[facility])    • B12 deficiency    • CAD (coronary artery disease)    • Cellulitis 04/2011    on face    • Cerebrovascular accident (CVA) (CMS/HCC) 12/07/2016   • Colon cancer (CMS/[facility])    • Coronary artery disease involving native coronary artery of native heart without angina pectoris 6/18/2019   • Depression    • Diabetes mellitus, type 2 (CMS/[facility])    • Essential hypertension 2/13/2012   • Fatigue    • GERD (gastroesophageal reflux disease)    • Hiatal hernia    • History of colonoscopy 04/2010    last done 4/10   • History of esophagogastroduodenoscopy (EGD) 04/2010    last done 4/10   • Hyperlipidemia    • Hyperlipidemia,  mixed 2/13/2012   • Hypertension     white coat htn   • HALEIGH (iron deficiency anemia)    • LAD (lymphadenopathy)     40% lesion LAD    • Left pontine CVA (CMS/HCC)    • Mitral regurgitation 05/21/2012   • STORMY (obstructive sleep apnea)     not treating   • Osteoarthritis    • Pacemaker 6/18/2019   • Permanent atrial fibrillation 6/18/2019   • Presence of Watchman left atrial appendage closure device 1/4/2020   • 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CMS/[facility])    • Right kidney mass     1.4 cm- following urology    • Sick sinus syndrome (CMS/[facility])    • Stroke (CMS/[facility]) 11/2016   • TIA (transient ischemic attack)     left CVA, interrupted TPA; As (moderate-severe)    • Vestibular nerve disease 2017     Past Surgical History:   Procedure Laterality Date   • ATRIAL APPENDAGE EXCLUSION LEFT WITH TRANSESOPHAGEAL ECHOCARDIOGRAM N/A 10/10/2019    Procedure: Atrial Appendage Occlusion;  Surgeon: Richie Chapman MD;  Location:  [facility];  Service: Cardiovascular   • ATRIAL APPENDAGE EXCLUSION LEFT WITH TRANSESOPHAGEAL ECHOCARDIOGRAM N/A 10/10/2019    Procedure: Atrial Appendage Occlusion;  Surgeon: Je Rivera MD;  Location:  [facility];  Service: Cardiology   • BLADDER REPAIR     • CARDIAC CATHETERIZATION  05/2010   • CHOLECYSTECTOMY     • COLECTOMY PARTIAL / TOTAL     • COLONOSCOPY  04/16/2018    negative    • ENDOSCOPY  04/16/2018    negative    • HYSTERECTOMY     • INSERT / REPLACE / REMOVE PACEMAKER  09/2018    Pacemaker gen change 9/2018    • OTHER SURGICAL HISTORY  04/25/2015    Exercise myoview, normal    • PACEMAKER IMPLANTATION  01/22/2010    Dual Chamber - 1/22/2010; RA Lead Revision- 5/7/2012- BS    • TOOTH EXTRACTION     • TRANSESOPHAGEAL ECHOCARDIOGRAM (DHEERAJ)  07/2019     Family History   Problem Relation Age of Onset   • Hypertension Mother    • Diabetes Mother    • Coronary artery disease Mother    • Other Father         CVA   • Heart disease Other    • Stroke Other    •  Hypertension Other      Social History     Tobacco Use   • Smoking status: Never Smoker   • Smokeless tobacco: Never Used   Substance Use Topics   • Alcohol use: No     Frequency: Never   • Drug use: No     Review of Systems  General: Negative  for fatigue and tiredness  Eyes: No redness  Cardiovascular: No chest pain,  palpitations  Respiratory:   No  shortness of breath  Gastrointestinal: No nausea or vomiting, bleeding  Genitourinary: no hematuria or dysuria  Musculoskeletal: No arthralgia or myalgia  Skin: No rash  Neurologic: No numbness, tingling, syncope  Hematologic/Lymphatic: No abnormal bleeding      Assessment plan    Post watchman device implantation 6 months ago--stop aspirin and continue Plavix  Hypertension well controlled on home monitoring and continue current medications  Status post leadless pacemaker implantation which is been interrogated in the office of Dr. Wilson  Prior history of GI bleed without recurrence and off anticoagulation  Follow-up in 6 months  Medications reviewed  Time spent on telephone visit evaluation is 11 minutes  Electronically signed by Richie Chapman MD, 04/09/20, 11:55 AM.

## 2020-04-13 RX ORDER — ZOLPIDEM TARTRATE 10 MG/1
TABLET ORAL
Qty: 30 TABLET | Refills: 3 | Status: SHIPPED | OUTPATIENT
Start: 2020-04-13 | End: 2020-10-26

## 2020-05-05 ENCOUNTER — CLINICAL SUPPORT NO REQUIREMENTS (OUTPATIENT)
Dept: CARDIOLOGY | Facility: CLINIC | Age: 85
End: 2020-05-05

## 2020-05-05 DIAGNOSIS — Z95.0 PACEMAKER: ICD-10-CM

## 2020-05-05 DIAGNOSIS — I48.21 PERMANENT ATRIAL FIBRILLATION (HCC): Primary | ICD-10-CM

## 2020-05-06 NOTE — PROGRESS NOTES
REMOTE DEVICE INTERROGATION    Received and reviewed on:   April 5, 2020    Remote device interrogation is reviewed.     Device Company: [employer]    Battery Stable.  Time to ZULEMA 8 years    Presenting EGM: Not available on this device    Arrhythmia Logbook Reviewed: No observations reported    Device function appears Stable    Continue remote device interrogations every 3 months.

## 2020-05-26 RX ORDER — LANOLIN ALCOHOL/MO/W.PET/CERES
CREAM (GRAM) TOPICAL
Qty: 30 TABLET | Refills: 4 | Status: SHIPPED | OUTPATIENT
Start: 2020-05-26 | End: 2020-10-26

## 2020-06-02 ENCOUNTER — EPISODE CHANGES (OUTPATIENT)
Dept: CASE MANAGEMENT | Facility: OTHER | Age: 85
End: 2020-06-02

## 2020-06-09 ENCOUNTER — EPISODE CHANGES (OUTPATIENT)
Dept: CASE MANAGEMENT | Facility: OTHER | Age: 85
End: 2020-06-09

## 2020-06-09 ENCOUNTER — PATIENT OUTREACH (OUTPATIENT)
Dept: CASE MANAGEMENT | Facility: OTHER | Age: 85
End: 2020-06-09

## 2020-06-09 RX ORDER — CLOPIDOGREL BISULFATE 75 MG/1
TABLET ORAL
Qty: 30 TABLET | Refills: 2 | Status: SHIPPED | OUTPATIENT
Start: 2020-06-09 | End: 2020-10-26

## 2020-06-09 NOTE — OUTREACH NOTE
"Care Coordination Assessment    Documented/Reviewed By:  Indigo Torres RN Date/time:  6/9/2020  3:55 PM   Assessment completed with:  patient  Enrolled in care management program:  No  Living arrangement:  alone  Support system:  family  Type of residence:  private residence  Equipment used at home:  cane  Bed or wheelchair confined:  No  Inadequate nutrition:  No  Medication adherence problem:  No  Experiencing side effects from current medications:  No  History of fall(s) in last 6 months:  No  Difficulty keeping appointments:  No  Family aware of the patient's advance care planning wishes:  Yes       Care Plan Note      Responses   Annual Wellness Visit:   Patient Will Schedule   Care Gaps Addressed  Diabetic Eye Exam [States had eye exam last fall. ]   Diabetic Eye Exam Status  Up to Date   Other Patient Education/Resources   24/7 [facility] Nurse Call Line [covid 19 hotline #]   24/7 Nurse Call Line Education Method  Verbal   Does patient have depression diagnosis?  Yes   Advanced Directives:  -- [RN-ACM will send materials to patient, as requested.]   Ed Visits past 12 months:  None   Hospitalizations past 12 months  1        RN-ACM completed covid high-risk outreach call to pt. Pt denies cough, SOA, F/C, N/V, D/C, change in taste perception.  Eating & drinking well.  Also denies known exposure to covid positive persons.  Patient voiced understanding & compliance. Patient states \"I'm doing wonderfully. I feel like a different person since the Watchman Device was placed.\" Pt states she lives alone and still drives.  Has support from her daughter & granddaughter, if needed.  She denies insecurities at this time re obtaining or affording food/medications/transport.  States she has gone to the grocery two times since covid-19 start, but always wears masks, socially distances & follows sanitization procedures. Otherwise she stays home. She denies SOA, chest pain, cough, F/C, N/V, change in taste or smell " sensations.  She denies chest pain, palps, dizziness, HA, blurred vision.    Discussed with patient covid precautions.  Provided pt with 24/7 Nurse Triage Line, [phone]; Covid-19 Hotline#, [phone].     Also reviewed with patient (see assessment & care plan flow sheets for additional details): RN-ACM role & contact info, PCP/specialty appts & Medicare AWV; ACP status (she requests copy be mailed to her), health maintenance gaps (pt states she will f/u). Education given re medication use and precautions, safety/fall precautions - pt verb understanding.  Discussed PCP f/u. Offered to assist pt with scheduling PCP appointment, but she declined assistance, states she sees no need as she feels quite well, but states she has PCP office # and will call to schedule if needed.  She was encouraged to call re AWV. Patient politely declined need for additional CM f/up calls.  She voices no questions or concerns at this time.   Advised pt to call PCP or RN-ACM with any needs & pt verb understanding.        Follow Up Outreach Due: as needed.    Indigo Torres RN  Ambulatory     6/9/2020, 16:01

## 2020-07-01 RX ORDER — FLUOXETINE HYDROCHLORIDE 40 MG/1
CAPSULE ORAL
Qty: 30 CAPSULE | Refills: 9 | Status: SHIPPED | OUTPATIENT
Start: 2020-07-01 | End: 2020-10-26

## 2020-07-01 RX ORDER — NEBIVOLOL HYDROCHLORIDE 10 MG/1
TABLET ORAL
Qty: 30 TABLET | Refills: 10 | Status: SHIPPED | OUTPATIENT
Start: 2020-07-01 | End: 2020-10-26

## 2020-07-17 ENCOUNTER — CLINICAL SUPPORT NO REQUIREMENTS (OUTPATIENT)
Dept: CARDIOLOGY | Facility: CLINIC | Age: 85
End: 2020-07-17

## 2020-07-17 DIAGNOSIS — Z95.0 PACEMAKER: ICD-10-CM

## 2020-07-17 DIAGNOSIS — I48.21 PERMANENT ATRIAL FIBRILLATION (HCC): Primary | ICD-10-CM

## 2020-07-17 PROCEDURE — 93294 REM INTERROG EVL PM/LDLS PM: CPT | Performed by: NURSE PRACTITIONER

## 2020-07-17 PROCEDURE — 93296 REM INTERROG EVL PM/IDS: CPT | Performed by: NURSE PRACTITIONER

## 2020-07-27 NOTE — PROGRESS NOTES
REMOTE DEVICE INTERROGATION    Received and reviewed on:   7/17/2020    Remote device interrogation is reviewed.     Device Company: [employer]    Battery Stable.  Time to ZULEMA greater than 9 years    Presenting EGM: Not available on this device    Arrhythmia Logbook Reviewed: No events reported    Device function appears Stable    Continue remote device interrogations every 3 months.

## 2020-08-13 RX ORDER — DILTIAZEM HYDROCHLORIDE 240 MG/1
CAPSULE, EXTENDED RELEASE ORAL
Qty: 60 CAPSULE | Refills: 10 | Status: SHIPPED | OUTPATIENT
Start: 2020-08-13 | End: 2020-10-26

## 2020-08-19 RX ORDER — ATORVASTATIN CALCIUM 10 MG/1
TABLET, FILM COATED ORAL
Qty: 30 TABLET | Refills: 8 | Status: SHIPPED | OUTPATIENT
Start: 2020-08-19 | End: 2020-10-26

## 2020-08-19 RX ORDER — FUROSEMIDE 40 MG/1
TABLET ORAL
Qty: 30 TABLET | Refills: 2 | Status: SHIPPED | OUTPATIENT
Start: 2020-08-19 | End: 2020-10-26

## 2020-09-01 ENCOUNTER — EPISODE CHANGES (OUTPATIENT)
Dept: CASE MANAGEMENT | Facility: OTHER | Age: 85
End: 2020-09-01

## 2020-10-13 ENCOUNTER — CLINICAL SUPPORT NO REQUIREMENTS (OUTPATIENT)
Dept: CARDIOLOGY | Facility: CLINIC | Age: 85
End: 2020-10-13

## 2020-10-13 ENCOUNTER — OFFICE VISIT (OUTPATIENT)
Dept: CARDIOLOGY | Facility: CLINIC | Age: 85
End: 2020-10-13

## 2020-10-13 VITALS
BODY MASS INDEX: 31.71 KG/M2 | WEIGHT: 179 LBS | HEIGHT: 63 IN | DIASTOLIC BLOOD PRESSURE: 80 MMHG | HEART RATE: 100 BPM | SYSTOLIC BLOOD PRESSURE: 138 MMHG | OXYGEN SATURATION: 99 %

## 2020-10-13 DIAGNOSIS — I49.5 TACHY-BRADY SYNDROME (HCC): Primary | ICD-10-CM

## 2020-10-13 DIAGNOSIS — Z95.0 PACEMAKER: ICD-10-CM

## 2020-10-13 DIAGNOSIS — I48.21 PERMANENT ATRIAL FIBRILLATION (HCC): ICD-10-CM

## 2020-10-13 DIAGNOSIS — I25.10 CORONARY ARTERY DISEASE INVOLVING NATIVE CORONARY ARTERY OF NATIVE HEART WITHOUT ANGINA PECTORIS: Primary | ICD-10-CM

## 2020-10-13 DIAGNOSIS — I10 ESSENTIAL HYPERTENSION: ICD-10-CM

## 2020-10-13 PROCEDURE — 93000 ELECTROCARDIOGRAM COMPLETE: CPT | Performed by: INTERNAL MEDICINE

## 2020-10-13 PROCEDURE — 99213 OFFICE O/P EST LOW 20 MIN: CPT | Performed by: INTERNAL MEDICINE

## 2020-10-13 PROCEDURE — 93279 PRGRMG DEV EVAL PM/LDLS PM: CPT | Performed by: NURSE PRACTITIONER

## 2020-10-13 NOTE — PROGRESS NOTES
Cardiology Office Visit Note      Referring physician:  Denny Field MD    Reason For Followup: 6 Month     HPI:  Cherie Hinton is a 86 y.o. female. Presents to [facility] for a follow up visit. History of atrial fibrillation that is permanent.  She underwent watchman implant in October 2019 due to chronic GI blood loss iron deficiency anemia and intolerance to Xarelto.    Additional past medical history is significant for previous MICRA pacemaker, CVA, GERD, hypertension, CKD    Echocardiogram done in November showed ejection fraction of 60% with left atrial dilatation.    At this time the patient reports she has been feeling well she denies chest pain, dyspnea, PND, orthopnea, palpitations, near syncope, lower extremity edema or feelings of her heart racing.  She reports compliance with medical therapy.    Past Medical History:   Diagnosis Date   • Antral erosion    • Aortic valve regurgitation 12/26/2018   • Atrial fibrillation (CMS/[facility])    • Atrial fibrillation with controlled ventricular response (CMS/HCC)    • B12 deficiency    • CAD (coronary artery disease)    • Cellulitis 04/2011    on face    • Cerebrovascular accident (CVA) (CMS/HCC) 12/07/2016   • Colon cancer (CMS/[facility])    • Coronary artery disease involving native coronary artery of native heart without angina pectoris 6/18/2019   • Depression    • Diabetes mellitus, type 2 (CMS/HCC)    • Essential hypertension 2/13/2012   • Fatigue    • GERD (gastroesophageal reflux disease)    • Hiatal hernia    • History of colonoscopy 04/2010    last done 4/10   • History of esophagogastroduodenoscopy (EGD) 04/2010    last done 4/10   • Hyperlipidemia    • Hyperlipidemia, mixed 2/13/2012   • Hypertension     white coat htn   • HALEIGH (iron deficiency anemia)    • LAD (lymphadenopathy)     40% lesion LAD    • Left pontine CVA (CMS/HCC)    • Mitral regurgitation 05/21/2012   • STORMY (obstructive sleep apnea)     not treating   • Osteoarthritis    • Pacemaker 6/18/2019    • Permanent atrial fibrillation (CMS/[facility]) 6/18/2019   • Presence of Watchman left atrial appendage closure device 1/4/2020   • 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CMS/[facility])    • Right kidney mass     1.4 cm- following urology    • Sick sinus syndrome (CMS/[facility])    • Stroke (CMS/[facility]) 11/2016   • TIA (transient ischemic attack)     left CVA, interrupted TPA; As (moderate-severe)    • Vestibular nerve disease 2017       Past Surgical History:   Procedure Laterality Date   • ATRIAL APPENDAGE EXCLUSION LEFT WITH TRANSESOPHAGEAL ECHOCARDIOGRAM N/A 10/10/2019    Procedure: Atrial Appendage Occlusion;  Surgeon: Richie Chapman MD;  Location:  [facility];  Service: Cardiovascular   • ATRIAL APPENDAGE EXCLUSION LEFT WITH TRANSESOPHAGEAL ECHOCARDIOGRAM N/A 10/10/2019    Procedure: Atrial Appendage Occlusion;  Surgeon: Je Rivera MD;  Location:  [facility];  Service: Cardiology   • BLADDER REPAIR     • CARDIAC CATHETERIZATION  05/2010   • CHOLECYSTECTOMY     • COLECTOMY PARTIAL / TOTAL     • COLONOSCOPY  04/16/2018    negative    • ENDOSCOPY  04/16/2018    negative    • HYSTERECTOMY     • INSERT / REPLACE / REMOVE PACEMAKER  09/2018    Pacemaker gen change 9/2018    • OTHER SURGICAL HISTORY  04/25/2015    Exercise myoview, normal    • PACEMAKER IMPLANTATION  01/22/2010    Dual Chamber - 1/22/2010; RA Lead Revision- 5/7/2012- BS    • TOOTH EXTRACTION     • TRANSESOPHAGEAL ECHOCARDIOGRAM (DHEERAJ)  07/2019         Current Outpatient Medications:   •  omeprazole (priLOSEC) 40 MG capsule, Take 1 capsule by mouth 2 (Two) Times a Day., Disp: 60 capsule, Rfl: 9  •  potassium chloride (K-DUR) 10 MEQ CR tablet, Take 2 tablets by mouth Daily., Disp: , Rfl:   •  atorvastatin (LIPITOR) 10 MG tablet, Take 10 mg by mouth Daily., Disp: , Rfl:   •  clopidogrel (PLAVIX) 75 MG tablet, Take 75 mg by mouth Daily., Disp: , Rfl:   •  dilTIAZem CD (CARDIZEM CD) 240 MG 24 hr capsule, Take 240 mg by mouth 2 (two)  times a day., Disp: , Rfl:   •  FLUoxetine (PROzac) 20 MG capsule, Take 40 mg by mouth Daily., Disp: , Rfl:   •  furosemide (LASIX) 40 MG tablet, Take 40 mg by mouth Daily., Disp: , Rfl:   •  nebivolol (BYSTOLIC) 10 MG tablet, Take 20 mg by mouth Daily., Disp: , Rfl:   •  vitamin B-12 (CYANOCOBALAMIN) 1000 MCG tablet, Take 1,000 mcg by mouth Daily., Disp: , Rfl:   •  zolpidem (AMBIEN) 5 MG tablet, Take 10 mg by mouth At Night As Needed for Sleep., Disp: , Rfl:     Social History     Socioeconomic History   • Marital status:      Spouse name: Not on file   • Number of children: Not on file   • Years of education: Not on file   • Highest education level: Not on file   Tobacco Use   • Smoking status: Never Smoker   • Smokeless tobacco: Never Used   Substance and Sexual Activity   • Alcohol use: No     Frequency: Never   • Drug use: No   • Sexual activity: Defer   Social History Narrative    Pt states she lives alone and still drives.  Has support from family if needed.  She denies insecurities re obtaining or affording food/medications/transport.         Family History   Problem Relation Age of Onset   • Hypertension Mother    • Diabetes Mother    • Coronary artery disease Mother    • Other Father         CVA   • Heart disease Other    • Stroke Other    • Hypertension Other          Review of Systems   General: denies fever, chills, anorexia, weight loss  Eyes: denies blurring, diplopia  Ear/Nose/Throat: denies ear pain, nosebleeds, hoarseness  Cardiovascular: See HPI  Respiratory: denies excessive sputum, hemoptysis, wheezing  Gastrointestinal: denies nausea, vomiting, change in bowel habits, abdominal pain  Genitourinary: No hematuria dysuria  Musculoskeletal: Only modest weightbearing arthralgias, not functionally limiting  Skin: denies rashes, itching, suspicious lesions  Neurologic: denies focal neuro deficits  Psychiatric: denies depression, anxiety  Endocrine: denies cold intolerance, heat  "intolerance  Hematologic/Lymphatic: denies abnormal bruising, bleeding  Allergic/Immunologic: denies urticaria or persistent infections      Objective     Visit Vitals  /80   Pulse 100   Ht 159 cm (62.6\")   Wt 81.2 kg (179 lb)   SpO2 99%   BMI 32.12 kg/m²           Physical Exam  General:     Obese, well developed,, in no acute distress.    Head:     normocephalic and atraumatic.    Eyes:    PERRL/EOM intact, conjunctiva and sclera clear with out nystagmus.    Neck:    no jvd or bruits  Chest Wall:    no deformities   Lungs:    clear bilaterally to auscultation with adequate global airflow   Heart:    non-displaced PMI; irregularly irregular rhythm consistent with atrial fibrillation with marginal rate control  Abdomen:  Soft, nontender without HSM  Msk:    no deformity; adequate R OM  Pulses:    pulses normal in all 4 extremities.    Extremities:    no clubbing, cyanosis, edema   Neurologic:    no focal sensory or motor deficits  Skin:    intact without lesions or rashes.    Psych:    alert and cooperative; normal mood and affect; normal attention span and concentration.            ECG 12 Lead    Date/Time: 10/13/2020 8:59 AM  Performed by: CORINA Wilson MD  Authorized by: CORINA Wilson MD   Comparison: compared with previous ECG from 1/13/2020  Similar to previous ECG  Rhythm: atrial fibrillation  Conduction: right bundle branch block    Clinical impression: abnormal EKG  Comments: No change in previous EKG              Assessment:   Problems Addressed this Visit        Cardiovascular and Mediastinum    Permanent atrial fibrillation (CMS/HCC)    -Generally sufficiently rate controlled though has not yet taken her medications today patient reports  -Not on anticoagulant therapy; status post watchman implant        Coronary artery disease involving native coronary artery of native heart without angina pectoris - Primary    -Remains medically stable well compensated and angina free        Essential " hypertension    -Generally well regulated though patient reports not having it taken her medications today.          Diagnoses       Codes Comments    Coronary artery disease involving native coronary artery of native heart without angina pectoris    -  Primary ICD-10-CM: I25.10  ICD-9-CM: 414.01     Permanent atrial fibrillation (CMS/[facility])     ICD-10-CM: I48.21  ICD-9-CM: 427.31     Essential hypertension     ICD-10-CM: I10  ICD-9-CM: 401.9             Plan:  Increase Bystolic to 20 mg p.o. daily to improve blood pressure and heart rate regulation.  Encourage regular progressive activities though cautioned against risk for falling.  Return to clinic 6 months or sooner if needed.    CORINA Wilson MD  10/27/2020 09:42 EDT    This report was generated using the Dragon voice recognition system.

## 2020-10-19 ENCOUNTER — PREP FOR SURGERY (OUTPATIENT)
Dept: OTHER | Facility: HOSPITAL | Age: 85
End: 2020-10-19

## 2020-10-19 ENCOUNTER — OFFICE VISIT (OUTPATIENT)
Dept: CARDIOLOGY | Facility: CLINIC | Age: 85
End: 2020-10-19

## 2020-10-19 ENCOUNTER — CLINICAL SUPPORT NO REQUIREMENTS (OUTPATIENT)
Dept: CARDIOLOGY | Facility: CLINIC | Age: 85
End: 2020-10-19

## 2020-10-19 VITALS
OXYGEN SATURATION: 95 % | BODY MASS INDEX: 32.3 KG/M2 | WEIGHT: 180 LBS | DIASTOLIC BLOOD PRESSURE: 89 MMHG | SYSTOLIC BLOOD PRESSURE: 120 MMHG | HEART RATE: 104 BPM

## 2020-10-19 DIAGNOSIS — I48.21 PERMANENT ATRIAL FIBRILLATION (HCC): Primary | ICD-10-CM

## 2020-10-19 DIAGNOSIS — I49.5 TACHY-BRADY SYNDROME (HCC): ICD-10-CM

## 2020-10-19 DIAGNOSIS — D50.0 IRON DEFICIENCY ANEMIA DUE TO CHRONIC BLOOD LOSS: ICD-10-CM

## 2020-10-19 DIAGNOSIS — I48.19 ATRIAL FIBRILLATION, PERSISTENT (HCC): Primary | ICD-10-CM

## 2020-10-19 DIAGNOSIS — Z95.818 PRESENCE OF WATCHMAN LEFT ATRIAL APPENDAGE CLOSURE DEVICE: ICD-10-CM

## 2020-10-19 DIAGNOSIS — I10 ESSENTIAL HYPERTENSION: ICD-10-CM

## 2020-10-19 DIAGNOSIS — Z95.0 PACEMAKER: Primary | ICD-10-CM

## 2020-10-19 DIAGNOSIS — I48.19 ATRIAL FIBRILLATION, PERSISTENT (HCC): ICD-10-CM

## 2020-10-19 DIAGNOSIS — I48.91 ATRIAL FIBRILLATION WITH CONTROLLED VENTRICULAR RESPONSE (HCC): ICD-10-CM

## 2020-10-19 PROCEDURE — 99214 OFFICE O/P EST MOD 30 MIN: CPT | Performed by: INTERNAL MEDICINE

## 2020-10-19 RX ORDER — SODIUM CHLORIDE 9 MG/ML
80 INJECTION, SOLUTION INTRAVENOUS CONTINUOUS
Status: CANCELLED | OUTPATIENT
Start: 2020-10-19

## 2020-10-19 NOTE — PROGRESS NOTES
CC--atrial fibrillation, hypertension and watchman device in situ with a leadless pacemaker in situ    Sub--patient is a delightful 86-year-old patient with history of GI blood loss, iron deficiency anemia and hiatal hernia with Luis's erosions, persistent atrial fibrillation, diastolic heart failure, chronic kidney disease, sick sinus syndrome status post MICRA PPM, hypertension, CVA, GERD.  She had watchman device implantation nearly 12 months ago and she is currently off anticoagulation and by protocol on  antiplatelet agent.  She denies shortness of breath, chest/back pain, syncopal or near syncopal events.  Patient is doing remarkably well without anticoagulation and without recurrent GI bleed and denies TIA or stroke and currently maintaining social distancing for ongoing pandemic.She denies any angina, chest pain, cough, fever, dyspnea or abdominal pain or diarrhea  She is very compliant with her medications  Her pacemaker was interrogated by transtelephonic transmission in the office of Dr. Wilson  She does complain of intermittent rapid atrial fibrillation and that dose of Cartia was recently increased to optimize therapy        Past Medical History:   Diagnosis Date   • Antral erosion    • Aortic valve regurgitation 12/26/2018   • Atrial fibrillation (CMS/HCC)    • Atrial fibrillation with controlled ventricular response (CMS/HCC)    • B12 deficiency    • CAD (coronary artery disease)    • Cellulitis 04/2011    on face    • Cerebrovascular accident (CVA) (CMS/HCC) 12/07/2016   • Colon cancer (CMS/[facility])    • Coronary artery disease involving native coronary artery of native heart without angina pectoris 6/18/2019   • Depression    • Diabetes mellitus, type 2 (CMS/HCC)    • Essential hypertension 2/13/2012   • Fatigue    • GERD (gastroesophageal reflux disease)    • Hiatal hernia    • History of colonoscopy 04/2010    last done 4/10   • History of esophagogastroduodenoscopy (EGD) 04/2010    last done 4/10    • Hyperlipidemia    • Hyperlipidemia, mixed 2/13/2012   • Hypertension     white coat htn   • HALEIGH (iron deficiency anemia)    • LAD (lymphadenopathy)     40% lesion LAD    • Left pontine CVA (CMS/[facility])    • Mitral regurgitation 05/21/2012   • STORMY (obstructive sleep apnea)     not treating   • Osteoarthritis    • Pacemaker 6/18/2019   • Permanent atrial fibrillation (CMS/[facility]) 6/18/2019   • Presence of Watchman left atrial appendage closure device 1/4/2020   • 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CMS/[facility])    • Right kidney mass     1.4 cm- following urology    • Sick sinus syndrome (CMS/[facility])    • Stroke (CMS/[facility]) 11/2016   • TIA (transient ischemic attack)     left CVA, interrupted TPA; As (moderate-severe)    • Vestibular nerve disease 2017     Past Surgical History:   Procedure Laterality Date   • ATRIAL APPENDAGE EXCLUSION LEFT WITH TRANSESOPHAGEAL ECHOCARDIOGRAM N/A 10/10/2019    Procedure: Atrial Appendage Occlusion;  Surgeon: Richie Chapman MD;  Location:  [facility];  Service: Cardiovascular   • ATRIAL APPENDAGE EXCLUSION LEFT WITH TRANSESOPHAGEAL ECHOCARDIOGRAM N/A 10/10/2019    Procedure: Atrial Appendage Occlusion;  Surgeon: Je Rivera MD;  Location:  [facility];  Service: Cardiology   • BLADDER REPAIR     • CARDIAC CATHETERIZATION  05/2010   • CHOLECYSTECTOMY     • COLECTOMY PARTIAL / TOTAL     • COLONOSCOPY  04/16/2018    negative    • ENDOSCOPY  04/16/2018    negative    • HYSTERECTOMY     • INSERT / REPLACE / REMOVE PACEMAKER  09/2018    Pacemaker gen change 9/2018    • OTHER SURGICAL HISTORY  04/25/2015    Exercise myoview, normal    • PACEMAKER IMPLANTATION  01/22/2010    Dual Chamber - 1/22/2010; RA Lead Revision- 5/7/2012- BS    • TOOTH EXTRACTION     • TRANSESOPHAGEAL ECHOCARDIOGRAM (DHEERAJ)  07/2019     Family History   Problem Relation Age of Onset   • Hypertension Mother    • Diabetes Mother    • Coronary artery disease Mother    • Other Father         CVA    • Heart disease Other    • Stroke Other    • Hypertension Other        Review of Systems  General: Negative  for fatigue and tiredness  Eyes: No redness  Cardiovascular: No chest pain, positive for palpitations  Respiratory:   No  shortness of breath  Gastrointestinal: No nausea or vomiting, bleeding  Genitourinary: no hematuria or dysuria  Musculoskeletal: No arthralgia or myalgia  Skin: No rash  Neurologic: No numbness, tingling, syncope  Hematologic/Lymphatic: No abnormal bleeding        Physical Exam--blood pressure 1/20/1989 pulse rate is 104 patient is afebrile respiration 12 times a minute    General:      well developed, well nourished, in no acute distress.    Head:      normocephalic and atraumatic.    Eyes:      PERRL/EOM intact, conjunctiva and sclera clear with out nystagmus.    Neck:      no masses, thyromegaly, trachea central with normal respiratory effort  Lungs:      clear bilaterally to auscultation.    Heart:     irregular rate and rhythm, S1, S2 without murmurs, rubs, or gallops  Skin:      intact without lesions or rashes.    Psych:      alert and cooperative; normal mood and affect; normal attention span and concentration.      Extremities with trace ankle edema without any cyanosis or clubbing    Muscular skeletal patient walks with a normal gait  with mild kyphosis    Neurological no focal deficits and alert oriented x3          Assessment plan    Post watchman device implantation 12 months ago--scheduled for DHEERAJ as part of protocol and orders placed   Hypertension well controlled   Status post leadless pacemaker implantation which is been interrogated in the office of Dr. Wilson  Prior history of GI bleed without recurrence and off anticoagulation  Follow-up in 6 months  Medications reviewed        Electronically signed by Richie Chapman MD, 10/19/20, 9:43 AM EDT.

## 2020-10-19 NOTE — PROGRESS NOTES
DEVICE INTERROGATION:  IN OFFICE    DEVICE TYPE:   Single-chamber Micra pacemaker    :   Medtronic    BATTERY:  Stable    TIME TO ELECTIVE REPLACEMENT INDICATORS:   8 years    CHARGE TIME:   Not applicable        LEAD DATA:       DEVICE REPROGRAMMED FOR TESTING      Atrial:     Ventricular:     20 mV, 670 ohms, 0.38 V@0.24 ms    LV:      Atrial pacing percentage: Not applicable %    Ventricular pacing percentage: 30.3%      Arrhythmia Logbook Reviewed: No ventricular high rates recorded        Summary:    Stable Device Function    No significant arhythmia burden.     Battery status is stable.    Reviewed with: Dr. Wilson      NEXT IN OFFICE DEVICE CHECK DUE: 1 year    REMOTE DEVICE INTERROGATIONS: 3 months

## 2020-10-19 NOTE — H&P (VIEW-ONLY)
CC--atrial fibrillation, hypertension and watchman device in situ with a leadless pacemaker in situ    Sub--patient is a delightful 86-year-old patient with history of GI blood loss, iron deficiency anemia and hiatal hernia with Luis's erosions, persistent atrial fibrillation, diastolic heart failure, chronic kidney disease, sick sinus syndrome status post MICRA PPM, hypertension, CVA, GERD.  She had watchman device implantation nearly 12 months ago and she is currently off anticoagulation and by protocol on  antiplatelet agent.  She denies shortness of breath, chest/back pain, syncopal or near syncopal events.  Patient is doing remarkably well without anticoagulation and without recurrent GI bleed and denies TIA or stroke and currently maintaining social distancing for ongoing pandemic.She denies any angina, chest pain, cough, fever, dyspnea or abdominal pain or diarrhea  She is very compliant with her medications  Her pacemaker was interrogated by transtelephonic transmission in the office of Dr. Wilson  She does complain of intermittent rapid atrial fibrillation and that dose of Cartia was recently increased to optimize therapy        Past Medical History:   Diagnosis Date   • Antral erosion    • Aortic valve regurgitation 12/26/2018   • Atrial fibrillation (CMS/HCC)    • Atrial fibrillation with controlled ventricular response (CMS/HCC)    • B12 deficiency    • CAD (coronary artery disease)    • Cellulitis 04/2011    on face    • Cerebrovascular accident (CVA) (CMS/HCC) 12/07/2016   • Colon cancer (CMS/[facility])    • Coronary artery disease involving native coronary artery of native heart without angina pectoris 6/18/2019   • Depression    • Diabetes mellitus, type 2 (CMS/HCC)    • Essential hypertension 2/13/2012   • Fatigue    • GERD (gastroesophageal reflux disease)    • Hiatal hernia    • History of colonoscopy 04/2010    last done 4/10   • History of esophagogastroduodenoscopy (EGD) 04/2010    last done 4/10    • Hyperlipidemia    • Hyperlipidemia, mixed 2/13/2012   • Hypertension     white coat htn   • HALEIGH (iron deficiency anemia)    • LAD (lymphadenopathy)     40% lesion LAD    • Left pontine CVA (CMS/[facility])    • Mitral regurgitation 05/21/2012   • STORMY (obstructive sleep apnea)     not treating   • Osteoarthritis    • Pacemaker 6/18/2019   • Permanent atrial fibrillation (CMS/[facility]) 6/18/2019   • Presence of Watchman left atrial appendage closure device 1/4/2020   • 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CMS/[facility])    • Right kidney mass     1.4 cm- following urology    • Sick sinus syndrome (CMS/[facility])    • Stroke (CMS/[facility]) 11/2016   • TIA (transient ischemic attack)     left CVA, interrupted TPA; As (moderate-severe)    • Vestibular nerve disease 2017     Past Surgical History:   Procedure Laterality Date   • ATRIAL APPENDAGE EXCLUSION LEFT WITH TRANSESOPHAGEAL ECHOCARDIOGRAM N/A 10/10/2019    Procedure: Atrial Appendage Occlusion;  Surgeon: Richie Chapman MD;  Location:  [facility];  Service: Cardiovascular   • ATRIAL APPENDAGE EXCLUSION LEFT WITH TRANSESOPHAGEAL ECHOCARDIOGRAM N/A 10/10/2019    Procedure: Atrial Appendage Occlusion;  Surgeon: Je Rivera MD;  Location:  [facility];  Service: Cardiology   • BLADDER REPAIR     • CARDIAC CATHETERIZATION  05/2010   • CHOLECYSTECTOMY     • COLECTOMY PARTIAL / TOTAL     • COLONOSCOPY  04/16/2018    negative    • ENDOSCOPY  04/16/2018    negative    • HYSTERECTOMY     • INSERT / REPLACE / REMOVE PACEMAKER  09/2018    Pacemaker gen change 9/2018    • OTHER SURGICAL HISTORY  04/25/2015    Exercise myoview, normal    • PACEMAKER IMPLANTATION  01/22/2010    Dual Chamber - 1/22/2010; RA Lead Revision- 5/7/2012- BS    • TOOTH EXTRACTION     • TRANSESOPHAGEAL ECHOCARDIOGRAM (DHEERAJ)  07/2019     Family History   Problem Relation Age of Onset   • Hypertension Mother    • Diabetes Mother    • Coronary artery disease Mother    • Other Father         CVA    • Heart disease Other    • Stroke Other    • Hypertension Other        Review of Systems  General: Negative  for fatigue and tiredness  Eyes: No redness  Cardiovascular: No chest pain, positive for palpitations  Respiratory:   No  shortness of breath  Gastrointestinal: No nausea or vomiting, bleeding  Genitourinary: no hematuria or dysuria  Musculoskeletal: No arthralgia or myalgia  Skin: No rash  Neurologic: No numbness, tingling, syncope  Hematologic/Lymphatic: No abnormal bleeding        Physical Exam--blood pressure 1/20/1989 pulse rate is 104 patient is afebrile respiration 12 times a minute    General:      well developed, well nourished, in no acute distress.    Head:      normocephalic and atraumatic.    Eyes:      PERRL/EOM intact, conjunctiva and sclera clear with out nystagmus.    Neck:      no masses, thyromegaly, trachea central with normal respiratory effort  Lungs:      clear bilaterally to auscultation.    Heart:     irregular rate and rhythm, S1, S2 without murmurs, rubs, or gallops  Skin:      intact without lesions or rashes.    Psych:      alert and cooperative; normal mood and affect; normal attention span and concentration.      Extremities with trace ankle edema without any cyanosis or clubbing    Muscular skeletal patient walks with a normal gait  with mild kyphosis    Neurological no focal deficits and alert oriented x3          Assessment plan    Post watchman device implantation 12 months ago--scheduled for DHEERAJ as part of protocol and orders placed   Hypertension well controlled   Status post leadless pacemaker implantation which is been interrogated in the office of Dr. Wilson  Prior history of GI bleed without recurrence and off anticoagulation  Follow-up in 6 months  Medications reviewed        Electronically signed by Richie Chapman MD, 10/19/20, 9:43 AM EDT.

## 2020-10-26 ENCOUNTER — TRANSCRIBE ORDERS (OUTPATIENT)
Dept: ADMINISTRATIVE | Facility: HOSPITAL | Age: 85
End: 2020-10-26

## 2020-10-26 ENCOUNTER — LAB (OUTPATIENT)
Dept: LAB | Facility: HOSPITAL | Age: 85
End: 2020-10-26

## 2020-10-26 DIAGNOSIS — Z20.822 ENCOUNTER FOR PREPROCEDURE SCREENING LABORATORY TESTING FOR COVID-19: ICD-10-CM

## 2020-10-26 DIAGNOSIS — Z01.812 ENCOUNTER FOR PREPROCEDURE SCREENING LABORATORY TESTING FOR COVID-19: Primary | ICD-10-CM

## 2020-10-26 DIAGNOSIS — Z20.822 ENCOUNTER FOR PREPROCEDURE SCREENING LABORATORY TESTING FOR COVID-19: Primary | ICD-10-CM

## 2020-10-26 DIAGNOSIS — Z01.812 ENCOUNTER FOR PREPROCEDURE SCREENING LABORATORY TESTING FOR COVID-19: ICD-10-CM

## 2020-10-26 PROCEDURE — C9803 HOPD COVID-19 SPEC COLLECT: HCPCS

## 2020-10-26 PROCEDURE — U0004 COV-19 TEST NON-CDC HGH THRU: HCPCS

## 2020-10-26 RX ORDER — LANOLIN ALCOHOL/MO/W.PET/CERES
1000 CREAM (GRAM) TOPICAL DAILY
COMMUNITY
End: 2021-04-13 | Stop reason: SDUPTHER

## 2020-10-26 RX ORDER — ZOLPIDEM TARTRATE 5 MG/1
10 TABLET ORAL NIGHTLY PRN
COMMUNITY
End: 2021-04-13 | Stop reason: SDUPTHER

## 2020-10-26 RX ORDER — CLOPIDOGREL BISULFATE 75 MG/1
75 TABLET ORAL DAILY
COMMUNITY
End: 2020-11-30

## 2020-10-26 RX ORDER — FUROSEMIDE 40 MG/1
40 TABLET ORAL DAILY
COMMUNITY
End: 2021-02-16

## 2020-10-26 RX ORDER — NEBIVOLOL 10 MG/1
20 TABLET ORAL DAILY
COMMUNITY
End: 2021-01-05

## 2020-10-26 RX ORDER — FLUOXETINE HYDROCHLORIDE 20 MG/1
40 CAPSULE ORAL DAILY
COMMUNITY
End: 2021-05-28 | Stop reason: DRUGHIGH

## 2020-10-26 RX ORDER — ATORVASTATIN CALCIUM 10 MG/1
10 TABLET, FILM COATED ORAL DAILY
COMMUNITY
End: 2021-11-22 | Stop reason: SDUPTHER

## 2020-10-26 RX ORDER — DILTIAZEM HYDROCHLORIDE 240 MG/1
240 CAPSULE, COATED, EXTENDED RELEASE ORAL 2 TIMES DAILY
COMMUNITY
End: 2021-11-22 | Stop reason: SDUPTHER

## 2020-10-27 ENCOUNTER — ANESTHESIA EVENT (OUTPATIENT)
Dept: CARDIOLOGY | Facility: HOSPITAL | Age: 85
End: 2020-10-27

## 2020-10-27 LAB — SARS-COV-2 RNA RESP QL NAA+PROBE: NOT DETECTED

## 2020-10-28 ENCOUNTER — ANESTHESIA (OUTPATIENT)
Dept: CARDIOLOGY | Facility: HOSPITAL | Age: 85
End: 2020-10-28

## 2020-10-28 ENCOUNTER — HOSPITAL ENCOUNTER (OUTPATIENT)
Dept: CARDIOLOGY | Facility: HOSPITAL | Age: 85
Discharge: HOME | End: 2020-10-28

## 2020-10-28 VITALS
DIASTOLIC BLOOD PRESSURE: 84 MMHG | TEMPERATURE: 97.7 F | HEART RATE: 60 BPM | HEIGHT: 62 IN | RESPIRATION RATE: 19 BRPM | SYSTOLIC BLOOD PRESSURE: 156 MMHG | WEIGHT: 183.42 LBS | OXYGEN SATURATION: 99 % | BODY MASS INDEX: 33.75 KG/M2

## 2020-10-28 VITALS — OXYGEN SATURATION: 99 % | SYSTOLIC BLOOD PRESSURE: 141 MMHG | DIASTOLIC BLOOD PRESSURE: 55 MMHG

## 2020-10-28 DIAGNOSIS — I48.19 ATRIAL FIBRILLATION, PERSISTENT (HCC): ICD-10-CM

## 2020-10-28 DIAGNOSIS — Z95.818 PRESENCE OF WATCHMAN LEFT ATRIAL APPENDAGE CLOSURE DEVICE: ICD-10-CM

## 2020-10-28 LAB
BH CV ECHO MEAS - AVA PLANIMETRY TRACED: 1.1 CM2
BH CV ECHO MEAS - BSA(HAYCOCK): 1.9 M^2
BH CV ECHO MEAS - BSA: 1.8 M^2
BH CV ECHO MEAS - BZI_BMI: 33.5 KILOGRAMS/M^2
BH CV ECHO MEAS - BZI_METRIC_HEIGHT: 157.5 CM
BH CV ECHO MEAS - BZI_METRIC_WEIGHT: 83 KG
BH CV ECHO MEAS - EF(MOD-BP): 60 %
GLUCOSE BLDC GLUCOMTR-MCNC: 183 MG/DL (ref 70–105)
LV EF 2D ECHO EST: 60 %

## 2020-10-28 PROCEDURE — 93325 DOPPLER ECHO COLOR FLOW MAPG: CPT

## 2020-10-28 PROCEDURE — 82962 GLUCOSE BLOOD TEST: CPT

## 2020-10-28 PROCEDURE — 25010000002 PROPOFOL 10 MG/ML EMULSION: Performed by: ANESTHESIOLOGY

## 2020-10-28 PROCEDURE — 93312 ECHO TRANSESOPHAGEAL: CPT | Performed by: INTERNAL MEDICINE

## 2020-10-28 PROCEDURE — 93325 DOPPLER ECHO COLOR FLOW MAPG: CPT | Performed by: INTERNAL MEDICINE

## 2020-10-28 PROCEDURE — 93320 DOPPLER ECHO COMPLETE: CPT

## 2020-10-28 PROCEDURE — 93312 ECHO TRANSESOPHAGEAL: CPT

## 2020-10-28 PROCEDURE — 93320 DOPPLER ECHO COMPLETE: CPT | Performed by: INTERNAL MEDICINE

## 2020-10-28 RX ORDER — SODIUM CHLORIDE 0.9 % (FLUSH) 0.9 %
3 SYRINGE (ML) INJECTION EVERY 12 HOURS SCHEDULED
Status: DISCONTINUED | OUTPATIENT
Start: 2020-10-28 | End: 2020-10-29 | Stop reason: HOSPADM

## 2020-10-28 RX ORDER — SODIUM CHLORIDE 9 MG/ML
9 INJECTION, SOLUTION INTRAVENOUS CONTINUOUS PRN
Status: DISCONTINUED | OUTPATIENT
Start: 2020-10-28 | End: 2020-10-29 | Stop reason: HOSPADM

## 2020-10-28 RX ORDER — LIDOCAINE HYDROCHLORIDE 10 MG/ML
0.5 INJECTION, SOLUTION EPIDURAL; INFILTRATION; INTRACAUDAL; PERINEURAL ONCE AS NEEDED
Status: DISCONTINUED | OUTPATIENT
Start: 2020-10-28 | End: 2020-10-29 | Stop reason: HOSPADM

## 2020-10-28 RX ORDER — SODIUM CHLORIDE 0.9 % (FLUSH) 0.9 %
3-10 SYRINGE (ML) INJECTION AS NEEDED
Status: DISCONTINUED | OUTPATIENT
Start: 2020-10-28 | End: 2020-10-29 | Stop reason: HOSPADM

## 2020-10-28 RX ORDER — PROPOFOL 10 MG/ML
VIAL (ML) INTRAVENOUS AS NEEDED
Status: DISCONTINUED | OUTPATIENT
Start: 2020-10-28 | End: 2020-10-28 | Stop reason: SURG

## 2020-10-28 RX ORDER — SODIUM CHLORIDE 9 MG/ML
80 INJECTION, SOLUTION INTRAVENOUS CONTINUOUS
Status: DISCONTINUED | OUTPATIENT
Start: 2020-10-28 | End: 2020-10-29 | Stop reason: HOSPADM

## 2020-10-28 RX ADMIN — PROPOFOL 75 MG: 10 INJECTION, EMULSION INTRAVENOUS at 12:35

## 2020-10-28 RX ADMIN — PROPOFOL 25 MG: 10 INJECTION, EMULSION INTRAVENOUS at 12:37

## 2020-10-28 NOTE — ANESTHESIA PREPROCEDURE EVALUATION
Anesthesia Evaluation     Patient summary reviewed and Nursing notes reviewed   NPO Solid Status: > 8 hours  NPO Liquid Status: > 8 hours           Airway   Mallampati: II  TM distance: >3 FB  Neck ROM: full  No difficulty expected  Dental - normal exam     Pulmonary - normal exam   (+) sleep apnea,   Cardiovascular     ECG reviewed  Rhythm: irregular    (+) hypertension, valvular problems/murmurs MR, CAD, dysrhythmias Atrial Fib, angina, hyperlipidemia,       Neuro/Psych  (+) TIA, CVA, numbness, psychiatric history,     GI/Hepatic/Renal/Endo    (+)  hiatal hernia, GERD, PUD,  diabetes mellitus,     Musculoskeletal     Abdominal  - normal exam    Bowel sounds: normal.   Substance History      OB/GYN          Other        ROS/Med Hx Other: Rhythm: atrial fibrillation   Conduction: right bundle branch block                   Anesthesia Plan    ASA 3     MAC       Anesthetic plan, all risks, benefits, and alternatives have been provided, discussed and informed consent has been obtained with: patient.

## 2020-10-28 NOTE — DISCHARGE INSTRUCTIONS
DHEERAJ DC Instructions    1) The medication, which was used to put the patient to sleep, will be acting in your body for the next twenty-four (24) hours, so you might feel a little sleepy; this feeling will slowly wear off.  Because the medicine is still in your system for the next twenty-four (24) hours, the adult patient SHOULD NOT:    a. Drive a car, operate machinery, or power tools  b. Drink any alcoholic drinks (not even beer)  c. Make any important decisions such as to sign important papers    2) We strongly suggest that a responsible adult be with the patient the rest of the day.    3) Any problems with:    a. EXCESSIVE MUCOUS  b. SPITTING UP BLOOD  c. SORE THROAT AT MORE THAN 72 HOURS    NOTIFY DR. Chapman [phone] OR CALL THE [facility] [phone].

## 2020-11-11 RX ORDER — ZOLPIDEM TARTRATE 10 MG/1
TABLET ORAL
Qty: 30 TABLET | Refills: 2 | Status: SHIPPED | OUTPATIENT
Start: 2020-11-11 | End: 2021-04-13 | Stop reason: SDUPTHER

## 2020-11-30 RX ORDER — CLOPIDOGREL BISULFATE 75 MG/1
TABLET ORAL
Qty: 30 TABLET | Refills: 1 | Status: SHIPPED | OUTPATIENT
Start: 2020-11-30 | End: 2021-02-17

## 2020-11-30 RX ORDER — OMEPRAZOLE 40 MG/1
CAPSULE, DELAYED RELEASE ORAL
Qty: 60 CAPSULE | Refills: 8 | Status: SHIPPED | OUTPATIENT
Start: 2020-11-30 | End: 2021-12-27

## 2021-01-05 ENCOUNTER — OFFICE VISIT (OUTPATIENT)
Dept: FAMILY MEDICINE CLINIC | Facility: CLINIC | Age: 86
End: 2021-01-05

## 2021-01-05 ENCOUNTER — LAB (OUTPATIENT)
Dept: FAMILY MEDICINE CLINIC | Facility: CLINIC | Age: 86
End: 2021-01-05

## 2021-01-05 VITALS
HEART RATE: 111 BPM | SYSTOLIC BLOOD PRESSURE: 161 MMHG | BODY MASS INDEX: 33.4 KG/M2 | RESPIRATION RATE: 16 BRPM | WEIGHT: 182.6 LBS | DIASTOLIC BLOOD PRESSURE: 77 MMHG | TEMPERATURE: 97.1 F

## 2021-01-05 DIAGNOSIS — E11.9 TYPE 2 DIABETES MELLITUS WITHOUT COMPLICATION, WITHOUT LONG-TERM CURRENT USE OF INSULIN (HCC): ICD-10-CM

## 2021-01-05 DIAGNOSIS — R27.0 ATAXIA: ICD-10-CM

## 2021-01-05 DIAGNOSIS — I48.91 ATRIAL FIBRILLATION WITH CONTROLLED VENTRICULAR RESPONSE (HCC): ICD-10-CM

## 2021-01-05 DIAGNOSIS — E55.9 VITAMIN D DEFICIENCY: ICD-10-CM

## 2021-01-05 DIAGNOSIS — E53.8 B12 DEFICIENCY: ICD-10-CM

## 2021-01-05 DIAGNOSIS — I10 ESSENTIAL HYPERTENSION: Primary | ICD-10-CM

## 2021-01-05 DIAGNOSIS — E78.2 HYPERLIPIDEMIA, MIXED: ICD-10-CM

## 2021-01-05 DIAGNOSIS — D50.0 IRON DEFICIENCY ANEMIA DUE TO CHRONIC BLOOD LOSS: ICD-10-CM

## 2021-01-05 LAB
25(OH)D3 SERPL-MCNC: 26.5 NG/ML (ref 30–100)
ALBUMIN SERPL-MCNC: 4.4 G/DL (ref 3.5–5.2)
ALBUMIN UR-MCNC: 1.4 MG/DL
ALBUMIN/GLOB SERPL: 1.4 G/DL
ALP SERPL-CCNC: 122 U/L (ref 39–117)
ALT SERPL W P-5'-P-CCNC: 9 U/L (ref 1–33)
ANION GAP SERPL CALCULATED.3IONS-SCNC: 11.2 MMOL/L (ref 5–15)
AST SERPL-CCNC: 18 U/L (ref 1–32)
BACTERIA UR QL AUTO: ABNORMAL /HPF
BASOPHILS # BLD AUTO: 0.07 10*3/MM3 (ref 0–0.2)
BASOPHILS NFR BLD AUTO: 1.1 % (ref 0–1.5)
BILIRUB SERPL-MCNC: 0.7 MG/DL (ref 0–1.2)
BILIRUB UR QL STRIP: NEGATIVE
BUN SERPL-MCNC: 22 MG/DL (ref 8–23)
BUN/CREAT SERPL: 21.4 (ref 7–25)
CALCIUM SPEC-SCNC: 9.6 MG/DL (ref 8.6–10.5)
CHLORIDE SERPL-SCNC: 99 MMOL/L (ref 98–107)
CHOLEST SERPL-MCNC: 155 MG/DL (ref 0–200)
CLARITY UR: CLEAR
CO2 SERPL-SCNC: 28.8 MMOL/L (ref 22–29)
COLOR UR: YELLOW
CREAT SERPL-MCNC: 1.03 MG/DL (ref 0.57–1)
DEPRECATED RDW RBC AUTO: 41.5 FL (ref 37–54)
EOSINOPHIL # BLD AUTO: 0.16 10*3/MM3 (ref 0–0.4)
EOSINOPHIL NFR BLD AUTO: 2.5 % (ref 0.3–6.2)
ERYTHROCYTE [DISTWIDTH] IN BLOOD BY AUTOMATED COUNT: 13.8 % (ref 12.3–15.4)
GFR SERPL CREATININE-BSD FRML MDRD: 51 ML/MIN/1.73
GLOBULIN UR ELPH-MCNC: 3.1 GM/DL
GLUCOSE SERPL-MCNC: 149 MG/DL (ref 65–99)
GLUCOSE UR STRIP-MCNC: NEGATIVE MG/DL
HBA1C MFR BLD: 7.9 % (ref 3.5–5.6)
HCT VFR BLD AUTO: 39.4 % (ref 34–46.6)
HDLC SERPL-MCNC: 85 MG/DL (ref 40–60)
HGB BLD-MCNC: 12.7 G/DL (ref 12–15.9)
HGB UR QL STRIP.AUTO: NEGATIVE
HYALINE CASTS UR QL AUTO: ABNORMAL /LPF
IMM GRANULOCYTES # BLD AUTO: 0.02 10*3/MM3 (ref 0–0.05)
IMM GRANULOCYTES NFR BLD AUTO: 0.3 % (ref 0–0.5)
KETONES UR QL STRIP: NEGATIVE
LDLC SERPL CALC-MCNC: 47 MG/DL (ref 0–100)
LDLC/HDLC SERPL: 0.49 {RATIO}
LEUKOCYTE ESTERASE UR QL STRIP.AUTO: ABNORMAL
LYMPHOCYTES # BLD AUTO: 1.63 10*3/MM3 (ref 0.7–3.1)
LYMPHOCYTES NFR BLD AUTO: 25.4 % (ref 19.6–45.3)
MCH RBC QN AUTO: 27 PG (ref 26.6–33)
MCHC RBC AUTO-ENTMCNC: 32.2 G/DL (ref 31.5–35.7)
MCV RBC AUTO: 83.8 FL (ref 79–97)
MONOCYTES # BLD AUTO: 0.64 10*3/MM3 (ref 0.1–0.9)
MONOCYTES NFR BLD AUTO: 10 % (ref 5–12)
NEUTROPHILS NFR BLD AUTO: 3.89 10*3/MM3 (ref 1.7–7)
NEUTROPHILS NFR BLD AUTO: 60.7 % (ref 42.7–76)
NITRITE UR QL STRIP: NEGATIVE
NRBC BLD AUTO-RTO: 0 /100 WBC (ref 0–0.2)
PH UR STRIP.AUTO: 5.5 [PH] (ref 5–8)
PLATELET # BLD AUTO: 299 10*3/MM3 (ref 140–450)
PMV BLD AUTO: 11 FL (ref 6–12)
POTASSIUM SERPL-SCNC: 4.2 MMOL/L (ref 3.5–5.2)
PROT SERPL-MCNC: 7.5 G/DL (ref 6–8.5)
PROT UR QL STRIP: ABNORMAL
RBC # BLD AUTO: 4.7 10*6/MM3 (ref 3.77–5.28)
RBC # UR: ABNORMAL /HPF
REF LAB TEST METHOD: ABNORMAL
SODIUM SERPL-SCNC: 139 MMOL/L (ref 136–145)
SP GR UR STRIP: 1.03 (ref 1–1.03)
SQUAMOUS #/AREA URNS HPF: ABNORMAL /HPF
TRIGL SERPL-MCNC: 142 MG/DL (ref 0–150)
TSH SERPL DL<=0.05 MIU/L-ACNC: 2.75 UIU/ML (ref 0.27–4.2)
UROBILINOGEN UR QL STRIP: ABNORMAL
VIT B12 BLD-MCNC: 691 PG/ML (ref 211–946)
VLDLC SERPL-MCNC: 23 MG/DL (ref 5–40)
WBC # BLD AUTO: 6.41 10*3/MM3 (ref 3.4–10.8)
WBC UR QL AUTO: ABNORMAL /HPF

## 2021-01-05 PROCEDURE — 90670 PCV13 VACCINE IM: CPT | Performed by: FAMILY MEDICINE

## 2021-01-05 PROCEDURE — G0009 ADMIN PNEUMOCOCCAL VACCINE: HCPCS | Performed by: FAMILY MEDICINE

## 2021-01-05 PROCEDURE — 81001 URINALYSIS AUTO W/SCOPE: CPT | Performed by: FAMILY MEDICINE

## 2021-01-05 PROCEDURE — 83036 HEMOGLOBIN GLYCOSYLATED A1C: CPT | Performed by: FAMILY MEDICINE

## 2021-01-05 PROCEDURE — 99214 OFFICE O/P EST MOD 30 MIN: CPT | Performed by: FAMILY MEDICINE

## 2021-01-05 PROCEDURE — 80061 LIPID PANEL: CPT | Performed by: FAMILY MEDICINE

## 2021-01-05 PROCEDURE — 84443 ASSAY THYROID STIM HORMONE: CPT | Performed by: FAMILY MEDICINE

## 2021-01-05 PROCEDURE — 87086 URINE CULTURE/COLONY COUNT: CPT | Performed by: FAMILY MEDICINE

## 2021-01-05 PROCEDURE — 80053 COMPREHEN METABOLIC PANEL: CPT | Performed by: FAMILY MEDICINE

## 2021-01-05 PROCEDURE — 85025 COMPLETE CBC W/AUTO DIFF WBC: CPT | Performed by: FAMILY MEDICINE

## 2021-01-05 PROCEDURE — 82306 VITAMIN D 25 HYDROXY: CPT | Performed by: FAMILY MEDICINE

## 2021-01-05 PROCEDURE — 82607 VITAMIN B-12: CPT | Performed by: FAMILY MEDICINE

## 2021-01-05 PROCEDURE — 82043 UR ALBUMIN QUANTITATIVE: CPT | Performed by: FAMILY MEDICINE

## 2021-01-05 RX ORDER — NEBIVOLOL 20 MG/1
20 TABLET ORAL DAILY
Qty: 30 TABLET | Refills: 6 | Status: SHIPPED | OUTPATIENT
Start: 2021-01-05 | End: 2022-02-25 | Stop reason: SDUPTHER

## 2021-01-05 NOTE — PROGRESS NOTES
Rooming Tab(CC,VS,Pt Hx,Fall Screen)  Chief Complaint   Patient presents with   • Hypertension       Subjective Is 86-year-old here for follow-up on her hypertension.  Patient denies any chest pain dizziness or syncope.  She is not checking her blood pressure at home.  She does feel somewhat unsteady on her feet but does not have any syncope.  She denies any tinnitus or hearing loss.  She has no incontinence.  She has fallen 3 times but she is not using any type of cane or walker.  The patient has a history of diabetes mellitus and she is not been checking her blood sugars.  She pretty much eats what she wants and she is not on any medications at this time.  She has no complaints of polyuria polydipsia and has no blurred vision.  Patient has had the watchman and is currently off of Coumadin.      I have reviewed and updated her medications, medical history and problem list during today's office visit.     Patient Care Team:  Denny Field MD as PCP - General (Family Medicine)    Problem List Tab  Medications Tab  Synopsis Tab  Chart Review Tab  Care Everywhere Tab  Immunizations Tab  Patient History Tab    Social History     Tobacco Use   • Smoking status: Never Smoker   • Smokeless tobacco: Never Used   Substance Use Topics   • Alcohol use: No     Frequency: Never       Review of Systems   Constitutional: Negative for chills, fatigue and fever.   HENT: Negative for congestion and nosebleeds.    Eyes: Negative for blurred vision and double vision.   Respiratory: Negative for chest tightness and shortness of breath.    Cardiovascular: Negative for chest pain and palpitations.   Gastrointestinal: Negative for abdominal distention, abdominal pain and blood in stool.   Endocrine: Negative for polydipsia and polyuria.   Genitourinary: Negative for dysuria and hematuria.   Musculoskeletal: Positive for gait problem.        Poor balance   Neurological: Positive for dizziness. Negative for syncope and light-headedness.    Psychiatric/Behavioral: Negative for self-injury and depressed mood.        No confusion       Objective     Rooming Tab(CC,VS,Pt Hx,Fall Screen)  /77   Pulse 111   Temp 97.1 °F (36.2 °C)   Resp 16   Wt 82.8 kg (182 lb 9.6 oz)   BMI 33.40 kg/m²     Body mass index is 33.4 kg/m².    Physical Exam  Vitals signs and nursing note reviewed.   Constitutional:       General: She is not in acute distress.     Appearance: Normal appearance. She is well-developed.      Comments: Pleasant elderly female who is in no acute distress.  She looks younger than her stated age.   HENT:      Head: Normocephalic and atraumatic.      Right Ear: Tympanic membrane and ear canal normal.      Left Ear: Tympanic membrane and ear canal normal.      Nose: Nose normal.      Mouth/Throat:      Mouth: Mucous membranes are moist.      Pharynx: Oropharynx is clear.   Eyes:      General: Lids are normal.      Extraocular Movements: Extraocular movements intact.      Conjunctiva/sclera: Conjunctivae normal.      Pupils: Pupils are equal, round, and reactive to light.   Neck:      Musculoskeletal: Normal range of motion and neck supple.      Thyroid: No thyroid mass or thyromegaly.      Vascular: No carotid bruit or JVD.      Trachea: Trachea normal.      Comments: No carotid bruits  Cardiovascular:      Rate and Rhythm: Normal rate.      Pulses: Normal pulses.      Heart sounds: Normal heart sounds.      Comments: Irregularly irregular  Pulmonary:      Effort: Pulmonary effort is normal.      Breath sounds: Normal breath sounds.   Musculoskeletal:      Comments: No c/c/e  DP pulses are palpable   Skin:     General: Skin is warm and dry.   Neurological:      Mental Status: She is alert and oriented to person, place, and time.      Cranial Nerves: No cranial nerve deficit.      Comments: She does have somewhat poor balance and is slightly ataxic.  No focal deficits   Psychiatric:         Attention and Perception: She is attentive.          Mood and Affect: Mood normal.         Speech: Speech normal.         Behavior: Behavior normal.         Thought Content: Thought content normal.          Statin Choice Calculator  Data Reviewed:               Lab Results   Component Value Date    BUN 22 01/05/2021    CREATININE 1.03 (H) 01/05/2021    EGFRIFNONA 51 (L) 01/05/2021     01/05/2021    K 4.2 01/05/2021    CL 99 01/05/2021    CALCIUM 9.6 01/05/2021    ALBUMIN 4.40 01/05/2021    BILITOT 0.7 01/05/2021    ALKPHOS 122 (H) 01/05/2021    AST 18 01/05/2021    ALT 9 01/05/2021    TRIG 142 01/05/2021    HDL 85 (H) 01/05/2021    VLDL 23 01/05/2021    LDL 47 01/05/2021    LDLHDL 0.49 01/05/2021    MICROALBUR 1.4 01/05/2021    WBC 6.41 01/05/2021    RBC 4.70 01/05/2021    HCT 39.4 01/05/2021    MCV 83.8 01/05/2021    MCH 27.0 01/05/2021    TSH 2.750 01/05/2021    MLGM06FG 26.5 (L) 01/05/2021      Assessment/Plan   Order Review Tab  Health Maintenance Tab  Patient Plan/Order Tab  Diagnoses and all orders for this visit:    1. Essential hypertension (Primary)  Assessment & Plan:  Hypertension is worsening.  Dietary sodium restriction.  Regular aerobic exercise.  Medication changes per orders.  Blood pressure will be reassessed in 4 weeks.  Increase Bystolic to 20 mg daily    Orders:  -     CBC & Differential  -     Comprehensive Metabolic Panel  -     TSH  -     CBC Auto Differential    2. Atrial fibrillation with controlled ventricular response (CMS/HCC)  Assessment & Plan:  She seems stable, rate is stable.      3. B12 deficiency  Assessment & Plan:  Stable, recheck level    Orders:  -     Vitamin B12    4. Type 2 diabetes mellitus without complication, without long-term current use of insulin (CMS/HCC)  Assessment & Plan:   her blood sugars have worsened.  I recommend she monitor her blood sugars at least 2 or 3 times a week.  Try to limit her carbohydrate intake somewhat.  We will go ahead and try Metformin 500 mg daily    Orders:  -     Hemoglobin A1c  -      Urinalysis With Culture If Indicated - Urine, Clean Catch  -     MicroAlbumin, Urine, Random - Urine, Clean Catch  -     Urinalysis, Microscopic Only - Urine, Clean Catch  -     Urine Culture - Urine, Urine, Clean Catch    5. Iron deficiency anemia due to chronic blood loss  Assessment & Plan:  Her hemoglobin has been stable lately.  She denies any melena or hematochezia.  We will recheck her hemoglobin today      6. Ataxia  Assessment & Plan:  I recommend she use a cane.  We will go ahead and schedule physical therapy for her gait.  We will also check a CT scan of her brain.    Orders:  -     Ambulatory Referral to Physical Therapy  -     CT Head Without Contrast; Future    7. Hyperlipidemia, mixed  Assessment & Plan:  Lipid abnormalities are improving with treatment.  Pharmacotherapy as ordered.  Lipids will be reassessed in 6 months.    Orders:  -     Lipid Panel    8. Vitamin D deficiency  -     Vitamin D 25 Hydroxy    Other orders  -     Pneumococcal Conjugate Vaccine 13-Valent All (PCV13)  -     nebivolol (Bystolic) 20 MG tablet; Take 1 tablet by mouth Daily.  Dispense: 30 tablet; Refill: 6      Wrapup Tab  Return in about 4 weeks (around 2/2/2021) for Recheck.

## 2021-01-06 ENCOUNTER — TELEPHONE (OUTPATIENT)
Dept: FAMILY MEDICINE CLINIC | Facility: CLINIC | Age: 86
End: 2021-01-06

## 2021-01-06 LAB — BACTERIA SPEC AEROBE CULT: NO GROWTH

## 2021-01-06 NOTE — TELEPHONE ENCOUNTER
Provider: DR. SWEENEY  Caller: LARS RUIZ  Relationship to Patient: SELF  Phone Number: [phone]  Reason for Call: RETURNING A MISSED CALL FROM TODAY 1-6-21

## 2021-01-08 NOTE — ASSESSMENT & PLAN NOTE
Hypertension is worsening.  Dietary sodium restriction.  Regular aerobic exercise.  Medication changes per orders.  Blood pressure will be reassessed in 4 weeks.  Increase Bystolic to 20 mg daily

## 2021-01-08 NOTE — ASSESSMENT & PLAN NOTE
Her hemoglobin has been stable lately.  She denies any melena or hematochezia.  We will recheck her hemoglobin today

## 2021-01-08 NOTE — ASSESSMENT & PLAN NOTE
her blood sugars have worsened.  I recommend she monitor her blood sugars at least 2 or 3 times a week.  Try to limit her carbohydrate intake somewhat.  We will go ahead and try Metformin 500 mg daily

## 2021-01-08 NOTE — ASSESSMENT & PLAN NOTE
I recommend she use a cane.  We will go ahead and schedule physical therapy for her gait.  We will also check a CT scan of her brain.

## 2021-01-13 DIAGNOSIS — R27.0 ATAXIA: ICD-10-CM

## 2021-02-16 RX ORDER — FUROSEMIDE 40 MG/1
TABLET ORAL
Qty: 30 TABLET | Refills: 1 | Status: SHIPPED | OUTPATIENT
Start: 2021-02-16 | End: 2021-06-14

## 2021-02-17 RX ORDER — CLOPIDOGREL BISULFATE 75 MG/1
TABLET ORAL
Qty: 30 TABLET | Refills: 0 | Status: SHIPPED | OUTPATIENT
Start: 2021-02-17 | End: 2021-04-12

## 2021-04-12 RX ORDER — CLOPIDOGREL BISULFATE 75 MG/1
TABLET ORAL
Qty: 90 TABLET | Refills: 2 | Status: SHIPPED | OUTPATIENT
Start: 2021-04-12 | End: 2022-01-31 | Stop reason: SDUPTHER

## 2021-04-13 ENCOUNTER — CLINICAL SUPPORT NO REQUIREMENTS (OUTPATIENT)
Dept: CARDIOLOGY | Facility: CLINIC | Age: 86
End: 2021-04-13

## 2021-04-13 ENCOUNTER — OFFICE VISIT (OUTPATIENT)
Dept: CARDIOLOGY | Facility: CLINIC | Age: 86
End: 2021-04-13

## 2021-04-13 VITALS
HEIGHT: 62 IN | DIASTOLIC BLOOD PRESSURE: 82 MMHG | HEART RATE: 72 BPM | WEIGHT: 185 LBS | BODY MASS INDEX: 34.04 KG/M2 | OXYGEN SATURATION: 96 % | SYSTOLIC BLOOD PRESSURE: 170 MMHG

## 2021-04-13 DIAGNOSIS — I48.21 PERMANENT ATRIAL FIBRILLATION (HCC): Primary | ICD-10-CM

## 2021-04-13 DIAGNOSIS — F51.01 PRIMARY INSOMNIA: Primary | ICD-10-CM

## 2021-04-13 DIAGNOSIS — Z95.0 PACEMAKER: ICD-10-CM

## 2021-04-13 DIAGNOSIS — I63.9 CEREBROVASCULAR ACCIDENT (CVA), UNSPECIFIED MECHANISM (HCC): ICD-10-CM

## 2021-04-13 DIAGNOSIS — I25.10 CORONARY ARTERY DISEASE INVOLVING NATIVE CORONARY ARTERY OF NATIVE HEART WITHOUT ANGINA PECTORIS: ICD-10-CM

## 2021-04-13 DIAGNOSIS — I34.0 MITRAL VALVE INSUFFICIENCY, UNSPECIFIED ETIOLOGY: ICD-10-CM

## 2021-04-13 DIAGNOSIS — I49.5 TACHY-BRADY SYNDROME (HCC): Primary | ICD-10-CM

## 2021-04-13 PROCEDURE — 93000 ELECTROCARDIOGRAM COMPLETE: CPT | Performed by: INTERNAL MEDICINE

## 2021-04-13 PROCEDURE — 93279 PRGRMG DEV EVAL PM/LDLS PM: CPT | Performed by: NURSE PRACTITIONER

## 2021-04-13 PROCEDURE — 99214 OFFICE O/P EST MOD 30 MIN: CPT | Performed by: INTERNAL MEDICINE

## 2021-04-13 NOTE — PROGRESS NOTES
Cardiology Office Visit Note      Referring physician:  Dr. Denny Field    Reason For Followup: 6 month follow up/device check    HPI:  Cherie Hinton is a 87 y.o. female Presents to [facility] for a follow up visit. She has aknown history of atrial fibrillation that is permanent.  She underwent watchman implant in October 2019 due to chronic GI blood loss iron deficiency anemia and intolerance to Xarelto. Additional past medical history is significant for previous MICRA pacemaker, CVA, GERD, hypertension, CKD    Today- she reports she has been feeling well and denies chest pain, PND, orthopnea, palpitations, near syncope or lower extremity edema . She does report some dyspnea on exertion though not significantly increased recently and not unexpected for age weight and level of conditioning.  Overall, Ms. Hinton continues to do quite well with routine ADLs and independent living despite her advanced age.  She appears mentally quite sharp with excellent recall and processing.    She reports compliance with medical therapy..     Past Medical History:   Diagnosis Date   • Antral erosion    • Aortic valve regurgitation 12/26/2018   • Atrial fibrillation (CMS/[facility])    • Atrial fibrillation with controlled ventricular response (CMS/[facility])    • B12 deficiency    • CAD (coronary artery disease)    • Cellulitis 04/2011    on face    • Cerebrovascular accident (CVA) (CMS/[facility]) 12/07/2016   • Colon cancer (CMS/[facility])    • Coronary artery disease involving native coronary artery of native heart without angina pectoris 6/18/2019   • Depression    • Diabetes mellitus, type 2 (CMS/[facility])    • Essential hypertension 2/13/2012   • Fatigue    • GERD (gastroesophageal reflux disease)    • Hiatal hernia    • History of colonoscopy 04/2010    last done 4/10   • History of esophagogastroduodenoscopy (EGD) 04/2010    last done 4/10   • Hyperlipidemia, mixed 2/13/2012   • Hypertension     white coat htn   • HALEIGH (iron deficiency anemia)    • LAD  (lymphadenopathy)     40% lesion LAD    • Left pontine CVA (CMS/[facility])    • Mitral regurgitation 05/21/2012   • STORMY (obstructive sleep apnea)     not treating   • Osteoarthritis    • Pacemaker 6/18/2019   • Permanent atrial fibrillation (CMS/[facility]) 6/18/2019   • Presence of Watchman left atrial appendage closure device 1/4/2020   • 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CMS/[facility])    • Right kidney mass     1.4 cm- following urology    • Sick sinus syndrome (CMS/[facility])    • Stroke (CMS/[facility]) 11/2016   • TIA (transient ischemic attack)     left CVA, interrupted TPA; As (moderate-severe)    • Vestibular nerve disease 2017       Past Surgical History:   Procedure Laterality Date   • ATRIAL APPENDAGE EXCLUSION LEFT WITH TRANSESOPHAGEAL ECHOCARDIOGRAM N/A 10/10/2019    Procedure: Atrial Appendage Occlusion;  Surgeon: Richie Chapman MD;  Location: [facility] CATH INVASIVE LOCATION;  Service: Cardiovascular   • ATRIAL APPENDAGE EXCLUSION LEFT WITH TRANSESOPHAGEAL ECHOCARDIOGRAM N/A 10/10/2019    Procedure: Atrial Appendage Occlusion;  Surgeon: Je Rivera MD;  Location: [facility] CATH INVASIVE LOCATION;  Service: Cardiology   • BLADDER REPAIR     • CARDIAC CATHETERIZATION  05/2010   • CHOLECYSTECTOMY     • COLECTOMY PARTIAL / TOTAL     • COLONOSCOPY  04/16/2018    negative    • ENDOSCOPY  04/16/2018    negative    • HYSTERECTOMY     • INSERT / REPLACE / REMOVE PACEMAKER  09/2018    Pacemaker gen change 9/2018    • OTHER SURGICAL HISTORY  04/25/2015    Exercise myoview, normal    • PACEMAKER IMPLANTATION  01/22/2010    Dual Chamber - 1/22/2010; RA Lead Revision- 5/7/2012- BS    • TOOTH EXTRACTION     • TRANSESOPHAGEAL ECHOCARDIOGRAM (DHEERAJ)  07/2019         Current Outpatient Medications:   •  atorvastatin (LIPITOR) 10 MG tablet, Take 10 mg by mouth Daily., Disp: , Rfl:   •  clopidogrel (PLAVIX) 75 MG tablet, TAKE ONE TABLET BY MOUTH DAILY, Disp: 90 tablet, Rfl: 2  •  dilTIAZem CD (CARDIZEM CD) 240 MG 24 hr capsule, Take 240 mg by mouth 2  (two) times a day., Disp: , Rfl:   •  FLUoxetine (PROzac) 20 MG capsule, Take 40 mg by mouth Daily., Disp: , Rfl:   •  furosemide (LASIX) 40 MG tablet, TAKE ONE TABLET BY MOUTH DAILY, Disp: 30 tablet, Rfl: 1  •  metFORMIN (Glucophage) 500 MG tablet, Take 1 tablet by mouth Daily With Breakfast. For diabetes, Disp: 30 tablet, Rfl: 6  •  nebivolol (Bystolic) 20 MG tablet, Take 1 tablet by mouth Daily., Disp: 30 tablet, Rfl: 6  •  omeprazole (priLOSEC) 40 MG capsule, TAKE ONE CAPSULE BY MOUTH TWICE A DAY, Disp: 60 capsule, Rfl: 8  •  potassium chloride (K-DUR) 10 MEQ CR tablet, Take 2 tablets by mouth Daily., Disp: , Rfl:   •  vitamin B-12 (CYANOCOBALAMIN) 1000 MCG tablet, Take 1 tablet by mouth Daily., Disp: 30 tablet, Rfl: 1  •  zolpidem (AMBIEN) 10 MG tablet, Take 1 tablet by mouth At Night As Needed for Sleep., Disp: 30 tablet, Rfl: 1    Social History     Socioeconomic History   • Marital status:      Spouse name: Not on file   • Number of children: Not on file   • Years of education: Not on file   • Highest education level: Not on file   Tobacco Use   • Smoking status: Never Smoker   • Smokeless tobacco: Never Used   Substance and Sexual Activity   • Alcohol use: No   • Drug use: No   • Sexual activity: Defer       Family History   Problem Relation Age of Onset   • Hypertension Mother    • Diabetes Mother    • Coronary artery disease Mother    • Other Father         CVA   • Heart disease Other    • Stroke Other    • Hypertension Other          Review of Systems   General: denies fever, chills, anorexia, weight loss  Eyes: denies blurring, diplopia  Ear/Nose/Throat: denies ear pain, nosebleeds, hoarseness  Cardiovascular: See HPI  Respiratory: denies excessive sputum, hemoptysis, wheezing  Gastrointestinal: denies nausea, vomiting, change in bowel habits, abdominal pain  Genitourinary: Denies hematuria or dysuria only occasional nocturia  Musculoskeletal: Relatively modest weightbearing joint arthralgias,  "modestly functionally limiting  Skin: denies rashes, itching, suspicious lesions  Neurologic: denies focal neuro deficits  Psychiatric: denies depression, anxiety  Endocrine: denies cold intolerance, heat intolerance  Hematologic/Lymphatic: denies abnormal bruising, bleeding  Allergic/Immunologic: denies urticaria or persistent infections      Objective     Visit Vitals  /82   Pulse 72   Ht 157.5 cm (62\")   Wt 83.9 kg (185 lb)   SpO2 96%   BMI 33.84 kg/m²           Physical Exam  General:     Obese, well developed,, in no acute distress.    Head:     normocephalic and atraumatic.    Eyes:    PERRL/EOM intact, conjunctiva and sclera clear with out nystagmus.    Neck:    no jvd or bruits  Chest Wall:    no deformities   Lungs:    clear bilaterally to auscultation with adequate global airflow   Heart:    non-displaced PMI; irregularly irregular rhythm consistent with A. fib with controlled ventricular rates, normal S1, S2 without murmurs, rubs, or gallops  Abdomen:  Soft, nontender without HSM  Msk:    no deformity; adequate R OM  Pulses:    pulses normal in all 4 extremities.    Extremities:    no clubbing, cyanosis, edema   Neurologic:    no focal sensory or motor deficits  Skin:    intact without lesions or rashes.    Psych:    alert and cooperative; normal mood and affect; normal attention span and concentration.            ECG 12 Lead    Date/Time: 4/13/2021 1:45 PM  Performed by: CORINA Wilson MD  Authorized by: CORINA Wilson MD   Comparison: compared with previous ECG from 10/13/2020                Assessment:   Problems Addressed this Visit        Other    Pacemaker--status post Micra pacemaker implant 2019      Permanent atrial fibrillation (CMS/HCC) - Primary  -Currently sufficiently rate controlled on current medications with Micra pacemaker  --Status post watchman implant for recurrent bleeding issues on Xarelto intolerance      Coronary artery disease involving native coronary artery of native " heart without angina pectoris  -Hemodynamically stable well compensated and angina free      Cerebrovascular accident (CVA) (CMS/HCC)  --Remains hemodynamically stable well compensated with minimal residual neuropathy      Mitral regurgitation-remains hemodynamically stable well compensated and asymptomatic        Diagnoses       Codes Comments    Permanent atrial fibrillation (CMS/HCC)    -  Primary ICD-10-CM: I48.21  ICD-9-CM: 427.31     Coronary artery disease involving native coronary artery of native heart without angina pectoris     ICD-10-CM: I25.10  ICD-9-CM: 414.01     Mitral valve insufficiency, unspecified etiology     ICD-10-CM: I34.0  ICD-9-CM: 424.0     Cerebrovascular accident (CVA), unspecified mechanism (CMS/HCC)     ICD-10-CM: I63.9  ICD-9-CM: 434.91     Pacemaker     ICD-10-CM: Z95.0  ICD-9-CM: V45.01             Plan:  Overall, the staff remains remarkably hemodynamically stable well compensated and asymptomatic from cardiac standpoint.  She maintains very satisfactory functional activity status for her advanced age.  She is status post watchman implant and has a Micra pacemaker.  Cherie is advised to continue with current medications as listed and reviewed in detail.    We hope to see her for follow-up in 6 months or sooner if needed.  She is strongly cautioned against risk for falls.    CORINA Wilson MD  4/15/2021 23:53 EDT    This report was generated using the Dragon voice recognition system.

## 2021-04-15 RX ORDER — ZOLPIDEM TARTRATE 10 MG/1
10 TABLET ORAL NIGHTLY PRN
Qty: 30 TABLET | Refills: 1 | Status: SHIPPED | OUTPATIENT
Start: 2021-04-15 | End: 2021-10-11 | Stop reason: ALTCHOICE

## 2021-04-15 RX ORDER — LANOLIN ALCOHOL/MO/W.PET/CERES
1000 CREAM (GRAM) TOPICAL DAILY
Qty: 30 TABLET | Refills: 1 | Status: SHIPPED | OUTPATIENT
Start: 2021-04-15 | End: 2021-11-15

## 2021-04-15 NOTE — PROGRESS NOTES
DEVICE INTERROGATION:  IN OFFICE    DEVICE TYPE:   Micra    :   Medtronic    BATTERY:  Stable    TIME TO ELECTIVE REPLACEMENT INDICATORS:   Greater than 8 years    CHARGE TIME:   Not applicable        LEAD DATA:       DEVICE REPROGRAMMED FOR TESTING      Atrial:   Not applicable    Ventricular:     20 mV, 610 ohms, 0.38 V@0.24 ms    LV:      Atrial pacing percentage: Not applicable %    Ventricular pacing percentage: 29.4%      Arrhythmia Logbook Reviewed: Not applicable        Summary:    Stable Device Function    No significant arhythmia burden.     Battery status is stable.    Reviewed with: Dr. Wilson      NEXT IN OFFICE DEVICE CHECK DUE: 1 year    REMOTE DEVICE INTERROGATIONS: 3 months

## 2021-05-28 ENCOUNTER — LAB (OUTPATIENT)
Dept: FAMILY MEDICINE CLINIC | Facility: CLINIC | Age: 86
End: 2021-05-28

## 2021-05-28 ENCOUNTER — OFFICE VISIT (OUTPATIENT)
Dept: FAMILY MEDICINE CLINIC | Facility: CLINIC | Age: 86
End: 2021-05-28

## 2021-05-28 VITALS
OXYGEN SATURATION: 97 % | TEMPERATURE: 98.4 F | DIASTOLIC BLOOD PRESSURE: 90 MMHG | SYSTOLIC BLOOD PRESSURE: 161 MMHG | WEIGHT: 184 LBS | HEIGHT: 62 IN | HEART RATE: 72 BPM | BODY MASS INDEX: 33.86 KG/M2

## 2021-05-28 DIAGNOSIS — I13.10 HYPERTENSIVE HEART AND CHRONIC KIDNEY DISEASE STAGE 3 (HCC): ICD-10-CM

## 2021-05-28 DIAGNOSIS — I48.21 PERMANENT ATRIAL FIBRILLATION (HCC): ICD-10-CM

## 2021-05-28 DIAGNOSIS — I42.0 DILATED CARDIOMYOPATHY (HCC): Primary | ICD-10-CM

## 2021-05-28 DIAGNOSIS — N18.30 HYPERTENSIVE HEART AND CHRONIC KIDNEY DISEASE STAGE 3 (HCC): ICD-10-CM

## 2021-05-28 DIAGNOSIS — N18.31 TYPE 2 DIABETES MELLITUS WITH STAGE 3A CHRONIC KIDNEY DISEASE, WITHOUT LONG-TERM CURRENT USE OF INSULIN (HCC): ICD-10-CM

## 2021-05-28 DIAGNOSIS — E11.22 TYPE 2 DIABETES MELLITUS WITH STAGE 3A CHRONIC KIDNEY DISEASE, WITHOUT LONG-TERM CURRENT USE OF INSULIN (HCC): ICD-10-CM

## 2021-05-28 LAB
ANION GAP SERPL CALCULATED.3IONS-SCNC: 11.4 MMOL/L (ref 5–15)
BASOPHILS # BLD AUTO: 0.07 10*3/MM3 (ref 0–0.2)
BASOPHILS NFR BLD AUTO: 1.1 % (ref 0–1.5)
BUN SERPL-MCNC: 26 MG/DL (ref 8–23)
BUN/CREAT SERPL: 16.9 (ref 7–25)
CALCIUM SPEC-SCNC: 9.6 MG/DL (ref 8.6–10.5)
CHLORIDE SERPL-SCNC: 103 MMOL/L (ref 98–107)
CO2 SERPL-SCNC: 24.6 MMOL/L (ref 22–29)
CREAT SERPL-MCNC: 1.54 MG/DL (ref 0.57–1)
DEPRECATED RDW RBC AUTO: 40.4 FL (ref 37–54)
EOSINOPHIL # BLD AUTO: 0.15 10*3/MM3 (ref 0–0.4)
EOSINOPHIL NFR BLD AUTO: 2.3 % (ref 0.3–6.2)
ERYTHROCYTE [DISTWIDTH] IN BLOOD BY AUTOMATED COUNT: 14 % (ref 12.3–15.4)
GFR SERPL CREATININE-BSD FRML MDRD: 32 ML/MIN/1.73
GLUCOSE SERPL-MCNC: 130 MG/DL (ref 65–99)
HBA1C MFR BLD: 8.1 % (ref 3.5–5.6)
HCT VFR BLD AUTO: 33.4 % (ref 34–46.6)
HGB BLD-MCNC: 10.4 G/DL (ref 12–15.9)
IMM GRANULOCYTES # BLD AUTO: 0.02 10*3/MM3 (ref 0–0.05)
IMM GRANULOCYTES NFR BLD AUTO: 0.3 % (ref 0–0.5)
LYMPHOCYTES # BLD AUTO: 2.03 10*3/MM3 (ref 0.7–3.1)
LYMPHOCYTES NFR BLD AUTO: 31.5 % (ref 19.6–45.3)
MCH RBC QN AUTO: 25 PG (ref 26.6–33)
MCHC RBC AUTO-ENTMCNC: 31.1 G/DL (ref 31.5–35.7)
MCV RBC AUTO: 80.3 FL (ref 79–97)
MONOCYTES # BLD AUTO: 0.59 10*3/MM3 (ref 0.1–0.9)
MONOCYTES NFR BLD AUTO: 9.1 % (ref 5–12)
NEUTROPHILS NFR BLD AUTO: 3.59 10*3/MM3 (ref 1.7–7)
NEUTROPHILS NFR BLD AUTO: 55.7 % (ref 42.7–76)
NRBC BLD AUTO-RTO: 0 /100 WBC (ref 0–0.2)
NT-PROBNP SERPL-MCNC: 1492 PG/ML (ref 0–1800)
PLATELET # BLD AUTO: 302 10*3/MM3 (ref 140–450)
PMV BLD AUTO: 11 FL (ref 6–12)
POTASSIUM SERPL-SCNC: 4.4 MMOL/L (ref 3.5–5.2)
RBC # BLD AUTO: 4.16 10*6/MM3 (ref 3.77–5.28)
SODIUM SERPL-SCNC: 139 MMOL/L (ref 136–145)
WBC # BLD AUTO: 6.45 10*3/MM3 (ref 3.4–10.8)

## 2021-05-28 PROCEDURE — 85025 COMPLETE CBC W/AUTO DIFF WBC: CPT | Performed by: FAMILY MEDICINE

## 2021-05-28 PROCEDURE — 99214 OFFICE O/P EST MOD 30 MIN: CPT | Performed by: FAMILY MEDICINE

## 2021-05-28 PROCEDURE — 80048 BASIC METABOLIC PNL TOTAL CA: CPT | Performed by: FAMILY MEDICINE

## 2021-05-28 PROCEDURE — 83036 HEMOGLOBIN GLYCOSYLATED A1C: CPT | Performed by: FAMILY MEDICINE

## 2021-05-28 PROCEDURE — 36415 COLL VENOUS BLD VENIPUNCTURE: CPT | Performed by: FAMILY MEDICINE

## 2021-05-28 PROCEDURE — 83880 ASSAY OF NATRIURETIC PEPTIDE: CPT | Performed by: FAMILY MEDICINE

## 2021-05-28 RX ORDER — FLUOXETINE HYDROCHLORIDE 40 MG/1
40 CAPSULE ORAL DAILY
COMMUNITY
Start: 2021-05-20 | End: 2021-07-28 | Stop reason: SDUPTHER

## 2021-06-01 DIAGNOSIS — D64.9 ANEMIA, UNSPECIFIED TYPE: ICD-10-CM

## 2021-06-01 DIAGNOSIS — I13.10 HYPERTENSIVE HEART AND CHRONIC KIDNEY DISEASE STAGE 3 (HCC): Primary | ICD-10-CM

## 2021-06-01 DIAGNOSIS — N18.30 HYPERTENSIVE HEART AND CHRONIC KIDNEY DISEASE STAGE 3 (HCC): Primary | ICD-10-CM

## 2021-06-11 PROBLEM — I42.0 DILATED CARDIOMYOPATHY: Status: ACTIVE | Noted: 2021-06-11

## 2021-06-14 RX ORDER — FUROSEMIDE 40 MG/1
40 TABLET ORAL DAILY
Qty: 90 TABLET | Refills: 1 | Status: SHIPPED | OUTPATIENT
Start: 2021-06-14 | End: 2021-11-04

## 2021-07-14 ENCOUNTER — OFFICE VISIT (OUTPATIENT)
Dept: FAMILY MEDICINE CLINIC | Facility: CLINIC | Age: 86
End: 2021-07-14

## 2021-07-14 VITALS
OXYGEN SATURATION: 96 % | BODY MASS INDEX: 34.04 KG/M2 | WEIGHT: 185 LBS | TEMPERATURE: 97.8 F | SYSTOLIC BLOOD PRESSURE: 157 MMHG | DIASTOLIC BLOOD PRESSURE: 101 MMHG | HEIGHT: 62 IN | HEART RATE: 105 BPM

## 2021-07-14 DIAGNOSIS — N18.30 HYPERTENSIVE HEART AND CHRONIC KIDNEY DISEASE STAGE 3 (HCC): Primary | ICD-10-CM

## 2021-07-14 DIAGNOSIS — I13.10 HYPERTENSIVE HEART AND CHRONIC KIDNEY DISEASE STAGE 3 (HCC): Primary | ICD-10-CM

## 2021-07-14 DIAGNOSIS — I48.21 PERMANENT ATRIAL FIBRILLATION (HCC): ICD-10-CM

## 2021-07-14 PROCEDURE — 93000 ELECTROCARDIOGRAM COMPLETE: CPT | Performed by: FAMILY MEDICINE

## 2021-07-14 PROCEDURE — 99213 OFFICE O/P EST LOW 20 MIN: CPT | Performed by: FAMILY MEDICINE

## 2021-07-14 NOTE — PATIENT INSTRUCTIONS
Mediterranean Diet  A Mediterranean diet refers to food and lifestyle choices that are based on the traditions of countries located on the [place]. This way of eating has been shown to help prevent certain conditions and improve outcomes for people who have chronic diseases, like kidney disease and heart disease.  What are tips for following this plan?  Lifestyle  · Cook and eat meals together with your family, when possible.  · Drink enough fluid to keep your urine clear or pale yellow.  · Be physically active every day. This includes:  ? Aerobic exercise like running or swimming.  ? Leisure activities like gardening, walking, or housework.  · Get 7-8 hours of sleep each night.  · If recommended by your health care provider, drink red wine in moderation. This means 1 glass a day for nonpregnant women and 2 glasses a day for men. A glass of wine equals 5 oz (150 mL).  Reading food labels    · Check the serving size of packaged foods. For foods such as rice and pasta, the serving size refers to the amount of cooked product, not dry.  · Check the total fat in packaged foods. Avoid foods that have saturated fat or trans fats.  · Check the ingredients list for added sugars, such as corn syrup.  Shopping  · At the grocery store, buy most of your food from the areas near the walls of the store. This includes:  ? Fresh fruits and vegetables (produce).  ? Grains, beans, nuts, and seeds. Some of these may be available in unpackaged forms or large amounts (in bulk).  ? Fresh seafood.  ? Poultry and eggs.  ? Low-fat dairy products.  · Buy whole ingredients instead of prepackaged foods.  · Buy fresh fruits and vegetables in-season from local farmers markets.  · Buy frozen fruits and vegetables in resealable bags.  · If you do not have access to quality fresh seafood, buy precooked frozen shrimp or canned fish, such as tuna, salmon, or sardines.  · Buy small amounts of raw or cooked vegetables, salads, or olives from  the deli or salad bar at your store.  · Stock your pantry so you always have certain foods on hand, such as olive oil, canned tuna, canned tomatoes, rice, pasta, and beans.  Cooking  · Cook foods with extra-virgin olive oil instead of using butter or other vegetable oils.  · Have meat as a side dish, and have vegetables or grains as your main dish. This means having meat in small portions or adding small amounts of meat to foods like pasta or stew.  · Use beans or vegetables instead of meat in common dishes like chili or lasagna.  · [place] with different cooking methods. Try roasting or broiling vegetables instead of steaming or sautéeing them.  · Add frozen vegetables to soups, stews, pasta, or rice.  · Add nuts or seeds for added healthy fat at each meal. You can add these to yogurt, salads, or vegetable dishes.  · Marinate fish or vegetables using olive oil, lemon juice, garlic, and fresh herbs.  Meal planning    · Plan to eat 1 vegetarian meal one day each week. Try to work up to 2 vegetarian meals, if possible.  · Eat seafood 2 or more times a week.  · Have healthy snacks readily available, such as:  ? Vegetable sticks with hummus.  ? Greek yogurt.  ? Fruit and nut trail mix.  · Eat balanced meals throughout the week. This includes:  ? Fruit: 2-3 servings a day  ? Vegetables: 4-5 servings a day  ? Low-fat dairy: 2 servings a day  ? Fish, poultry, or lean meat: 1 serving a day  ? Beans and legumes: 2 or more servings a week  ? Nuts and seeds: 1-2 servings a day  ? Whole grains: 6-8 servings a day  ? Extra-virgin olive oil: 3-4 servings a day  · Limit red meat and sweets to only a few servings a month  What are my food choices?  · Mediterranean diet  ? Recommended  § Grains: Whole-grain pasta. Brown rice. Bulgar wheat. Polenta. Couscous. Whole-wheat bread. Oatmeal. Quinoa.  § Vegetables: Artichokes. Beets. Broccoli. Cabbage. Carrots. Eggplant. Green beans. Chard. Kale. Spinach. Onions. Leeks. Peas. Squash.  Tomatoes. Peppers. Radishes.  § Fruits: Apples. Apricots. Avocado. Berries. Bananas. Cherries. Dates. Figs. Grapes. [place]. Melon. Oranges. Peaches. Plums. Pomegranate.  § Meats and other protein foods: Beans. Almonds. Sunflower seeds. Pine nuts. Peanuts. Cod. [place]. Scallops. Shrimp. Tuna. Tilapia. Clams. Oysters. Eggs.  § Dairy: Low-fat milk. Cheese. Greek yogurt.  § Beverages: Water. Red wine. Herbal tea.  § Fats and oils: Extra virgin olive oil. Avocado oil. Grape seed oil.  § Sweets and desserts: Greek yogurt with honey. Baked apples. Poached pears. Trail mix.  § Seasoning and other foods: Basil. Cilantro. Coriander. Cumin. Mint. Parsley. Sukh. Rosemary. Tarragon. Garlic. Oregano. Thyme. Pepper. Balsalmic vinegar. Tahini. Hummus. Tomato sauce. Olives. Mushrooms.  ? Limit these  § Grains: Prepackaged pasta or rice dishes. Prepackaged cereal with added sugar.  § Vegetables: Deep fried potatoes (french fries).  § Fruits: Fruit canned in syrup.  § Meats and other protein foods: Beef. Pork. Lamb. Poultry with skin. Hot dogs. Frost.  § Dairy: Ice cream. Sour cream. Whole milk.  § Beverages: Juice. Sugar-sweetened soft drinks. Beer. Liquor and spirits.  § Fats and oils: Butter. Canola oil. Vegetable oil. Beef fat (tallow). Lard.  § Sweets and desserts: Cookies. Cakes. Pies. Candy.  § Seasoning and other foods: Mayonnaise. Premade sauces and marinades.  The items listed may not be a complete list. Talk with your dietitian about what dietary choices are right for you.  Summary  · The Mediterranean diet includes both food and lifestyle choices.  · Eat a variety of fresh fruits and vegetables, beans, nuts, seeds, and whole grains.  · Limit the amount of red meat and sweets that you eat.  · Talk with your health care provider about whether it is safe for you to drink red wine in moderation. This means 1 glass a day for nonpregnant women and 2 glasses a day for men. A glass of wine equals 5 oz (150 mL).  This information  is not intended to replace advice given to you by your health care provider. Make sure you discuss any questions you have with your health care provider.  Document Revised: 08/17/2017 Document Reviewed: 08/10/2017  [employer] Patient Education © 2020 [employer]      Exercising to Stay Healthy  To become healthy and stay healthy, it is recommended that you do moderate-intensity and vigorous-intensity exercise. You can tell that you are exercising at a moderate intensity if your heart starts beating faster and you start breathing faster but can still hold a conversation. You can tell that you are exercising at a vigorous intensity if you are breathing much harder and faster and cannot hold a conversation while exercising.  Exercising regularly is important. It has many health benefits, such as:  · Improving overall fitness, flexibility, and endurance.  · Increasing bone density.  · Helping with weight control.  · Decreasing body fat.  · Increasing muscle strength.  · Reducing stress and tension.  · Improving overall health.  How often should I exercise?  Choose an activity that you enjoy, and set realistic goals. Your health care provider can help you make an activity plan that works for you.  Exercise regularly as told by your health care provider. This may include:  · Doing strength training two times a week, such as:  ? Lifting weights.  ? Using resistance bands.  ? Push-ups.  ? Sit-ups.  ? Yoga.  · Doing a certain intensity of exercise for a given amount of time. Choose from these options:  ? A total of 150 minutes of moderate-intensity exercise every week.  ? A total of 75 minutes of vigorous-intensity exercise every week.  ? A mix of moderate-intensity and vigorous-intensity exercise every week.  Children, pregnant women, people who have not exercised regularly, people who are overweight, and older adults may need to talk with a health care provider about what activities are safe to do. If you have a medical  condition, be sure to talk with your health care provider before you start a new exercise program.  What are some exercise ideas?  Moderate-intensity exercise ideas include:  · Walking 1 mile (1.6 km) in about 15 minutes.  · Biking.  · Hiking.  · Golfing.  · Dancing.  · Water aerobics.  Vigorous-intensity exercise ideas include:  · Walking 4.5 miles (7.2 km) or more in about 1 hour.  · Jogging or running 5 miles (8 km) in about 1 hour.  · Biking 10 miles (16.1 km) or more in about 1 hour.  · Lap swimming.  · Roller-skating or in-line skating.  · Cross-country skiing.  · Vigorous competitive sports, such as football, basketball, and soccer.  · Jumping rope.  · Aerobic dancing.  What are some everyday activities that can help me to get exercise?  · Yard work, such as:  ? Pushing a .  ? Raking and bagging leaves.  · Washing your car.  · Pushing a stroller.  · Shoveling snow.  · Gardening.  · Washing windows or floors.  How can I be more active in my day-to-day activities?  · Use stairs instead of an elevator.  · Take a walk during your lunch break.  · If you drive, park your car farther away from your work or school.  · If you take public transportation, get off one stop early and walk the rest of the way.  · Stand up or walk around during all of your indoor phone calls.  · Get up, stretch, and walk around every 30 minutes throughout the day.  · Enjoy exercise with a friend. Support to continue exercising will help you keep a regular routine of activity.  What guidelines can I follow while exercising?  · Before you start a new exercise program, talk with your health care provider.  · Do not exercise so much that you hurt yourself, feel dizzy, or get very short of breath.  · Wear comfortable clothes and wear shoes with good support.  · Drink plenty of water while you exercise to prevent dehydration or heat stroke.  · Work out until your breathing and your heartbeat get faster.  Where to find more  information  · [place] Department of Health and Human Services: www.hhs.gov  · Centers for Disease Control and Prevention (CDC): www.cdc.gov  Summary  · Exercising regularly is important. It will improve your overall fitness, flexibility, and endurance.  · Regular exercise also will improve your overall health. It can help you control your weight, reduce stress, and improve your bone density.  · Do not exercise so much that you hurt yourself, feel dizzy, or get very short of breath.  · Before you start a new exercise program, talk with your health care provider.  This information is not intended to replace advice given to you by your health care provider. Make sure you discuss any questions you have with your health care provider.  Document Revised: 11/30/2018 Document Reviewed: 11/08/2018  [employer] Patient Education © 2021 [employer]       ADVANCE CARE PLANNING  Conversations that Matter  PLANNING GUIDE    LOOKING BACK ...  Who we are, what we believe, and what we value are all shaped by experiences we have had. Taoist, family traditions, jobs and friends affect us deeply.    Has anything happened in your past that shaped your feelings about medical treatment?    Think about an experience you may have had with a family member or friend who was faced with a decision about medical care near the end of life. What was positive about that experience? What do you wish would have been done differently?    HERE AND NOW ...  Do you have any significant health problems now? What kinds of things bring you such vidya that, should a health problem prevent you from doing them any more, life would have little meaning? What short- or long-term goals do you have? How might medical treatment help you or hinder you in accomplishing those goals?    WHAT ABOUT TOMORROW?  What significant health problems do you fear may affect you in the future? How do you feel about the possibility of having to go to a nursing home? How would decisions  "be made if you could not make them?    WHO SHOULD MAKE DECISIONS?  An important part of planning is to consider whether or not you could appoint someone to make your healthcare decisions if you could not make them yourself. Many people select a close family member, but you are free to pick anyone you think could best represent you. Whoever you appoint should have all of the following qualifications:    • Can be trusted.  • Is willing to accept this responsibility.  • Is willing to follow the values and instructions you have discussed.  • Is able to make complex, difficult decisions.    It is helpful, but not required, to appoint one or more alternate persons in case your first choice becomes unable or unwilling to represent you. It is best if only one person has authority at a time, but you can instruct your representatives to discuss decisions together if time permits.    WHAT FUTURE DECISIONS NEED TO BE CONSIDERED?  Providing instructions for future healthcare decisions may seem like an impossible task. How can anyone plan for all the possibilities? You cannot … but you do not have to.    You need to plan for situations where you:  1. Become unexpectedly incapable of making your own decisions  2. It is clear you will have little or no recovery, and  3. The injury or loss of function is significant.    Such a situation might arise because of an injury to the brain from an accident, a stroke, or a slowly progressive disease such as Alzheimer's.    To plan for this type of situation, many people state, \"If I'm going to be a vegetable, let me go,\" or \"No heroics,\" or \"Don't keep me alive on machines.\" While these remarks are a beginning, they simply are too vague to guide decision-making.    You need to completely describe under what circumstances your goals for medical care should be changed from attempting to prolong life to being allowed to die. In some situations, certain treatments may not make sense because they " will not help, but other treatments will be of important benefit.    Consider these three questions:  1. When would it make sense to continue certain treatments in an effort to prolong life and seek recovery?  2. When would it make sense to stop or withhold certain treatments and accept death when it comes?  3. Under any circumstance, what kind of comfort care would you want, including medication, spiritual and environmental options?    Making these choices requires understanding the information, weighing the benefits and burdens from your perspective, and then discussing your choices with those closest to you.    WHAT'S NEXT?  How do you make sure that your choices are respected? First talk, about them with your family, friends, clergy and physician, then put your choices in writing. Information about putting your plans into writing -- in an advance directive -- is available from your healthcare organization or .    Do you have any significant health problems? What health problems do you fear in the future?     Consider what frightens you most about medical treatment. What role does Mandaeism, smith or spirituality play in how you live your life? How does cost influence your decisions about medical care?   In terms of future medical care, under what circumstances would you want the goals of medical treatment to switch from attempting to prolong life to focusing on comfort?     Describe these circumstances in as much detail as possible.   Ask yourself, what will most help me live well at this point in my life?     Share your views with the person or people who would make your medical decisions if you could not make them.     THERE'S NO EASY WAY TO PLAN FOR FUTURE HEALTHCARE CHOICES.  It's a process that involves thinking and talking about complex and sensitive issues.    The questions that follow will help in the advance care planning process. This is a guide for your own benefit; it's not a test, and there  are no right or wrong answers. It does not need to be completed all at once. You may use it to share your feelings with healthcare providers, your family and your friends. The answers to these questions will help those you love make choices for you when you cannot make them yourself.    These are things I need to tell my loved ones:  What is your idea of comfort care? Describe how you would want medications to be used to provide comfort. What type of spiritual care would you want?     I need to learn about: ____________________________  I need to ask my healthcare provider: ________________

## 2021-07-14 NOTE — PROGRESS NOTES
Subjective:     Cherie Hinton is a 87 y.o. female who presents for  Chief Complaint   Patient presents with   • Hypertension     6 week follow up blood pressure      Postmenopausal obese diabetic with an extensive CMHx of CVD presents for BP follow up. Hypertensive in office despite diltiazem 480 mg (240 mg prescribed twice a day though patient is only taking it once a day), Bystolic 20 mg, and Lasix 40 mg daily. Complains of chest fluttering and impending doom sensation.     Requesting medication to curb sweet tooth.     Past Medical Hx:  Past Medical History:   Diagnosis Date   • Antral erosion    • Aortic valve regurgitation 12/26/2018   • Atrial fibrillation (CMS/[facility])    • Atrial fibrillation with controlled ventricular response (CMS/[facility]ly [facility])    • B12 deficiency    • CAD (coronary artery disease)    • Cellulitis 04/2011    on face    • Cerebrovascular accident (CVA) (CMS/[facility]ly [facility]) 12/07/2016   • Colon cancer (CMS/[facility]ly [facility])    • Coronary artery disease involving native coronary artery of native heart without angina pectoris 6/18/2019   • Depression    • Diabetes mellitus, type 2 (CMS/[facility])    • Essential hypertension 2/13/2012   • Fatigue    • GERD (gastroesophageal reflux disease)    • Hiatal hernia    • History of colonoscopy 04/2010    last done 4/10   • History of esophagogastroduodenoscopy (EGD) 04/2010    last done 4/10   • Hyperlipidemia, mixed 2/13/2012   • Hypertension     white coat htn   • HALEIGH (iron deficiency anemia)    • LAD (lymphadenopathy)     40% lesion LAD    • Left pontine CVA (CMS/[facility])    • Mitral regurgitation 05/21/2012   • STORMY (obstructive sleep apnea)     not treating   • Osteoarthritis    • Pacemaker 6/18/2019   • Permanent atrial fibrillation (CMS/[facility]) 6/18/2019   • Presence of Watchman left atrial appendage closure device 1/4/2020   • 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CMS/[facility])    • Right kidney mass     1.4 cm- following urology    • Sick sinus syndrome (CMS/[facility])    • Stroke (CMS/[facility]) 11/2016   • TIA  (transient ischemic attack)     left CVA, interrupted TPA; As (moderate-severe)    • Vestibular nerve disease 2017       Past Surgical Hx:  Past Surgical History:   Procedure Laterality Date   • ATRIAL APPENDAGE EXCLUSION LEFT WITH TRANSESOPHAGEAL ECHOCARDIOGRAM N/A 10/10/2019    Procedure: Atrial Appendage Occlusion;  Surgeon: Richie Chapman MD;  Location: [facility] CATH INVASIVE LOCATION;  Service: Cardiovascular   • ATRIAL APPENDAGE EXCLUSION LEFT WITH TRANSESOPHAGEAL ECHOCARDIOGRAM N/A 10/10/2019    Procedure: Atrial Appendage Occlusion;  Surgeon: Je Rivera MD;  Location: [facility] CATH INVASIVE LOCATION;  Service: Cardiology   • BLADDER REPAIR     • CARDIAC CATHETERIZATION  05/2010   • CHOLECYSTECTOMY     • COLECTOMY PARTIAL / TOTAL     • COLONOSCOPY  04/16/2018    negative    • ENDOSCOPY  04/16/2018    negative    • HYSTERECTOMY     • INSERT / REPLACE / REMOVE PACEMAKER  09/2018    Pacemaker gen change 9/2018    • OTHER SURGICAL HISTORY  04/25/2015    Exercise myoview, normal    • PACEMAKER IMPLANTATION  01/22/2010    Dual Chamber - 1/22/2010; RA Lead Revision- 5/7/2012- BS    • TOOTH EXTRACTION     • TRANSESOPHAGEAL ECHOCARDIOGRAM (DHEERAJ)  07/2019       Social History:  she  reports that she has never smoked. She has never used smokeless tobacco. She reports that she does not drink alcohol and does not use drugs.    Current Meds:    Current Outpatient Medications:   •  atorvastatin (LIPITOR) 10 MG tablet, Take 10 mg by mouth Daily., Disp: , Rfl:   •  clopidogrel (PLAVIX) 75 MG tablet, TAKE ONE TABLET BY MOUTH DAILY, Disp: 90 tablet, Rfl: 2  •  dilTIAZem CD (CARDIZEM CD) 240 MG 24 hr capsule, Take 240 mg by mouth 2 (two) times a day., Disp: , Rfl:   •  FLUoxetine (PROzac) 40 MG capsule, Take 40 mg by mouth Daily., Disp: , Rfl:   •  furosemide (LASIX) 40 MG tablet, Take 1 tablet by mouth Daily., Disp: 90 tablet, Rfl: 1  •  metFORMIN (Glucophage) 500 MG tablet, Take 1 tablet by mouth Daily With  "Breakfast. For diabetes, Disp: 30 tablet, Rfl: 6  •  nebivolol (Bystolic) 20 MG tablet, Take 1 tablet by mouth Daily., Disp: 30 tablet, Rfl: 6  •  omeprazole (priLOSEC) 40 MG capsule, TAKE ONE CAPSULE BY MOUTH TWICE A DAY, Disp: 60 capsule, Rfl: 8  •  potassium chloride (K-DUR) 10 MEQ CR tablet, Take 2 tablets by mouth Daily., Disp: , Rfl:   •  vitamin B-12 (CYANOCOBALAMIN) 1000 MCG tablet, Take 1 tablet by mouth Daily., Disp: 30 tablet, Rfl: 1  •  zolpidem (AMBIEN) 10 MG tablet, Take 1 tablet by mouth At Night As Needed for Sleep., Disp: 30 tablet, Rfl: 1      Review of Systems  Review of Systems    Objective:     BP (!) 157/101 (BP Location: Left arm, Patient Position: Sitting, Cuff Size: Small Adult)   Pulse 105   Temp 97.8 °F (36.6 °C) (Infrared)   Ht 157.5 cm (62\")   Wt 83.9 kg (185 lb)   SpO2 96%   BMI 33.84 kg/m²     Physical Exam  Vitals reviewed.   Constitutional:       General: She is not in acute distress.     Appearance: She is well-developed. She is obese. She is not diaphoretic.   HENT:      Head: Normocephalic and atraumatic.   Cardiovascular:      Rate and Rhythm: Normal rate and regular rhythm.      Heart sounds: Murmur heard.     Pulmonary:      Effort: Pulmonary effort is normal.      Breath sounds: Normal breath sounds.   Skin:     General: Skin is warm and dry.   Neurological:      Mental Status: She is alert and oriented to person, place, and time.   Psychiatric:         Mood and Affect: Mood normal.         Behavior: Behavior normal.         ECG 12 Lead    Date/Time: 7/14/2021 12:10 PM  Performed by: Stephanie Diallo MD  Authorized by: Stephanie Diallo MD   Comparison: compared with previous ECG from 4/13/2021  Similar to previous ECG  Rhythm: atrial fibrillation  Rate: normal  BPM: 91  Other findings: non-specific ST-T wave changes    Clinical impression: abnormal EKG              Lab Results   Component Value Date    WBC 6.45 05/28/2021    HGB 10.4 (L) 05/28/2021    HCT 33.4 " (L) 05/28/2021    MCV 80.3 05/28/2021     05/28/2021     Lab Results   Component Value Date    GLUCOSE 130 (H) 05/28/2021    BUN 26 (H) 05/28/2021    CREATININE 1.54 (H) 05/28/2021    EGFRIFNONA 32 (L) 05/28/2021    EGFRIFAFRI 33 (L) 01/07/2017    BCR 16.9 05/28/2021    K 4.4 05/28/2021    CO2 24.6 05/28/2021    CALCIUM 9.6 05/28/2021    PROTENTOTREF 6.9 10/09/2019    ALBUMIN 4.40 01/05/2021    LABIL2 0.8 10/09/2019    AST 18 01/05/2021    ALT 9 01/05/2021     Lab Results   Component Value Date    HGBA1C 8.1 (H) 05/28/2021     Lab Results   Component Value Date    CHOL 155 01/05/2021    TRIG 142 01/05/2021    HDL 85 (H) 01/05/2021    LDL 47 01/05/2021        Assessment/Plan:     Problem List Items Addressed This Visit        Cardiac and Vasculature    Permanent atrial fibrillation (CMS/HCC)    Overview     S/p pacemaker placement and Watchman left atrial appendage closure device.  Rate controlled with diltiazem 240 mg twice daily and Bystolic 20 mg daily.  Patient is not on anticoagulation therapy.  Followed by cardiology, Dr. Wilson.          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  Encouraged low-Na+ diet & 150 min exercise/week.   Encouraged compliance with diltiazem 240 mg BID.  Continue Bystolic 20 mg daily & Lasix to 40 mg daily.  Monitoring renal function.         Relevant Orders    ECG 12 Lead           Follow-up:     Return in about 3 months (around 10/14/2021) for Medicare Wellness.

## 2021-07-29 PROCEDURE — U0003 INFECTIOUS AGENT DETECTION BY NUCLEIC ACID (DNA OR RNA); SEVERE ACUTE RESPIRATORY SYNDROME CORONAVIRUS 2 (SARS-COV-2) (CORONAVIRUS DISEASE [COVID-19]), AMPLIFIED PROBE TECHNIQUE, MAKING USE OF HIGH THROUGHPUT TECHNOLOGIES AS DESCRIBED BY CMS-2020-01-R: HCPCS | Performed by: FAMILY MEDICINE

## 2021-07-30 RX ORDER — FLUOXETINE HYDROCHLORIDE 40 MG/1
40 CAPSULE ORAL DAILY
Qty: 90 CAPSULE | Refills: 1 | Status: ON HOLD | OUTPATIENT
Start: 2021-07-30 | End: 2022-06-30

## 2021-10-11 ENCOUNTER — OFFICE VISIT (OUTPATIENT)
Dept: CARDIOLOGY | Facility: CLINIC | Age: 86
End: 2021-10-11

## 2021-10-11 VITALS
DIASTOLIC BLOOD PRESSURE: 72 MMHG | OXYGEN SATURATION: 97 % | BODY MASS INDEX: 32.43 KG/M2 | WEIGHT: 183 LBS | HEIGHT: 63 IN | SYSTOLIC BLOOD PRESSURE: 148 MMHG | HEART RATE: 67 BPM

## 2021-10-11 DIAGNOSIS — I35.0 AORTIC STENOSIS, MODERATE: ICD-10-CM

## 2021-10-11 DIAGNOSIS — I48.19 OTHER PERSISTENT ATRIAL FIBRILLATION (HCC): ICD-10-CM

## 2021-10-11 DIAGNOSIS — I49.5 TACHY-BRADY SYNDROME (HCC): ICD-10-CM

## 2021-10-11 DIAGNOSIS — I48.21 PERMANENT ATRIAL FIBRILLATION (HCC): ICD-10-CM

## 2021-10-11 DIAGNOSIS — I20.0 UNSTABLE ANGINA (HCC): Primary | ICD-10-CM

## 2021-10-11 DIAGNOSIS — I34.0 MITRAL VALVE INSUFFICIENCY, UNSPECIFIED ETIOLOGY: ICD-10-CM

## 2021-10-11 DIAGNOSIS — Z95.818 PRESENCE OF WATCHMAN LEFT ATRIAL APPENDAGE CLOSURE DEVICE: ICD-10-CM

## 2021-10-11 DIAGNOSIS — Z95.0 PACEMAKER: ICD-10-CM

## 2021-10-11 DIAGNOSIS — I25.10 CORONARY ARTERY DISEASE INVOLVING NATIVE CORONARY ARTERY OF NATIVE HEART WITHOUT ANGINA PECTORIS: ICD-10-CM

## 2021-10-11 PROBLEM — I48.91 ATRIAL FIBRILLATION: Status: ACTIVE | Noted: 2019-06-18

## 2021-10-11 PROCEDURE — 93000 ELECTROCARDIOGRAM COMPLETE: CPT | Performed by: NURSE PRACTITIONER

## 2021-10-11 PROCEDURE — 99214 OFFICE O/P EST MOD 30 MIN: CPT | Performed by: NURSE PRACTITIONER

## 2021-10-11 RX ORDER — DIPHENHYDRAMINE HCL 25 MG
50 TABLET ORAL
Qty: 2 TABLET | Refills: 0 | Status: SHIPPED | OUTPATIENT
Start: 2021-10-11 | End: 2021-10-11

## 2021-10-11 RX ORDER — ISOSORBIDE MONONITRATE 30 MG/1
30 TABLET, EXTENDED RELEASE ORAL DAILY
Qty: 30 TABLET | Refills: 11 | Status: SHIPPED | OUTPATIENT
Start: 2021-10-11

## 2021-10-11 RX ORDER — PREDNISONE 10 MG/1
TABLET ORAL
Qty: 15 TABLET | Refills: 0 | Status: SHIPPED | OUTPATIENT
Start: 2021-10-11 | End: 2021-11-04

## 2021-10-11 RX ORDER — FAMOTIDINE 40 MG/1
40 TABLET, FILM COATED ORAL
Qty: 1 TABLET | Refills: 0 | Status: SHIPPED | OUTPATIENT
Start: 2021-10-11 | End: 2021-10-11

## 2021-10-11 NOTE — PROGRESS NOTES
Cardiology Office Follow Up Visit      Primary Care Provider:  Stephanie Diallo MD    Reason for f/u:     Chest pain  History of CAD  Aortic stenosis, mitral regurgitation  Permanent A. fib  Tachybradycardia syndrome  Micra pacemaker  History of GI bleed  History of watchman    Subjective     CC:    Recent complaints of chest pain associated with diaphoresis and shortness of breath    History of Present Illness       Cherie Hinton is a 87 y.o. female.  Patient is a very pleasant female who has a known history of CAD.  She had a previous cardiac catheterization in 2010 that showed 60% proximal LAD stenosis.    Since the patient's last office visit she complains of some episodes of chest tightness associated with diaphoresis and shortness of breath that occur with exertion such as ironing close or running a vacuum in her home.  It usually eases with rest.    She denies chest pain at rest.    Patient reports compliance with medical therapy.    Patient is a previous patient of Dr. Wilson.  She has a history of atrial fibrillation that is permanent.  In October 2019 she had a watchman device implant due to chronic GI blood loss, anemia and intolerance to anticoagulation.  She also has a history of previous CVA, GERD, hypertension, CKD.  Her last creatinine was 1.54 in May 2021.      Patient reports an allergy to IV dye.          Past Medical History:   Diagnosis Date   • Antral erosion    • Aortic valve regurgitation 12/26/2018   • Atrial fibrillation ([facility])    • Atrial fibrillation with controlled ventricular response ([facility])    • B12 deficiency    • CAD (coronary artery disease)    • Cellulitis 04/2011    on face    • Cerebrovascular accident (CVA) ([facility]) 12/07/2016   • Colon cancer ([facility])    • Coronary artery disease involving native coronary artery of native heart without angina pectoris 6/18/2019   • Depression    • Diabetes mellitus, type 2 ([facility])    • Essential hypertension 2/13/2012   • Fatigue    • GERD  (gastroesophageal reflux disease)    • Hiatal hernia    • History of colonoscopy 04/2010    last done 4/10   • History of esophagogastroduodenoscopy (EGD) 04/2010    last done 4/10   • Hyperlipidemia, mixed 2/13/2012   • Hypertension     white coat htn   • HALEIGH (iron deficiency anemia)    • LAD (lymphadenopathy)     40% lesion LAD    • Left pontine CVA (HCC)    • Mitral regurgitation 05/21/2012   • STORMY (obstructive sleep apnea)     not treating   • Osteoarthritis    • Pacemaker 6/18/2019   • Permanent atrial fibrillation (HCC) 6/18/2019   • Presence of Watchman left atrial appendage closure device 1/4/2020   • 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HCC)    • Right kidney mass     1.4 cm- following urology    • Sick sinus syndrome (HCC)    • Stroke (HCC) 11/2016   • TIA (transient ischemic attack)     left CVA, interrupted TPA; As (moderate-severe)    • Vestibular nerve disease 2017       Past Surgical History:   Procedure Laterality Date   • ATRIAL APPENDAGE EXCLUSION LEFT WITH TRANSESOPHAGEAL ECHOCARDIOGRAM N/A 10/10/2019    Procedure: Atrial Appendage Occlusion;  Surgeon: Richie Chapman MD;  Location: [facility] CATH INVASIVE LOCATION;  Service: Cardiovascular   • ATRIAL APPENDAGE EXCLUSION LEFT WITH TRANSESOPHAGEAL ECHOCARDIOGRAM N/A 10/10/2019    Procedure: Atrial Appendage Occlusion;  Surgeon: Je Rivera MD;  Location: [facility] CATH INVASIVE LOCATION;  Service: Cardiology   • BLADDER REPAIR     • CARDIAC CATHETERIZATION  05/2010   • CHOLECYSTECTOMY     • COLECTOMY PARTIAL / TOTAL     • COLONOSCOPY  04/16/2018    negative    • ENDOSCOPY  04/16/2018    negative    • HYSTERECTOMY     • INSERT / REPLACE / REMOVE PACEMAKER  09/2018    Pacemaker gen change 9/2018    • OTHER SURGICAL HISTORY  04/25/2015    Exercise myoview, normal    • PACEMAKER IMPLANTATION  01/22/2010    Dual Chamber - 1/22/2010; RA Lead Revision- 5/7/2012- BS    • TOOTH EXTRACTION     • TRANSESOPHAGEAL ECHOCARDIOGRAM (DHEERAJ)  07/2019         Current  Outpatient Medications:   •  atorvastatin (LIPITOR) 10 MG tablet, Take 10 mg by mouth Daily., Disp: , Rfl:   •  clopidogrel (PLAVIX) 75 MG tablet, TAKE ONE TABLET BY MOUTH DAILY, Disp: 90 tablet, Rfl: 2  •  dilTIAZem CD (CARDIZEM CD) 240 MG 24 hr capsule, Take 240 mg by mouth 2 (two) times a day., Disp: , Rfl:   •  FLUoxetine (PROzac) 40 MG capsule, Take 1 capsule by mouth Daily., Disp: 90 capsule, Rfl: 1  •  furosemide (LASIX) 40 MG tablet, Take 1 tablet by mouth Daily., Disp: 90 tablet, Rfl: 1  •  metFORMIN (Glucophage) 500 MG tablet, Take 1 tablet by mouth Daily With Breakfast. For diabetes, Disp: 30 tablet, Rfl: 6  •  nebivolol (Bystolic) 20 MG tablet, Take 1 tablet by mouth Daily., Disp: 30 tablet, Rfl: 6  •  omeprazole (priLOSEC) 40 MG capsule, TAKE ONE CAPSULE BY MOUTH TWICE A DAY, Disp: 60 capsule, Rfl: 8  •  potassium chloride (K-DUR) 10 MEQ CR tablet, Take 2 tablets by mouth Daily. Only take 1 tablet, Disp: , Rfl:   •  vitamin B-12 (CYANOCOBALAMIN) 1000 MCG tablet, Take 1 tablet by mouth Daily., Disp: 30 tablet, Rfl: 1  •  albuterol sulfate  (90 Base) MCG/ACT inhaler, Inhale 2 puffs Every 4 (Four) Hours As Needed for Wheezing or Shortness of Air., Disp: 18 g, Rfl: 0    Social History     Socioeconomic History   • Marital status:    Tobacco Use   • Smoking status: Never Smoker   • Smokeless tobacco: Never Used   Vaping Use   • Vaping Use: Never used   Substance and Sexual Activity   • Alcohol use: No   • Drug use: No   • Sexual activity: Defer       Family History   Problem Relation Age of Onset   • Hypertension Mother    • Diabetes Mother    • Coronary artery disease Mother    • Other Father         CVA   • Heart disease Other    • Stroke Other    • Hypertension Other        The following portions of the patient's history were reviewed and updated as appropriate: allergies, current medications, past family history, past medical history, past social history, past surgical history and problem  "list.    Review of Systems   Constitutional: Positive for diaphoresis and malaise/fatigue. Negative for decreased appetite.   HENT: Negative for congestion, hearing loss and nosebleeds.    Cardiovascular: Positive for chest pain and dyspnea on exertion. Negative for claudication, irregular heartbeat, leg swelling, near-syncope, orthopnea, palpitations, paroxysmal nocturnal dyspnea and syncope.   Respiratory: Positive for shortness of breath. Negative for cough and sleep disturbances due to breathing.    Endocrine: Negative for polyuria.   Hematologic/Lymphatic: Does not bruise/bleed easily.   Skin: Negative for itching and rash.   Musculoskeletal: Negative for back pain, muscle weakness and myalgias.   Gastrointestinal: Negative for abdominal pain, change in bowel habit and nausea.   Genitourinary: Negative for dysuria, flank pain, frequency and hesitancy.   Neurological: Positive for weakness. Negative for dizziness and tremors.   Psychiatric/Behavioral: Negative for altered mental status. The patient does not have insomnia.        /72   Pulse 67   Ht 160 cm (63\")   Wt 83 kg (183 lb)   SpO2 97%   BMI 32.42 kg/m² .    Objective     Vitals reviewed.   Constitutional:       General: Not in acute distress.     Appearance: Normal appearance. Well-developed.   Eyes:      Pupils: Pupils are equal, round, and reactive to light.   HENT:      Head: Normocephalic and atraumatic.   Neck:      Vascular: No JVD.   Pulmonary:      Effort: Pulmonary effort is normal.      Breath sounds: Normal breath sounds.   Cardiovascular:      Normal rate. Regular rhythm.      Murmurs: There is a systolic murmur.   Pulses:     Intact distal pulses.   Edema:     Peripheral edema absent.   Abdominal:      General: There is no distension.      Palpations: Abdomen is soft.      Tenderness: There is no abdominal tenderness.   Musculoskeletal: Normal range of motion.      Cervical back: Normal range of motion and neck supple. Skin:     " General: Skin is warm and dry.   Neurological:      Mental Status: Alert and oriented to person, place, and time.           EKG ordered and reviewed by me in office    ECG 12 Lead    Date/Time: 10/11/2021 4:16 PM  Performed by: Silvia Montoya APRN  Authorized by: Silvia Montoya APRN   Previous ECG: no previous ECG available  Rhythm: atrial fibrillation  BPM: 109  Conduction: right bundle branch block  Q waves: V1, V2 and V3    Other findings: left ventricular hypertrophy with strain    Clinical impression: abnormal EKG          Medtronic Micra pacemaker interrogated in office today.  With stable device function.  Programmed VVI 60.  V paced percent 25%.  Battery with 8 years longevity.  Capture threshold is stable sensing and impedance stable          Diagnoses and all orders for this visit:    1. Unstable angina (HCC) (Primary)  Comments:  Recent complaints of chest pain consistent with unstable angina.  Associated with shortness of breath and diaphoresis    2. Coronary artery disease involving native coronary artery of native heart without angina pectoris  Comments:  Cardiac catheterization in 2010 that showed proximal LAD stenosis of 60%.  Last noninvasive ischemic evaluation 2015 with no reversible ischemia  Orders:  -     ECG 12 Lead  -     CBC & Differential; Future  -     Adult Transthoracic Echo Complete W/ Cont if Necessary Per Protocol; Future  -     Stress Test With Myocardial Perfusion (1 Day); Future    3. Aortic stenosis, moderate  Comments:  Moderate aortic stenosis by DHEERAJ 10/20/2020  Orders:  -     ECG 12 Lead  -     CBC & Differential; Future  -     Adult Transthoracic Echo Complete W/ Cont if Necessary Per Protocol; Future  -     Stress Test With Myocardial Perfusion (1 Day); Future    4. Permanent atrial fibrillation (HCC)  Comments:  Ventricular rates are mildly elevated.  She is not on anticoagulation due to previous GI bleed and previous watchman implant    5. Mitral valve insufficiency,  unspecified etiology  Comments:  Moderate to severe mitral regurgitation by DHEERAJ in October 2020    6. Tachy-morgan syndrome (HCC)  Comments:  Stable since pacemaker implant    7. Pacemaker  Comments:  Medtronic Micra pacemaker with stable device function    8. Presence of Watchman left atrial appendage closure device  Comments:  Patient has had previous watchman device implant due to recurrent GI bleed           MEDICAL DECISION MAKING:        Patient is here for follow-up complaining of chest pain associated with dyspnea and diaphoresis consistent with unstable angina.    The patient has a known history of CAD with previous 60% proximal LAD stenosis by cardiac catheterization in 2010.    She reports creasing frequency of chest pain associated with shortness of breath and diaphoresis.    Previous echocardiogram has showed moderate aortic stenosis of moderate to severe mitral regurgitation with preserved LV function.    The patient has permanent atrial fibrillation she has intolerance to anticoagulation due to previous GI bleed and has had a watchman device implant.    The patient has a history of CKD and her last creatinine was 1.5 in addition she reports an allergy to IV contrast.    At this time I discussed her case with Dr. Araujo.  We will start long-acting nitrates with Imdur 30 mg daily.  We will order preprocedure labs including CBC, CMP PT/INR for consideration for cardiac catheterization.  She will be treated for IV dye dye allergy.    The case will be scheduled on a Monday at the patient's request due to her daughter being off work.    We will also repeat an echocardiogram to assess her valvular function.  Additional recommendations will be made pending review of the above test patient is in agreement to this plan

## 2021-10-14 ENCOUNTER — TELEPHONE (OUTPATIENT)
Dept: CARDIOLOGY | Facility: CLINIC | Age: 86
End: 2021-10-14

## 2021-10-14 NOTE — TELEPHONE ENCOUNTER
[phone] is the number to call the daughter Smitha back at. The patient said she spoke with her daughter in regards to the heart cath and she is concerned because Bickers told her that she couldn't have anymore heart caths because during the procedure her arteries get spastic and he can't get the guide wire up into the heart.

## 2021-10-15 ENCOUNTER — TELEPHONE (OUTPATIENT)
Dept: FAMILY MEDICINE CLINIC | Facility: CLINIC | Age: 86
End: 2021-10-15

## 2021-10-15 NOTE — TELEPHONE ENCOUNTER
Patient had a question about getting a heart cath. Told her she needed to contact Dr Araujo . She states she Is waiting on a call back

## 2021-10-15 NOTE — TELEPHONE ENCOUNTER
Pt had appointment today, we did not get a hold of her to reschedule.  She has some questions about a test they are wanting to do... she was told she could not do that.  Please call pt [phone].

## 2021-10-15 NOTE — TELEPHONE ENCOUNTER
Spoke with patients daughter. She will discuss with her mom over the weekend but they will not be having it this coming Monday as previously scheduled. She will call us back on Monday.

## 2021-10-29 ENCOUNTER — HOSPITAL ENCOUNTER (OUTPATIENT)
Dept: CARDIOLOGY | Facility: HOSPITAL | Age: 86
Discharge: HOME | End: 2021-10-29
Admitting: NURSE PRACTITIONER

## 2021-10-29 VITALS
HEIGHT: 63 IN | BODY MASS INDEX: 32.43 KG/M2 | SYSTOLIC BLOOD PRESSURE: 184 MMHG | HEART RATE: 79 BPM | DIASTOLIC BLOOD PRESSURE: 80 MMHG | WEIGHT: 183 LBS

## 2021-10-29 DIAGNOSIS — I35.0 AORTIC STENOSIS, MODERATE: ICD-10-CM

## 2021-10-29 DIAGNOSIS — I25.10 CORONARY ARTERY DISEASE INVOLVING NATIVE CORONARY ARTERY OF NATIVE HEART WITHOUT ANGINA PECTORIS: ICD-10-CM

## 2021-10-29 PROCEDURE — 93306 TTE W/DOPPLER COMPLETE: CPT

## 2021-10-29 PROCEDURE — 93306 TTE W/DOPPLER COMPLETE: CPT | Performed by: INTERNAL MEDICINE

## 2021-11-01 LAB
ASCENDING AORTA: 3.3 CM
BH CV ECHO MEAS - ACS: 1.6 CM
BH CV ECHO MEAS - AI DEC SLOPE: 289.9 CM/SEC^2
BH CV ECHO MEAS - AI DEC TIME: 1.5 SEC
BH CV ECHO MEAS - AI MAX PG: 67.7 MMHG
BH CV ECHO MEAS - AI MAX VEL: 411.1 CM/SEC
BH CV ECHO MEAS - AI P1/2T: 415.3 MSEC
BH CV ECHO MEAS - AO MAX PG (FULL): 16.9 MMHG
BH CV ECHO MEAS - AO MAX PG: 20.2 MMHG
BH CV ECHO MEAS - AO MEAN PG (FULL): 7.6 MMHG
BH CV ECHO MEAS - AO MEAN PG: 9.5 MMHG
BH CV ECHO MEAS - AO ROOT AREA (BSA CORRECTED): 1.6
BH CV ECHO MEAS - AO ROOT AREA: 6.7 CM^2
BH CV ECHO MEAS - AO ROOT DIAM: 2.9 CM
BH CV ECHO MEAS - AO V2 MAX: 223.6 CM/SEC
BH CV ECHO MEAS - AO V2 MEAN: 137.5 CM/SEC
BH CV ECHO MEAS - AO V2 VTI: 47.5 CM
BH CV ECHO MEAS - ASC AORTA: 3.3 CM
BH CV ECHO MEAS - AVA PLANIMETRY TRACED: 1.2 CM2
BH CV ECHO MEAS - AVA(I,A): 1.3 CM^2
BH CV ECHO MEAS - AVA(I,D): 1.3 CM^2
BH CV ECHO MEAS - AVA(V,A): 1.3 CM^2
BH CV ECHO MEAS - AVA(V,D): 1.3 CM^2
BH CV ECHO MEAS - BSA(HAYCOCK): 2 M^2
BH CV ECHO MEAS - BSA: 1.9 M^2
BH CV ECHO MEAS - BZI_BMI: 32.4 KILOGRAMS/M^2
BH CV ECHO MEAS - BZI_METRIC_HEIGHT: 160 CM
BH CV ECHO MEAS - BZI_METRIC_WEIGHT: 83 KG
BH CV ECHO MEAS - EDV(CUBED): 95.1 ML
BH CV ECHO MEAS - EDV(MOD-SP2): 63.1 ML
BH CV ECHO MEAS - EDV(MOD-SP4): 62.4 ML
BH CV ECHO MEAS - EDV(TEICH): 95.6 ML
BH CV ECHO MEAS - EF(CUBED): 64.1 %
BH CV ECHO MEAS - EF(MOD-BP): 49 %
BH CV ECHO MEAS - EF(MOD-SP2): 53.5 %
BH CV ECHO MEAS - EF(MOD-SP4): 45.4 %
BH CV ECHO MEAS - EF(TEICH): 55.7 %
BH CV ECHO MEAS - ESV(CUBED): 34.2 ML
BH CV ECHO MEAS - ESV(MOD-SP2): 29.4 ML
BH CV ECHO MEAS - ESV(MOD-SP4): 34.1 ML
BH CV ECHO MEAS - ESV(TEICH): 42.4 ML
BH CV ECHO MEAS - FS: 28.9 %
BH CV ECHO MEAS - IVS/LVPW: 1.1
BH CV ECHO MEAS - IVSD: 1.4 CM
BH CV ECHO MEAS - LA DIMENSION(2D): 3.9 CM
BH CV ECHO MEAS - LA DIMENSION: 3.9 CM
BH CV ECHO MEAS - LA/AO: 1.3
BH CV ECHO MEAS - LAT PEAK E' VEL: 5 CM/SEC
BH CV ECHO MEAS - LV DIASTOLIC VOL/BSA (35-75): 33.5 ML/M^2
BH CV ECHO MEAS - LV IVRT: 0.07 SEC
BH CV ECHO MEAS - LV MASS(C)D: 238 GRAMS
BH CV ECHO MEAS - LV MASS(C)DI: 127.8 GRAMS/M^2
BH CV ECHO MEAS - LV MAX PG: 3.3 MMHG
BH CV ECHO MEAS - LV MEAN PG: 1.9 MMHG
BH CV ECHO MEAS - LV SYSTOLIC VOL/BSA (12-30): 18.3 ML/M^2
BH CV ECHO MEAS - LV V1 MAX: 89.6 CM/SEC
BH CV ECHO MEAS - LV V1 MEAN: 65.2 CM/SEC
BH CV ECHO MEAS - LV V1 VTI: 19.1 CM
BH CV ECHO MEAS - LVIDD: 4.6 CM
BH CV ECHO MEAS - LVIDS: 3.2 CM
BH CV ECHO MEAS - LVOT AREA: 3.3 CM^2
BH CV ECHO MEAS - LVOT DIAM: 2.1 CM
BH CV ECHO MEAS - LVPWD: 1.3 CM
BH CV ECHO MEAS - MED PEAK E' VEL: 5 CM/SEC
BH CV ECHO MEAS - MV E MAX VEL: 119.2 CM/SEC
BH CV ECHO MEAS - MV MAX PG: 8.6 MMHG
BH CV ECHO MEAS - MV MEAN PG: 2.3 MMHG
BH CV ECHO MEAS - MV P1/2T: 48 MSEC
BH CV ECHO MEAS - MV V2 MAX: 146.6 CM/SEC
BH CV ECHO MEAS - MV V2 MEAN: 64.5 CM/SEC
BH CV ECHO MEAS - MV V2 VTI: 28.9 CM
BH CV ECHO MEAS - MVA(P1/2T): 4.6 CM2
BH CV ECHO MEAS - MVA(VTI): 2.2 CM^2
BH CV ECHO MEAS - PA ACC SLOPE: 797 CM/SEC2
BH CV ECHO MEAS - PA ACC TIME: 0.09 SEC
BH CV ECHO MEAS - PA MAX PG (FULL): 0.41 MMHG
BH CV ECHO MEAS - PA MAX PG: 2.6 MMHG
BH CV ECHO MEAS - PA MEAN PG (FULL): 0.33 MMHG
BH CV ECHO MEAS - PA MEAN PG: 1.5 MMHG
BH CV ECHO MEAS - PA PR(ACCEL): 38.7 MMHG
BH CV ECHO MEAS - PA V2 MAX: 80.1 CM/SEC
BH CV ECHO MEAS - PA V2 MEAN: 58.5 CM/SEC
BH CV ECHO MEAS - PA V2 VTI: 15.2 CM
BH CV ECHO MEAS - PAPD(PI EDV): 6 MMHG
BH CV ECHO MEAS - PI END-D VEL: 81.5 CM/SEC
BH CV ECHO MEAS - PI MAX PG: 13.1 MMHG
BH CV ECHO MEAS - PI MAX VEL: 181.3 CM/SEC
BH CV ECHO MEAS - PULM DIAS VEL: 78.2 CM/SEC
BH CV ECHO MEAS - PULM S/D: 0.39
BH CV ECHO MEAS - PULM SYS VEL: 30.2 CM/SEC
BH CV ECHO MEAS - PVA(I,A): 4.4 CM^2
BH CV ECHO MEAS - PVA(I,D): 4.4 CM^2
BH CV ECHO MEAS - PVA(V,A): 4.1 CM^2
BH CV ECHO MEAS - PVA(V,D): 4.1 CM^2
BH CV ECHO MEAS - QP/QS: 1
BH CV ECHO MEAS - RAP SYSTOLE: 3 MMHG
BH CV ECHO MEAS - RV MAX PG: 2.2 MMHG
BH CV ECHO MEAS - RV MEAN PG: 1.2 MMHG
BH CV ECHO MEAS - RV V1 MAX: 73.2 CM/SEC
BH CV ECHO MEAS - RV V1 MEAN: 50.8 CM/SEC
BH CV ECHO MEAS - RV V1 VTI: 14.8 CM
BH CV ECHO MEAS - RVOT AREA: 4.5 CM^2
BH CV ECHO MEAS - RVOT DIAM: 2.4 CM
BH CV ECHO MEAS - RVSP: 30.3 MMHG
BH CV ECHO MEAS - SI(AO): 172 ML/M^2
BH CV ECHO MEAS - SI(CUBED): 32.7 ML/M^2
BH CV ECHO MEAS - SI(LVOT): 34.2 ML/M^2
BH CV ECHO MEAS - SI(MOD-SP2): 18.1 ML/M^2
BH CV ECHO MEAS - SI(MOD-SP4): 15.2 ML/M^2
BH CV ECHO MEAS - SI(TEICH): 28.6 ML/M^2
BH CV ECHO MEAS - SV(AO): 320.4 ML
BH CV ECHO MEAS - SV(CUBED): 60.9 ML
BH CV ECHO MEAS - SV(LVOT): 63.7 ML
BH CV ECHO MEAS - SV(MOD-SP2): 33.8 ML
BH CV ECHO MEAS - SV(MOD-SP4): 28.4 ML
BH CV ECHO MEAS - SV(RVOT): 66.8 ML
BH CV ECHO MEAS - SV(TEICH): 53.2 ML
BH CV ECHO MEAS - TAPSE (>1.6): 1 CM
BH CV ECHO MEAS - TR MAX PG: 27 MMHG
BH CV ECHO MEAS - TR MAX VEL: 260.7 CM/SEC
BH CV ECHO MEASUREMENTS AVERAGE E/E' RATIO: 23.84
BH CV VAS BP LEFT ARM: NORMAL MMHG
BH CV XLRA - RV BASE: 4 CM
BH CV XLRA - RV MID: 2.8 CM
BH CV XLRA - TDI S': 10 CM/SEC
IVRT: 73 MSEC
LEFT ATRIUM VOLUME INDEX: 50 ML/M2
LEFT ATRIUM VOLUME: 92 CM3
LV EF 2D ECHO EST: 50 %
PV VALVE AREA: 4.1 CM2
STJ: 2.5 CM

## 2021-11-04 ENCOUNTER — OFFICE VISIT (OUTPATIENT)
Dept: CARDIOLOGY | Facility: CLINIC | Age: 86
End: 2021-11-04

## 2021-11-04 VITALS
BODY MASS INDEX: 33.06 KG/M2 | HEART RATE: 84 BPM | SYSTOLIC BLOOD PRESSURE: 182 MMHG | WEIGHT: 186.6 LBS | HEIGHT: 63 IN | OXYGEN SATURATION: 97 % | DIASTOLIC BLOOD PRESSURE: 93 MMHG

## 2021-11-04 DIAGNOSIS — I48.21 PERMANENT ATRIAL FIBRILLATION (HCC): Primary | ICD-10-CM

## 2021-11-04 DIAGNOSIS — Z95.0 PACEMAKER: ICD-10-CM

## 2021-11-04 DIAGNOSIS — I50.30 DIASTOLIC CONGESTIVE HEART FAILURE, UNSPECIFIED HF CHRONICITY (HCC): ICD-10-CM

## 2021-11-04 DIAGNOSIS — I10 PRIMARY HYPERTENSION: ICD-10-CM

## 2021-11-04 DIAGNOSIS — Z95.818 PRESENCE OF WATCHMAN LEFT ATRIAL APPENDAGE CLOSURE DEVICE: ICD-10-CM

## 2021-11-04 PROCEDURE — 99214 OFFICE O/P EST MOD 30 MIN: CPT | Performed by: INTERNAL MEDICINE

## 2021-11-04 RX ORDER — SPIRONOLACTONE 25 MG/1
25 TABLET ORAL DAILY
Qty: 90 TABLET | Refills: 3 | Status: SHIPPED | OUTPATIENT
Start: 2021-11-04 | End: 2022-01-31 | Stop reason: SDUPTHER

## 2021-11-04 NOTE — PROGRESS NOTES
CC--atrial fibrillation, hypertension and watchman device in situ with a leadless pacemaker in situ    Sub--patient is a delightful 87-year-old patient with history of GI blood loss, iron deficiency anemia and hiatal hernia with Luis's erosions, persistent atrial fibrillation, diastolic heart failure, chronic kidney disease, sick sinus syndrome status post MICRA PPM, hypertension, CVA, GERD.  She had watchman device implantation months ago and she is currently off anticoagulation and by protocol on  antiplatelet agent.  She denies  chest/back pain, syncopal or near syncopal events.  Patient is doing remarkably well without anticoagulation and without recurrent GI bleed and denies TIA or stroke and currently maintaining social distancing for ongoing pandemic.She denies any angina, chest pain, cough, fever, dyspnea or abdominal pain or diarrhea  She is very compliant with her medications  Her pacemaker was interrogated by transtelephonic transmission in the office of Dr. Wilson  She does complain of fatigue and dyspnea       Past Medical History:   Diagnosis Date   • Antral erosion    • Aortic valve regurgitation 12/26/2018   • Atrial fibrillation (CMS/[facility])    • Atrial fibrillation with controlled ventricular response (CMS/[facility])    • B12 deficiency    • CAD (coronary artery disease)    • Cellulitis 04/2011    on face    • Cerebrovascular accident (CVA) (CMS/HCC) 12/07/2016   • Colon cancer (CMS/[facility])    • Coronary artery disease involving native coronary artery of native heart without angina pectoris 6/18/2019   • Depression    • Diabetes mellitus, type 2 (CMS/HCC)    • Essential hypertension 2/13/2012   • Fatigue    • GERD (gastroesophageal reflux disease)    • Hiatal hernia    • History of colonoscopy 04/2010    last done 4/10   • History of esophagogastroduodenoscopy (EGD) 04/2010    last done 4/10   • Hyperlipidemia    • Hyperlipidemia, mixed 2/13/2012   • Hypertension     white coat htn   • HALEIGH (iron deficiency  anemia)    • LAD (lymphadenopathy)     40% lesion LAD    • Left pontine CVA (CMS/[facility])    • Mitral regurgitation 05/21/2012   • STORMY (obstructive sleep apnea)     not treating   • Osteoarthritis    • Pacemaker 6/18/2019   • Permanent atrial fibrillation (CMS/[facility]) 6/18/2019   • Presence of Watchman left atrial appendage closure device 1/4/2020   • 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CMS/[facility])    • Right kidney mass     1.4 cm- following urology    • Sick sinus syndrome (CMS/[facility])    • Stroke (CMS/[facility]) 11/2016   • TIA (transient ischemic attack)     left CVA, interrupted TPA; As (moderate-severe)    • Vestibular nerve disease 2017     Past Surgical History:   Procedure Laterality Date   • ATRIAL APPENDAGE EXCLUSION LEFT WITH TRANSESOPHAGEAL ECHOCARDIOGRAM N/A 10/10/2019    Procedure: Atrial Appendage Occlusion;  Surgeon: Richie Chapman MD;  Location: [facility] CATH INVASIVE LOCATION;  Service: Cardiovascular   • ATRIAL APPENDAGE EXCLUSION LEFT WITH TRANSESOPHAGEAL ECHOCARDIOGRAM N/A 10/10/2019    Procedure: Atrial Appendage Occlusion;  Surgeon: Je Rivera MD;  Location: [facility] CATH INVASIVE LOCATION;  Service: Cardiology   • BLADDER REPAIR     • CARDIAC CATHETERIZATION  05/2010   • CHOLECYSTECTOMY     • COLECTOMY PARTIAL / TOTAL     • COLONOSCOPY  04/16/2018    negative    • ENDOSCOPY  04/16/2018    negative    • HYSTERECTOMY     • INSERT / REPLACE / REMOVE PACEMAKER  09/2018    Pacemaker gen change 9/2018    • OTHER SURGICAL HISTORY  04/25/2015    Exercise myoview, normal    • PACEMAKER IMPLANTATION  01/22/2010    Dual Chamber - 1/22/2010; RA Lead Revision- 5/7/2012- BS    • TOOTH EXTRACTION     • TRANSESOPHAGEAL ECHOCARDIOGRAM (DHEERAJ)  07/2019     Family History   Problem Relation Age of Onset   • Hypertension Mother    • Diabetes Mother    • Coronary artery disease Mother    • Other Father         CVA   • Heart disease Other    • Stroke Other    • Hypertension Other        Review of Systems  General: Positive for class  III shortness of breath and positive for fatigue and tiredness  Eyes: No redness  Cardiovascular: No chest pain, positive for palpitations              Physical Exam  General:      well developed, well nourished, in no acute distress.    Head:      normocephalic and atraumatic.    Eyes:      PERRL/EOM intact, conjunctiva and sclera clear with out nystagmus.    Neck:      no masses, thyromegaly, trachea central with normal respiratory effort  Lungs:      clear bilaterally to auscultation.    Heart:     irregular rate and rhythm, S1, S2 without murmurs, rubs, or gallops            Assessment plan    Dyspnea with fatigue--- hypertension poorly controlled---chronic class 3 diastolic chf--add aldactone  Prior creatinine of 1.54 and potassium of 4.4  Stop Lasix and potassium  Start Aldactone 25 mg a day to optimize hypertension  Check BMP in 2 weeks  Patient complains of insomnia and patient educated about nonpharmacological clues to help her insomnia  Post watchman device   Status post leadless pacemaker implantation which is been interrogated in the office of Dr. Wilson  Prior history of GI bleed without recurrence and off anticoagulation  Follow-up in 3 months  Medications reviewed      Electronically signed by Richie Chapman MD, 11/04/21, 4:40 PM EDT.

## 2021-11-15 RX ORDER — LANOLIN ALCOHOL/MO/W.PET/CERES
CREAM (GRAM) TOPICAL
Qty: 30 TABLET | Refills: 1 | Status: SHIPPED | OUTPATIENT
Start: 2021-11-15 | End: 2022-12-29

## 2021-11-19 ENCOUNTER — APPOINTMENT (OUTPATIENT)
Dept: GENERAL RADIOLOGY | Facility: HOSPITAL | Age: 86
End: 2021-11-19

## 2021-11-19 ENCOUNTER — HOSPITAL ENCOUNTER (EMERGENCY)
Facility: HOSPITAL | Age: 86
Discharge: HOME | End: 2021-11-19
Attending: EMERGENCY MEDICINE | Admitting: EMERGENCY MEDICINE

## 2021-11-19 VITALS
TEMPERATURE: 97.4 F | RESPIRATION RATE: 18 BRPM | HEART RATE: 85 BPM | WEIGHT: 192.24 LBS | HEIGHT: 63 IN | SYSTOLIC BLOOD PRESSURE: 158 MMHG | BODY MASS INDEX: 34.06 KG/M2 | DIASTOLIC BLOOD PRESSURE: 93 MMHG | OXYGEN SATURATION: 95 %

## 2021-11-19 DIAGNOSIS — I50.32 CHRONIC DIASTOLIC CONGESTIVE HEART FAILURE (HCC): ICD-10-CM

## 2021-11-19 DIAGNOSIS — R06.02 SHORTNESS OF BREATH: Primary | ICD-10-CM

## 2021-11-19 LAB
ALBUMIN SERPL-MCNC: 3.9 G/DL (ref 3.5–5.2)
ALBUMIN/GLOB SERPL: 1.4 G/DL
ALP SERPL-CCNC: 102 U/L (ref 39–117)
ALT SERPL W P-5'-P-CCNC: 9 U/L (ref 1–33)
ANION GAP SERPL CALCULATED.3IONS-SCNC: 15 MMOL/L (ref 5–15)
ANISOCYTOSIS BLD QL: ABNORMAL
AST SERPL-CCNC: 18 U/L (ref 1–32)
BASOPHILS # BLD MANUAL: 0.13 10*3/MM3 (ref 0–0.2)
BASOPHILS NFR BLD MANUAL: 2 % (ref 0–1.5)
BILIRUB SERPL-MCNC: 0.8 MG/DL (ref 0–1.2)
BUN SERPL-MCNC: 13 MG/DL (ref 8–23)
BUN/CREAT SERPL: 12.3 (ref 7–25)
CALCIUM SPEC-SCNC: 8.8 MG/DL (ref 8.6–10.5)
CHLORIDE SERPL-SCNC: 102 MMOL/L (ref 98–107)
CO2 SERPL-SCNC: 24 MMOL/L (ref 22–29)
CREAT SERPL-MCNC: 1.06 MG/DL (ref 0.57–1)
DEPRECATED RDW RBC AUTO: 48.1 FL (ref 37–54)
EOSINOPHIL # BLD MANUAL: 0.2 10*3/MM3 (ref 0–0.4)
EOSINOPHIL NFR BLD MANUAL: 3 % (ref 0.3–6.2)
ERYTHROCYTE [DISTWIDTH] IN BLOOD BY AUTOMATED COUNT: 17 % (ref 12.3–15.4)
GFR SERPL CREATININE-BSD FRML MDRD: 49 ML/MIN/1.73
GLOBULIN UR ELPH-MCNC: 2.7 GM/DL
GLUCOSE SERPL-MCNC: 146 MG/DL (ref 65–99)
HCT VFR BLD AUTO: 30 % (ref 34–46.6)
HGB BLD-MCNC: 9.6 G/DL (ref 12–15.9)
HOLD SPECIMEN: NORMAL
HOLD SPECIMEN: NORMAL
HYPOCHROMIA BLD QL: ABNORMAL
LARGE PLATELETS: ABNORMAL
LYMPHOCYTES # BLD MANUAL: 1.07 10*3/MM3 (ref 0.7–3.1)
LYMPHOCYTES NFR BLD MANUAL: 12 % (ref 5–12)
MCH RBC QN AUTO: 25.3 PG (ref 26.6–33)
MCHC RBC AUTO-ENTMCNC: 31.8 G/DL (ref 31.5–35.7)
MCV RBC AUTO: 79.6 FL (ref 79–97)
MONOCYTES # BLD: 0.8 10*3/MM3 (ref 0.1–0.9)
NEUTROPHILS # BLD AUTO: 4.49 10*3/MM3 (ref 1.7–7)
NEUTROPHILS NFR BLD MANUAL: 63 % (ref 42.7–76)
NEUTS BAND NFR BLD MANUAL: 4 % (ref 0–5)
NT-PROBNP SERPL-MCNC: 2261 PG/ML (ref 0–1800)
PLATELET # BLD AUTO: 279 10*3/MM3 (ref 140–450)
PMV BLD AUTO: 8.5 FL (ref 6–12)
POIKILOCYTOSIS BLD QL SMEAR: ABNORMAL
POTASSIUM SERPL-SCNC: 4.1 MMOL/L (ref 3.5–5.2)
PROT SERPL-MCNC: 6.6 G/DL (ref 6–8.5)
RBC # BLD AUTO: 3.77 10*6/MM3 (ref 3.77–5.28)
SCAN SLIDE: NORMAL
SMALL PLATELETS BLD QL SMEAR: ADEQUATE
SODIUM SERPL-SCNC: 141 MMOL/L (ref 136–145)
TROPONIN T SERPL-MCNC: <0.01 NG/ML (ref 0–0.03)
VARIANT LYMPHS NFR BLD MANUAL: 16 % (ref 19.6–45.3)
WBC MORPH BLD: NORMAL
WBC NRBC COR # BLD: 6.7 10*3/MM3 (ref 3.4–10.8)
WHOLE BLOOD HOLD SPECIMEN: NORMAL
WHOLE BLOOD HOLD SPECIMEN: NORMAL

## 2021-11-19 PROCEDURE — 85007 BL SMEAR W/DIFF WBC COUNT: CPT | Performed by: EMERGENCY MEDICINE

## 2021-11-19 PROCEDURE — 93005 ELECTROCARDIOGRAM TRACING: CPT | Performed by: EMERGENCY MEDICINE

## 2021-11-19 PROCEDURE — 85025 COMPLETE CBC W/AUTO DIFF WBC: CPT | Performed by: EMERGENCY MEDICINE

## 2021-11-19 PROCEDURE — 83880 ASSAY OF NATRIURETIC PEPTIDE: CPT | Performed by: EMERGENCY MEDICINE

## 2021-11-19 PROCEDURE — 84484 ASSAY OF TROPONIN QUANT: CPT | Performed by: EMERGENCY MEDICINE

## 2021-11-19 PROCEDURE — 99283 EMERGENCY DEPT VISIT LOW MDM: CPT

## 2021-11-19 PROCEDURE — 71045 X-RAY EXAM CHEST 1 VIEW: CPT

## 2021-11-19 PROCEDURE — 80053 COMPREHEN METABOLIC PANEL: CPT | Performed by: EMERGENCY MEDICINE

## 2021-11-19 PROCEDURE — 93005 ELECTROCARDIOGRAM TRACING: CPT

## 2021-11-19 RX ORDER — SODIUM CHLORIDE 0.9 % (FLUSH) 0.9 %
10 SYRINGE (ML) INJECTION AS NEEDED
Status: DISCONTINUED | OUTPATIENT
Start: 2021-11-19 | End: 2021-11-19 | Stop reason: HOSPADM

## 2021-11-19 RX ORDER — FUROSEMIDE 20 MG/1
20 TABLET ORAL DAILY
Qty: 30 TABLET | Refills: 0 | Status: SHIPPED | OUTPATIENT
Start: 2021-11-19 | End: 2022-01-31 | Stop reason: SDUPTHER

## 2021-11-19 NOTE — DISCHARGE INSTRUCTIONS
Follow-up with cardiologist 1 to 2 months for repeat evaluation, return new or worsening symptoms.

## 2021-11-19 NOTE — ED PROVIDER NOTES
Subjective   History of Present Illness  Context: 87-year-old female presents with shortness of breath.  She has been having symptoms for about 6 weeks.  She states it is getting worse.  She states she can only walk a few feet without being out of breath.  She sleeps on 3 pillows but states this is not new.  She states she woke up with some tightness in her chest during the night but then fell back asleep so is not sure how long it lasted.  She had recently seen cardiologist approximate 3 weeks ago states switched her water pill.  She states she does not feel like she has been urinating as much since then.  She reports no fever.  She says she will cough when she first wakes up in the morning but that is all not after that.  She has history of atrial fibrillation and a watchman's device placed 2 years ago.  Location: Chest  Quality: Dyspnea and tightness  Duration: Tightness today dyspnea 6 weeks  Timing: Tightness resolve dyspnea constant  Severity: Moderate   Modifying factors: States water pill was adjusted by cardiologist 3 weeks ago  Associated signs and symptoms: Edema    Review of Systems   Constitutional: Negative for fever and unexpected weight change.   HENT: Negative for congestion and sore throat.    Eyes: Negative for pain.   Respiratory: Positive for cough, chest tightness and shortness of breath.         States full cough just in the morning when she first wakes up then this resolves   Cardiovascular: Positive for leg swelling. Negative for chest pain.   Gastrointestinal: Negative for abdominal pain, diarrhea and vomiting.   Genitourinary: Negative for dysuria and urgency.   Musculoskeletal: Negative for back pain.   Skin: Negative for rash.   Neurological: Negative for dizziness, weakness and headaches.   Psychiatric/Behavioral: Negative for confusion.       Past Medical History:   Diagnosis Date   • Antral erosion    • Aortic valve regurgitation 12/26/2018   • Atrial fibrillation (HCC)    • Atrial  fibrillation with controlled ventricular response ([facility])    • B12 deficiency    • CAD (coronary artery disease)    • Cellulitis 04/2011    on face    • Cerebrovascular accident (CVA) ([facility]) 12/07/2016   • Colon cancer ([facility])    • Coronary artery disease involving native coronary artery of native heart without angina pectoris 6/18/2019   • Depression    • Diabetes mellitus, type 2 ([facility])    • Essential hypertension 2/13/2012   • Fatigue    • GERD (gastroesophageal reflux disease)    • Hiatal hernia    • History of colonoscopy 04/2010    last done 4/10   • History of esophagogastroduodenoscopy (EGD) 04/2010    last done 4/10   • Hyperlipidemia, mixed 2/13/2012   • Hypertension     white coat htn   • HALEIGH (iron deficiency anemia)    • LAD (lymphadenopathy)     40% lesion LAD    • Left pontine CVA ([facility])    • Mitral regurgitation 05/21/2012   • STORMY (obstructive sleep apnea)     not treating   • Osteoarthritis    • Pacemaker 6/18/2019   • Permanent atrial fibrillation ([facility]) 6/18/2019   • Presence of Watchman left atrial appendage closure device 1/4/2020   • 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[facility])    • Right kidney mass     1.4 cm- following urology    • Sick sinus syndrome ([facility])    • Stroke ([facility]) 11/2016   • TIA (transient ischemic attack)     left CVA, interrupted TPA; As (moderate-severe)    • Vestibular nerve disease 2017       Allergies   Allergen Reactions   • Contrast Dye Anaphylaxis   • Adhesive Tape Itching   • Latex Hives       Past Surgical History:   Procedure Laterality Date   • ATRIAL APPENDAGE EXCLUSION LEFT WITH TRANSESOPHAGEAL ECHOCARDIOGRAM N/A 10/10/2019    Procedure: Atrial Appendage Occlusion;  Surgeon: Richie Chapmna MD;  Location: [facility] CATH INVASIVE LOCATION;  Service: Cardiovascular   • ATRIAL APPENDAGE EXCLUSION LEFT WITH TRANSESOPHAGEAL ECHOCARDIOGRAM N/A 10/10/2019    Procedure: Atrial Appendage Occlusion;  Surgeon: Je Rivera MD;  Location: [facility] CATH INVASIVE LOCATION;  Service: Cardiology    • BLADDER REPAIR     • CARDIAC CATHETERIZATION  05/2010   • CHOLECYSTECTOMY     • COLECTOMY PARTIAL / TOTAL     • COLONOSCOPY  04/16/2018    negative    • ENDOSCOPY  04/16/2018    negative    • HYSTERECTOMY     • INSERT / REPLACE / REMOVE PACEMAKER  09/2018    Pacemaker gen change 9/2018    • OTHER SURGICAL HISTORY  04/25/2015    Exercise myoview, normal    • PACEMAKER IMPLANTATION  01/22/2010    Dual Chamber - 1/22/2010; RA Lead Revision- 5/7/2012- BS    • TOOTH EXTRACTION     • TRANSESOPHAGEAL ECHOCARDIOGRAM (DHEERAJ)  07/2019       Family History   Problem Relation Age of Onset   • Hypertension Mother    • Diabetes Mother    • Coronary artery disease Mother    • Other Father         CVA   • Heart disease Other    • Stroke Other    • Hypertension Other        Social History     Socioeconomic History   • Marital status:    Tobacco Use   • Smoking status: Never Smoker   • Smokeless tobacco: Never Used   Vaping Use   • Vaping Use: Never used   Substance and Sexual Activity   • Alcohol use: No   • Drug use: No   • Sexual activity: Defer     Prior to Admission medications    Medication Sig Start Date End Date Taking? Authorizing Provider   albuterol sulfate  (90 Base) MCG/ACT inhaler Inhale 2 puffs Every 4 (Four) Hours As Needed for Wheezing or Shortness of Air. 7/29/21   Ruben Delarosa MD   atorvastatin (LIPITOR) 10 MG tablet Take 10 mg by mouth Daily.    Provider, MD Ju[place]   clopidogrel (PLAVIX) 75 MG tablet TAKE ONE TABLET BY MOUTH DAILY 4/12/21   Richie Chapman MD   dilTIAZem CD (CARDIZEM CD) 240 MG 24 hr capsule Take 240 mg by mouth 2 (two) times a day.    Provider, MD [place]   FLUoxetine (PROzac) 40 MG capsule Take 1 capsule by mouth Daily. 7/30/21   Stephanie Diallo MD   isosorbide mononitrate (IMDUR) 30 MG 24 hr tablet Take 1 tablet by mouth Daily. 10/11/21   Silvia Montoya APRN   metFORMIN (Glucophage) 500 MG tablet Take 1 tablet by mouth Daily With Breakfast. For  diabetes 1/6/21   Denny Field MD   nebivolol (Bystolic) 20 MG tablet Take 1 tablet by mouth Daily. 1/5/21   Denny Field MD   omeprazole (priLOSEC) 40 MG capsule TAKE ONE CAPSULE BY MOUTH TWICE A DAY 11/30/20   Richie Chapman MD   spironolactone (ALDACTONE) 25 MG tablet Take 1 tablet by mouth Daily. 11/4/21   Richie Chapman MD   vitamin B-12 (CYANOCOBALAMIN) 1000 MCG tablet TAKE ONE TABLET BY MOUTH DAILY 11/15/21   Gigi Maier MD           Objective   Physical Exam  87-year-old female awake alert.  Generally well-developed well-nourished.  Pupils equal round react light.  Neck supple positive for JVD.  Chest clear equal breath sounds cardiovascular irregular rhythm without murmur appreciated.  There were some bowel sounds noted over precordium.  abdomen soft nontender.  Extremities 1+ symmetric edema.  Neurologic exam without focal findings noted.  Skin without rash noted.  Procedures           ED Course      Results for orders placed or performed during the hospital encounter of 11/19/21   Comprehensive Metabolic Panel    Specimen: Blood   Result Value Ref Range    Glucose 146 (H) 65 - 99 mg/dL    BUN 13 8 - 23 mg/dL    Creatinine 1.06 (H) 0.57 - 1.00 mg/dL    Sodium 141 136 - 145 mmol/L    Potassium 4.1 3.5 - 5.2 mmol/L    Chloride 102 98 - 107 mmol/L    CO2 24.0 22.0 - 29.0 mmol/L    Calcium 8.8 8.6 - 10.5 mg/dL    Total Protein 6.6 6.0 - 8.5 g/dL    Albumin 3.90 3.50 - 5.20 g/dL    ALT (SGPT) 9 1 - 33 U/L    AST (SGOT) 18 1 - 32 U/L    Alkaline Phosphatase 102 39 - 117 U/L    Total Bilirubin 0.8 0.0 - 1.2 mg/dL    eGFR Non African Amer 49 (L) >60 mL/min/1.73    Globulin 2.7 gm/dL    A/G Ratio 1.4 g/dL    BUN/Creatinine Ratio 12.3 7.0 - 25.0    Anion Gap 15.0 5.0 - 15.0 mmol/L   BNP    Specimen: Blood   Result Value Ref Range    proBNP 2,261.0 (H) 0.0-1,800.0 pg/mL   Troponin    Specimen: Blood   Result Value Ref Range    Troponin T <0.010 0.000 - 0.030 ng/mL   CBC Auto Differential     Specimen: Blood   Result Value Ref Range    WBC 6.70 3.40 - 10.80 10*3/mm3    RBC 3.77 3.77 - 5.28 10*6/mm3    Hemoglobin 9.6 (L) 12.0 - 15.9 g/dL    Hematocrit 30.0 (L) 34.0 - 46.6 %    MCV 79.6 79.0 - 97.0 fL    MCH 25.3 (L) 26.6 - 33.0 pg    MCHC 31.8 31.5 - 35.7 g/dL    RDW 17.0 (H) 12.3 - 15.4 %    RDW-SD 48.1 37.0 - 54.0 fl    MPV 8.5 6.0 - 12.0 fL    Platelets 279 140 - 450 10*3/mm3   Scan Slide    Specimen: Blood   Result Value Ref Range    Scan Slide     Manual Differential    Specimen: Blood   Result Value Ref Range    Neutrophil % 63.0 42.7 - 76.0 %    Lymphocyte % 16.0 (L) 19.6 - 45.3 %    Monocyte % 12.0 5.0 - 12.0 %    Eosinophil % 3.0 0.3 - 6.2 %    Basophil % 2.0 (H) 0.0 - 1.5 %    Bands %  4.0 0.0 - 5.0 %    Neutrophils Absolute 4.49 1.70 - 7.00 10*3/mm3    Lymphocytes Absolute 1.07 0.70 - 3.10 10*3/mm3    Monocytes Absolute 0.80 0.10 - 0.90 10*3/mm3    Eosinophils Absolute 0.20 0.00 - 0.40 10*3/mm3    Basophils Absolute 0.13 0.00 - 0.20 10*3/mm3    Anisocytosis Slight/1+ None Seen    Hypochromia Slight/1+ None Seen    Poikilocytes Slight/1+ None Seen    WBC Morphology Normal Normal    Platelet Estimate Adequate Normal    Large Platelets Slight/1+ None Seen   ECG 12 Lead   Result Value Ref Range    QT Interval 391 ms   Green Top (Gel)   Result Value Ref Range    Extra Tube Hold for add-ons.    Lavender Top   Result Value Ref Range    Extra Tube Done    Gold Top - SST   Result Value Ref Range    Extra Tube Hold for add-ons.    Light Blue Top   Result Value Ref Range    Extra Tube hold for add-on      XR Chest 1 View    Result Date: 11/19/2021   1. No acute airspace disease. 2. Trace right basilar pleural fluid. 3. Stable cardiomegaly without overt features of pulmonary edema. 4. Large hiatal hernia.  Electronically Signed By-Nohemi Hurd MD On:11/19/2021 3:15 PM This report was finalized on [id number] by  Nohemi Hurd MD.    Medications   sodium chloride 0.9 % flush 10 mL (has no  "administration in time range)     /83   Pulse 85   Temp 97.8 °F (36.6 °C)   Resp 20   Ht 160 cm (63\")   Wt 87.2 kg (192 lb 3.9 oz)   SpO2 95%   BMI 34.05 kg/m²                                        MDM  Chart review: Patient was seen by cardiologist on the fourth.  At that time her Lasix and potassium were discontinued and was started on Aldactone 25 mg.  She is diagnosed with chronic class III diastolic congestive heart failure.  She had echocardiogram on the 29th of last month that showed ejection fraction of 50% with normal systolic function.  Left ventricular wall thickness consistent with moderate concentric hypertrophy.  Left ventricular diastolic function consistent with grade 2 with high LAP pseudonormalization.  Left atrial volume is severely increased.  Right atrial cavity is moderately to severely dilated.  There is mild to moderate aortic valve stenosis.  Comorbidity: As per past history  Differential: CHF, pulmonary hypertension,  My EKG interpretation: Atrial fibrillation with anteroseptal infarct.  PVC.  Compared to previous ventricular rate increased otherwise no significant change.  Lab: Comprehensive metabolic panel remarkable for glucose of 146 creatinine 1.06, BNP 2261, white count 6.7 with hemoglobin 9.6 platelet count 279 with 63 segs 16 lymphs on differential.  Troponin less than 0.010  Radiology: I reviewed chest x-ray.  There is trace right pleural effusion there is stable cardiomegaly without overt features of edema.  There is a large hiatal hernia.  Discussion/treatment: Review of patient's chart shows most recent hemoglobin had been 10.4, 5 months ago.  Patient was discussed with Dr. Chapman after discussion it was elected to add Lasix to her medication regimen.  She should follow-up with cardiologist in 1 to 2 weeks.  Patient was evaluated using appropriate PPE      Final diagnoses:   Shortness of breath   Chronic diastolic congestive heart failure (HCC)       ED " Disposition  ED Disposition     ED Disposition Condition Comment    Discharge Stable           Stephanie Diallo MD  [address]  [address]  [place] [address] [phone]               Medication List      New Prescriptions    furosemide 20 MG tablet  Commonly known as: LASIX  Take 1 tablet by mouth Daily.           Where to Get Your Medications      These medications were sent to [address] [place] - [address] - [phone]  - [phone] FX  [address] [facility] [place] [address]    Phone: [phone]   · furosemide 20 MG tablet          Nhan Downs MD  11/19/21 [id number]

## 2021-11-21 LAB — QT INTERVAL: 391 MS

## 2021-11-22 RX ORDER — ATORVASTATIN CALCIUM 10 MG/1
10 TABLET, FILM COATED ORAL DAILY
Qty: 30 TABLET | Refills: 0 | Status: SHIPPED | OUTPATIENT
Start: 2021-11-22 | End: 2021-12-27

## 2021-11-22 RX ORDER — DILTIAZEM HYDROCHLORIDE 240 MG/1
240 CAPSULE, COATED, EXTENDED RELEASE ORAL DAILY
Qty: 30 CAPSULE | Refills: 0 | Status: SHIPPED | OUTPATIENT
Start: 2021-11-22 | End: 2021-12-27

## 2021-11-22 NOTE — TELEPHONE ENCOUNTER
Caller: KWAME BOYLE    Relationship: Emergency Contact    Best call back number: [phone]    Requested Prescriptions:   Requested Prescriptions     Pending Prescriptions Disp Refills   • dilTIAZem CD (CARDIZEM CD) 240 MG 24 hr capsule       Sig: Take 1 capsule by mouth.        Pharmacy where request should be sent: [facility] [address] [place] [address] [phone] [place] [phone] FX     Additional details provided by patient: PATIENTS DAUGHTER STATES SHE HAS BEEN OUT FOR ABOUT 2 WEEKS    Does the patient have less than a 3 day supply:  [x] Yes  [] No    Giuseppe Francisco Rep   11/22/21 10:38 EST

## 2021-11-23 ENCOUNTER — PATIENT OUTREACH (OUTPATIENT)
Dept: CASE MANAGEMENT | Facility: OTHER | Age: 86
End: 2021-11-23

## 2021-11-23 RX ORDER — ZOLPIDEM TARTRATE 5 MG/1
5 TABLET ORAL NIGHTLY PRN
Qty: 28 TABLET | Refills: 0 | Status: SHIPPED | OUTPATIENT
Start: 2021-11-23 | End: 2021-12-27

## 2021-11-23 NOTE — OUTREACH NOTE
Ambulatory Case Management Note    Care Coordination    Multiple RN-ACM outreach attempts to patient unsuccessful s/p ED discharge. Patient eligible for RN- Ambulatory . RN-ACM available for assistance as needed. RN-ACM can be reached at [phone].    Bob Clifton RN  Ambulatory Case Management    11/23/2021, 09:59 EST

## 2021-12-15 ENCOUNTER — OFFICE VISIT (OUTPATIENT)
Dept: FAMILY MEDICINE CLINIC | Facility: CLINIC | Age: 86
End: 2021-12-15

## 2021-12-15 VITALS
HEIGHT: 63 IN | HEART RATE: 83 BPM | SYSTOLIC BLOOD PRESSURE: 160 MMHG | TEMPERATURE: 98 F | OXYGEN SATURATION: 96 % | WEIGHT: 185 LBS | BODY MASS INDEX: 32.78 KG/M2 | DIASTOLIC BLOOD PRESSURE: 77 MMHG

## 2021-12-15 DIAGNOSIS — Z77.22 EXPOSURE TO SECOND HAND SMOKE AT WORK: ICD-10-CM

## 2021-12-15 DIAGNOSIS — R06.00 DYSPNEA, UNSPECIFIED TYPE: ICD-10-CM

## 2021-12-15 DIAGNOSIS — I42.0 DILATED CARDIOMYOPATHY: Primary | ICD-10-CM

## 2021-12-15 PROCEDURE — 99213 OFFICE O/P EST LOW 20 MIN: CPT | Performed by: FAMILY MEDICINE

## 2021-12-15 RX ORDER — ALBUTEROL SULFATE 90 UG/1
2 AEROSOL, METERED RESPIRATORY (INHALATION) EVERY 4 HOURS PRN
Qty: 8 G | Refills: 0 | Status: SHIPPED | OUTPATIENT
Start: 2021-12-15 | End: 2021-12-16

## 2021-12-16 ENCOUNTER — TELEPHONE (OUTPATIENT)
Dept: FAMILY MEDICINE CLINIC | Facility: CLINIC | Age: 86
End: 2021-12-16

## 2021-12-16 DIAGNOSIS — R06.00 DYSPNEA, UNSPECIFIED TYPE: ICD-10-CM

## 2021-12-16 DIAGNOSIS — I42.0 DILATED CARDIOMYOPATHY (HCC): ICD-10-CM

## 2021-12-16 DIAGNOSIS — Z77.22 EXPOSURE TO SECOND HAND SMOKE AT WORK: ICD-10-CM

## 2021-12-16 RX ORDER — ALBUTEROL SULFATE 90 UG/1
2 AEROSOL, METERED RESPIRATORY (INHALATION) EVERY 6 HOURS PRN
Qty: 8 G | Refills: 1 | Status: SHIPPED | OUTPATIENT
Start: 2021-12-16 | End: 2022-07-20

## 2021-12-16 NOTE — TELEPHONE ENCOUNTER
Please call pharmacy and see if patient used a GoodRx coupon to  prescription.  I sent a new Rx to pharmacy for brand name Ventolin. Can we see what the cost is compared to GoodRx?

## 2021-12-16 NOTE — TELEPHONE ENCOUNTER
Prior authorization is needed for Albuterol Sulfate. Plan prefers Ventolin HFA Aerosol inhaler(brand). Can this be changed?

## 2021-12-16 NOTE — TELEPHONE ENCOUNTER
Spoke with pharmacy. Brand name Ventolin $58.63. Good rx coupon for original albuterol prescription is $19.05. I have cancelled brand name Ventolin prescription. They are filling original prescription that was sent in for albuterol sulfate (90 Base) MCG/ACT inhaler. Good rx coupon information given to pharmacy. They will get prescription ready for  and will cost pt $19.05.

## 2021-12-23 DIAGNOSIS — I42.0 DILATED CARDIOMYOPATHY (HCC): Primary | ICD-10-CM

## 2021-12-23 DIAGNOSIS — I13.10 HYPERTENSIVE HEART AND CHRONIC KIDNEY DISEASE STAGE 3 (HCC): ICD-10-CM

## 2021-12-23 DIAGNOSIS — N18.30 HYPERTENSIVE HEART AND CHRONIC KIDNEY DISEASE STAGE 3 (HCC): ICD-10-CM

## 2021-12-23 DIAGNOSIS — F51.01 PRIMARY INSOMNIA: ICD-10-CM

## 2021-12-27 DIAGNOSIS — Z01.811 PREPROCEDURAL RESPIRATORY EXAMINATION: Primary | ICD-10-CM

## 2021-12-27 DIAGNOSIS — Z11.59 SCREENING FOR VIRAL DISEASE: ICD-10-CM

## 2021-12-27 RX ORDER — DILTIAZEM HYDROCHLORIDE 240 MG/1
240 CAPSULE, COATED, EXTENDED RELEASE ORAL DAILY
Qty: 90 CAPSULE | Refills: 1 | Status: SHIPPED | OUTPATIENT
Start: 2021-12-27 | End: 2022-01-31 | Stop reason: SDUPTHER

## 2021-12-27 RX ORDER — ATORVASTATIN CALCIUM 10 MG/1
10 TABLET, FILM COATED ORAL NIGHTLY
Qty: 90 TABLET | Refills: 1 | Status: SHIPPED | OUTPATIENT
Start: 2021-12-27 | End: 2022-01-31 | Stop reason: SDUPTHER

## 2021-12-27 RX ORDER — ZOLPIDEM TARTRATE 5 MG/1
5 TABLET ORAL NIGHTLY PRN
Qty: 28 TABLET | Refills: 0 | Status: SHIPPED | OUTPATIENT
Start: 2021-12-27 | End: 2022-02-25 | Stop reason: SDUPTHER

## 2021-12-27 RX ORDER — OMEPRAZOLE 40 MG/1
CAPSULE, DELAYED RELEASE ORAL
Qty: 60 CAPSULE | Refills: 8 | Status: SHIPPED | OUTPATIENT
Start: 2021-12-27 | End: 2022-01-31 | Stop reason: SDUPTHER

## 2022-01-17 ENCOUNTER — APPOINTMENT (OUTPATIENT)
Dept: RESPIRATORY THERAPY | Facility: HOSPITAL | Age: 87
End: 2022-01-17

## 2022-01-31 DIAGNOSIS — I42.0 DILATED CARDIOMYOPATHY: ICD-10-CM

## 2022-01-31 DIAGNOSIS — I13.10 HYPERTENSIVE HEART AND CHRONIC KIDNEY DISEASE STAGE 3: ICD-10-CM

## 2022-01-31 DIAGNOSIS — N18.30 HYPERTENSIVE HEART AND CHRONIC KIDNEY DISEASE STAGE 3: ICD-10-CM

## 2022-01-31 RX ORDER — ATORVASTATIN CALCIUM 10 MG/1
10 TABLET, FILM COATED ORAL NIGHTLY
Qty: 90 TABLET | Refills: 1 | Status: SHIPPED | OUTPATIENT
Start: 2022-01-31 | End: 2022-12-07

## 2022-01-31 RX ORDER — SPIRONOLACTONE 25 MG/1
25 TABLET ORAL DAILY
Qty: 90 TABLET | Refills: 3 | Status: SHIPPED | OUTPATIENT
Start: 2022-01-31

## 2022-01-31 RX ORDER — OMEPRAZOLE 40 MG/1
40 CAPSULE, DELAYED RELEASE ORAL 2 TIMES DAILY
Qty: 180 CAPSULE | Refills: 1 | Status: ON HOLD | OUTPATIENT
Start: 2022-01-31 | End: 2022-06-30

## 2022-01-31 RX ORDER — CLOPIDOGREL BISULFATE 75 MG/1
75 TABLET ORAL DAILY
Qty: 90 TABLET | Refills: 2 | Status: SHIPPED | OUTPATIENT
Start: 2022-01-31 | End: 2022-02-25 | Stop reason: SINTOL

## 2022-01-31 RX ORDER — FUROSEMIDE 20 MG/1
20 TABLET ORAL DAILY
Qty: 90 TABLET | Refills: 1 | Status: SHIPPED | OUTPATIENT
Start: 2022-01-31 | End: 2022-02-25 | Stop reason: SDUPTHER

## 2022-01-31 RX ORDER — DILTIAZEM HYDROCHLORIDE 240 MG/1
240 CAPSULE, COATED, EXTENDED RELEASE ORAL DAILY
Qty: 90 CAPSULE | Refills: 1 | Status: SHIPPED | OUTPATIENT
Start: 2022-01-31 | End: 2022-12-07

## 2022-02-25 ENCOUNTER — OFFICE VISIT (OUTPATIENT)
Dept: CARDIOLOGY | Facility: CLINIC | Age: 87
End: 2022-02-25

## 2022-02-25 VITALS
HEIGHT: 63 IN | SYSTOLIC BLOOD PRESSURE: 130 MMHG | OXYGEN SATURATION: 94 % | BODY MASS INDEX: 32.25 KG/M2 | DIASTOLIC BLOOD PRESSURE: 82 MMHG | WEIGHT: 182 LBS | HEART RATE: 76 BPM

## 2022-02-25 DIAGNOSIS — Z95.818 PRESENCE OF WATCHMAN LEFT ATRIAL APPENDAGE CLOSURE DEVICE: ICD-10-CM

## 2022-02-25 DIAGNOSIS — F51.01 PRIMARY INSOMNIA: ICD-10-CM

## 2022-02-25 DIAGNOSIS — N18.30 HYPERTENSIVE HEART AND CHRONIC KIDNEY DISEASE STAGE 3: ICD-10-CM

## 2022-02-25 DIAGNOSIS — I13.10 HYPERTENSIVE HEART AND CHRONIC KIDNEY DISEASE STAGE 3: ICD-10-CM

## 2022-02-25 DIAGNOSIS — I35.0 AORTIC STENOSIS, MODERATE: ICD-10-CM

## 2022-02-25 DIAGNOSIS — I10 PRIMARY HYPERTENSION: ICD-10-CM

## 2022-02-25 DIAGNOSIS — Z95.0 PACEMAKER: ICD-10-CM

## 2022-02-25 DIAGNOSIS — I48.21 PERMANENT ATRIAL FIBRILLATION: Primary | ICD-10-CM

## 2022-02-25 DIAGNOSIS — I50.30 DIASTOLIC CONGESTIVE HEART FAILURE, UNSPECIFIED HF CHRONICITY: ICD-10-CM

## 2022-02-25 PROCEDURE — 93000 ELECTROCARDIOGRAM COMPLETE: CPT | Performed by: INTERNAL MEDICINE

## 2022-02-25 PROCEDURE — 99214 OFFICE O/P EST MOD 30 MIN: CPT | Performed by: INTERNAL MEDICINE

## 2022-02-25 PROCEDURE — 93279 PRGRMG DEV EVAL PM/LDLS PM: CPT | Performed by: INTERNAL MEDICINE

## 2022-02-25 RX ORDER — ZOLPIDEM TARTRATE 5 MG/1
5 TABLET ORAL NIGHTLY PRN
Qty: 28 TABLET | Refills: 0 | Status: ON HOLD | OUTPATIENT
Start: 2022-02-25 | End: 2022-06-30

## 2022-02-25 RX ORDER — FUROSEMIDE 20 MG/1
20 TABLET ORAL DAILY
Qty: 90 TABLET | Refills: 3 | Status: SHIPPED | OUTPATIENT
Start: 2022-02-25

## 2022-02-25 RX ORDER — NEBIVOLOL 20 MG/1
20 TABLET ORAL DAILY
Qty: 90 TABLET | Refills: 3 | Status: SHIPPED | OUTPATIENT
Start: 2022-02-25

## 2022-02-25 NOTE — PROGRESS NOTES
CC--atrial fibrillation, hypertension and watchman device in situ with a leadless pacemaker in situ    Sub--patient is a delightful 87-year-old patient with history of GI blood loss, iron deficiency anemia and hiatal hernia with Luis's erosions, persistent atrial fibrillation, diastolic heart failure, chronic kidney disease, sick sinus syndrome status post MICRA PPM, hypertension, CVA, GERD.  She had watchman device implantation months ago and she is currently off anticoagulation and by protocol on  antiplatelet agent.  She denies  chest/back pain, syncopal or near syncopal events.  Patient is doing remarkably well without anticoagulation and without recurrent GI bleed and denies TIA or stroke and currently maintaining social distancing for ongoing pandemic.She denies any angina, chest pain, cough, fever, dyspnea or abdominal pain or diarrhea  She is very compliant with her medications  Her pacemaker was interrogated by transtelephonic transmission in the office of Dr. Wilson  She does complain of fatigue and dyspnea       Past Medical History:   Diagnosis Date   • Antral erosion    • Aortic valve regurgitation 12/26/2018   • Atrial fibrillation (CMS/[facility])    • Atrial fibrillation with controlled ventricular response (CMS/[facility])    • B12 deficiency    • CAD (coronary artery disease)    • Cellulitis 04/2011    on face    • Cerebrovascular accident (CVA) (CMS/HCC) 12/07/2016   • Colon cancer (CMS/[facility])    • Coronary artery disease involving native coronary artery of native heart without angina pectoris 6/18/2019   • Depression    • Diabetes mellitus, type 2 (CMS/HCC)    • Essential hypertension 2/13/2012   • Fatigue    • GERD (gastroesophageal reflux disease)    • Hiatal hernia    • History of colonoscopy 04/2010    last done 4/10   • History of esophagogastroduodenoscopy (EGD) 04/2010    last done 4/10   • Hyperlipidemia    • Hyperlipidemia, mixed 2/13/2012   • Hypertension     white coat htn   • HALEIGH (iron deficiency  anemia)    • LAD (lymphadenopathy)     40% lesion LAD    • Left pontine CVA (CMS/[facility])    • Mitral regurgitation 05/21/2012   • STORMY (obstructive sleep apnea)     not treating   • Osteoarthritis    • Pacemaker 6/18/2019   • Permanent atrial fibrillation (CMS/[facility]) 6/18/2019   • Presence of Watchman left atrial appendage closure device 1/4/2020   • 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CMS/[facility])    • Right kidney mass     1.4 cm- following urology    • Sick sinus syndrome (CMS/[facility])    • Stroke (CMS/[facility]) 11/2016   • TIA (transient ischemic attack)     left CVA, interrupted TPA; As (moderate-severe)    • Vestibular nerve disease 2017     Past Surgical History:   Procedure Laterality Date   • ATRIAL APPENDAGE EXCLUSION LEFT WITH TRANSESOPHAGEAL ECHOCARDIOGRAM N/A 10/10/2019    Procedure: Atrial Appendage Occlusion;  Surgeon: Richie Chapman MD;  Location: [facility] CATH INVASIVE LOCATION;  Service: Cardiovascular   • ATRIAL APPENDAGE EXCLUSION LEFT WITH TRANSESOPHAGEAL ECHOCARDIOGRAM N/A 10/10/2019    Procedure: Atrial Appendage Occlusion;  Surgeon: Je Rivera MD;  Location: [facility] CATH INVASIVE LOCATION;  Service: Cardiology   • BLADDER REPAIR     • CARDIAC CATHETERIZATION  05/2010   • CHOLECYSTECTOMY     • COLECTOMY PARTIAL / TOTAL     • COLONOSCOPY  04/16/2018    negative    • ENDOSCOPY  04/16/2018    negative    • HYSTERECTOMY     • INSERT / REPLACE / REMOVE PACEMAKER  09/2018    Pacemaker gen change 9/2018    • OTHER SURGICAL HISTORY  04/25/2015    Exercise myoview, normal    • PACEMAKER IMPLANTATION  01/22/2010    Dual Chamber - 1/22/2010; RA Lead Revision- 5/7/2012- BS    • TOOTH EXTRACTION     • TRANSESOPHAGEAL ECHOCARDIOGRAM (DHEERAJ)  07/2019     Family History   Problem Relation Age of Onset   • Hypertension Mother    • Diabetes Mother    • Coronary artery disease Mother    • Other Father         CVA   • Heart disease Other    • Stroke Other    • Hypertension Other        Review of Systems  General: Positive for class  III shortness of breath and positive for fatigue and tiredness  Eyes: No redness  Cardiovascular: No chest pain, positive for palpitations              Physical Exam  General:      well developed, well nourished, in no acute distress.    Head:      normocephalic and atraumatic.    Eyes:      PERRL/EOM intact, conjunctiva and sclera clear with out nystagmus.    Neck:      no masses, thyromegaly, trachea central with normal respiratory effort  Lungs:      clear bilaterally to auscultation.    Heart:     irregular rate and rhythm, S1, S2 without murmurs, rubs, or gallops            Assessment plan    Dyspnea with fatigue--- hypertension  controlled---chronic class 3 diastolic chf--on aldactone  Prior creatinine of 1.06,  and potassium of 4.1  Patient complains of insomnia and patient educated about nonpharmacological clues to help her insomnia  Post watchman device   Status post leadless pacemaker implantation which is been interrogated with appropriate function  Prior history of GI bleed without recurrence and off anticoagulation  Follow-up in 6 months  Medications reviewed  Refill of meds done  Refill for ambien given for one month only  Follow up in 3 months      ECG 12 Lead    Date/Time: 2/25/2022 11:09 AM  Performed by: Richie Chapman MD  Authorized by: Richie Chapman MD   Comparison: compared with previous ECG   Similar to previous ECG  Rhythm: atrial fibrillation  Rate: normal  Conduction: incomplete right bundle branch block  Other findings: non-specific ST-T wave changes            Electronically signed by Richie Chapman MD, 02/25/22, 11:08 AM EST.

## 2022-03-02 NOTE — PROGRESS NOTES
Left message for patient to call back to discuss if she is still seeing us as well as Dr. Chapman.

## 2022-03-02 NOTE — PROGRESS NOTES
Received request to transfer home monitoring of onel to Dr. Weber office via [facility]

## 2022-03-08 NOTE — PROGRESS NOTES
Left message to call back on both numbers   Contact Information   [phone] (Home Phone)    [phone] (Mobile)    Will keep trying

## 2022-05-06 ENCOUNTER — APPOINTMENT (OUTPATIENT)
Dept: CT IMAGING | Facility: HOSPITAL | Age: 87
End: 2022-05-06

## 2022-05-06 ENCOUNTER — HOSPITAL ENCOUNTER (EMERGENCY)
Facility: HOSPITAL | Age: 87
Discharge: HOME | End: 2022-05-06
Attending: EMERGENCY MEDICINE | Admitting: EMERGENCY MEDICINE

## 2022-05-06 VITALS
DIASTOLIC BLOOD PRESSURE: 66 MMHG | BODY MASS INDEX: 32.07 KG/M2 | OXYGEN SATURATION: 95 % | SYSTOLIC BLOOD PRESSURE: 115 MMHG | TEMPERATURE: 96.8 F | WEIGHT: 181 LBS | RESPIRATION RATE: 20 BRPM | HEIGHT: 63 IN | HEART RATE: 106 BPM

## 2022-05-06 DIAGNOSIS — R42 DIZZINESS: Primary | ICD-10-CM

## 2022-05-06 LAB
ANION GAP SERPL CALCULATED.3IONS-SCNC: 14 MMOL/L (ref 5–15)
BASOPHILS # BLD AUTO: 0.1 10*3/MM3 (ref 0–0.2)
BASOPHILS NFR BLD AUTO: 1.3 % (ref 0–1.5)
BUN SERPL-MCNC: 29 MG/DL (ref 8–23)
BUN/CREAT SERPL: 23.4 (ref 7–25)
CALCIUM SPEC-SCNC: 9.7 MG/DL (ref 8.6–10.5)
CHLORIDE SERPL-SCNC: 100 MMOL/L (ref 98–107)
CO2 SERPL-SCNC: 23 MMOL/L (ref 22–29)
CREAT SERPL-MCNC: 1.24 MG/DL (ref 0.57–1)
DEPRECATED RDW RBC AUTO: 45.9 FL (ref 37–54)
EGFRCR SERPLBLD CKD-EPI 2021: 41.9 ML/MIN/1.73
EOSINOPHIL # BLD AUTO: 0 10*3/MM3 (ref 0–0.4)
EOSINOPHIL NFR BLD AUTO: 0.3 % (ref 0.3–6.2)
ERYTHROCYTE [DISTWIDTH] IN BLOOD BY AUTOMATED COUNT: 17.4 % (ref 12.3–15.4)
GLUCOSE SERPL-MCNC: 152 MG/DL (ref 65–99)
HCT VFR BLD AUTO: 29.2 % (ref 34–46.6)
HGB BLD-MCNC: 8.7 G/DL (ref 12–15.9)
LYMPHOCYTES # BLD AUTO: 1.4 10*3/MM3 (ref 0.7–3.1)
LYMPHOCYTES NFR BLD AUTO: 18.9 % (ref 19.6–45.3)
MCH RBC QN AUTO: 22.3 PG (ref 26.6–33)
MCHC RBC AUTO-ENTMCNC: 29.9 G/DL (ref 31.5–35.7)
MCV RBC AUTO: 74.5 FL (ref 79–97)
MONOCYTES # BLD AUTO: 0.4 10*3/MM3 (ref 0.1–0.9)
MONOCYTES NFR BLD AUTO: 6 % (ref 5–12)
NEUTROPHILS NFR BLD AUTO: 5.4 10*3/MM3 (ref 1.7–7)
NEUTROPHILS NFR BLD AUTO: 73.5 % (ref 42.7–76)
NRBC BLD AUTO-RTO: 0.1 /100 WBC (ref 0–0.2)
PLATELET # BLD AUTO: 311 10*3/MM3 (ref 140–450)
PMV BLD AUTO: 8.1 FL (ref 6–12)
POTASSIUM SERPL-SCNC: 4.2 MMOL/L (ref 3.5–5.2)
RBC # BLD AUTO: 3.92 10*6/MM3 (ref 3.77–5.28)
SODIUM SERPL-SCNC: 137 MMOL/L (ref 136–145)
WBC NRBC COR # BLD: 7.3 10*3/MM3 (ref 3.4–10.8)

## 2022-05-06 PROCEDURE — 93005 ELECTROCARDIOGRAM TRACING: CPT | Performed by: EMERGENCY MEDICINE

## 2022-05-06 PROCEDURE — 70450 CT HEAD/BRAIN W/O DYE: CPT

## 2022-05-06 PROCEDURE — 99283 EMERGENCY DEPT VISIT LOW MDM: CPT

## 2022-05-06 PROCEDURE — 85025 COMPLETE CBC W/AUTO DIFF WBC: CPT | Performed by: EMERGENCY MEDICINE

## 2022-05-06 PROCEDURE — 80048 BASIC METABOLIC PNL TOTAL CA: CPT | Performed by: EMERGENCY MEDICINE

## 2022-05-06 RX ORDER — SODIUM CHLORIDE 0.9 % (FLUSH) 0.9 %
10 SYRINGE (ML) INJECTION AS NEEDED
Status: DISCONTINUED | OUTPATIENT
Start: 2022-05-06 | End: 2022-05-06 | Stop reason: HOSPADM

## 2022-05-06 RX ORDER — MECLIZINE HYDROCHLORIDE 25 MG/1
25 TABLET ORAL 3 TIMES DAILY PRN
Qty: 30 TABLET | Refills: 0 | Status: SHIPPED | OUTPATIENT
Start: 2022-05-06

## 2022-05-06 NOTE — ED PROVIDER NOTES
Subjective   Patient is a 88-year-old female complaining of dizziness after she had an Avastin injection in her left eye 4 days ago.  She states the dizziness is moderate and constant.  She denies headache or other complaint.          Review of Systems   Constitutional: Negative for diaphoresis and fever.   HENT: Negative for congestion.    Eyes: Negative for visual disturbance.   Respiratory: Negative for shortness of breath.    Cardiovascular: Negative for chest pain.   Gastrointestinal: Negative for abdominal pain, diarrhea and vomiting.   Endocrine: Negative for polyuria.   Genitourinary: Negative for dysuria.   Musculoskeletal: Negative for back pain and neck pain.   Neurological: Positive for dizziness. Negative for weakness and headaches.   Psychiatric/Behavioral: Negative for confusion.     A complete review of system was obtained and is otherwise negative  Past Medical History:   Diagnosis Date   • Antral erosion    • Aortic valve regurgitation 12/26/2018   • Atrial fibrillation (HCC)    • Atrial fibrillation with controlled ventricular response (HCC)    • B12 deficiency    • CAD (coronary artery disease)    • Cellulitis 04/2011    on face    • Cerebrovascular accident (CVA) (HCC) 12/07/2016   • Colon cancer ([facility])    • Coronary artery disease involving native coronary artery of native heart without angina pectoris 6/18/2019   • Depression    • Diabetes mellitus, type 2 ([facility])    • Essential hypertension 2/13/2012   • Fatigue    • GERD (gastroesophageal reflux disease)    • Hiatal hernia    • History of colonoscopy 04/2010    last done 4/10   • History of esophagogastroduodenoscopy (EGD) 04/2010    last done 4/10   • Hyperlipidemia, mixed 2/13/2012   • Hypertension     white coat htn   • HALEIGH (iron deficiency anemia)    • LAD (lymphadenopathy)     40% lesion LAD    • Left pontine CVA ([facility])    • Mitral regurgitation 05/21/2012   • STORMY (obstructive sleep apnea)     not treating   • Osteoarthritis    • Pacemaker  6/18/2019   • Permanent atrial fibrillation (HCC) 6/18/2019   • Presence of Watchman left atrial appendage closure device 1/4/2020   • 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HCC)    • Right kidney mass     1.4 cm- following urology    • Sick sinus syndrome (HCC)    • Stroke (HCC) 11/2016   • TIA (transient ischemic attack)     left CVA, interrupted TPA; As (moderate-severe)    • Vestibular nerve disease 2017       Allergies   Allergen Reactions   • Contrast Dye Anaphylaxis   • Adhesive Tape Itching   • Latex Hives       Past Surgical History:   Procedure Laterality Date   • ATRIAL APPENDAGE EXCLUSION LEFT WITH TRANSESOPHAGEAL ECHOCARDIOGRAM N/A 10/10/2019    Procedure: Atrial Appendage Occlusion;  Surgeon: Richie Chapman MD;  Location: [facility] CATH INVASIVE LOCATION;  Service: Cardiovascular   • ATRIAL APPENDAGE EXCLUSION LEFT WITH TRANSESOPHAGEAL ECHOCARDIOGRAM N/A 10/10/2019    Procedure: Atrial Appendage Occlusion;  Surgeon: Je Rivera MD;  Location: [facility] CATH INVASIVE LOCATION;  Service: Cardiology   • BLADDER REPAIR     • CARDIAC CATHETERIZATION  05/2010   • CHOLECYSTECTOMY     • COLECTOMY PARTIAL / TOTAL     • COLONOSCOPY  04/16/2018    negative    • ENDOSCOPY  04/16/2018    negative    • HYSTERECTOMY     • INSERT / REPLACE / REMOVE PACEMAKER  09/2018    Pacemaker gen change 9/2018    • OTHER SURGICAL HISTORY  04/25/2015    Exercise myoview, normal    • PACEMAKER IMPLANTATION  01/22/2010    Dual Chamber - 1/22/2010; RA Lead Revision- 5/7/2012- BS    • TOOTH EXTRACTION     • TRANSESOPHAGEAL ECHOCARDIOGRAM (DHEERAJ)  07/2019       Family History   Problem Relation Age of Onset   • Hypertension Mother    • Diabetes Mother    • Coronary artery disease Mother    • Other Father         CVA   • Heart disease Other    • Stroke Other    • Hypertension Other        Social History     Socioeconomic History   • Marital status:    Tobacco Use   • Smoking status: Never Smoker   • Smokeless tobacco: Never Used   Vaping  Use   • Vaping Use: Never used   Substance and Sexual Activity   • Alcohol use: No   • Drug use: No   • Sexual activity: Defer           Objective   Physical Exam  Neurologic exam is nonfocal.  HEENT exam shows TMs to be clear.  Oropharynx clear moist.  Sclera is nonicteric.  Neck has no adenopathy JVD or bruits.  Lungs are clear.  Heart has a regular rate and rhythm without murmur rub or gallop.  Chest is nontender.  Abdomen is soft nontender.  Extremity exam is no cyanosis or edema.  Procedures     EKG interpretation shows age fibrillation at a rate of 100 with no acute ST change this is unchanged from previous tracing 11/19/2021      ED Course      Results for orders placed or performed during the hospital encounter of 05/06/22   Basic Metabolic Panel    Specimen: Blood   Result Value Ref Range    Glucose 152 (H) 65 - 99 mg/dL    BUN 29 (H) 8 - 23 mg/dL    Creatinine 1.24 (H) 0.57 - 1.00 mg/dL    Sodium 137 136 - 145 mmol/L    Potassium 4.2 3.5 - 5.2 mmol/L    Chloride 100 98 - 107 mmol/L    CO2 23.0 22.0 - 29.0 mmol/L    Calcium 9.7 8.6 - 10.5 mg/dL    BUN/Creatinine Ratio 23.4 7.0 - 25.0    Anion Gap 14.0 5.0 - 15.0 mmol/L    eGFR 41.9 (L) >60.0 mL/min/1.73   CBC Auto Differential    Specimen: Blood   Result Value Ref Range    WBC 7.30 3.40 - 10.80 10*3/mm3    RBC 3.92 3.77 - 5.28 10*6/mm3    Hemoglobin 8.7 (L) 12.0 - 15.9 g/dL    Hematocrit 29.2 (L) 34.0 - 46.6 %    MCV 74.5 (L) 79.0 - 97.0 fL    MCH 22.3 (L) 26.6 - 33.0 pg    MCHC 29.9 (L) 31.5 - 35.7 g/dL    RDW 17.4 (H) 12.3 - 15.4 %    RDW-SD 45.9 37.0 - 54.0 fl    MPV 8.1 6.0 - 12.0 fL    Platelets 311 140 - 450 10*3/mm3    Neutrophil % 73.5 42.7 - 76.0 %    Lymphocyte % 18.9 (L) 19.6 - 45.3 %    Monocyte % 6.0 5.0 - 12.0 %    Eosinophil % 0.3 0.3 - 6.2 %    Basophil % 1.3 0.0 - 1.5 %    Neutrophils, Absolute 5.40 1.70 - 7.00 10*3/mm3    Lymphocytes, Absolute 1.40 0.70 - 3.10 10*3/mm3    Monocytes, Absolute 0.40 0.10 - 0.90 10*3/mm3    Eosinophils,  Absolute 0.00 0.00 - 0.40 10*3/mm3    Basophils, Absolute 0.10 0.00 - 0.20 10*3/mm3    nRBC 0.1 0.0 - 0.2 /100 WBC   ECG 12 Lead   Result Value Ref Range    QT Interval 390 ms     CT Head Without Contrast    Result Date: 5/6/2022  No acute intracranial abnormality. There is age-appropriate diffuse volume loss. There is a mild amount of low-density in the periventricular and subcortical white matter. This is nonspecific, but most commonly seen with chronic small vessel ischemic change. Brain MRI is more sensitive to evaluate for acute or subacute infarcts and to evaluate for intracranial metastatic disease.  Electronically Signed By-Octavia Keller MD On:5/6/2022 4:56 PM This report was finalized on [id number] by  Octavia Keller MD.                                               MDM  Number of Diagnoses or Management Options  Diagnosis management comments: Patient has no focal neurologic deficits and has no intracranial abnormality on my review of her CT scan of her head without contrast.  Metabolic panel is at baseline without increased renal insufficiency from her baseline when I reviewed her old laboratory data.  Patient has mild anemia which again is unchanged in previous data.  Patient will be discharged.  She will be placed on Antivert.  She is most likely having a reaction from the a Avastin injection.  We will follow with MD for recheck as needed.       Amount and/or Complexity of Data Reviewed  Clinical lab tests: reviewed  Tests in the radiology section of CPT®: reviewed  Tests in the medicine section of CPT®: reviewed    Risk of Complications, Morbidity, and/or Mortality  Presenting problems: high  Diagnostic procedures: high  Management options: high    Patient Progress  Patient progress: stable      Final diagnoses:   Dizziness       ED Disposition  ED Disposition     ED Disposition   Discharge    Condition   Stable    Comment   --             No follow-up provider specified.       Medication List      New  Prescriptions    meclizine 25 MG tablet  Commonly known as: ANTIVERT  Take 1 tablet by mouth 3 (Three) Times a Day As Needed for Dizziness.           Where to Get Your Medications      Information about where to get these medications is not yet available    Ask your nurse or doctor about these medications  · meclizine 25 MG tablet          Sonu Flor MD  05/06/22 [id number]

## 2022-05-08 LAB — QT INTERVAL: 390 MS

## 2022-05-11 ENCOUNTER — PATIENT OUTREACH (OUTPATIENT)
Dept: CASE MANAGEMENT | Facility: OTHER | Age: 87
End: 2022-05-11

## 2022-05-11 NOTE — OUTREACH NOTE
AMBULATORY CASE MANAGEMENT NOTE    Name and Relationship of Patient/Support Person: Cherie Hinton M - Self    Patient Outreach    Pt discharged from [facility] ED on 5/6/22, seen for dizziness. RN-ACM outreach call made to pt, able to reach pt on 3rd attempt, spoke very briefly. Explained role of RN-ACM. Pt states she's doing well. She praises ED staff for care received during visit. Unable to further outreach, pt thanks RN-ACM for calling and ended call. Follow up outreach prn.     MELY AMAYA  Ambulatory Case Management    5/11/2022, 10:07 EDT

## 2022-06-23 ENCOUNTER — OFFICE VISIT (OUTPATIENT)
Dept: CARDIOLOGY | Facility: CLINIC | Age: 87
End: 2022-06-23

## 2022-06-23 VITALS
WEIGHT: 181 LBS | HEART RATE: 80 BPM | OXYGEN SATURATION: 97 % | SYSTOLIC BLOOD PRESSURE: 147 MMHG | HEIGHT: 63 IN | BODY MASS INDEX: 32.07 KG/M2 | DIASTOLIC BLOOD PRESSURE: 80 MMHG

## 2022-06-23 DIAGNOSIS — R06.09 DYSPNEA ON EXERTION: ICD-10-CM

## 2022-06-23 DIAGNOSIS — I10 ESSENTIAL HYPERTENSION: ICD-10-CM

## 2022-06-23 DIAGNOSIS — E78.2 HYPERLIPIDEMIA, MIXED: ICD-10-CM

## 2022-06-23 DIAGNOSIS — Z95.0 PACEMAKER: Primary | ICD-10-CM

## 2022-06-23 DIAGNOSIS — R53.83 OTHER FATIGUE: ICD-10-CM

## 2022-06-23 DIAGNOSIS — I25.10 CORONARY ARTERY DISEASE INVOLVING NATIVE CORONARY ARTERY OF NATIVE HEART WITHOUT ANGINA PECTORIS: ICD-10-CM

## 2022-06-23 DIAGNOSIS — I48.21 PERMANENT ATRIAL FIBRILLATION: ICD-10-CM

## 2022-06-23 PROCEDURE — 99213 OFFICE O/P EST LOW 20 MIN: CPT | Performed by: NURSE PRACTITIONER

## 2022-06-23 PROCEDURE — 93000 ELECTROCARDIOGRAM COMPLETE: CPT | Performed by: NURSE PRACTITIONER

## 2022-06-23 NOTE — PROGRESS NOTES
"  [facility] CARDIOLOGY      REASON FOR FOLLOW-UP:  Fatigue  Shortness of breath          Chief Complaint   Patient presents with   • Atrial Fibrillation   • Coronary Artery Disease   • Shortness of Breath         Dear Andrew Antunez MD        History of Present Illness   Cherie Hinton is an 88-year-old female with no known history of ischemic heart disease.  Past medical history includes sick sinus syndrome status post Micra PPM implant, GI blood loss, iron deficiency anemia, persistent atrial fibrillation, watchman/left atrial appendage device implant, essential hypertension, chronic kidney disease, stroke, GERD, diabetes mellitus 2.  Patient is seen today in acute office visit with her daughter.  The patient reports over the past 2 weeks she has \"felt awful\".  She states that she has had very little p.o. intake, however this is not due to nausea, stomach pain or anorexia-she just simply does not want to eat.  She reports some shortness of breath with activity and poor exercise tolerance stating walking short distances makes her very fatigued.  She denies any chest pain, pressure, tightness or palpitations.  She denies any shortness of breath at rest.  In the office today she is quite talkative with no conversational dyspnea.  She denies any orthopnea, PND, lower extremity edema.  She has had no cough, fever or chills.        ASSESSMENT:  Fatigue  Dyspnea on exertion  Persistent atrial fibrillation  Permanent pacemaker in situ  History of chronic diastolic heart failure  Chronic kidney disease  Watchman device in situ    PLAN:  The patient clinically does not appear fluid overloaded.  I am not sure what is causing her fatigue, however she could have some underlying pulmonary edema.  Her blood pressure is slightly elevated 147/80.  She has no specific complaints other than fatigue and shortness of breath with exertion.  My recommendation is for patient to go to the emergency department for " further diagnostics.  Patient is quite reluctant to go.  Daughter states she will take her to the ER for further evaluation and treatment options.        The following portions of the patient's history were reviewed and updated as appropriate: allergies, current medications, past family history, past medical history, past social history, past surgical history and problem list.    REVIEW OF SYSTEMS:    Review of Systems   Constitutional: Positive for malaise/fatigue.   Cardiovascular: Positive for dyspnea on exertion.   All other systems reviewed and are negative.      Vitals:    06/23/22 1432   BP: 147/80   Pulse: 80   SpO2: 97%         PHYSICAL EXAM:    General: Alert, cooperative, no distress, appears stated age  Head:  Normocephalic, atraumatic, mucous membranes moist  Eyes:  Conjunctiva/corneas clear, EOM's intact     Neck:  Supple,  no JVD or bruit     Lungs: Clear to auscultation bilaterally, no wheezes rhonchi rales are noted  Chest wall: No tenderness  Musculoskeletal:   Ambulates freely without assistance  Heart::  Regular rate and rhythm, S1 and S2 normal, no murmur, rub or gallop  Abdomen: Soft, non-tender, nondistended, bowel sounds active, no abdominal bruit  Extremities: No cyanosis, clubbing, or edema   Pulses: 2+ and symmetric all extremities  Skin:  No rashes or lesions  Neuro/psych: A&O x3. CN II through XII are grossly intact with appropriate affect        Past Medical History:   Diagnosis Date   • Antral erosion    • Aortic valve regurgitation 12/26/2018   • Atrial fibrillation ([facility])    • Atrial fibrillation with controlled ventricular response ([facility])    • B12 deficiency    • CAD (coronary artery disease)    • Cellulitis 04/2011    on face    • Cerebrovascular accident (CVA) ([facility]) 12/07/2016   • Colon cancer ([facility])    • Coronary artery disease involving native coronary artery of native heart without angina pectoris 6/18/2019   • Depression    • Diabetes mellitus, type 2 ([facility])    • Essential  hypertension 2/13/2012   • Fatigue    • GERD (gastroesophageal reflux disease)    • Hiatal hernia    • History of colonoscopy 04/2010    last done 4/10   • History of esophagogastroduodenoscopy (EGD) 04/2010    last done 4/10   • Hyperlipidemia, mixed 2/13/2012   • Hypertension     white coat htn   • HALEIGH (iron deficiency anemia)    • LAD (lymphadenopathy)     40% lesion LAD    • Left pontine CVA (HCC)    • Mitral regurgitation 05/21/2012   • STORMY (obstructive sleep apnea)     not treating   • Osteoarthritis    • Pacemaker 6/18/2019   • Permanent atrial fibrillation (HCC) 6/18/2019   • Presence of Watchman left atrial appendage closure device 1/4/2020   • 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HCC)    • Right kidney mass     1.4 cm- following urology    • Sick sinus syndrome (HCC)    • Stroke (HCC) 11/2016   • TIA (transient ischemic attack)     left CVA, interrupted TPA; As (moderate-severe)    • Vestibular nerve disease 2017       Past Surgical History:   Procedure Laterality Date   • ATRIAL APPENDAGE EXCLUSION LEFT WITH TRANSESOPHAGEAL ECHOCARDIOGRAM N/A 10/10/2019    Procedure: Atrial Appendage Occlusion;  Surgeon: Richie Chapman MD;  Location: [facility] CATH INVASIVE LOCATION;  Service: Cardiovascular   • ATRIAL APPENDAGE EXCLUSION LEFT WITH TRANSESOPHAGEAL ECHOCARDIOGRAM N/A 10/10/2019    Procedure: Atrial Appendage Occlusion;  Surgeon: Je Rivera MD;  Location: [facility] CATH INVASIVE LOCATION;  Service: Cardiology   • BLADDER REPAIR     • CARDIAC CATHETERIZATION  05/2010   • CHOLECYSTECTOMY     • COLECTOMY PARTIAL / TOTAL     • COLONOSCOPY  04/16/2018    negative    • ENDOSCOPY  04/16/2018    negative    • HYSTERECTOMY     • INSERT / REPLACE / REMOVE PACEMAKER  09/2018    Pacemaker gen change 9/2018    • OTHER SURGICAL HISTORY  04/25/2015    Exercise myoview, normal    • PACEMAKER IMPLANTATION  01/22/2010    Dual Chamber - 1/22/2010; RA Lead Revision- 5/7/2012- BS    • TOOTH EXTRACTION     • TRANSESOPHAGEAL  ECHOCARDIOGRAM (DHEERAJ)  07/2019         Current Outpatient Medications:   •  albuterol sulfate  (90 Base) MCG/ACT inhaler, Inhale 2 puffs Every 6 (Six) Hours As Needed for Wheezing or Shortness of Air., Disp: 8 g, Rfl: 1  •  atorvastatin (LIPITOR) 10 MG tablet, Take 1 tablet by mouth Every Night., Disp: 90 tablet, Rfl: 1  •  dilTIAZem CD (CARDIZEM CD) 240 MG 24 hr capsule, Take 1 capsule by mouth Daily., Disp: 90 capsule, Rfl: 1  •  FLUoxetine (PROzac) 40 MG capsule, Take 1 capsule by mouth Daily., Disp: 90 capsule, Rfl: 1  •  furosemide (LASIX) 20 MG tablet, Take 1 tablet by mouth Daily., Disp: 90 tablet, Rfl: 3  •  isosorbide mononitrate (IMDUR) 30 MG 24 hr tablet, Take 1 tablet by mouth Daily., Disp: 30 tablet, Rfl: 11  •  meclizine (ANTIVERT) 25 MG tablet, Take 1 tablet by mouth 3 (Three) Times a Day As Needed for Dizziness., Disp: 30 tablet, Rfl: 0  •  metFORMIN (Glucophage) 500 MG tablet, Take 1 tablet by mouth Daily With Breakfast. For diabetes, Disp: 30 tablet, Rfl: 6  •  nebivolol (Bystolic) 20 MG tablet, Take 1 tablet by mouth Daily., Disp: 90 tablet, Rfl: 3  •  omeprazole (priLOSEC) 40 MG capsule, Take 1 capsule by mouth 2 (Two) Times a Day., Disp: 180 capsule, Rfl: 1  •  spironolactone (ALDACTONE) 25 MG tablet, Take 1 tablet by mouth Daily., Disp: 90 tablet, Rfl: 3  •  vitamin B-12 (CYANOCOBALAMIN) 1000 MCG tablet, TAKE ONE TABLET BY MOUTH DAILY, Disp: 30 tablet, Rfl: 1  •  zolpidem (AMBIEN) 5 MG tablet, Take 1 tablet by mouth At Night As Needed for Sleep. May cause drowsiness.  Use caution with driving., Disp: 28 tablet, Rfl: 0    Allergies   Allergen Reactions   • Contrast Dye Anaphylaxis   • Adhesive Tape Itching   • Latex Hives       Family History   Problem Relation Age of Onset   • Hypertension Mother    • Diabetes Mother    • Coronary artery disease Mother    • Other Father         CVA   • Heart disease Other    • Stroke Other    • Hypertension Other        Social History     Tobacco Use   •  "Smoking status: Never Smoker   • Smokeless tobacco: Never Used   Substance Use Topics   • Alcohol use: No           Current Electrocardiogram:    ECG 12 Lead    Date/Time: 6/23/2022 4:23 PM  Performed by: Sonia Kim APRN  Authorized by: Sonia Kim APRN   Comparison: not compared with previous ECG   Rhythm: atrial fibrillation  Ectopy comments: PVC  BPM: 80                  EMR Dragon/Transcription:   \"Dictated utilizing Dragon dictation\".       "

## 2022-06-27 ENCOUNTER — APPOINTMENT (OUTPATIENT)
Dept: GENERAL RADIOLOGY | Facility: HOSPITAL | Age: 87
End: 2022-06-27

## 2022-06-27 ENCOUNTER — APPOINTMENT (OUTPATIENT)
Dept: CT IMAGING | Facility: HOSPITAL | Age: 87
End: 2022-06-27

## 2022-06-27 ENCOUNTER — HOSPITAL ENCOUNTER (INPATIENT)
Facility: HOSPITAL | Age: 87
LOS: 2 days | Discharge: HOME | End: 2022-07-01
Attending: INTERNAL MEDICINE | Admitting: INTERNAL MEDICINE

## 2022-06-27 DIAGNOSIS — R06.00 DYSPNEA, UNSPECIFIED TYPE: Primary | ICD-10-CM

## 2022-06-27 DIAGNOSIS — R41.0 CONFUSION: ICD-10-CM

## 2022-06-27 DIAGNOSIS — E83.42 HYPOMAGNESEMIA: ICD-10-CM

## 2022-06-27 DIAGNOSIS — D50.0 IRON DEFICIENCY ANEMIA DUE TO CHRONIC BLOOD LOSS: ICD-10-CM

## 2022-06-27 DIAGNOSIS — M25.571 ACUTE RIGHT ANKLE PAIN: ICD-10-CM

## 2022-06-27 LAB
ALBUMIN SERPL-MCNC: 3.8 G/DL (ref 3.5–5.2)
ALBUMIN/GLOB SERPL: 1.1 G/DL
ALP SERPL-CCNC: 105 U/L (ref 39–117)
ALT SERPL W P-5'-P-CCNC: 8 U/L (ref 1–33)
ANION GAP SERPL CALCULATED.3IONS-SCNC: 17 MMOL/L (ref 5–15)
APTT PPP: 23.3 SECONDS (ref 61–76.5)
AST SERPL-CCNC: 19 U/L (ref 1–32)
B PARAPERT DNA SPEC QL NAA+PROBE: NOT DETECTED
B PERT DNA SPEC QL NAA+PROBE: NOT DETECTED
BASOPHILS # BLD AUTO: 0.1 10*3/MM3 (ref 0–0.2)
BASOPHILS NFR BLD AUTO: 0.9 % (ref 0–1.5)
BILIRUB SERPL-MCNC: 0.8 MG/DL (ref 0–1.2)
BILIRUB UR QL STRIP: NEGATIVE
BUN SERPL-MCNC: 14 MG/DL (ref 8–23)
BUN/CREAT SERPL: 13.9 (ref 7–25)
C PNEUM DNA NPH QL NAA+NON-PROBE: NOT DETECTED
CALCIUM SPEC-SCNC: 8.2 MG/DL (ref 8.6–10.5)
CHLORIDE SERPL-SCNC: 104 MMOL/L (ref 98–107)
CLARITY UR: CLEAR
CO2 SERPL-SCNC: 21 MMOL/L (ref 22–29)
COLOR UR: YELLOW
CREAT SERPL-MCNC: 1.01 MG/DL (ref 0.57–1)
CRP SERPL-MCNC: 4.63 MG/DL (ref 0–0.5)
DEPRECATED RDW RBC AUTO: 44.6 FL (ref 37–54)
EGFRCR SERPLBLD CKD-EPI 2021: 53.7 ML/MIN/1.73
EOSINOPHIL # BLD AUTO: 0.2 10*3/MM3 (ref 0–0.4)
EOSINOPHIL NFR BLD AUTO: 1.8 % (ref 0.3–6.2)
ERYTHROCYTE [DISTWIDTH] IN BLOOD BY AUTOMATED COUNT: 18 % (ref 12.3–15.4)
ERYTHROCYTE [SEDIMENTATION RATE] IN BLOOD: 88 MM/HR (ref 0–30)
FLUAV SUBTYP SPEC NAA+PROBE: NOT DETECTED
FLUBV RNA ISLT QL NAA+PROBE: NOT DETECTED
GLOBULIN UR ELPH-MCNC: 3.5 GM/DL
GLUCOSE BLDC GLUCOMTR-MCNC: 137 MG/DL (ref 70–105)
GLUCOSE SERPL-MCNC: 125 MG/DL (ref 65–99)
GLUCOSE UR STRIP-MCNC: NEGATIVE MG/DL
HADV DNA SPEC NAA+PROBE: NOT DETECTED
HBA1C MFR BLD: 7.9 % (ref 3.5–5.6)
HCOV 229E RNA SPEC QL NAA+PROBE: NOT DETECTED
HCOV HKU1 RNA SPEC QL NAA+PROBE: NOT DETECTED
HCOV NL63 RNA SPEC QL NAA+PROBE: NOT DETECTED
HCOV OC43 RNA SPEC QL NAA+PROBE: NOT DETECTED
HCT VFR BLD AUTO: 26.8 % (ref 34–46.6)
HGB BLD-MCNC: 8 G/DL (ref 12–15.9)
HGB UR QL STRIP.AUTO: NEGATIVE
HMPV RNA NPH QL NAA+NON-PROBE: NOT DETECTED
HPIV1 RNA ISLT QL NAA+PROBE: NOT DETECTED
HPIV2 RNA SPEC QL NAA+PROBE: NOT DETECTED
HPIV3 RNA NPH QL NAA+PROBE: NOT DETECTED
HPIV4 P GENE NPH QL NAA+PROBE: NOT DETECTED
INR PPP: 1.07 (ref 0.93–1.1)
KETONES UR QL STRIP: ABNORMAL
LEUKOCYTE ESTERASE UR QL STRIP.AUTO: NEGATIVE
LYMPHOCYTES # BLD AUTO: 2.2 10*3/MM3 (ref 0.7–3.1)
LYMPHOCYTES NFR BLD AUTO: 21.2 % (ref 19.6–45.3)
M PNEUMO IGG SER IA-ACNC: NOT DETECTED
MAGNESIUM SERPL-MCNC: 1 MG/DL (ref 1.6–2.4)
MCH RBC QN AUTO: 20.5 PG (ref 26.6–33)
MCHC RBC AUTO-ENTMCNC: 29.9 G/DL (ref 31.5–35.7)
MCV RBC AUTO: 68.7 FL (ref 79–97)
MONOCYTES # BLD AUTO: 1.2 10*3/MM3 (ref 0.1–0.9)
MONOCYTES NFR BLD AUTO: 11.5 % (ref 5–12)
NEUTROPHILS NFR BLD AUTO: 6.8 10*3/MM3 (ref 1.7–7)
NEUTROPHILS NFR BLD AUTO: 64.6 % (ref 42.7–76)
NITRITE UR QL STRIP: NEGATIVE
NRBC BLD AUTO-RTO: 0 /100 WBC (ref 0–0.2)
NT-PROBNP SERPL-MCNC: 1851 PG/ML (ref 0–1800)
PH UR STRIP.AUTO: <=5 [PH] (ref 5–8)
PLATELET # BLD AUTO: 363 10*3/MM3 (ref 140–450)
PMV BLD AUTO: 7.8 FL (ref 6–12)
POTASSIUM SERPL-SCNC: 3.9 MMOL/L (ref 3.5–5.2)
PROT SERPL-MCNC: 7.3 G/DL (ref 6–8.5)
PROT UR QL STRIP: ABNORMAL
PROTHROMBIN TIME: 11 SECONDS (ref 9.6–11.7)
RBC # BLD AUTO: 3.9 10*6/MM3 (ref 3.77–5.28)
RHINOVIRUS RNA SPEC NAA+PROBE: NOT DETECTED
RSV RNA NPH QL NAA+NON-PROBE: NOT DETECTED
SARS-COV-2 RNA NPH QL NAA+NON-PROBE: NOT DETECTED
SODIUM SERPL-SCNC: 142 MMOL/L (ref 136–145)
SP GR UR STRIP: 1.02 (ref 1–1.03)
TROPONIN T SERPL-MCNC: <0.01 NG/ML (ref 0–0.03)
URATE SERPL-MCNC: 5 MG/DL (ref 2.4–5.7)
UROBILINOGEN UR QL STRIP: ABNORMAL
WBC NRBC COR # BLD: 10.5 10*3/MM3 (ref 3.4–10.8)
WHOLE BLOOD HOLD COAG: NORMAL
WHOLE BLOOD HOLD SPECIMEN: NORMAL

## 2022-06-27 PROCEDURE — 71046 X-RAY EXAM CHEST 2 VIEWS: CPT

## 2022-06-27 PROCEDURE — 81003 URINALYSIS AUTO W/O SCOPE: CPT | Performed by: EMERGENCY MEDICINE

## 2022-06-27 PROCEDURE — 0202U NFCT DS 22 TRGT SARS-COV-2: CPT

## 2022-06-27 PROCEDURE — G0378 HOSPITAL OBSERVATION PER HR: HCPCS

## 2022-06-27 PROCEDURE — 93005 ELECTROCARDIOGRAM TRACING: CPT | Performed by: INTERNAL MEDICINE

## 2022-06-27 PROCEDURE — 84550 ASSAY OF BLOOD/URIC ACID: CPT | Performed by: NURSE PRACTITIONER

## 2022-06-27 PROCEDURE — 85610 PROTHROMBIN TIME: CPT | Performed by: NURSE PRACTITIONER

## 2022-06-27 PROCEDURE — 86140 C-REACTIVE PROTEIN: CPT | Performed by: NURSE PRACTITIONER

## 2022-06-27 PROCEDURE — 80053 COMPREHEN METABOLIC PANEL: CPT | Performed by: NURSE PRACTITIONER

## 2022-06-27 PROCEDURE — 85025 COMPLETE CBC W/AUTO DIFF WBC: CPT | Performed by: NURSE PRACTITIONER

## 2022-06-27 PROCEDURE — 85730 THROMBOPLASTIN TIME PARTIAL: CPT | Performed by: NURSE PRACTITIONER

## 2022-06-27 PROCEDURE — 73610 X-RAY EXAM OF ANKLE: CPT

## 2022-06-27 PROCEDURE — 25010000002 ONDANSETRON PER 1 MG: Performed by: NURSE PRACTITIONER

## 2022-06-27 PROCEDURE — 25010000002 ENOXAPARIN PER 10 MG: Performed by: NURSE PRACTITIONER

## 2022-06-27 PROCEDURE — 82962 GLUCOSE BLOOD TEST: CPT

## 2022-06-27 PROCEDURE — 83036 HEMOGLOBIN GLYCOSYLATED A1C: CPT | Performed by: NURSE PRACTITIONER

## 2022-06-27 PROCEDURE — 83735 ASSAY OF MAGNESIUM: CPT | Performed by: NURSE PRACTITIONER

## 2022-06-27 PROCEDURE — 99285 EMERGENCY DEPT VISIT HI MDM: CPT

## 2022-06-27 PROCEDURE — 25010000002 MAGNESIUM SULFATE 2 GM/50ML SOLUTION: Performed by: NURSE PRACTITIONER

## 2022-06-27 PROCEDURE — 70450 CT HEAD/BRAIN W/O DYE: CPT

## 2022-06-27 PROCEDURE — 83880 ASSAY OF NATRIURETIC PEPTIDE: CPT | Performed by: NURSE PRACTITIONER

## 2022-06-27 PROCEDURE — 0 MORPHINE SULFATE (PF) 4 MG/ML SOLUTION: Performed by: NURSE PRACTITIONER

## 2022-06-27 PROCEDURE — 84484 ASSAY OF TROPONIN QUANT: CPT | Performed by: NURSE PRACTITIONER

## 2022-06-27 PROCEDURE — P9612 CATHETERIZE FOR URINE SPEC: HCPCS

## 2022-06-27 PROCEDURE — 86038 ANTINUCLEAR ANTIBODIES: CPT | Performed by: INTERNAL MEDICINE

## 2022-06-27 PROCEDURE — 85652 RBC SED RATE AUTOMATED: CPT | Performed by: NURSE PRACTITIONER

## 2022-06-27 PROCEDURE — 93005 ELECTROCARDIOGRAM TRACING: CPT | Performed by: EMERGENCY MEDICINE

## 2022-06-27 RX ORDER — ONDANSETRON 2 MG/ML
4 INJECTION INTRAMUSCULAR; INTRAVENOUS EVERY 6 HOURS PRN
Status: DISCONTINUED | OUTPATIENT
Start: 2022-06-27 | End: 2022-07-01 | Stop reason: HOSPADM

## 2022-06-27 RX ORDER — POTASSIUM CHLORIDE 20 MEQ/1
40 TABLET, EXTENDED RELEASE ORAL AS NEEDED
Status: DISCONTINUED | OUTPATIENT
Start: 2022-06-27 | End: 2022-07-01 | Stop reason: HOSPADM

## 2022-06-27 RX ORDER — POTASSIUM CHLORIDE 1.5 G/1.77G
40 POWDER, FOR SOLUTION ORAL AS NEEDED
Status: DISCONTINUED | OUTPATIENT
Start: 2022-06-27 | End: 2022-07-01 | Stop reason: HOSPADM

## 2022-06-27 RX ORDER — ENOXAPARIN SODIUM 100 MG/ML
40 INJECTION SUBCUTANEOUS
Status: DISCONTINUED | OUTPATIENT
Start: 2022-06-27 | End: 2022-07-01 | Stop reason: HOSPADM

## 2022-06-27 RX ORDER — SODIUM CHLORIDE 0.9 % (FLUSH) 0.9 %
3-10 SYRINGE (ML) INJECTION AS NEEDED
Status: DISCONTINUED | OUTPATIENT
Start: 2022-06-27 | End: 2022-07-01 | Stop reason: HOSPADM

## 2022-06-27 RX ORDER — ATORVASTATIN CALCIUM 10 MG/1
10 TABLET, FILM COATED ORAL NIGHTLY
Status: DISCONTINUED | OUTPATIENT
Start: 2022-06-27 | End: 2022-07-01 | Stop reason: HOSPADM

## 2022-06-27 RX ORDER — MAGNESIUM SULFATE HEPTAHYDRATE 40 MG/ML
2 INJECTION, SOLUTION INTRAVENOUS ONCE
Status: COMPLETED | OUTPATIENT
Start: 2022-06-27 | End: 2022-06-27

## 2022-06-27 RX ORDER — DEXTROSE MONOHYDRATE 25 G/50ML
25 INJECTION, SOLUTION INTRAVENOUS
Status: DISCONTINUED | OUTPATIENT
Start: 2022-06-27 | End: 2022-07-01 | Stop reason: HOSPADM

## 2022-06-27 RX ORDER — MORPHINE SULFATE 4 MG/ML
4 INJECTION, SOLUTION INTRAMUSCULAR; INTRAVENOUS ONCE
Status: COMPLETED | OUTPATIENT
Start: 2022-06-27 | End: 2022-06-27

## 2022-06-27 RX ORDER — ACETAMINOPHEN 325 MG/1
650 TABLET ORAL EVERY 4 HOURS PRN
Status: DISCONTINUED | OUTPATIENT
Start: 2022-06-27 | End: 2022-07-01 | Stop reason: HOSPADM

## 2022-06-27 RX ORDER — POTASSIUM CHLORIDE 7.45 MG/ML
10 INJECTION INTRAVENOUS
Status: DISCONTINUED | OUTPATIENT
Start: 2022-06-27 | End: 2022-07-01 | Stop reason: HOSPADM

## 2022-06-27 RX ORDER — FUROSEMIDE 20 MG/1
20 TABLET ORAL DAILY
Status: DISCONTINUED | OUTPATIENT
Start: 2022-06-28 | End: 2022-06-29

## 2022-06-27 RX ORDER — INSULIN LISPRO 100 [IU]/ML
0-7 INJECTION, SOLUTION INTRAVENOUS; SUBCUTANEOUS AS NEEDED
Status: DISCONTINUED | OUTPATIENT
Start: 2022-06-27 | End: 2022-06-28

## 2022-06-27 RX ORDER — MECLIZINE HYDROCHLORIDE 25 MG/1
25 TABLET ORAL 3 TIMES DAILY PRN
Status: DISCONTINUED | OUTPATIENT
Start: 2022-06-27 | End: 2022-07-01 | Stop reason: HOSPADM

## 2022-06-27 RX ORDER — DILTIAZEM HYDROCHLORIDE 5 MG/ML
10 INJECTION INTRAVENOUS ONCE
Status: COMPLETED | OUTPATIENT
Start: 2022-06-27 | End: 2022-06-27

## 2022-06-27 RX ORDER — NITROGLYCERIN 0.4 MG/1
0.4 TABLET SUBLINGUAL
Status: DISCONTINUED | OUTPATIENT
Start: 2022-06-27 | End: 2022-07-01 | Stop reason: HOSPADM

## 2022-06-27 RX ORDER — ONDANSETRON 2 MG/ML
4 INJECTION INTRAMUSCULAR; INTRAVENOUS ONCE
Status: COMPLETED | OUTPATIENT
Start: 2022-06-27 | End: 2022-06-27

## 2022-06-27 RX ORDER — INSULIN LISPRO 100 [IU]/ML
0-7 INJECTION, SOLUTION INTRAVENOUS; SUBCUTANEOUS
Status: DISCONTINUED | OUTPATIENT
Start: 2022-06-28 | End: 2022-06-28

## 2022-06-27 RX ORDER — ONDANSETRON 4 MG/1
4 TABLET, FILM COATED ORAL EVERY 6 HOURS PRN
Status: DISCONTINUED | OUTPATIENT
Start: 2022-06-27 | End: 2022-07-01 | Stop reason: HOSPADM

## 2022-06-27 RX ORDER — MAGNESIUM SULFATE HEPTAHYDRATE 40 MG/ML
2 INJECTION, SOLUTION INTRAVENOUS AS NEEDED
Status: DISCONTINUED | OUTPATIENT
Start: 2022-06-27 | End: 2022-07-01 | Stop reason: HOSPADM

## 2022-06-27 RX ORDER — SODIUM CHLORIDE 0.9 % (FLUSH) 0.9 %
3 SYRINGE (ML) INJECTION EVERY 12 HOURS SCHEDULED
Status: DISCONTINUED | OUTPATIENT
Start: 2022-06-27 | End: 2022-07-01 | Stop reason: HOSPADM

## 2022-06-27 RX ORDER — ISOSORBIDE MONONITRATE 30 MG/1
30 TABLET, EXTENDED RELEASE ORAL DAILY
Status: DISCONTINUED | OUTPATIENT
Start: 2022-06-28 | End: 2022-07-01 | Stop reason: HOSPADM

## 2022-06-27 RX ORDER — NICOTINE POLACRILEX 4 MG
15 LOZENGE BUCCAL
Status: DISCONTINUED | OUTPATIENT
Start: 2022-06-27 | End: 2022-07-01 | Stop reason: HOSPADM

## 2022-06-27 RX ORDER — OLANZAPINE 10 MG/2ML
1 INJECTION, POWDER, LYOPHILIZED, FOR SOLUTION INTRAMUSCULAR AS NEEDED
Status: DISCONTINUED | OUTPATIENT
Start: 2022-06-27 | End: 2022-07-01 | Stop reason: HOSPADM

## 2022-06-27 RX ORDER — HYDRALAZINE HYDROCHLORIDE 20 MG/ML
20 INJECTION INTRAMUSCULAR; INTRAVENOUS EVERY 6 HOURS PRN
Status: DISCONTINUED | OUTPATIENT
Start: 2022-06-27 | End: 2022-07-01 | Stop reason: HOSPADM

## 2022-06-27 RX ORDER — CHOLECALCIFEROL (VITAMIN D3) 125 MCG
5 CAPSULE ORAL NIGHTLY PRN
Status: DISCONTINUED | OUTPATIENT
Start: 2022-06-27 | End: 2022-07-01 | Stop reason: HOSPADM

## 2022-06-27 RX ORDER — DILTIAZEM HYDROCHLORIDE 240 MG/1
240 CAPSULE, COATED, EXTENDED RELEASE ORAL DAILY
Status: DISCONTINUED | OUTPATIENT
Start: 2022-06-28 | End: 2022-07-01 | Stop reason: HOSPADM

## 2022-06-27 RX ORDER — SODIUM CHLORIDE 0.9 % (FLUSH) 0.9 %
10 SYRINGE (ML) INJECTION AS NEEDED
Status: DISCONTINUED | OUTPATIENT
Start: 2022-06-27 | End: 2022-07-01 | Stop reason: HOSPADM

## 2022-06-27 RX ORDER — HYDROCODONE BITARTRATE AND ACETAMINOPHEN 7.5; 325 MG/1; MG/1
1 TABLET ORAL EVERY 6 HOURS PRN
Status: DISCONTINUED | OUTPATIENT
Start: 2022-06-27 | End: 2022-06-30

## 2022-06-27 RX ORDER — MAGNESIUM SULFATE HEPTAHYDRATE 40 MG/ML
4 INJECTION, SOLUTION INTRAVENOUS AS NEEDED
Status: DISCONTINUED | OUTPATIENT
Start: 2022-06-27 | End: 2022-07-01 | Stop reason: HOSPADM

## 2022-06-27 RX ORDER — FLUOXETINE HYDROCHLORIDE 20 MG/1
40 CAPSULE ORAL DAILY
Status: DISCONTINUED | OUTPATIENT
Start: 2022-06-28 | End: 2022-06-30

## 2022-06-27 RX ORDER — PANTOPRAZOLE SODIUM 40 MG/1
40 TABLET, DELAYED RELEASE ORAL
Status: DISCONTINUED | OUTPATIENT
Start: 2022-06-28 | End: 2022-06-29

## 2022-06-27 RX ORDER — ZOLPIDEM TARTRATE 5 MG/1
5 TABLET ORAL NIGHTLY PRN
Status: DISCONTINUED | OUTPATIENT
Start: 2022-06-27 | End: 2022-06-30

## 2022-06-27 RX ORDER — MORPHINE SULFATE 2 MG/ML
2 INJECTION, SOLUTION INTRAMUSCULAR; INTRAVENOUS EVERY 4 HOURS PRN
Status: DISCONTINUED | OUTPATIENT
Start: 2022-06-27 | End: 2022-06-30

## 2022-06-27 RX ADMIN — HYDROCODONE BITARTRATE AND ACETAMINOPHEN 1 TABLET: 7.5; 325 TABLET ORAL at 22:36

## 2022-06-27 RX ADMIN — ENOXAPARIN SODIUM 40 MG: 100 INJECTION SUBCUTANEOUS at 21:49

## 2022-06-27 RX ADMIN — DILTIAZEM HYDROCHLORIDE 10 MG: 5 INJECTION INTRAVENOUS at 20:13

## 2022-06-27 RX ADMIN — MORPHINE SULFATE 4 MG: 4 INJECTION, SOLUTION INTRAMUSCULAR; INTRAVENOUS at 19:23

## 2022-06-27 RX ADMIN — ONDANSETRON 4 MG: 2 INJECTION INTRAMUSCULAR; INTRAVENOUS at 19:23

## 2022-06-27 RX ADMIN — Medication 3 ML: at 21:51

## 2022-06-27 RX ADMIN — MAGNESIUM SULFATE HEPTAHYDRATE 2 G: 2 INJECTION, SOLUTION INTRAVENOUS at 21:51

## 2022-06-27 RX ADMIN — MAGNESIUM SULFATE HEPTAHYDRATE 2 G: 2 INJECTION, SOLUTION INTRAVENOUS at 18:24

## 2022-06-27 RX ADMIN — MAGNESIUM SULFATE HEPTAHYDRATE 2 G: 2 INJECTION, SOLUTION INTRAVENOUS at 23:54

## 2022-06-28 ENCOUNTER — APPOINTMENT (OUTPATIENT)
Dept: CT IMAGING | Facility: HOSPITAL | Age: 87
End: 2022-06-28

## 2022-06-28 LAB
ANION GAP SERPL CALCULATED.3IONS-SCNC: 14 MMOL/L (ref 5–15)
BASOPHILS # BLD AUTO: 0.2 10*3/MM3 (ref 0–0.2)
BASOPHILS # BLD AUTO: 0.2 10*3/MM3 (ref 0–0.2)
BASOPHILS NFR BLD AUTO: 1.4 % (ref 0–1.5)
BASOPHILS NFR BLD AUTO: 1.5 % (ref 0–1.5)
BUN SERPL-MCNC: 16 MG/DL (ref 8–23)
BUN/CREAT SERPL: 15.2 (ref 7–25)
CALCIUM SPEC-SCNC: 7.8 MG/DL (ref 8.6–10.5)
CHLORIDE SERPL-SCNC: 101 MMOL/L (ref 98–107)
CO2 SERPL-SCNC: 22 MMOL/L (ref 22–29)
CREAT SERPL-MCNC: 1.05 MG/DL (ref 0.57–1)
DEPRECATED RDW RBC AUTO: 43.3 FL (ref 37–54)
DEPRECATED RDW RBC AUTO: 44.6 FL (ref 37–54)
EGFRCR SERPLBLD CKD-EPI 2021: 51.2 ML/MIN/1.73
EOSINOPHIL # BLD AUTO: 0.1 10*3/MM3 (ref 0–0.4)
EOSINOPHIL # BLD AUTO: 0.2 10*3/MM3 (ref 0–0.4)
EOSINOPHIL NFR BLD AUTO: 0.8 % (ref 0.3–6.2)
EOSINOPHIL NFR BLD AUTO: 1.4 % (ref 0.3–6.2)
ERYTHROCYTE [DISTWIDTH] IN BLOOD BY AUTOMATED COUNT: 17.9 % (ref 12.3–15.4)
ERYTHROCYTE [DISTWIDTH] IN BLOOD BY AUTOMATED COUNT: 18.2 % (ref 12.3–15.4)
GLUCOSE BLDC GLUCOMTR-MCNC: 153 MG/DL (ref 70–105)
GLUCOSE BLDC GLUCOMTR-MCNC: 156 MG/DL (ref 70–105)
GLUCOSE BLDC GLUCOMTR-MCNC: 194 MG/DL (ref 70–105)
GLUCOSE BLDC GLUCOMTR-MCNC: 207 MG/DL (ref 70–105)
GLUCOSE SERPL-MCNC: 155 MG/DL (ref 65–99)
HCT VFR BLD AUTO: 23.1 % (ref 34–46.6)
HCT VFR BLD AUTO: 24.9 % (ref 34–46.6)
HGB BLD-MCNC: 7.1 G/DL (ref 12–15.9)
HGB BLD-MCNC: 7.4 G/DL (ref 12–15.9)
IRON 24H UR-MRATE: 22 MCG/DL (ref 37–145)
IRON SATN MFR SERPL: 5 % (ref 20–50)
LYMPHOCYTES # BLD AUTO: 1.4 10*3/MM3 (ref 0.7–3.1)
LYMPHOCYTES # BLD AUTO: 2.1 10*3/MM3 (ref 0.7–3.1)
LYMPHOCYTES NFR BLD AUTO: 12.7 % (ref 19.6–45.3)
LYMPHOCYTES NFR BLD AUTO: 16.4 % (ref 19.6–45.3)
MAGNESIUM SERPL-MCNC: 3.7 MG/DL (ref 1.6–2.4)
MCH RBC QN AUTO: 20.2 PG (ref 26.6–33)
MCH RBC QN AUTO: 20.7 PG (ref 26.6–33)
MCHC RBC AUTO-ENTMCNC: 29.6 G/DL (ref 31.5–35.7)
MCHC RBC AUTO-ENTMCNC: 30.5 G/DL (ref 31.5–35.7)
MCV RBC AUTO: 68 FL (ref 79–97)
MCV RBC AUTO: 68.1 FL (ref 79–97)
MONOCYTES # BLD AUTO: 1.5 10*3/MM3 (ref 0.1–0.9)
MONOCYTES # BLD AUTO: 2 10*3/MM3 (ref 0.1–0.9)
MONOCYTES NFR BLD AUTO: 13.6 % (ref 5–12)
MONOCYTES NFR BLD AUTO: 15.6 % (ref 5–12)
NEUTROPHILS NFR BLD AUTO: 65.1 % (ref 42.7–76)
NEUTROPHILS NFR BLD AUTO: 7.6 10*3/MM3 (ref 1.7–7)
NEUTROPHILS NFR BLD AUTO: 71.5 % (ref 42.7–76)
NEUTROPHILS NFR BLD AUTO: 8.1 10*3/MM3 (ref 1.7–7)
NRBC BLD AUTO-RTO: 0.1 /100 WBC (ref 0–0.2)
NRBC BLD AUTO-RTO: 0.2 /100 WBC (ref 0–0.2)
PLATELET # BLD AUTO: 294 10*3/MM3 (ref 140–450)
PLATELET # BLD AUTO: 301 10*3/MM3 (ref 140–450)
PMV BLD AUTO: 8 FL (ref 6–12)
PMV BLD AUTO: 8.1 FL (ref 6–12)
POTASSIUM SERPL-SCNC: 4 MMOL/L (ref 3.5–5.2)
RBC # BLD AUTO: 3.4 10*6/MM3 (ref 3.77–5.28)
RBC # BLD AUTO: 3.65 10*6/MM3 (ref 3.77–5.28)
SODIUM SERPL-SCNC: 137 MMOL/L (ref 136–145)
TIBC SERPL-MCNC: 466 MCG/DL (ref 298–536)
TRANSFERRIN SERPL-MCNC: 313 MG/DL (ref 200–360)
URATE SERPL-MCNC: 5.3 MG/DL (ref 2.4–5.7)
WBC NRBC COR # BLD: 10.7 10*3/MM3 (ref 3.4–10.8)
WBC NRBC COR # BLD: 12.5 10*3/MM3 (ref 3.4–10.8)

## 2022-06-28 PROCEDURE — 83735 ASSAY OF MAGNESIUM: CPT | Performed by: NURSE PRACTITIONER

## 2022-06-28 PROCEDURE — 71250 CT THORAX DX C-: CPT

## 2022-06-28 PROCEDURE — 74176 CT ABD & PELVIS W/O CONTRAST: CPT

## 2022-06-28 PROCEDURE — G0378 HOSPITAL OBSERVATION PER HR: HCPCS

## 2022-06-28 PROCEDURE — 80048 BASIC METABOLIC PNL TOTAL CA: CPT | Performed by: NURSE PRACTITIONER

## 2022-06-28 PROCEDURE — 25010000002 NA FERRIC GLUC CPLX PER 12.5 MG: Performed by: NURSE PRACTITIONER

## 2022-06-28 PROCEDURE — 25010000002 MAGNESIUM SULFATE 2 GM/50ML SOLUTION: Performed by: NURSE PRACTITIONER

## 2022-06-28 PROCEDURE — 25010000002 ENOXAPARIN PER 10 MG: Performed by: NURSE PRACTITIONER

## 2022-06-28 PROCEDURE — 83540 ASSAY OF IRON: CPT | Performed by: NURSE PRACTITIONER

## 2022-06-28 PROCEDURE — 82962 GLUCOSE BLOOD TEST: CPT

## 2022-06-28 PROCEDURE — 84466 ASSAY OF TRANSFERRIN: CPT | Performed by: NURSE PRACTITIONER

## 2022-06-28 PROCEDURE — 85025 COMPLETE CBC W/AUTO DIFF WBC: CPT | Performed by: NURSE PRACTITIONER

## 2022-06-28 PROCEDURE — 36415 COLL VENOUS BLD VENIPUNCTURE: CPT | Performed by: NURSE PRACTITIONER

## 2022-06-28 PROCEDURE — 63710000001 INSULIN LISPRO (HUMAN) PER 5 UNITS: Performed by: NURSE PRACTITIONER

## 2022-06-28 PROCEDURE — 97162 PT EVAL MOD COMPLEX 30 MIN: CPT

## 2022-06-28 PROCEDURE — 84550 ASSAY OF BLOOD/URIC ACID: CPT | Performed by: INTERNAL MEDICINE

## 2022-06-28 RX ORDER — SPIRONOLACTONE 25 MG/1
25 TABLET ORAL DAILY
Status: DISCONTINUED | OUTPATIENT
Start: 2022-06-28 | End: 2022-07-01 | Stop reason: HOSPADM

## 2022-06-28 RX ORDER — DEXTROSE MONOHYDRATE 25 G/50ML
25 INJECTION, SOLUTION INTRAVENOUS
Status: DISCONTINUED | OUTPATIENT
Start: 2022-06-28 | End: 2022-06-28 | Stop reason: SDUPTHER

## 2022-06-28 RX ORDER — NICOTINE POLACRILEX 4 MG
15 LOZENGE BUCCAL
Status: DISCONTINUED | OUTPATIENT
Start: 2022-06-28 | End: 2022-06-28 | Stop reason: SDUPTHER

## 2022-06-28 RX ORDER — INSULIN LISPRO 100 [IU]/ML
0-9 INJECTION, SOLUTION INTRAVENOUS; SUBCUTANEOUS
Status: DISCONTINUED | OUTPATIENT
Start: 2022-06-28 | End: 2022-07-01 | Stop reason: HOSPADM

## 2022-06-28 RX ORDER — INSULIN LISPRO 100 [IU]/ML
0-9 INJECTION, SOLUTION INTRAVENOUS; SUBCUTANEOUS AS NEEDED
Status: DISCONTINUED | OUTPATIENT
Start: 2022-06-28 | End: 2022-07-01 | Stop reason: HOSPADM

## 2022-06-28 RX ORDER — NEBIVOLOL 10 MG/1
20 TABLET ORAL DAILY
Status: DISCONTINUED | OUTPATIENT
Start: 2022-06-28 | End: 2022-07-01 | Stop reason: HOSPADM

## 2022-06-28 RX ORDER — OLANZAPINE 10 MG/2ML
1 INJECTION, POWDER, LYOPHILIZED, FOR SOLUTION INTRAMUSCULAR AS NEEDED
Status: DISCONTINUED | OUTPATIENT
Start: 2022-06-28 | End: 2022-06-28 | Stop reason: SDUPTHER

## 2022-06-28 RX ORDER — IPRATROPIUM BROMIDE AND ALBUTEROL SULFATE 2.5; .5 MG/3ML; MG/3ML
3 SOLUTION RESPIRATORY (INHALATION) EVERY 4 HOURS PRN
Status: DISCONTINUED | OUTPATIENT
Start: 2022-06-28 | End: 2022-07-01 | Stop reason: HOSPADM

## 2022-06-28 RX ADMIN — NEBIVOLOL 20 MG: 10 TABLET ORAL at 11:04

## 2022-06-28 RX ADMIN — Medication 3 ML: at 10:48

## 2022-06-28 RX ADMIN — ENOXAPARIN SODIUM 40 MG: 100 INJECTION SUBCUTANEOUS at 16:33

## 2022-06-28 RX ADMIN — INSULIN LISPRO 2 UNITS: 100 INJECTION, SOLUTION INTRAVENOUS; SUBCUTANEOUS at 11:34

## 2022-06-28 RX ADMIN — MAGNESIUM SULFATE HEPTAHYDRATE 2 G: 2 INJECTION, SOLUTION INTRAVENOUS at 04:09

## 2022-06-28 RX ADMIN — Medication 3 ML: at 20:30

## 2022-06-28 RX ADMIN — FLUOXETINE 40 MG: 20 CAPSULE ORAL at 11:03

## 2022-06-28 RX ADMIN — ISOSORBIDE MONONITRATE 30 MG: 30 TABLET, EXTENDED RELEASE ORAL at 11:04

## 2022-06-28 RX ADMIN — SODIUM CHLORIDE 250 MG: 9 INJECTION, SOLUTION INTRAVENOUS at 13:50

## 2022-06-28 RX ADMIN — ZOLPIDEM TARTRATE 5 MG: 5 TABLET ORAL at 20:33

## 2022-06-28 RX ADMIN — DILTIAZEM HYDROCHLORIDE 240 MG: 240 CAPSULE, COATED, EXTENDED RELEASE ORAL at 11:04

## 2022-06-28 RX ADMIN — MAGNESIUM SULFATE HEPTAHYDRATE 2 G: 2 INJECTION, SOLUTION INTRAVENOUS at 02:00

## 2022-06-28 RX ADMIN — FUROSEMIDE 20 MG: 20 TABLET ORAL at 11:03

## 2022-06-28 RX ADMIN — PANTOPRAZOLE SODIUM 40 MG: 40 TABLET, DELAYED RELEASE ORAL at 11:04

## 2022-06-28 RX ADMIN — ATORVASTATIN CALCIUM 10 MG: 10 TABLET, FILM COATED ORAL at 20:30

## 2022-06-28 RX ADMIN — INSULIN LISPRO 2 UNITS: 100 INJECTION, SOLUTION INTRAVENOUS; SUBCUTANEOUS at 16:33

## 2022-06-28 RX ADMIN — SPIRONOLACTONE 25 MG: 25 TABLET ORAL at 11:04

## 2022-06-29 ENCOUNTER — ANESTHESIA (OUTPATIENT)
Dept: GASTROENTEROLOGY | Facility: HOSPITAL | Age: 87
End: 2022-06-29

## 2022-06-29 ENCOUNTER — INPATIENT HOSPITAL (AMBULATORY)
Dept: URBAN - METROPOLITAN AREA HOSPITAL 84 | Facility: HOSPITAL | Age: 87
End: 2022-06-29

## 2022-06-29 ENCOUNTER — ANESTHESIA EVENT (OUTPATIENT)
Dept: GASTROENTEROLOGY | Facility: HOSPITAL | Age: 87
End: 2022-06-29

## 2022-06-29 DIAGNOSIS — K31.7 POLYP OF STOMACH AND DUODENUM: ICD-10-CM

## 2022-06-29 DIAGNOSIS — K44.9 DIAPHRAGMATIC HERNIA WITHOUT OBSTRUCTION OR GANGRENE: ICD-10-CM

## 2022-06-29 DIAGNOSIS — D50.0 IRON DEFICIENCY ANEMIA SECONDARY TO BLOOD LOSS (CHRONIC): ICD-10-CM

## 2022-06-29 LAB
ABO GROUP BLD: NORMAL
ANA SER QL: NEGATIVE
ANION GAP SERPL CALCULATED.3IONS-SCNC: 13 MMOL/L (ref 5–15)
BASOPHILS # BLD AUTO: 0.1 10*3/MM3 (ref 0–0.2)
BASOPHILS NFR BLD AUTO: 0.6 % (ref 0–1.5)
BLD GP AB SCN SERPL QL: NEGATIVE
BUN SERPL-MCNC: 19 MG/DL (ref 8–23)
BUN/CREAT SERPL: 13.9 (ref 7–25)
CALCIUM SPEC-SCNC: 7.7 MG/DL (ref 8.6–10.5)
CHLORIDE SERPL-SCNC: 99 MMOL/L (ref 98–107)
CHROMATIN AB SERPL-ACNC: 181 IU/ML (ref 0–14)
CO2 SERPL-SCNC: 21 MMOL/L (ref 22–29)
CREAT SERPL-MCNC: 1.37 MG/DL (ref 0.57–1)
DEPRECATED RDW RBC AUTO: 43.3 FL (ref 37–54)
EGFRCR SERPLBLD CKD-EPI 2021: 37.2 ML/MIN/1.73
EOSINOPHIL # BLD AUTO: 0.2 10*3/MM3 (ref 0–0.4)
EOSINOPHIL NFR BLD AUTO: 1.8 % (ref 0.3–6.2)
ERYTHROCYTE [DISTWIDTH] IN BLOOD BY AUTOMATED COUNT: 17.8 % (ref 12.3–15.4)
GLUCOSE BLDC GLUCOMTR-MCNC: 141 MG/DL (ref 70–105)
GLUCOSE BLDC GLUCOMTR-MCNC: 142 MG/DL (ref 70–105)
GLUCOSE BLDC GLUCOMTR-MCNC: 152 MG/DL (ref 70–105)
GLUCOSE BLDC GLUCOMTR-MCNC: 174 MG/DL (ref 70–105)
GLUCOSE SERPL-MCNC: 137 MG/DL (ref 65–99)
HCT VFR BLD AUTO: 21.2 % (ref 34–46.6)
HCT VFR BLD AUTO: 24.1 % (ref 34–46.6)
HGB BLD-MCNC: 6.5 G/DL (ref 12–15.9)
HGB BLD-MCNC: 7.5 G/DL (ref 12–15.9)
LYMPHOCYTES # BLD AUTO: 1.2 10*3/MM3 (ref 0.7–3.1)
LYMPHOCYTES NFR BLD AUTO: 12.5 % (ref 19.6–45.3)
MCH RBC QN AUTO: 21 PG (ref 26.6–33)
MCHC RBC AUTO-ENTMCNC: 30.7 G/DL (ref 31.5–35.7)
MCV RBC AUTO: 68.3 FL (ref 79–97)
MONOCYTES # BLD AUTO: 1.2 10*3/MM3 (ref 0.1–0.9)
MONOCYTES NFR BLD AUTO: 12.4 % (ref 5–12)
NEUTROPHILS NFR BLD AUTO: 6.7 10*3/MM3 (ref 1.7–7)
NEUTROPHILS NFR BLD AUTO: 72.7 % (ref 42.7–76)
NRBC BLD AUTO-RTO: 0.2 /100 WBC (ref 0–0.2)
PLATELET # BLD AUTO: 245 10*3/MM3 (ref 140–450)
PMV BLD AUTO: 8.4 FL (ref 6–12)
POTASSIUM SERPL-SCNC: 4.2 MMOL/L (ref 3.5–5.2)
QT INTERVAL: 384 MS
RBC # BLD AUTO: 3.1 10*6/MM3 (ref 3.77–5.28)
RH BLD: POSITIVE
SODIUM SERPL-SCNC: 133 MMOL/L (ref 136–145)
T&S EXPIRATION DATE: NORMAL
WBC NRBC COR # BLD: 9.2 10*3/MM3 (ref 3.4–10.8)

## 2022-06-29 PROCEDURE — 43239 EGD BIOPSY SINGLE/MULTIPLE: CPT | Performed by: INTERNAL MEDICINE

## 2022-06-29 PROCEDURE — P9016 RBC LEUKOCYTES REDUCED: HCPCS

## 2022-06-29 PROCEDURE — 88305 TISSUE EXAM BY PATHOLOGIST: CPT | Performed by: INTERNAL MEDICINE

## 2022-06-29 PROCEDURE — 86850 RBC ANTIBODY SCREEN: CPT | Performed by: INTERNAL MEDICINE

## 2022-06-29 PROCEDURE — 86900 BLOOD TYPING SEROLOGIC ABO: CPT | Performed by: INTERNAL MEDICINE

## 2022-06-29 PROCEDURE — 36430 TRANSFUSION BLD/BLD COMPNT: CPT

## 2022-06-29 PROCEDURE — 85014 HEMATOCRIT: CPT | Performed by: NURSE PRACTITIONER

## 2022-06-29 PROCEDURE — 85018 HEMOGLOBIN: CPT | Performed by: NURSE PRACTITIONER

## 2022-06-29 PROCEDURE — 36415 COLL VENOUS BLD VENIPUNCTURE: CPT | Performed by: NURSE PRACTITIONER

## 2022-06-29 PROCEDURE — 86900 BLOOD TYPING SEROLOGIC ABO: CPT

## 2022-06-29 PROCEDURE — 80053 COMPREHEN METABOLIC PANEL: CPT | Performed by: NURSE PRACTITIONER

## 2022-06-29 PROCEDURE — 85045 AUTOMATED RETICULOCYTE COUNT: CPT | Performed by: INTERNAL MEDICINE

## 2022-06-29 PROCEDURE — 86923 COMPATIBILITY TEST ELECTRIC: CPT

## 2022-06-29 PROCEDURE — 97530 THERAPEUTIC ACTIVITIES: CPT

## 2022-06-29 PROCEDURE — 86431 RHEUMATOID FACTOR QUANT: CPT | Performed by: INTERNAL MEDICINE

## 2022-06-29 PROCEDURE — 82962 GLUCOSE BLOOD TEST: CPT

## 2022-06-29 PROCEDURE — 85025 COMPLETE CBC W/AUTO DIFF WBC: CPT | Performed by: NURSE PRACTITIONER

## 2022-06-29 PROCEDURE — 25010000002 ENOXAPARIN PER 10 MG: Performed by: NURSE PRACTITIONER

## 2022-06-29 PROCEDURE — 63710000001 DIPHENHYDRAMINE PER 50 MG: Performed by: INTERNAL MEDICINE

## 2022-06-29 PROCEDURE — 25010000002 PROPOFOL 10 MG/ML EMULSION: Performed by: ANESTHESIOLOGIST ASSISTANT

## 2022-06-29 PROCEDURE — 0DB98ZX EXCISION OF DUODENUM, VIA NATURAL OR ARTIFICIAL OPENING ENDOSCOPIC, DIAGNOSTIC: ICD-10-PCS | Performed by: INTERNAL MEDICINE

## 2022-06-29 PROCEDURE — 86901 BLOOD TYPING SEROLOGIC RH(D): CPT | Performed by: INTERNAL MEDICINE

## 2022-06-29 PROCEDURE — 63710000001 INSULIN LISPRO (HUMAN) PER 5 UNITS: Performed by: NURSE PRACTITIONER

## 2022-06-29 RX ORDER — PANTOPRAZOLE SODIUM 40 MG/1
40 TABLET, DELAYED RELEASE ORAL
Status: DISCONTINUED | OUTPATIENT
Start: 2022-06-29 | End: 2022-07-01 | Stop reason: HOSPADM

## 2022-06-29 RX ORDER — PHENYLEPHRINE HCL IN 0.9% NACL 1 MG/10 ML
SYRINGE (ML) INTRAVENOUS AS NEEDED
Status: DISCONTINUED | OUTPATIENT
Start: 2022-06-29 | End: 2022-06-29 | Stop reason: SURG

## 2022-06-29 RX ORDER — LIDOCAINE HYDROCHLORIDE 20 MG/ML
INJECTION, SOLUTION EPIDURAL; INFILTRATION; INTRACAUDAL; PERINEURAL AS NEEDED
Status: DISCONTINUED | OUTPATIENT
Start: 2022-06-29 | End: 2022-06-29 | Stop reason: SURG

## 2022-06-29 RX ORDER — DIPHENHYDRAMINE HCL 25 MG
25 CAPSULE ORAL ONCE
Status: DISCONTINUED | OUTPATIENT
Start: 2022-06-29 | End: 2022-06-30

## 2022-06-29 RX ORDER — SODIUM CHLORIDE 0.9 % (FLUSH) 0.9 %
3 SYRINGE (ML) INJECTION EVERY 12 HOURS SCHEDULED
Status: DISCONTINUED | OUTPATIENT
Start: 2022-06-29 | End: 2022-07-01 | Stop reason: HOSPADM

## 2022-06-29 RX ORDER — DIPHENHYDRAMINE HCL 25 MG
25 CAPSULE ORAL EVERY 6 HOURS PRN
Status: DISCONTINUED | OUTPATIENT
Start: 2022-06-29 | End: 2022-06-30

## 2022-06-29 RX ORDER — GLYCOPYRROLATE 0.2 MG/ML
INJECTION INTRAMUSCULAR; INTRAVENOUS AS NEEDED
Status: DISCONTINUED | OUTPATIENT
Start: 2022-06-29 | End: 2022-06-29 | Stop reason: SURG

## 2022-06-29 RX ORDER — SODIUM CHLORIDE 9 MG/ML
INJECTION, SOLUTION INTRAVENOUS CONTINUOUS PRN
Status: DISCONTINUED | OUTPATIENT
Start: 2022-06-29 | End: 2022-06-29 | Stop reason: SURG

## 2022-06-29 RX ORDER — PROPOFOL 10 MG/ML
VIAL (ML) INTRAVENOUS AS NEEDED
Status: DISCONTINUED | OUTPATIENT
Start: 2022-06-29 | End: 2022-06-29 | Stop reason: SURG

## 2022-06-29 RX ORDER — SODIUM CHLORIDE 0.9 % (FLUSH) 0.9 %
3-10 SYRINGE (ML) INJECTION AS NEEDED
Status: DISCONTINUED | OUTPATIENT
Start: 2022-06-29 | End: 2022-07-01 | Stop reason: HOSPADM

## 2022-06-29 RX ADMIN — DILTIAZEM HYDROCHLORIDE 240 MG: 240 CAPSULE, COATED, EXTENDED RELEASE ORAL at 09:34

## 2022-06-29 RX ADMIN — GLYCOPYRROLATE 0.1 MG: 0.2 INJECTION INTRAMUSCULAR; INTRAVENOUS at 12:50

## 2022-06-29 RX ADMIN — ISOSORBIDE MONONITRATE 30 MG: 30 TABLET, EXTENDED RELEASE ORAL at 09:35

## 2022-06-29 RX ADMIN — FLUOXETINE 40 MG: 20 CAPSULE ORAL at 09:35

## 2022-06-29 RX ADMIN — LIDOCAINE HYDROCHLORIDE 80 MG: 20 INJECTION, SOLUTION EPIDURAL; INFILTRATION; INTRACAUDAL; PERINEURAL at 12:48

## 2022-06-29 RX ADMIN — INSULIN LISPRO 2 UNITS: 100 INJECTION, SOLUTION INTRAVENOUS; SUBCUTANEOUS at 09:34

## 2022-06-29 RX ADMIN — Medication 3 ML: at 20:46

## 2022-06-29 RX ADMIN — ENOXAPARIN SODIUM 40 MG: 100 INJECTION SUBCUTANEOUS at 16:53

## 2022-06-29 RX ADMIN — SPIRONOLACTONE 25 MG: 25 TABLET ORAL at 09:34

## 2022-06-29 RX ADMIN — HYDROCODONE BITARTRATE AND ACETAMINOPHEN 1 TABLET: 7.5; 325 TABLET ORAL at 10:52

## 2022-06-29 RX ADMIN — ZOLPIDEM TARTRATE 5 MG: 5 TABLET ORAL at 20:46

## 2022-06-29 RX ADMIN — Medication 3 ML: at 09:36

## 2022-06-29 RX ADMIN — PROPOFOL 140 MG: 10 INJECTION, EMULSION INTRAVENOUS at 12:48

## 2022-06-29 RX ADMIN — NEBIVOLOL 20 MG: 10 TABLET ORAL at 09:35

## 2022-06-29 RX ADMIN — DIPHENHYDRAMINE HYDROCHLORIDE 25 MG: 25 CAPSULE ORAL at 09:34

## 2022-06-29 RX ADMIN — Medication 100 MCG: at 12:53

## 2022-06-29 RX ADMIN — INSULIN LISPRO 2 UNITS: 100 INJECTION, SOLUTION INTRAVENOUS; SUBCUTANEOUS at 16:53

## 2022-06-29 RX ADMIN — Medication 100 MCG: at 13:00

## 2022-06-29 RX ADMIN — SODIUM CHLORIDE: 0.9 INJECTION, SOLUTION INTRAVENOUS at 12:44

## 2022-06-29 RX ADMIN — ATORVASTATIN CALCIUM 10 MG: 10 TABLET, FILM COATED ORAL at 20:46

## 2022-06-29 RX ADMIN — HYDROCODONE BITARTRATE AND ACETAMINOPHEN 1 TABLET: 7.5; 325 TABLET ORAL at 20:46

## 2022-06-29 RX ADMIN — PANTOPRAZOLE SODIUM 40 MG: 40 TABLET, DELAYED RELEASE ORAL at 16:53

## 2022-06-29 NOTE — ANESTHESIA PREPROCEDURE EVALUATION
Anesthesia Evaluation     Patient summary reviewed and Nursing notes reviewed                Airway   Mallampati: II  TM distance: >3 FB  Neck ROM: full  No difficulty expected  Dental - normal exam     Pulmonary    (+) shortness of breath, sleep apnea,   Cardiovascular     (+) hypertension, valvular problems/murmurs AS, CAD, dysrhythmias Atrial Fib, angina, CHF , hyperlipidemia,       Neuro/Psych  (+) TIA, CVA,    GI/Hepatic/Renal/Endo    (+)  hiatal hernia, GERD, PUD,  renal disease, diabetes mellitus,     Musculoskeletal     Abdominal    Substance History      OB/GYN          Other   arthritis, blood dyscrasia anemia,   history of cancer    ROS/Med Hx Other: · Estimated left ventricular EF = 50% Left ventricular systolic function is normal.  · Left ventricular wall thickness is consistent with moderate concentric hypertrophy.  · Left ventricular diastolic function is consistent with (grade II w/high LAP) pseudonormalization.  · Left atrial volume is severely increased.  · The right atrial cavity is moderate to severely dilated.  · Mild to moderate aortic valve stenosis is present.  · Estimated right ventricular systolic pressure from tricuspid regurgitation is normal (<35 mmHg).                     Anesthesia Plan    ASA 3     MAC     intravenous induction     Anesthetic plan, risks, benefits, and alternatives have been provided, discussed and informed consent has been obtained with: patient.    Plan discussed with CAA.        CODE STATUS:    Level Of Support Discussed With: Patient  Code Status (Patient has no pulse and is not breathing): CPR (Attempt to Resuscitate)  Medical Interventions (Patient has pulse or is breathing): Full Support

## 2022-06-29 NOTE — ANESTHESIA POSTPROCEDURE EVALUATION
Patient: Cherie Hinton    Procedure Summary     Date: 06/29/22 Room / Location: [facility] ENDOSCOPY 1 / [facility] ENDOSCOPY    Anesthesia Start: 1242 Anesthesia Stop: 1307    Procedure: ESOPHAGOGASTRODUODENOSCOPY with biopsy of duodenum r/o celiac (N/A ) Diagnosis:       Iron deficiency anemia due to chronic blood loss      (Iron deficiency anemia due to chronic blood loss [D50.0])    Surgeons: ADOLFO Boss MD Provider: Oscar Arias MD    Anesthesia Type: MAC ASA Status: 4          Anesthesia Type: MAC    Vitals  Vitals Value Taken Time   /54 06/29/22 1332   Temp     Pulse 68 06/29/22 1332   Resp 16 06/29/22 1332   SpO2 96 % 06/29/22 1332           Post Anesthesia Care and Evaluation    Patient location during evaluation: PACU  Patient participation: complete - patient participated  Level of consciousness: awake  Pain scale: See nurse's notes for pain score.  Pain management: adequate    Airway patency: patent  Anesthetic complications: No anesthetic complications  PONV Status: none  Cardiovascular status: acceptable  Respiratory status: acceptable  Hydration status: acceptable    Comments: Patient seen and examined postoperatively; vital signs stable; SpO2 greater than or equal to 90%; cardiopulmonary status stable; nausea/vomiting adequately controlled; pain adequately controlled; no apparent anesthesia complications; patient discharged from anesthesia care when discharge criteria were met

## 2022-06-30 ENCOUNTER — INPATIENT HOSPITAL (AMBULATORY)
Dept: URBAN - METROPOLITAN AREA HOSPITAL 84 | Facility: HOSPITAL | Age: 87
End: 2022-06-30

## 2022-06-30 DIAGNOSIS — K44.9 DIAPHRAGMATIC HERNIA WITHOUT OBSTRUCTION OR GANGRENE: ICD-10-CM

## 2022-06-30 DIAGNOSIS — D50.0 IRON DEFICIENCY ANEMIA SECONDARY TO BLOOD LOSS (CHRONIC): ICD-10-CM

## 2022-06-30 DIAGNOSIS — Z85.038 PERSONAL HISTORY OF OTHER MALIGNANT NEOPLASM OF LARGE INTEST: ICD-10-CM

## 2022-06-30 DIAGNOSIS — R10.13 EPIGASTRIC PAIN: ICD-10-CM

## 2022-06-30 LAB
ALBUMIN SERPL-MCNC: 3.6 G/DL (ref 3.5–5.2)
ALBUMIN/GLOB SERPL: 1.2 G/DL
ALP SERPL-CCNC: 96 U/L (ref 39–117)
ALT SERPL W P-5'-P-CCNC: 7 U/L (ref 1–33)
ANION GAP SERPL CALCULATED.3IONS-SCNC: 15 MMOL/L (ref 5–15)
AST SERPL-CCNC: 15 U/L (ref 1–32)
BASOPHILS # BLD AUTO: 0.1 10*3/MM3 (ref 0–0.2)
BASOPHILS NFR BLD AUTO: 0.7 % (ref 0–1.5)
BH BB BLOOD EXPIRATION DATE: NORMAL
BH BB BLOOD TYPE BARCODE: 6200
BH BB DISPENSE STATUS: NORMAL
BH BB PRODUCT CODE: NORMAL
BH BB UNIT NUMBER: NORMAL
BILIRUB SERPL-MCNC: 1.3 MG/DL (ref 0–1.2)
BUN SERPL-MCNC: 20 MG/DL (ref 8–23)
BUN/CREAT SERPL: 15.6 (ref 7–25)
CALCIUM SPEC-SCNC: 8.3 MG/DL (ref 8.6–10.5)
CHLORIDE SERPL-SCNC: 97 MMOL/L (ref 98–107)
CO2 SERPL-SCNC: 19 MMOL/L (ref 22–29)
CREAT SERPL-MCNC: 1.28 MG/DL (ref 0.57–1)
CROSSMATCH INTERPRETATION: NORMAL
DEPRECATED RDW RBC AUTO: 48.1 FL (ref 37–54)
EGFRCR SERPLBLD CKD-EPI 2021: 40.4 ML/MIN/1.73
EOSINOPHIL # BLD AUTO: 0.3 10*3/MM3 (ref 0–0.4)
EOSINOPHIL NFR BLD AUTO: 3 % (ref 0.3–6.2)
ERYTHROCYTE [DISTWIDTH] IN BLOOD BY AUTOMATED COUNT: 19.4 % (ref 12.3–15.4)
GLOBULIN UR ELPH-MCNC: 3.1 GM/DL
GLUCOSE BLDC GLUCOMTR-MCNC: 137 MG/DL (ref 70–105)
GLUCOSE BLDC GLUCOMTR-MCNC: 150 MG/DL (ref 70–105)
GLUCOSE BLDC GLUCOMTR-MCNC: 150 MG/DL (ref 70–105)
GLUCOSE BLDC GLUCOMTR-MCNC: 170 MG/DL (ref 70–105)
GLUCOSE SERPL-MCNC: 117 MG/DL (ref 65–99)
HCT VFR BLD AUTO: 23.9 % (ref 34–46.6)
HGB BLD-MCNC: 7.2 G/DL (ref 12–15.9)
LAB AP CASE REPORT: NORMAL
LYMPHOCYTES # BLD AUTO: 1.2 10*3/MM3 (ref 0.7–3.1)
LYMPHOCYTES NFR BLD AUTO: 13.1 % (ref 19.6–45.3)
MCH RBC QN AUTO: 21.2 PG (ref 26.6–33)
MCHC RBC AUTO-ENTMCNC: 30.3 G/DL (ref 31.5–35.7)
MCV RBC AUTO: 70 FL (ref 79–97)
MONOCYTES # BLD AUTO: 0.8 10*3/MM3 (ref 0.1–0.9)
MONOCYTES NFR BLD AUTO: 9.6 % (ref 5–12)
NEUTROPHILS NFR BLD AUTO: 6.5 10*3/MM3 (ref 1.7–7)
NEUTROPHILS NFR BLD AUTO: 73.6 % (ref 42.7–76)
NRBC BLD AUTO-RTO: 0.3 /100 WBC (ref 0–0.2)
PATH REPORT.FINAL DX SPEC: NORMAL
PATH REPORT.GROSS SPEC: NORMAL
PLATELET # BLD AUTO: 263 10*3/MM3 (ref 140–450)
PMV BLD AUTO: 8.6 FL (ref 6–12)
POTASSIUM SERPL-SCNC: 4.8 MMOL/L (ref 3.5–5.2)
PROT SERPL-MCNC: 6.7 G/DL (ref 6–8.5)
RBC # BLD AUTO: 3.41 10*6/MM3 (ref 3.77–5.28)
RETICS # AUTO: 0.08 10*6/MM3 (ref 0.02–0.13)
RETICS/RBC NFR AUTO: 2.38 % (ref 0.7–1.9)
SODIUM SERPL-SCNC: 131 MMOL/L (ref 136–145)
UNIT  ABO: NORMAL
UNIT  RH: NORMAL
WBC NRBC COR # BLD: 8.8 10*3/MM3 (ref 3.4–10.8)

## 2022-06-30 PROCEDURE — 25010000002 ENOXAPARIN PER 10 MG: Performed by: NURSE PRACTITIONER

## 2022-06-30 PROCEDURE — 99232 SBSQ HOSP IP/OBS MODERATE 35: CPT | Mod: FS | Performed by: NURSE PRACTITIONER

## 2022-06-30 PROCEDURE — 25010000002 NA FERRIC GLUC CPLX PER 12.5 MG: Performed by: INTERNAL MEDICINE

## 2022-06-30 PROCEDURE — 82962 GLUCOSE BLOOD TEST: CPT

## 2022-06-30 PROCEDURE — 63710000001 INSULIN LISPRO (HUMAN) PER 5 UNITS: Performed by: NURSE PRACTITIONER

## 2022-06-30 RX ORDER — ZOLPIDEM TARTRATE 10 MG/1
10 TABLET ORAL NIGHTLY PRN
COMMUNITY
End: 2022-07-01 | Stop reason: HOSPADM

## 2022-06-30 RX ORDER — SODIUM, POTASSIUM,MAG SULFATES 17.5-3.13G
1 SOLUTION, RECONSTITUTED, ORAL ORAL 2 TIMES DAILY
Status: COMPLETED | OUTPATIENT
Start: 2022-06-30 | End: 2022-07-01

## 2022-06-30 RX ORDER — OMEPRAZOLE 40 MG/1
40 CAPSULE, DELAYED RELEASE ORAL DAILY
COMMUNITY
End: 2022-12-07

## 2022-06-30 RX ADMIN — Medication 3 ML: at 20:40

## 2022-06-30 RX ADMIN — Medication 3 ML: at 20:39

## 2022-06-30 RX ADMIN — ISOSORBIDE MONONITRATE 30 MG: 30 TABLET, EXTENDED RELEASE ORAL at 08:10

## 2022-06-30 RX ADMIN — ENOXAPARIN SODIUM 40 MG: 100 INJECTION SUBCUTANEOUS at 17:08

## 2022-06-30 RX ADMIN — Medication 3 ML: at 08:10

## 2022-06-30 RX ADMIN — FLUOXETINE 40 MG: 20 CAPSULE ORAL at 08:10

## 2022-06-30 RX ADMIN — PANTOPRAZOLE SODIUM 40 MG: 40 TABLET, DELAYED RELEASE ORAL at 08:10

## 2022-06-30 RX ADMIN — SODIUM CHLORIDE 250 MG: 9 INJECTION, SOLUTION INTRAVENOUS at 12:03

## 2022-06-30 RX ADMIN — INSULIN LISPRO 2 UNITS: 100 INJECTION, SOLUTION INTRAVENOUS; SUBCUTANEOUS at 20:39

## 2022-06-30 RX ADMIN — PANTOPRAZOLE SODIUM 40 MG: 40 TABLET, DELAYED RELEASE ORAL at 17:08

## 2022-06-30 RX ADMIN — INSULIN LISPRO 2 UNITS: 100 INJECTION, SOLUTION INTRAVENOUS; SUBCUTANEOUS at 17:08

## 2022-06-30 RX ADMIN — ATORVASTATIN CALCIUM 10 MG: 10 TABLET, FILM COATED ORAL at 20:39

## 2022-06-30 RX ADMIN — SPIRONOLACTONE 25 MG: 25 TABLET ORAL at 08:10

## 2022-06-30 RX ADMIN — NEBIVOLOL 20 MG: 10 TABLET ORAL at 08:10

## 2022-06-30 RX ADMIN — DILTIAZEM HYDROCHLORIDE 240 MG: 240 CAPSULE, COATED, EXTENDED RELEASE ORAL at 08:10

## 2022-06-30 RX ADMIN — Medication 1 BOTTLE: at 14:02

## 2022-06-30 RX ADMIN — INSULIN LISPRO 2 UNITS: 100 INJECTION, SOLUTION INTRAVENOUS; SUBCUTANEOUS at 12:03

## 2022-07-01 ENCOUNTER — INPATIENT HOSPITAL (AMBULATORY)
Dept: URBAN - METROPOLITAN AREA HOSPITAL 84 | Facility: HOSPITAL | Age: 87
End: 2022-07-01

## 2022-07-01 ENCOUNTER — ANESTHESIA EVENT (OUTPATIENT)
Dept: GASTROENTEROLOGY | Facility: HOSPITAL | Age: 87
End: 2022-07-01

## 2022-07-01 ENCOUNTER — ANESTHESIA (OUTPATIENT)
Dept: GASTROENTEROLOGY | Facility: HOSPITAL | Age: 87
End: 2022-07-01

## 2022-07-01 ENCOUNTER — READMISSION MANAGEMENT (OUTPATIENT)
Dept: CALL CENTER | Facility: HOSPITAL | Age: 87
End: 2022-07-01

## 2022-07-01 VITALS
BODY MASS INDEX: 32.74 KG/M2 | SYSTOLIC BLOOD PRESSURE: 137 MMHG | OXYGEN SATURATION: 99 % | HEART RATE: 78 BPM | DIASTOLIC BLOOD PRESSURE: 69 MMHG | TEMPERATURE: 97.9 F | WEIGHT: 191.8 LBS | RESPIRATION RATE: 14 BRPM | HEIGHT: 64 IN

## 2022-07-01 DIAGNOSIS — K57.30 DIVERTICULOSIS OF LARGE INTESTINE WITHOUT PERFORATION OR ABS: ICD-10-CM

## 2022-07-01 DIAGNOSIS — D50.0 IRON DEFICIENCY ANEMIA SECONDARY TO BLOOD LOSS (CHRONIC): ICD-10-CM

## 2022-07-01 DIAGNOSIS — D12.2 BENIGN NEOPLASM OF ASCENDING COLON: ICD-10-CM

## 2022-07-01 DIAGNOSIS — K64.8 OTHER HEMORRHOIDS: ICD-10-CM

## 2022-07-01 PROBLEM — M25.571 ACUTE RIGHT ANKLE PAIN: Status: ACTIVE | Noted: 2022-07-01

## 2022-07-01 PROBLEM — E83.42 HYPOMAGNESEMIA: Status: ACTIVE | Noted: 2022-07-01

## 2022-07-01 LAB
ALBUMIN SERPL-MCNC: 3.3 G/DL (ref 3.5–5.2)
ALBUMIN/GLOB SERPL: 1.2 G/DL
ALP SERPL-CCNC: 92 U/L (ref 39–117)
ALT SERPL W P-5'-P-CCNC: 7 U/L (ref 1–33)
ANION GAP SERPL CALCULATED.3IONS-SCNC: 14 MMOL/L (ref 5–15)
AST SERPL-CCNC: 14 U/L (ref 1–32)
BASOPHILS # BLD AUTO: 0.1 10*3/MM3 (ref 0–0.2)
BASOPHILS NFR BLD AUTO: 0.7 % (ref 0–1.5)
BILIRUB SERPL-MCNC: 0.6 MG/DL (ref 0–1.2)
BUN SERPL-MCNC: 17 MG/DL (ref 8–23)
BUN/CREAT SERPL: 15.3 (ref 7–25)
CALCIUM SPEC-SCNC: 8.5 MG/DL (ref 8.6–10.5)
CHLORIDE SERPL-SCNC: 99 MMOL/L (ref 98–107)
CO2 SERPL-SCNC: 21 MMOL/L (ref 22–29)
CREAT SERPL-MCNC: 1.11 MG/DL (ref 0.57–1)
DEPRECATED RDW RBC AUTO: 48.1 FL (ref 37–54)
EGFRCR SERPLBLD CKD-EPI 2021: 47.9 ML/MIN/1.73
EOSINOPHIL # BLD AUTO: 0.2 10*3/MM3 (ref 0–0.4)
EOSINOPHIL NFR BLD AUTO: 2 % (ref 0.3–6.2)
ERYTHROCYTE [DISTWIDTH] IN BLOOD BY AUTOMATED COUNT: 19.5 % (ref 12.3–15.4)
GLOBULIN UR ELPH-MCNC: 2.7 GM/DL
GLUCOSE BLDC GLUCOMTR-MCNC: 137 MG/DL (ref 70–105)
GLUCOSE BLDC GLUCOMTR-MCNC: 140 MG/DL (ref 70–105)
GLUCOSE SERPL-MCNC: 96 MG/DL (ref 65–99)
HCT VFR BLD AUTO: 23.3 % (ref 34–46.6)
HGB BLD-MCNC: 7.2 G/DL (ref 12–15.9)
LYMPHOCYTES # BLD AUTO: 1 10*3/MM3 (ref 0.7–3.1)
LYMPHOCYTES NFR BLD AUTO: 11.5 % (ref 19.6–45.3)
MCH RBC QN AUTO: 22 PG (ref 26.6–33)
MCHC RBC AUTO-ENTMCNC: 31.1 G/DL (ref 31.5–35.7)
MCV RBC AUTO: 70.8 FL (ref 79–97)
MONOCYTES # BLD AUTO: 0.9 10*3/MM3 (ref 0.1–0.9)
MONOCYTES NFR BLD AUTO: 10.6 % (ref 5–12)
NEUTROPHILS NFR BLD AUTO: 6.6 10*3/MM3 (ref 1.7–7)
NEUTROPHILS NFR BLD AUTO: 75.2 % (ref 42.7–76)
NRBC BLD AUTO-RTO: 0.2 /100 WBC (ref 0–0.2)
PLATELET # BLD AUTO: 254 10*3/MM3 (ref 140–450)
PMV BLD AUTO: 8.8 FL (ref 6–12)
POTASSIUM SERPL-SCNC: 4.8 MMOL/L (ref 3.5–5.2)
PROT SERPL-MCNC: 6 G/DL (ref 6–8.5)
RBC # BLD AUTO: 3.29 10*6/MM3 (ref 3.77–5.28)
SODIUM SERPL-SCNC: 134 MMOL/L (ref 136–145)
WBC NRBC COR # BLD: 8.7 10*3/MM3 (ref 3.4–10.8)

## 2022-07-01 PROCEDURE — 94618 PULMONARY STRESS TESTING: CPT

## 2022-07-01 PROCEDURE — 0DBK8ZZ EXCISION OF ASCENDING COLON, VIA NATURAL OR ARTIFICIAL OPENING ENDOSCOPIC: ICD-10-PCS | Performed by: INTERNAL MEDICINE

## 2022-07-01 PROCEDURE — 80053 COMPREHEN METABOLIC PANEL: CPT | Performed by: NURSE PRACTITIONER

## 2022-07-01 PROCEDURE — 25010000002 DEXAMETHASONE PER 1 MG: Performed by: NURSE PRACTITIONER

## 2022-07-01 PROCEDURE — 82962 GLUCOSE BLOOD TEST: CPT

## 2022-07-01 PROCEDURE — 25010000002 PROPOFOL 200 MG/20ML EMULSION: Performed by: ANESTHESIOLOGY

## 2022-07-01 PROCEDURE — 45385 COLONOSCOPY W/LESION REMOVAL: CPT | Performed by: INTERNAL MEDICINE

## 2022-07-01 PROCEDURE — 88305 TISSUE EXAM BY PATHOLOGIST: CPT | Performed by: INTERNAL MEDICINE

## 2022-07-01 PROCEDURE — 97530 THERAPEUTIC ACTIVITIES: CPT

## 2022-07-01 PROCEDURE — 97166 OT EVAL MOD COMPLEX 45 MIN: CPT

## 2022-07-01 PROCEDURE — 85025 COMPLETE CBC W/AUTO DIFF WBC: CPT | Performed by: NURSE PRACTITIONER

## 2022-07-01 RX ORDER — MAGNESIUM OXIDE 400 MG/1
400 TABLET ORAL DAILY
Qty: 90 TABLET | Refills: 0 | Status: SHIPPED | OUTPATIENT
Start: 2022-07-01

## 2022-07-01 RX ORDER — SODIUM CHLORIDE 9 MG/ML
INJECTION, SOLUTION INTRAVENOUS CONTINUOUS PRN
Status: DISCONTINUED | OUTPATIENT
Start: 2022-07-01 | End: 2022-07-01 | Stop reason: SURG

## 2022-07-01 RX ORDER — PROPOFOL 10 MG/ML
INJECTION, EMULSION INTRAVENOUS AS NEEDED
Status: DISCONTINUED | OUTPATIENT
Start: 2022-07-01 | End: 2022-07-01 | Stop reason: SURG

## 2022-07-01 RX ORDER — LANOLIN ALCOHOL/MO/W.PET/CERES
324 CREAM (GRAM) TOPICAL
Qty: 90 TABLET | Refills: 1 | Status: SHIPPED | OUTPATIENT
Start: 2022-07-01

## 2022-07-01 RX ORDER — DEXAMETHASONE SODIUM PHOSPHATE 4 MG/ML
6 INJECTION, SOLUTION INTRA-ARTICULAR; INTRALESIONAL; INTRAMUSCULAR; INTRAVENOUS; SOFT TISSUE ONCE
Status: COMPLETED | OUTPATIENT
Start: 2022-07-01 | End: 2022-07-01

## 2022-07-01 RX ORDER — ACETAMINOPHEN 325 MG/1
650 TABLET ORAL EVERY 4 HOURS PRN
Start: 2022-07-01

## 2022-07-01 RX ORDER — SODIUM CHLORIDE 0.9 % (FLUSH) 0.9 %
3 SYRINGE (ML) INJECTION EVERY 12 HOURS SCHEDULED
Status: DISCONTINUED | OUTPATIENT
Start: 2022-07-01 | End: 2022-07-01 | Stop reason: HOSPADM

## 2022-07-01 RX ORDER — CHOLECALCIFEROL (VITAMIN D3) 125 MCG
5 CAPSULE ORAL NIGHTLY PRN
Start: 2022-07-01 | End: 2022-12-29

## 2022-07-01 RX ORDER — SODIUM CHLORIDE 0.9 % (FLUSH) 0.9 %
3-10 SYRINGE (ML) INJECTION AS NEEDED
Status: DISCONTINUED | OUTPATIENT
Start: 2022-07-01 | End: 2022-07-01 | Stop reason: HOSPADM

## 2022-07-01 RX ADMIN — Medication 1 BOTTLE: at 04:40

## 2022-07-01 RX ADMIN — Medication 3 ML: at 09:36

## 2022-07-01 RX ADMIN — Medication 3 ML: at 09:32

## 2022-07-01 RX ADMIN — Medication 3 ML: at 09:31

## 2022-07-01 RX ADMIN — ISOSORBIDE MONONITRATE 30 MG: 30 TABLET, EXTENDED RELEASE ORAL at 09:31

## 2022-07-01 RX ADMIN — PROPOFOL 300 MG: 10 INJECTION, EMULSION INTRAVENOUS at 08:06

## 2022-07-01 RX ADMIN — NEBIVOLOL 20 MG: 10 TABLET ORAL at 09:31

## 2022-07-01 RX ADMIN — PANTOPRAZOLE SODIUM 40 MG: 40 TABLET, DELAYED RELEASE ORAL at 09:31

## 2022-07-01 RX ADMIN — SPIRONOLACTONE 25 MG: 25 TABLET ORAL at 09:31

## 2022-07-01 RX ADMIN — DEXAMETHASONE SODIUM PHOSPHATE 6 MG: 4 INJECTION, SOLUTION INTRAMUSCULAR; INTRAVENOUS at 12:23

## 2022-07-01 RX ADMIN — DILTIAZEM HYDROCHLORIDE 240 MG: 240 CAPSULE, COATED, EXTENDED RELEASE ORAL at 09:31

## 2022-07-01 RX ADMIN — SODIUM CHLORIDE: 0.9 INJECTION, SOLUTION INTRAVENOUS at 08:03

## 2022-07-01 NOTE — OUTREACH NOTE
Prep Survey    Flowsheet Row Responses   [facility] facility patient discharged from? [facility]   Is LACE score < 7 ? No   Emergency Room discharge w/ pulse ox? No   Eligibility Readm Mgmt   Discharge diagnosis Iron deficiency anemia due to chronic blood lossEGD    Does the patient have one of the following disease processes/diagnoses(primary or secondary)? Other   Does the patient have Home health ordered? No   Is there a DME ordered? No   Prep survey completed? Yes          JAMIL MUIR - Registered Nurse

## 2022-07-01 NOTE — THERAPY EVALUATION
Patient Name: Cherie Hinton  : 1934    MRN: [id number]                              Today's Date: 2022       Admit Date: 2022    Visit Dx:     ICD-10-CM ICD-9-CM   1. Dyspnea, unspecified type  R06.00 786.09   2. Confusion  R41.0 298.9   3. Hypomagnesemia  E83.42 275.2   4. Acute right ankle pain  M25.571 719.47     338.19   5. 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[facility])   • Iron deficiency anemia due to chronic blood loss   • Antral erosion   • B12 deficiency   • Cerebrovascular accident (CVA) ([facility])   • Colon cancer ([facility])   • Depression   • Type 2 diabetes mellitus with stage 3a chronic kidney disease, without long-term current use of insulin ([facility])   • GERD (gastroesophageal reflux disease)   • Hiatal hernia   • HALEIGH (iron deficiency anemia)   • LAD (lymphadenopathy)   • Left pontine CVA ([facility])   • Mitral regurgitation   • STORMY (obstructive sleep apnea)   • Osteoarthritis   • 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[facility])   • Right kidney mass   • TIA (transient ischemic attack)   • Vestibular nerve disease   • Presence of Watchman left atrial appendage closure device   • Ataxia   • 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 ([facility])   • Dilated cardiomyopathy ([facility])   • Unstable angina ([facility])   • Aortic stenosis, moderate   • Other fatigue   • Dyspnea on exertion   • Dyspnea, unspecified type   • Hypomagnesemia   • Acute right ankle pain     Past Medical History:   Diagnosis Date   • Antral erosion    • Aortic valve regurgitation 2018   • Atrial fibrillation ([facility])    • Atrial fibrillation with controlled ventricular response ([facility])    • B12 deficiency    • CAD (coronary artery disease)    • Cellulitis 2011    on face     • Cerebrovascular accident (CVA) ([facility]) 12/07/2016   • CHF (congestive heart failure) ([facility])    • Colon cancer ([facility])    • Coronary artery disease involving native coronary artery of native heart without angina pectoris 06/18/2019   • Depression    • Diabetes mellitus, type 2 ([facility])    • Essential hypertension 02/13/2012   • Fatigue    • GERD (gastroesophageal reflux disease)    • Hiatal hernia    • History of colonoscopy 04/2010    last done 4/10   • History of esophagogastroduodenoscopy (EGD) 04/2010    last done 4/10   • Hyperlipidemia, mixed 02/13/2012   • Hypertension     white coat htn   • HALEIGH (iron deficiency anemia)    • LAD (lymphadenopathy)     40% lesion LAD    • Left pontine CVA ([facility])    • Mitral regurgitation 05/21/2012   • STORMY (obstructive sleep apnea)     not treating   • Osteoarthritis    • Pacemaker 06/18/2019   • Permanent atrial fibrillation ([facility]) 06/18/2019   • Presence of Watchman left atrial appendage closure device 01/04/2020   • 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[facility])    • Right kidney mass     1.4 cm- following urology    • Sick sinus syndrome ([facility])    • Stroke ([facility]) 11/2016   • TIA (transient ischemic attack)     left CVA, interrupted TPA; As (moderate-severe)    • Vestibular nerve disease 2017     Past Surgical History:   Procedure Laterality Date   • ATRIAL APPENDAGE EXCLUSION LEFT WITH TRANSESOPHAGEAL ECHOCARDIOGRAM N/A 10/10/2019    Procedure: Atrial Appendage Occlusion;  Surgeon: Richie Chapman MD;  Location: [facility] CATH INVASIVE LOCATION;  Service: Cardiovascular   • ATRIAL APPENDAGE EXCLUSION LEFT WITH TRANSESOPHAGEAL ECHOCARDIOGRAM N/A 10/10/2019    Procedure: Atrial Appendage Occlusion;  Surgeon: Je Rivera MD;  Location: [facility] CATH INVASIVE LOCATION;  Service: Cardiology   • BLADDER REPAIR     • CARDIAC CATHETERIZATION  05/2010   • CHOLECYSTECTOMY     • COLECTOMY PARTIAL / TOTAL     • COLONOSCOPY  04/16/2018    negative    • ENDOSCOPY  04/16/2018    negative    • ENDOSCOPY  N/A 6/29/2022    Procedure: ESOPHAGOGASTRODUODENOSCOPY with biopsy of duodenum r/o celiac;  Surgeon: ADOLFO Boss MD;  Location: [facility] ENDOSCOPY;  Service: Gastroenterology;  Laterality: N/A;  hiatial hernia  Gastric Polyps       • HYSTERECTOMY     • INSERT / REPLACE / REMOVE PACEMAKER  09/2018    Pacemaker gen change 9/2018    • OTHER SURGICAL HISTORY  04/25/2015    Exercise myoview, normal    • PACEMAKER IMPLANTATION  01/22/2010    Dual Chamber - 1/22/2010; RA Lead Revision- 5/7/2012- BS    • TOOTH EXTRACTION     • TRANSESOPHAGEAL ECHOCARDIOGRAM (DHEERAJ)  07/2019      General Information     Row Name 07/01/22 1410          General Information    Prior Level of Function independent:;driving;home management;all household mobility;ADL's;community mobility;shopping;cleaning;cooking  recently she has been very limited to basic self care due to SOA.  -     Existing Precautions/Restrictions no known precautions/restrictions  -     Barriers to Rehab none identified  -     Row Name 07/01/22 1410          Living Environment    People in Home alone  has good family support  -     Row Name 07/01/22 1410          Home Main Entrance    Number of Stairs, Main Entrance none  -     Row Name 07/01/22 1410          Stairs Within Home, Primary    Number of Stairs, Within Home, Primary none  -     Row Name 07/01/22 1410          Cognition    Orientation Status (Cognition) oriented x 4  -     Row Name 07/01/22 1410          Safety Issues, Functional Mobility    Impairments Affecting Function (Mobility) endurance/activity tolerance  -           User Key  (r) = Recorded By, (t) = Taken By, (c) = Cosigned By    Initials Name Provider Type     Cici Napier OT Occupational Therapist                 Mobility/ADL's     Row Name 07/01/22 1411          Bed Mobility    Bed Mobility bed mobility (all) activities  -     All Activities, [place] (Bed Mobility) modified independence  -     Comment, (Bed Mobility)  extra time  -[facility] Name 07/01/22 1411          Transfers    Transfers sit-stand transfer;stand-sit transfer  -     Sit-Stand [place] (Transfers) independent  -     Stand-Sit [place] (Transfers) independent  -[facility] Name 07/01/22 1411          Stand-Sit Transfer    Comment, (Stand-Sit Transfer) Pt completed several sit<>stands at bedside trhen walked in the room w/ RW, then transitioned to walking in shields without DME. O2 did not drop below 90% on soom air.  -[facility] Name 07/01/22 1411          Functional Mobility    Functional Mobility- Ind. Level independent  -MH     Row Name 07/01/22 1411          Activities of Daily Living    BADL Assessment/Intervention grooming;toileting;feeding  -MH     Row Name 07/01/22 1411          Grooming Assessment/Training    [place] Level (Grooming) grooming skills;independent  -MH     Row Name 07/01/22 1411          Toileting Assessment/Training    [place] Level (Toileting) toileting skills;independent  -MH     Row Name 07/01/22 1411          Self-Feeding Assessment/Training    [place] Level (Feeding) feeding skills;independent  -           User Key  (r) = Recorded By, (t) = Taken By, (c) = Cosigned By    Initials Name Provider Type     Cici Napier OT Occupational Therapist               Obj/Interventions     [facility] Name 07/01/22 1413          Sensory Assessment (Somatosensory)    Sensory Assessment (Somatosensory) sensation intact  -MH     Row Name 07/01/22 1413          Vision Assessment/Intervention    Visual Impairment/Limitations corrective lenses full-time  -MH     Row Name 07/01/22 1413          Range of Motion Comprehensive    Comment, General Range of Motion WNL  -MH     Row Name 07/01/22 1413          Strength Comprehensive (MMT)    Comment, General Manual Muscle Testing (MMT) Assessment Pt is 88 but no acute weakness identified  -[facility] Name 07/01/22 1413          Balance    Balance Assessment sitting static balance;sitting  dynamic balance;standing static balance;standing dynamic balance  -     Static Sitting Balance independent  -     Dynamic Sitting Balance independent  -     Static Standing Balance independent  -     Dynamic Standing Balance independent  mild lateral sway  -     Position/Device Used, Standing Balance unsupported  -           User Key  (r) = Recorded By, (t) = Taken By, (c) = Cosigned By    Initials Name Provider Type     Cici Napier, OT Occupational Therapist               Goals/Plan    No documentation.                Clinical Impression     Row Name 07/01/22 1414          Pain Assessment    Pretreatment Pain Rating 0/10 - no pain  -     Posttreatment Pain Rating 0/10 - no pain  -     Row Name 07/01/22 1414          Plan of Care Review    Plan of Care Reviewed With patient;daughter  -     Progress improving  -     Outcome Evaluation Patient is a 88 y.o. female who presents with medical history including sick sinus syndrome status post Micra PPM implant, GI blood loss, iron deficiency anemia, persistent atrial fibrillation, watchman/left atrial appendage device implant, essential hypertension, chronic kidney disease, stroke, GERD, CVA, depression, hypertension, GERD, hiatal hernia, hyperlipidemia, STORMY in the past with no current treatment, osteoarthritis, vestibular nerve disease, and diabetes mellitus 2. Pt was increasingly SOA w/ activity at home & it was severely limiting her IADL. Pt lives alone. She has been admitted and has had a colonocsopy and polypectomy. Moderate colonic diverticulosis and internal & external hemorrhoids were noted. OT evaluated pt and advised her to manage her bowel habits & hydration well. Pt has large hiatial hernia and is going to have this evaluated by a local surgeon to advse if it is compressing her lungs and if a minimally invasive repair could be done. No other acute OT needs identified. Some energy conservation stratagies were discussed. Pt declines [facility].  Pt is able to manage own personal affiar but will need some family assist for now with driving & IADL. Pt & dtr agreed.  -     Row Name 07/01/22 1414          Therapy Assessment/Plan (OT)    Therapy Frequency (OT) evaluation only  -     Row Name 07/01/22 1414          Vital Signs    Pre SpO2 (%) 95  -MH     O2 Delivery Pre Treatment room air  -MH     Intra SpO2 (%) 90  -MH     O2 Delivery Intra Treatment room air  -MH     Post SpO2 (%) 92  -MH     O2 Delivery Post Treatment room air  -MH     Pre Patient Position Sitting  -MH     Intra Patient Position Standing  -MH     Post Patient Position Sitting  -MH     Row Name 07/01/22 1414          Positioning and Restraints    Pre-Treatment Position other (comment)  EOB  -MH     Post Treatment Position other  EOB  -MH           User Key  (r) = Recorded By, (t) = Taken By, (c) = Cosigned By    Initials Name Provider Type     Cici Napier, OT Occupational Therapist               Outcome Measures     Row Name 07/01/22 0800          How much help from another person do you currently need...    Turning from your back to your side while in flat bed without using bedrails? 4  -ES     Moving from lying on back to sitting on the side of a flat bed without bedrails? 4  -ES     Moving to and from a bed to a chair (including a wheelchair)? 3  -ES     Standing up from a chair using your arms (e.g., wheelchair, bedside chair)? 3  -ES     Climbing 3-5 steps with a railing? 2  -ES     To walk in hospital room? 3  -ES     AM-PAC 6 Clicks Score (PT) 19  -ES     Highest level of mobility 6 --> Walked 10 steps or more  -ES           User Key  (r) = Recorded By, (t) = Taken By, (c) = Cosigned By    Initials Name Provider Type    Isabel Vera LPN Licensed Nurse                Occupational Therapy Education                 Title: PT OT SLP Therapies (In Progress)     Topic: Occupational Therapy (In Progress)     Point: ADL training (Done)     Description:   Instruct learner(s) on  proper safety adaptation and remediation techniques during self care or transfers.   Instruct in proper use of assistive devices.              Learning Progress Summary           Patient Acceptance, E,TB,D, VU,DU by  at 7/1/2022 1420   Family Acceptance, E,TB,D, VU,DU by  at 7/1/2022 1420                   Point: Home exercise program (Not Started)     Description:   Instruct learner(s) on appropriate technique for monitoring, assisting and/or progressing therapeutic exercises/activities.              Learner Progress:  Not documented in this visit.          Point: Precautions (Done)     Description:   Instruct learner(s) on prescribed precautions during self-care and functional transfers.              Learning Progress Summary           Patient Acceptance, E,TB,D, VU,DU by  at 7/1/2022 1420   Family Acceptance, E,TB,D, VU,DU by  at 7/1/2022 1420                   Point: Body mechanics (Done)     Description:   Instruct learner(s) on proper positioning and spine alignment during self-care, functional mobility activities and/or exercises.              Learning Progress Summary           Patient Acceptance, E,TB,D, VU,DU by  at 7/1/2022 1420   Family Acceptance, E,TB,D, VU,DU by  at 7/1/2022 1420                               User Key     Initials Effective Dates Name Provider Type Discipline     06/16/21 -  Cici Napier OT Occupational Therapist OT              OT Recommendation and Plan  Therapy Frequency (OT): evaluation only  Plan of Care Review  Plan of Care Reviewed With: patient, daughter  Progress: improving  Outcome Evaluation: Patient is a 88 y.o. female who presents with medical history including sick sinus syndrome status post Micra PPM implant, GI blood loss, iron deficiency anemia, persistent atrial fibrillation, watchman/left atrial appendage device implant, essential hypertension, chronic kidney disease, stroke, GERD, CVA, depression, hypertension, GERD, hiatal hernia, hyperlipidemia,  STORMY in the past with no current treatment, osteoarthritis, vestibular nerve disease, and diabetes mellitus 2. Pt was increasingly SOA w/ activity at home & it was severely limiting her IADL. Pt lives alone. She has been admitted and has had a colonocsopy and polypectomy. Moderate colonic diverticulosis and internal & external hemorrhoids were noted. OT evaluated pt and advised her to manage her bowel habits & hydration well. Pt has large hiatial hernia and is going to have this evaluated by a local surgeon to advse if it is compressing her lungs and if a minimally invasive repair could be done. No other acute OT needs identified. Some energy conservation stratagies were discussed. Pt declines HHC. Pt is able to manage own personal affiar but will need some family assist for now with driving & IADL. Pt & dtr agreed.     Time Calculation:    Time Calculation- OT     Row Name 07/01/22 1420             Time Calculation-     OT Start Time 1346  -      OT Stop Time 1407  -      OT Time Calculation (min) 21 min  -      Total Timed Code Minutes- OT 10 minute(s)  -      OT Received On 07/01/22  -            User Key  (r) = Recorded By, (t) = Taken By, (c) = Cosigned By    Initials Name Provider Type     Cici Napier OT Occupational Therapist              Therapy Charges for Today     Code Description Service Date Service Provider Modifiers Qty    [id number]  OT THERAPEUTIC ACT EA 15 MIN 7/1/2022 Cici Napier OT GO 1    [id number]  OT EVAL MOD COMPLEXITY 2 7/1/2022 Cici Napier OT GO 1               Cici Napier OT  7/1/2022

## 2022-07-01 NOTE — CASE MANAGEMENT/SOCIAL WORK
Continued Stay Note  WILBERT Youssef     Patient Name: Cherie Hinton  MRN: [id number]  Today's Date: 7/1/2022    Admit Date: 6/27/2022     Discharge Plan     Row Name 07/01/22 1059       Plan    Plan DC plan: Routine home.    Plan Comments Met with patient and daughter Smitha at bedside and discussed HHC. Daughter feels pt will be able to return home safely without services. Declines any needs. DC barriers: s/p colonoscopy, possible Hem/Onc consult.              Met with patient in room wearing PPE: mask, face shield/goggles.      Maintained distance greater than six feet and spent less than 15 minutes in the room.      Megan Naegele, RN      Office Phone: [phone]  Office Cell: [phone]

## 2022-07-01 NOTE — CASE MANAGEMENT/SOCIAL WORK
Case Management Discharge Note      Final Note: Routine home.      Selected Continued Care - Discharged on 7/1/2022 Admission date: 6/27/2022 - Discharge disposition: Home or Self Care     Transportation Services  Private: Car    Final Discharge Disposition Code: 01 - home or self-care

## 2022-07-01 NOTE — ANESTHESIA PREPROCEDURE EVALUATION
Anesthesia Evaluation     Patient summary reviewed and Nursing notes reviewed   NPO Solid Status: > 8 hours  NPO Liquid Status: > 8 hours           Airway   Mallampati: II  TM distance: >3 FB  Neck ROM: full  No difficulty expected  Dental - normal exam     Pulmonary    (+) shortness of breath, sleep apnea,   Cardiovascular     ECG reviewed    (+) pacemaker ICD, hypertension, valvular problems/murmurs AS, CAD, dysrhythmias Atrial Fib, angina, CHF , hyperlipidemia,     ROS comment: Watchman    ECHO 10/2021:  Interpretation Summary  Estimated left ventricular EF = 50% Left ventricular systolic function is normal.  ·Left ventricular wall thickness is consistent with moderate concentric hypertrophy.  ·Left ventricular diastolic function is consistent with (grade II w/high LAP) pseudonormalization.  ·Left atrial volume is severely increased.  ·The right atrial cavity is moderate to severely dilated.  ·Mild to moderate aortic valve stenosis is present.  ·Estimated right ventricular systolic pressure from tricuspid regurgitation is normal (<35 mmHg).        Neuro/Psych  (+) TIA, CVA residual symptoms, psychiatric history Depression,    GI/Hepatic/Renal/Endo    (+)  hiatal hernia, GERD, PUD,  renal disease CRI, diabetes mellitus,     Musculoskeletal     Abdominal    Substance History      OB/GYN          Other   arthritis, blood dyscrasia anemia,   history of cancer    ROS/Med Hx Other:                     Anesthesia Plan    ASA 4     MAC     intravenous induction     Anesthetic plan, risks, benefits, and alternatives have been provided, discussed and informed consent has been obtained with: patient.        CODE STATUS:

## 2022-07-01 NOTE — PLAN OF CARE
Goal Outcome Evaluation:  Plan of Care Reviewed With: patient        Progress: improving  Outcome Evaluation: Pt rested well throughout the shift, VSS, Bloodsugar was elevated and treated per mar. Will continue to monitor

## 2022-07-01 NOTE — PROGRESS NOTES
Exercise Oximetry    Patient Name:Cherie Hinton   MRN: [id number]   Date: 07/01/22             ROOM AIR BASELINE   SpO2% 93% room air   Heart Rate    Blood Pressure      EXERCISE ON ROOM AIR SpO2% EXERCISE ON O2 @ LPM SpO2%   1 MINUTE 94 1 MINUTE    2 MINUTES 93 2 MINUTES    3 MINUTES 94 3 MINUTES    4 MINUTES 93 4 MINUTES    5 MINUTES 92 5 MINUTES    6 MINUTES 92 6 MINUTES               Distance Walked   Distance Walked   Dyspnea (Russell Scale)   Dyspnea (Russell Scale)   Fatigue (Russell Scale)   Fatigue (Russell Scale)   SpO2% Post Exercise  92% room air  SpO2% Post Exercise   HR Post Exercise   HR Post Exercise   Time to Recovery   Time to Recovery     Comments:

## 2022-07-01 NOTE — PLAN OF CARE
Goal Outcome Evaluation:  Plan of Care Reviewed With: patient, daughter        Progress: improving  Outcome Evaluation: Patient to be discharged home with daughter and granddaughter via private vehicle. F/U with GI in 4-6 weeks and General surgery. F/U with PCP within 1-2 weeks.

## 2022-07-01 NOTE — PLAN OF CARE
Goal Outcome Evaluation:  Plan of Care Reviewed With: patient, daughter        Progress: improving  Outcome Evaluation: Patient is a 88 y.o. female who presents with medical history including sick sinus syndrome status post Micra PPM implant, GI blood loss, iron deficiency anemia, persistent atrial fibrillation, watchman/left atrial appendage device implant, essential hypertension, chronic kidney disease, stroke, GERD, CVA, depression, hypertension, GERD, hiatal hernia, hyperlipidemia, STORMY in the past with no current treatment, osteoarthritis, vestibular nerve disease, and diabetes mellitus 2. Pt was increasingly SOA w/ activity at home & it was severely limiting her IADL. Pt lives alone. She has been admitted and has had a colonocsopy and polypectomy. Moderate colonic diverticulosis and internal & external hemorrhoids were noted. OT evaluated pt and advised her to manage her bowel habits & hydration well. Pt has large hiatial hernia and is going to have this evaluated by a local surgeon to advse if it is compressing her lungs and if a minimally invasive repair could be done. No other acute OT needs identified. Some energy conservation stratagies were discussed. Pt declines [facility]. Pt is able to manage own personal affiar but will need some family assist for now with driving & IADL. Pt & dtr agreed.

## 2022-07-01 NOTE — PLAN OF CARE
Goal Outcome Evaluation:  Plan of Care Reviewed With: patient        Progress: improving  Outcome Evaluation: Patient has been resting well throughout the shift without any complaints. 6 minute walk was done and patient requires no oxygen. Colonscopy showed internal hemorrhoid, diverticulosis, and polyps. Hgb still remains low but stable. GI has signed off; F/U in 4-6 weeks. No other complaints at this moment. Will continue to monitor.

## 2022-07-01 NOTE — DISCHARGE SUMMARY
Date of Discharge:  7/1/2022    Discharge Diagnosis:   **Iron deficiency anemia due to chronic blood loss [D50.0]   Hypomagnesemia [E83.42]   Acute right ankle pain [M25.571]   Dyspnea, unspecified type [R06.00]   Type 2 diabetes mellitus with stage 3a chronic kidney disease, without long-term current use of insulin (HCC) [E11.22, N18.31]   GERD (gastroesophageal reflux disease) [K21.9]   Hiatal hernia [K44.9]   HALEIGH (iron deficiency anemia) [D50.9]   Pacemaker [Z95.0]   Atrial fibrillation (HCC) [I48.91]   Essential hypertension [I10]       Presenting Problem/History of Present Illness  Active Hospital Problems    Diagnosis  POA   • **Iron deficiency anemia due to chronic blood loss [D50.0]  Yes   • Hypomagnesemia [E83.42]  Yes   • Acute right ankle pain [M25.571]  Yes   • Dyspnea, unspecified type [R06.00]  Yes   • Type 2 diabetes mellitus with stage 3a chronic kidney disease, without long-term current use of insulin (HCC) [E11.22, N18.31]  Yes   • GERD (gastroesophageal reflux disease) [K21.9]  Yes   • Hiatal hernia [K44.9]  Yes   • HALEIGH (iron deficiency anemia) [D50.9]  Yes   • Pacemaker [Z95.0]  Yes   • Atrial fibrillation (HCC) [I48.91]  Yes   • Essential hypertension [I10]  Yes      Resolved Hospital Problems   No resolved problems to display.          Hospital Course  Patient is a 88 y.o. female with h/o HALEIGH due to hiatal hernia presented with right ankle pain, generalized weakness and dyspnea on exertion.  She was had significant iron deficiency anemia with hemoglobin as low as 6.5.  She received one unit of packed red cells and 3 doses of IV iron. Dyspnea improved. TIBC was low and reticulocytes were appropriately elevated.  EGD showed large hiatal hernia but no source of bleeding.  Colonoscopy showed one polyp but no source of bleeding.  GI is recommending outpatient small bowel cameral endoscopy and hematology evaluation.  Pt is hemodynamically stable and requesting to go home with family support.  She  will follow up with GI to schedule small bowel test.  She will see general surgery to discuss possible hiatal hernia repair, although she is realistic about her high risk status for surgery.  I will make a referral for her to see hematology as outpatient.    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  Uric acid level was normal.  ANÍBAL and RF are pending. Xray showed no fracture. She will follow up as outpatient for further evaluation if symptoms recur.    Procedures Performed    Procedure(s):  ESOPHAGOGASTRODUODENOSCOPY with biopsy of duodenum r/o celiac  -------------------    Procedure(s):  COLONOSCOPY with polypectomy x1  -------------------  07/01 0753 COLONOSCOPY  06/29 1235 UPPER GI ENDOSCOPY    Consults:   Consults     Date and Time Order Name Status Description    6/28/2022  4:20 PM Inpatient Gastroenterology Consult Completed           Pertinent Test Results:    Lab Results (most recent)     Procedure Component Value Units Date/Time    POC Glucose Once [[id number]]  (Abnormal) Collected: 07/01/22 1101    Specimen: Blood Updated: 07/01/22 1102     Glucose 140 mg/dL      Comment: Serial Number: [id number]:  [id number]       Tissue Pathology Exam [[id number]] Collected: 07/01/22 0810    Specimen: Polyp from Large Intestine, Right / Ascending Colon Updated: 07/01/22 0935    POC Glucose Once [[id number]]  (Abnormal) Collected: 07/01/22 0714    Specimen: Blood Updated: 07/01/22 0715     Glucose 137 mg/dL      Comment: Serial Number: [id number]:  [id number]       Comprehensive Metabolic Panel [[id number]]  (Abnormal) Collected: 07/01/22 0133    Specimen: Blood Updated: 07/01/22 0403     Glucose 96 mg/dL      BUN 17 mg/dL      Creatinine 1.11 mg/dL      Sodium 134 mmol/L      Potassium 4.8 mmol/L      Chloride 99 mmol/L      CO2 21.0 mmol/L      Calcium 8.5 mg/dL      Total Protein 6.0 g/dL      Albumin 3.30 g/dL      ALT (SGPT) 7 U/L       AST (SGOT) 14 U/L      Alkaline Phosphatase 92 U/L      Total Bilirubin 0.6 mg/dL      Globulin 2.7 gm/dL      A/G Ratio 1.2 g/dL      BUN/Creatinine Ratio 15.3     Anion Gap 14.0 mmol/L      eGFR 47.9 mL/min/1.73      Comment: National Kidney Foundation and American Society of Nephrology (ASN) Task Force recommended calculation based on the Chronic Kidney Disease Epidemiology Collaboration (CKD-EPI) equation refit without adjustment for race.       Narrative:      GFR Normal >60  Chronic Kidney Disease <60  Kidney Failure <15      CBC & Differential [[id number]]  (Abnormal) Collected: 07/01/22 0133    Specimen: Blood Updated: 07/01/22 0347    Narrative:      The following orders were created for panel order CBC & Differential.  Procedure                               Abnormality         Status                     ---------                               -----------         ------                     CBC Auto Differential[[id number]]        Abnormal            Final result               Scan Slide[[id number]]                                                                    Please view results for these tests on the individual orders.    CBC Auto Differential [[id number]]  (Abnormal) Collected: 07/01/22 0133    Specimen: Blood Updated: 07/01/22 0347     WBC 8.70 10*3/mm3      RBC 3.29 10*6/mm3      Hemoglobin 7.2 g/dL      Hematocrit 23.3 %      MCV 70.8 fL      MCH 22.0 pg      MCHC 31.1 g/dL      RDW 19.5 %      RDW-SD 48.1 fl      MPV 8.8 fL      Platelets 254 10*3/mm3      Neutrophil % 75.2 %      Lymphocyte % 11.5 %      Monocyte % 10.6 %      Eosinophil % 2.0 %      Basophil % 0.7 %      Neutrophils, Absolute 6.60 10*3/mm3      Lymphocytes, Absolute 1.00 10*3/mm3      Monocytes, Absolute 0.90 10*3/mm3      Eosinophils, Absolute 0.20 10*3/mm3      Basophils, Absolute 0.10 10*3/mm3      nRBC 0.2 /100 WBC     Tissue Pathology Exam [[id number]] Collected: 06/29/22 1254    Specimen: Tissue from Small Intestine Updated:  "06/30/22 1543     Case Report --     Surgical Pathology Report                         Case: [id number]                                  Authorizing Provider:  ADOLFO Boss MD       Collected:           06/29/2022 12:54 PM          Ordering Location:     [facility]  Received:            06/29/2022 02:11 PM                                 SUITES                                                                       Pathologist:           Ruben Salamanca MD                                                             Specimen:    Small Intestine, biopsys R/O celiac                                                         Final Diagnosis --     Duodenum, biopsy:    Benign duodenal mucosa with preserved villous architecture and no significant histopathology     Negative for celiac disease    JPR/sms        Gross Description --     1. Small Intestine.  Received in formalin designated \"Small bowel\" are a few fragments of tan tissue measuring 0.4 cm in greatest dimension, submitted in one cassette.      CHANDRIKA/sms         Reticulocytes [[id number]]  (Abnormal) Collected: 06/29/22 2339    Specimen: Blood Updated: 06/30/22 1322     Reticulocyte % 2.38 %      Reticulocyte Absolute 0.0799 10*6/mm3     Comprehensive Metabolic Panel [[id number]]  (Abnormal) Collected: 06/29/22 2339    Specimen: Blood Updated: 06/30/22 0204     Glucose 117 mg/dL      BUN 20 mg/dL      Creatinine 1.28 mg/dL      Sodium 131 mmol/L      Potassium 4.8 mmol/L      Chloride 97 mmol/L      CO2 19.0 mmol/L      Calcium 8.3 mg/dL      Total Protein 6.7 g/dL      Albumin 3.60 g/dL      ALT (SGPT) 7 U/L      AST (SGOT) 15 U/L      Alkaline Phosphatase 96 U/L      Total Bilirubin 1.3 mg/dL      Globulin 3.1 gm/dL      A/G Ratio 1.2 g/dL      BUN/Creatinine Ratio 15.6     Anion Gap 15.0 mmol/L      eGFR 40.4 mL/min/1.73      Comment: National Kidney Foundation and American Society of Nephrology (ASN) Task Force recommended calculation based on the " Chronic Kidney Disease Epidemiology Collaboration (CKD-EPI) equation refit without adjustment for race.       Narrative:      GFR Normal >60  Chronic Kidney Disease <60  Kidney Failure <15      CBC & Differential [[id number]]  (Abnormal) Collected: 06/29/22 2339    Specimen: Blood Updated: 06/30/22 0139    Narrative:      The following orders were created for panel order CBC & Differential.  Procedure                               Abnormality         Status                     ---------                               -----------         ------                     CBC Auto Differential[[id number]]        Abnormal            Final result               Scan Slide[[id number]]                                                                    Please view results for these tests on the individual orders.    CBC Auto Differential [[id number]]  (Abnormal) Collected: 06/29/22 2339    Specimen: Blood Updated: 06/30/22 0139     WBC 8.80 10*3/mm3      RBC 3.41 10*6/mm3      Hemoglobin 7.2 g/dL      Hematocrit 23.9 %      MCV 70.0 fL      MCH 21.2 pg      MCHC 30.3 g/dL      RDW 19.4 %      RDW-SD 48.1 fl      MPV 8.6 fL      Platelets 263 10*3/mm3      Neutrophil % 73.6 %      Lymphocyte % 13.1 %      Monocyte % 9.6 %      Eosinophil % 3.0 %      Basophil % 0.7 %      Neutrophils, Absolute 6.50 10*3/mm3      Lymphocytes, Absolute 1.20 10*3/mm3      Monocytes, Absolute 0.80 10*3/mm3      Eosinophils, Absolute 0.30 10*3/mm3      Basophils, Absolute 0.10 10*3/mm3      nRBC 0.3 /100 WBC     Hemoglobin & Hematocrit, Blood [[id number]]  (Abnormal) Collected: 06/29/22 1437    Specimen: Blood Updated: 06/29/22 1549     Hemoglobin 7.5 g/dL      Hematocrit 24.1 %     ANÍBAL [[id number]]  (Normal) Collected: 06/27/22 1559    Specimen: Blood Updated: 06/29/22 1156     Antinuclear Antibodies (ANÍBAL) Negative    Rheumatoid Factor [[id number]]  (Abnormal) Collected: 06/29/22 0056    Specimen: Blood Updated: 06/29/22 1146     Rheumatoid Factor Quantitative  181.0 IU/mL     Basic Metabolic Panel [[id number]]  (Abnormal) Collected: 06/29/22 0056    Specimen: Blood Updated: 06/29/22 0351     Glucose 137 mg/dL      BUN 19 mg/dL      Creatinine 1.37 mg/dL      Sodium 133 mmol/L      Potassium 4.2 mmol/L      Chloride 99 mmol/L      CO2 21.0 mmol/L      Calcium 7.7 mg/dL      BUN/Creatinine Ratio 13.9     Anion Gap 13.0 mmol/L      eGFR 37.2 mL/min/1.73      Comment: National Kidney Foundation and American Society of Nephrology (ASN) Task Force recommended calculation based on the Chronic Kidney Disease Epidemiology Collaboration (CKD-EPI) equation refit without adjustment for race.       Narrative:      GFR Normal >60  Chronic Kidney Disease <60  Kidney Failure <15      Uric Acid [[id number]]  (Normal) Collected: 06/28/22 0910    Specimen: Blood Updated: 06/28/22 1728     Uric Acid 5.3 mg/dL     Magnesium [[id number]]  (Abnormal) Collected: 06/28/22 0910    Specimen: Blood Updated: 06/28/22 1009     Magnesium 3.7 mg/dL     Iron Profile [[id number]]  (Abnormal) Collected: 06/28/22 0910    Specimen: Blood Updated: 06/28/22 1008     Iron 22 mcg/dL      Iron Saturation 5 %      Transferrin 313 mg/dL      TIBC 466 mcg/dL     Basic Metabolic Panel [[id number]]  (Abnormal) Collected: 06/28/22 0147    Specimen: Blood Updated: 06/28/22 0238     Glucose 155 mg/dL      BUN 16 mg/dL      Creatinine 1.05 mg/dL      Sodium 137 mmol/L      Potassium 4.0 mmol/L      Chloride 101 mmol/L      CO2 22.0 mmol/L      Calcium 7.8 mg/dL      BUN/Creatinine Ratio 15.2     Anion Gap 14.0 mmol/L      eGFR 51.2 mL/min/1.73      Comment: National Kidney Foundation and American Society of Nephrology (ASN) Task Force recommended calculation based on the Chronic Kidney Disease Epidemiology Collaboration (CKD-EPI) equation refit without adjustment for race.       Narrative:      GFR Normal >60  Chronic Kidney Disease <60  Kidney Failure <15      Hemoglobin A1c [[id number]]  (Abnormal) Collected: 06/27/22 3983     Specimen: Blood Updated: 06/27/22 2028     Hemoglobin A1C 7.9 %     Narrative:      Hemoglobin A1C Reference Range:    <5.7 %        Normal  5.7-6.4 %     Increased risk for diabetes  > 6.4 %        Diabetes       These guidelines have been recommended by the American Diabetic Association for Hgb A1c.      The following 2010 guidelines have been recommended by the American Diabetes Association for Hemoglobin A1c.    HBA1c 5.7-6.4% Increased risk for future diabetes (pre-diabetes)  HBA1c     >6.4% Diabetes      Respiratory Panel PCR w/COVID-19(SARS-CoV-2) COSMO/SANDRO/BASIL/PAD/COR/MAD/CHINO In-House, NP Swab in UTM/VTM, 3-4 HR TAT - Swab, Nasopharynx [[id number]]  (Normal) Collected: 06/27/22 1601    Specimen: Swab from Nasopharynx Updated: 06/27/22 1723     ADENOVIRUS, PCR Not Detected     Coronavirus 229E Not Detected     Coronavirus HKU1 Not Detected     Coronavirus NL63 Not Detected     Coronavirus OC43 Not Detected     COVID19 Not Detected     Human Metapneumovirus Not Detected     Human Rhinovirus/Enterovirus Not Detected     Influenza A PCR Not Detected     Influenza B PCR Not Detected     Parainfluenza Virus 1 Not Detected     Parainfluenza Virus 2 Not Detected     Parainfluenza Virus 3 Not Detected     Parainfluenza Virus 4 Not Detected     RSV, PCR Not Detected     Bordetella pertussis pcr Not Detected     Bordetella parapertussis PCR Not Detected     Chlamydophila pneumoniae PCR Not Detected     Mycoplasma pneumo by PCR Not Detected    Narrative:      In the setting of a positive respiratory panel with a viral infection PLUS a negative procalcitonin without other underlying concern for bacterial infection, consider observing off antibiotics or discontinuation of antibiotics and continue supportive care. If the respiratory panel is positive for atypical bacterial infection (Bordetella pertussis, Chlamydophila pneumoniae, or Mycoplasma pneumoniae), consider antibiotic de-escalation to target atypical bacterial  infection.    [place] Draw [[id number]] Collected: 06/27/22 1559    Specimen: Blood Updated: 06/27/22 1702    Narrative:      The following orders were created for panel order [place] Draw.  Procedure                               Abnormality         Status                     ---------                               -----------         ------                     Green Top (Gel)[[id number]]                                  Final result               Lavender Top[[id number]]                                     Final result               Gold Top - SST[[id number]]                                   Final result               Light Blue Top[[id number]]                                   Final result                 Please view results for these tests on the individual orders.    Light Blue Top [[id number]] Collected: 06/27/22 1559    Specimen: Blood Updated: 06/27/22 1702     Extra Tube Hold for add-ons.     Comment: Auto resulted       Lavender Top [[id number]] Collected: 06/27/22 1559    Specimen: Blood Updated: 06/27/22 1702     Extra Tube hold for add-on     Comment: Auto resulted       Protime-INR [[id number]]  (Normal) Collected: 06/27/22 1559    Specimen: Blood Updated: 06/27/22 1646     Protime 11.0 Seconds      INR 1.07    aPTT [[id number]]  (Abnormal) Collected: 06/27/22 1559    Specimen: Blood Updated: 06/27/22 1646     PTT 23.3 seconds     Green Top (Gel) [[id number]] Collected: 06/27/22 1559    Specimen: Blood Updated: 06/27/22 1645    Sedimentation Rate [[id number]]  (Abnormal) Collected: 06/27/22 1559    Specimen: Blood Updated: 06/27/22 1644     Sed Rate 88 mm/hr     Uric Acid [[id number]]  (Normal) Collected: 06/27/22 1559    Specimen: Blood Updated: 06/27/22 1642     Uric Acid 5.0 mg/dL     C-reactive Protein [[id number]]  (Abnormal) Collected: 06/27/22 1559    Specimen: Blood Updated: 06/27/22 1642     C-Reactive Protein 4.63 mg/dL     Troponin [[id number]]  (Normal) Collected: 06/27/22 1559    Specimen: Blood  Updated: 06/27/22 1642     Troponin T <0.010 ng/mL     Narrative:      Troponin T Reference Range:  <= 0.03 ng/mL-   Negative for AMI  >0.03 ng/mL-     Abnormal for myocardial necrosis.  Clinicians would have to utilize clinical acumen, EKG, Troponin and serial changes to determine if it is an Acute Myocardial Infarction or myocardial injury due to an underlying chronic condition.       Results may be falsely decreased if patient taking Biotin.      Magnesium [[id number]]  (Abnormal) Collected: 06/27/22 1559    Specimen: Blood Updated: 06/27/22 1642     Magnesium 1.0 mg/dL     Gold Top - SST [[id number]] Collected: 06/27/22 1559    Specimen: Blood Updated: 06/27/22 1639    BNP [[id number]]  (Abnormal) Collected: 06/27/22 1559    Specimen: Blood Updated: 06/27/22 1638     proBNP 1,851.0 pg/mL     Narrative:      Among patients with dyspnea, NT-proBNP is highly sensitive for the detection of acute congestive heart failure. In addition NT-proBNP of <300 pg/ml effectively rules out acute congestive heart failure with 99% negative predictive value.    Results may be falsely decreased if patient taking Biotin.      Urinalysis With Culture If Indicated - Urine, Catheter In/Out [[id number]]  (Abnormal) Collected: 06/27/22 1621    Specimen: Urine, Catheter In/Out Updated: 06/27/22 1627     Color, UA Yellow     Appearance, UA Clear     pH, UA <=5.0     Specific Gravity, UA 1.021     Glucose, UA Negative     Ketones, UA Trace     Bilirubin, UA Negative     Blood, UA Negative     Protein, UA Trace     Leuk Esterase, UA Negative     Nitrite, UA Negative     Urobilinogen, UA 0.2 E.U./dL    Narrative:      In absence of clinical symptoms, the presence of pyuria, bacteria, and/or nitrites on the urinalysis result does not correlate with infection.  Urine microscopic not indicated.           Results for orders placed during the hospital encounter of 10/29/21    Adult Transthoracic Echo Complete W/ Cont if Necessary Per  Protocol    Interpretation Summary  · Estimated left ventricular EF = 50% Left ventricular systolic function is normal.  · Left ventricular wall thickness is consistent with moderate concentric hypertrophy.  · Left ventricular diastolic function is consistent with (grade II w/high LAP) pseudonormalization.  · Left atrial volume is severely increased.  · The right atrial cavity is moderate to severely dilated.  · Mild to moderate aortic valve stenosis is present.  · Estimated right ventricular systolic pressure from tricuspid regurgitation is normal (<35 mmHg).              Condition on Discharge:  Stable    Vital Signs  Temp:  [97.3 °F (36.3 °C)-98.6 °F (37 °C)] 97.9 °F (36.6 °C)  Heart Rate:  [63-85] 78  Resp:  [14-18] 14  BP: (106-157)/(35-83) 137/69    Physical Exam:     General Appearance:    Alert, cooperative, in no acute distress   Head:    Normocephalic, without obvious abnormality, atraumatic   Eyes:            Lids and lashes normal, conjunctivae and sclerae normal, no   icterus, no pallor, corneas clear, PERRLA   Ears:    Ears appear intact with no abnormalities noted   Throat:   No oral lesions, no thrush, oral mucosa moist   Neck:   No adenopathy, supple, trachea midline, no thyromegaly, no   carotid bruit, no JVD   Lungs:     Clear to auscultation,respirations regular, even and                  unlabored    Heart:   Irregularly irregular   Chest Wall:    No abnormalities observed   Abdomen:     Normal bowel sounds, no masses, no organomegaly, soft        non-tender, non-distended, no guarding, no rebound                tenderness   Extremities:   Moves all extremities well, no edema, no cyanosis, no             redness   Pulses:   Pulses palpable and equal bilaterally   Skin:   No bleeding, bruising or rash   Lymph nodes:   No palpable adenopathy   Neurologic:   Cranial nerves 2 - 12 grossly intact, sensation intact, DTR       present and equal bilaterally       Discharge Disposition  Home or Self  Care    Discharge Medications     Discharge Medications      New Medications      Instructions Start Date   acetaminophen 325 MG tablet  Commonly known as: TYLENOL   650 mg, Oral, Every 4 Hours PRN      ferrous sulfate 324 MG tablet delayed-release   324 mg, Oral, Daily With Breakfast      magnesium oxide 400 MG tablet  Commonly known as: MAG-OX   400 mg, Oral, Daily      melatonin 5 MG tablet tablet   5 mg, Oral, Nightly PRN         Continue These Medications      Instructions Start Date   albuterol sulfate  (90 Base) MCG/ACT inhaler  Commonly known as: PROVENTIL HFA;VENTOLIN HFA;PROAIR HFA   2 puffs, Inhalation, Every 6 Hours PRN      atorvastatin 10 MG tablet  Commonly known as: LIPITOR   10 mg, Oral, Nightly      dilTIAZem  MG 24 hr capsule  Commonly known as: CARDIZEM CD   240 mg, Oral, Daily      furosemide 20 MG tablet  Commonly known as: LASIX   20 mg, Oral, Daily      isosorbide mononitrate 30 MG 24 hr tablet  Commonly known as: IMDUR   30 mg, Oral, Daily      meclizine 25 MG tablet  Commonly known as: ANTIVERT   25 mg, Oral, 3 Times Daily PRN      metFORMIN 500 MG tablet  Commonly known as: Glucophage   500 mg, Oral, Daily With Breakfast, For diabetes      nebivolol 20 MG tablet  Commonly known as: Bystolic   20 mg, Oral, Daily      omeprazole 40 MG capsule  Commonly known as: priLOSEC   40 mg, Oral, Daily      spironolactone 25 MG tablet  Commonly known as: ALDACTONE   25 mg, Oral, Daily      vitamin B-12 1000 MCG tablet  Commonly known as: CYANOCOBALAMIN   TAKE ONE TABLET BY MOUTH DAILY         Stop These Medications    Ambien 10 MG tablet  Generic drug: zolpidem            Discharge Diet: Low concentrated sweets    Activity at Discharge: Gradually resume normal activities    Follow-up Appointments  Future Appointments   Date Time Provider Department Center   7/13/2022  1:15 PM Mich Araujo MD [facility]   7/13/2022  1:15 PM PRE[facility] DEVICE CHECK [facility]      Additional Instructions for the Follow-ups that You Need to Schedule     Discharge Follow-up with PCP   As directed       Currently Documented PCP:    Andrew Antunez MD    PCP Phone Number:    [phone]     Follow Up Details: You have an appointment with Dr. Harmony Mortensen's Nurse practitioner, on July 12 at 7/12 at 10:40         Discharge Follow-up with Specified Provider: Dr. Gutierrez or Dr. Noel [place] - [phone]   As directed      To: Dr. Gutierrez or Dr. Noel with [place] - [phone]    Follow Up Details: Call to schedule appointment to discuss hiatal hernia         Discharge Follow-up with Specified Provider: [facility] - Dr. oBss - to discuss cameral endoscopy   As directed      To: [facility] - Dr. Boss - to discuss cameral endoscopy    Follow Up Details: Call to schedule appointment.[phone]               Test Results Pending at Discharge  Pending Labs     Order Current Status    Tissue Pathology Exam In process           Coni Fields MD  07/01/22  13:45 EDT    Time: Discharge 35 min

## 2022-07-01 NOTE — THERAPY TREATMENT NOTE
"Subjective: Pt agreeable to therapeutic plan of care.    Objective:     Bed mobility - Modified-Independent using BR.   Transfers - Modified-Independent and with rolling walker  Ambulation - 100 feet CGA and with rolling walker. No LOB noted.     Vitals: WNL. Pt on room air throughout treatment session.     Pain: 0 VAS    Education: Provided education on importance of mobility and skilled verbal / tactile cueing throughout intervention.     Assessment: Cherie Hinton presents with functional mobility impairments which indicate the need for skilled intervention. Tolerating session today without incident. Pt appropriate to safely return home with Home Health Services upon discharge. Will continue to follow and progress as tolerated.     Plan/Recommendations:   Low Intensity Therapy recommended post-acute care - This is recommended as therapy feels this patient would require 2-3 visits per week. OP or HH would be the best option depending on patient's home bound status. Consider, if the patient has other  \"skilled\" needs such as wounds, IV antibiotics, etc. Combined with \"low intensity\" could also equate to a SNF. If patient is medically complex, consider LTAC.. Pt requires no DME at discharge.     Pt desires Home with Home Health at discharge. Pt cooperative; agreeable to therapeutic recommendations and plan of care.         Basic Mobility 6-click:  Rollin = Total, A lot = 2, A little = 3; 4 = None  Supine>Sit:   1 = Total, A lot = 2, A little = 3; 4 = None   Sit>Stand with arms:  1 = Total, A lot = 2, A little = 3; 4 = None  Bed>Chair:   1 = Total, A lot = 2, A little = 3; 4 = None  Ambulate in room:  1 = Total, A lot = 2, A little = 3; 4 = None  3-5 Steps with railin = Total, A lot = 2, A little = 3; 4 = None  Score: 22    Modified Turner: N/A = No pre-op stroke/TIA    Post-Tx Position: Supine with HOB Elevated, Alarms activated and Call light and personal items within reach  PPE: gloves, " surgical mask, eyewear protection

## 2022-07-01 NOTE — OP NOTE
COLONOSCOPY Procedure Report    Patient Name:  Cherie Hinton  YOB: 1934    Date of Surgery:  7/1/2022     Pre-Op Diagnosis:  Iron deficiency anemia due to chronic blood loss [D50.0]       Post-Op Diagnosis Codes:     * Iron deficiency anemia due to chronic blood loss [D50.0]     Postop diagnosis:  1.  Ascending colon polyp  2.  Diverticulosis  3.  Internal hemorrhoids  4.  Normal mucosa in the terminal ileum    Procedure/CPT® Codes:      Procedure(s):  COLONOSCOPY with polypectomy x1    Staff:  Surgeon(s):  ADOLFO Boss MD      Anesthesia: Monitored Anesthesia Care    Description of Procedure:  A description of the procedure as well as risks, benefits and alternative methods were explained to the patient who voiced understanding and signed the corresponding consent form. A physical exam was performed and vital signs were monitored throughout the procedure.    A rectal exam was performed which was normal. An Olympus colonoscope was placed into the rectum and proceeded under direct visualization through the colon until the cecum and appendiceal orifice were identified. Careful visualization occurred upon slow withdraw of the scope. The scope was then retroflexed and the distal rectum was visualized. The quality of the prep was good. The procedure was not difficult and there were no immediate complications.  There was no blood loss.    Impression:  1.  1 small 3 mm polyp in the ascending colon removed in a single piece with cold snare polypectomy was completely removed  2.  Moderate diverticulosis of the whole colon with small size openings and no bleeding.  Diverticula were most predominant in the left colon.  3.  Medium size nonbleeding internal and external hemorrhoids.  4.  Otherwise normal-appearing mucosa of the colon.  There is no blood seen anywhere in the colon.  The prep was good and there was yellow liquid stool.  5.  Normal mucosa in the terminal ileum, there was no blood in the  TI.    Recommendations:  -No GI source of anemia found.  No blood in the terminal ileum.    -Consider further hematology work-up for anemia   -okay with resuming a regular diet  -Continue PPI for GERD and hiatal hernia  -Okay with discharge home today with plans for outpatient follow-up and can consider video capsule enteroscopy if ongoing/worsening anemia    GI team to sign off, but please feel free to call us back if further assistance is needed.  Thank you    GEGE Boss MD     Date: 7/1/2022    Time: 08:16 EDT

## 2022-07-01 NOTE — ANESTHESIA POSTPROCEDURE EVALUATION
Patient: Cherie Hinton    Procedure Summary     Date: 07/01/22 Room / Location: [facility] ENDOSCOPY 1 / [facility] ENDOSCOPY    Anesthesia Start: 0802 Anesthesia Stop: 0817    Procedure: COLONOSCOPY with polypectomy x1 (N/A ) Diagnosis:       Iron deficiency anemia due to chronic blood loss      (Iron deficiency anemia due to chronic blood loss [D50.0])    Surgeons: ADOLFO Boss MD Provider: Marek Cruz MD    Anesthesia Type: MAC ASA Status: 4          Anesthesia Type: MAC    Vitals  Vitals Value Taken Time   /46 07/01/22 0836   Temp     Pulse 77 07/01/22 0839   Resp 16 07/01/22 0830   SpO2 100 % 07/01/22 0839   Vitals shown include unvalidated device data.        Post Anesthesia Care and Evaluation    Patient location during evaluation: PACU  Patient participation: complete - patient participated  Level of consciousness: sleepy but conscious and responsive to verbal stimuli  Pain score: 0  Pain management: adequate    Airway patency: patent  Anesthetic complications: No anesthetic complications  PONV Status: none  Cardiovascular status: acceptable  Respiratory status: acceptable  Hydration status: acceptable

## 2022-07-05 ENCOUNTER — READMISSION MANAGEMENT (OUTPATIENT)
Dept: CALL CENTER | Facility: HOSPITAL | Age: 87
End: 2022-07-05

## 2022-07-05 LAB
LAB AP CASE REPORT: NORMAL
PATH REPORT.FINAL DX SPEC: NORMAL
PATH REPORT.GROSS SPEC: NORMAL

## 2022-07-12 ENCOUNTER — READMISSION MANAGEMENT (OUTPATIENT)
Dept: CALL CENTER | Facility: HOSPITAL | Age: 87
End: 2022-07-12

## 2022-07-12 NOTE — OUTREACH NOTE
"Medical Week 2 Survey    Flowsheet Row Responses   [facility] patient discharged from? [facility]   Does the patient have one of the following disease processes/diagnoses(primary or secondary)? Other   Week 2 attempt successful? Yes   Call start time 1906   Discharge diagnosis Iron deficiency anemia due to chronic blood lossEGD    Call end time 1909   Is the patient taking all medications as directed (includes completed medication regime)? Yes   Does the patient have a primary care provider?  Yes   Does the patient have an appointment with their PCP within 7 days of discharge? Greater than 7 days   Has the patient kept scheduled appointments due by today? N/A   Comments Next week PCP   Psychosocial issues? No   What is the patient's perception of their health status since discharge? Improving   Is the patient/caregiver able to teach back signs and symptoms related to disease process for when to call PCP? Yes   Is the patient/caregiver able to teach back signs and symptoms related to disease process for when to call 911? Yes   Is the patient/caregiver able to teach back the hierarchy of who to call/visit for symptoms/problems? PCP, Specialist, Home health nurse, Urgent Care, ED, 911 Yes   If the patient is a current smoker, are they able to teach back resources for cessation? Not a smoker   Additional teach back comments I feel \"excellent\".  Granddaughter and daughter have set up her meds for her    Week 2 Call Completed? Yes   Graduated Yes   Graduated/Revoked comments Denies questions or needs at this time.          MARCOS AMAYA - Licensed Nurse  "

## 2022-07-20 ENCOUNTER — OFFICE VISIT (OUTPATIENT)
Dept: CARDIOLOGY | Facility: CLINIC | Age: 87
End: 2022-07-20

## 2022-07-20 VITALS
HEART RATE: 57 BPM | WEIGHT: 173.2 LBS | RESPIRATION RATE: 18 BRPM | DIASTOLIC BLOOD PRESSURE: 68 MMHG | BODY MASS INDEX: 30.69 KG/M2 | SYSTOLIC BLOOD PRESSURE: 110 MMHG | HEIGHT: 63 IN

## 2022-07-20 DIAGNOSIS — Z95.818 PRESENCE OF WATCHMAN LEFT ATRIAL APPENDAGE CLOSURE DEVICE: ICD-10-CM

## 2022-07-20 DIAGNOSIS — I10 ESSENTIAL HYPERTENSION: ICD-10-CM

## 2022-07-20 DIAGNOSIS — Z95.0 PACEMAKER: Primary | ICD-10-CM

## 2022-07-20 DIAGNOSIS — I48.21 PERMANENT ATRIAL FIBRILLATION: ICD-10-CM

## 2022-07-20 DIAGNOSIS — E78.2 HYPERLIPIDEMIA, MIXED: ICD-10-CM

## 2022-07-20 DIAGNOSIS — R06.09 DYSPNEA ON EXERTION: ICD-10-CM

## 2022-07-20 PROBLEM — H10.10 ALLERGIC CONJUNCTIVITIS: Status: ACTIVE | Noted: 2022-04-27

## 2022-07-20 PROBLEM — H35.3290 EXUDATIVE AGE-RELATED MACULAR DEGENERATION: Status: ACTIVE | Noted: 2022-04-27

## 2022-07-20 PROBLEM — H43.819 POSTERIOR VITREOUS DETACHMENT: Status: ACTIVE | Noted: 2022-05-02

## 2022-07-20 PROCEDURE — 99214 OFFICE O/P EST MOD 30 MIN: CPT | Performed by: INTERNAL MEDICINE

## 2022-07-26 NOTE — PROGRESS NOTES
"Cardiology Clinic Note  Mich Araujo MD, PhD    Subjective:     Encounter Date:07/20/2022      Patient ID: Cherie Hinton is a 88 y.o. female.    Chief Complaint:  Chief Complaint   Patient presents with   • Follow-up       HPI:    I the pleasure to see this 88-year-old female in follow-up with history of stroke diabetes hypertension hyperlipidemia and paroxysmal atrial fibrillation along with obstructive sleep apnea and CKD in the past.  Her last echo was in November of last year demonstrating EF of 50% moderate concentric LVH grade 2 diastolic dysfunction normal RV size and function, severely enlarged left atrium, moderately to severely enlarged right atrium, mild TR, mild MR mild to moderate aortic valve stenosis with thickening of the valve.  Blood pressure 110/68 heart rates in the low 60s.  CV therapies consist of atorvastatin diltiazem Lasix Imdur Bystolic and Aldactone.  She appears well compensated with no new complaints.  Accompanied by family today with no issues.  EKG reviewed and interpreted by me demonstrates rate controlled atrial fibrillation with incomplete right bundle branch block otherwise unchanged from prior    Review of systems otherwise negative x14 point review of systems except as mentioned above    Historical data copied forward from previous encounters in EMR including the history, exam, and assessment/plan has been reviewed and is unchanged unless noted otherwise.    Cardiac medicines reviewed with risk, benefits, and necessity of each discussed.    Risk and benefit of cardiac testing reviewed including death heart attack stroke pain bleeding infection need for vascular /cardiovascular surgery were discussed and the patient     Objective:         /68 (BP Location: Left arm, Patient Position: Sitting)   Pulse 57   Resp 18   Ht 160 cm (63\")   Wt 78.6 kg (173 lb 3.2 oz)   BMI 30.68 kg/m²     Physical Exam  Irregular, no rubs gallops heave or lift, stable 2 out of 6 systolic " ejection murmur crescendo with preserved S2  No clubbing cyanosis or edema  Clear to auscultation  Soft nontender nondistended  Normal radial pulses normal cap refill  Assessment:       Essential hypertension well-controlled  Permanent pacemaker in place normal functioning device routine interrogation every 3 to 6 months  History of watchman procedure off anticoagulation  History of stroke  Permanent atrial fibrillation, rate controlled, device in place with tachybradycardia syndrome  Hyperlipidemia controlled  Diabetes per primary  EF 50%, volume status well-controlled continue Lasix  Continue present medications    See her back in 6 months      The pleasure to be involved in this patient's cardiovascular care.  Please call with any questions or concerns  Mich Araujo MD, PhD    Most recent EKG as reviewed and interpreted by me:  Procedures     Most recent echo as reviewed and interpreted by me:  Results for orders placed during the hospital encounter of 10/29/21    Adult Transthoracic Echo Complete W/ Cont if Necessary Per Protocol    Interpretation Summary  · Estimated left ventricular EF = 50% Left ventricular systolic function is normal.  · Left ventricular wall thickness is consistent with moderate concentric hypertrophy.  · Left ventricular diastolic function is consistent with (grade II w/high LAP) pseudonormalization.  · Left atrial volume is severely increased.  · The right atrial cavity is moderate to severely dilated.  · Mild to moderate aortic valve stenosis is present.  · Estimated right ventricular systolic pressure from tricuspid regurgitation is normal (<35 mmHg).      Most recent stress test as reviewed and interpreted by me:      Most recent cardiac catheterization as reviewed interpreted by me:  No results found for this or any previous visit.    The following portions of the patient's history were reviewed and updated as appropriate: allergies, current medications, past family history, past  medical history, past social history, past surgical history and problem list.      ROS:  14 point review of systems negative except as mentioned above    Current Outpatient Medications:   •  acetaminophen (TYLENOL) 325 MG tablet, Take 2 tablets by mouth Every 4 (Four) Hours As Needed for Mild Pain ., Disp: , Rfl:   •  atorvastatin (LIPITOR) 10 MG tablet, Take 1 tablet by mouth Every Night., Disp: 90 tablet, Rfl: 1  •  dilTIAZem CD (CARDIZEM CD) 240 MG 24 hr capsule, Take 1 capsule by mouth Daily., Disp: 90 capsule, Rfl: 1  •  ferrous sulfate 325 (65 FE) MG EC tablet, Take 1 tablet by mouth Daily With Breakfast., Disp: 90 tablet, Rfl: 1  •  furosemide (LASIX) 20 MG tablet, Take 1 tablet by mouth Daily., Disp: 90 tablet, Rfl: 3  •  isosorbide mononitrate (IMDUR) 30 MG 24 hr tablet, Take 1 tablet by mouth Daily., Disp: 30 tablet, Rfl: 11  •  magnesium oxide (MAG-OX) 400 MG tablet, Take 1 tablet by mouth Daily., Disp: 90 tablet, Rfl: 0  •  meclizine (ANTIVERT) 25 MG tablet, Take 1 tablet by mouth 3 (Three) Times a Day As Needed for Dizziness., Disp: 30 tablet, Rfl: 0  •  melatonin 5 MG tablet tablet, Take 1 tablet by mouth At Night As Needed (sleep)., Disp: , Rfl:   •  metFORMIN (Glucophage) 500 MG tablet, Take 1 tablet by mouth Daily With Breakfast. For diabetes, Disp: 30 tablet, Rfl: 6  •  nebivolol (Bystolic) 20 MG tablet, Take 1 tablet by mouth Daily., Disp: 90 tablet, Rfl: 3  •  omeprazole (priLOSEC) 40 MG capsule, Take 40 mg by mouth Daily., Disp: , Rfl:   •  spironolactone (ALDACTONE) 25 MG tablet, Take 1 tablet by mouth Daily., Disp: 90 tablet, Rfl: 3  •  vitamin B-12 (CYANOCOBALAMIN) 1000 MCG tablet, TAKE ONE TABLET BY MOUTH DAILY, Disp: 30 tablet, Rfl: 1    Problem List:  Patient Active Problem List   Diagnosis   • Pacemaker   • Atrial fibrillation (HCC)   • Coronary artery disease involving native coronary artery of native heart without angina pectoris   • Hyperlipidemia, mixed   • Essential hypertension   •  Tachy-morgan syndrome (HCC)   • Iron deficiency anemia due to chronic blood loss   • Antral erosion   • B12 deficiency   • Cerebrovascular accident (CVA) (HCC)   • Colon cancer (HCC)   • Depression   • Type 2 diabetes mellitus with stage 3a chronic kidney disease, without long-term current use of insulin (HCC)   • GERD (gastroesophageal reflux disease)   • Hiatal hernia   • HALEIGH (iron deficiency anemia)   • LAD (lymphadenopathy)   • Left pontine CVA (HCC)   • Mitral regurgitation   • STORMY (obstructive sleep apnea)   • Osteoarthritis   • 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[facility])   • Right kidney mass   • TIA (transient ischemic attack)   • Vestibular nerve disease   • Presence of Watchman left atrial appendage closure device   • Ataxia   • 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 ([facility])   • Dilated cardiomyopathy ([facility])   • Unstable angina ([facility])   • Aortic stenosis, moderate   • Other fatigue   • Dyspnea on exertion   • Dyspnea, unspecified type   • Hypomagnesemia   • Acute right ankle pain   • Allergic conjunctivitis   • Posterior vitreous detachment   • Exudative age-related macular degeneration ([facility])     Past Medical History:  Past Medical History:   Diagnosis Date   • Antral erosion    • Aortic valve regurgitation 12/26/2018   • Atrial fibrillation ([facility])    • Atrial fibrillation with controlled ventricular response ([facility])    • B12 deficiency    • CAD (coronary artery disease)    • Cellulitis 04/2011    on face    • Cerebrovascular accident (CVA) ([facility]) 12/07/2016   • CHF (congestive heart failure) ([facility])    • Colon cancer ([facility])    • Coronary artery disease involving native coronary artery of native heart without angina pectoris 06/18/2019   • Depression    • Diabetes mellitus, type 2 ([facility])    • Essential hypertension 02/13/2012   • Fatigue    • GERD (gastroesophageal reflux disease)    • Hiatal hernia    • History of colonoscopy 04/2010    last done 4/10   • History of esophagogastroduodenoscopy (EGD) 04/2010    last done  4/10   • Hyperlipidemia, mixed 02/13/2012   • Hypertension     white coat htn   • HALEIGH (iron deficiency anemia)    • LAD (lymphadenopathy)     40% lesion LAD    • Left pontine CVA (HCC)    • Mitral regurgitation 05/21/2012   • STORMY (obstructive sleep apnea)     not treating   • Osteoarthritis    • Pacemaker 06/18/2019   • Permanent atrial fibrillation (HCC) 06/18/2019   • Presence of Watchman left atrial appendage closure device 01/04/2020   • 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HCC)    • Right kidney mass     1.4 cm- following urology    • Sick sinus syndrome (HCC)    • Stroke (HCC) 11/2016   • TIA (transient ischemic attack)     left CVA, interrupted TPA; As (moderate-severe)    • Vestibular nerve disease 2017     Past Surgical History:  Past Surgical History:   Procedure Laterality Date   • ATRIAL APPENDAGE EXCLUSION LEFT WITH TRANSESOPHAGEAL ECHOCARDIOGRAM N/A 10/10/2019    Procedure: Atrial Appendage Occlusion;  Surgeon: Richie Chapman MD;  Location: [facility] CATH INVASIVE LOCATION;  Service: Cardiovascular   • ATRIAL APPENDAGE EXCLUSION LEFT WITH TRANSESOPHAGEAL ECHOCARDIOGRAM N/A 10/10/2019    Procedure: Atrial Appendage Occlusion;  Surgeon: Je Rivera MD;  Location: [facility] CATH INVASIVE LOCATION;  Service: Cardiology   • BLADDER REPAIR     • CARDIAC CATHETERIZATION  05/2010   • CHOLECYSTECTOMY     • COLECTOMY PARTIAL / TOTAL     • COLONOSCOPY  04/16/2018    negative    • COLONOSCOPY N/A 7/1/2022    Procedure: COLONOSCOPY with polypectomy x1;  Surgeon: ADOLFO Boss MD;  Location: [facility] ENDOSCOPY;  Service: Gastroenterology;  Laterality: N/A;  post: diverticulosis, hemorrhoids, polyps   • ENDOSCOPY  04/16/2018    negative    • ENDOSCOPY N/A 6/29/2022    Procedure: ESOPHAGOGASTRODUODENOSCOPY with biopsy of duodenum r/o celiac;  Surgeon: ADOLFO Boss MD;  Location: [facility] ENDOSCOPY;  Service: Gastroenterology;  Laterality: N/A;  hiatial hernia  Gastric Polyps       • HYSTERECTOMY     • INSERT /  REPLACE / REMOVE PACEMAKER  09/2018    Pacemaker gen change 9/2018    • OTHER SURGICAL HISTORY  04/25/2015    Exercise myoview, normal    • PACEMAKER IMPLANTATION  01/22/2010    Dual Chamber - 1/22/2010; RA Lead Revision- 5/7/2012- BS    • TOOTH EXTRACTION     • TRANSESOPHAGEAL ECHOCARDIOGRAM (DHEERAJ)  07/2019     Social History:  Social History     Socioeconomic History   • Marital status:    Tobacco Use   • Smoking status: Never Smoker   • Smokeless tobacco: Never Used   Vaping Use   • Vaping Use: Never used   Substance and Sexual Activity   • Alcohol use: No   • Drug use: No   • Sexual activity: Defer     Allergies:  Allergies   Allergen Reactions   • Contrast Dye Anaphylaxis   • Adhesive Tape Itching   • Latex Hives     Immunizations:  Immunization History   Administered Date(s) Administered   • Flu Vaccine Quad PF >36MO 10/16/2015   • Fluzone High Dose =>65 Years ([employer] ONLY) 10/18/2014, 10/25/2017, 10/03/2020   • Influenza, Unspecified 10/21/2019   • Pneumococcal Conjugate 13-Valent (PCV13) 01/05/2021   • flucelvax quad pfs =>4 YRS 10/21/2019            In-Office Procedure(s):  No orders to display        ASCVD RIsk Score::  The ASCVD Risk score (Humberto MEERA Jr., et al., 2013) failed to calculate for the following reasons:    The 2013 ASCVD risk score is only valid for ages 40 to 79    The patient has a prior MI or stroke diagnosis    Imaging:    Results for orders placed during the hospital encounter of 06/27/22    XR Ankle 3+ View Right    Narrative  XR ANKLE 3+ VW RIGHT-    Date of Exam: 6/27/2022 4:54 PM    Indication: redness/swelling.    Comparison: None available.    Technique: Three views of the ankle were obtained.    FINDINGS:  No fracture is identified. There is no periostitis or bone destruction.  Ankle joint space is preserved. There is minor spurring at the inferior  tip of the medial malleolus. There is a small heel spur. There is  arterial vascular calcification. There is mild diffuse soft  tissue  swelling.    Impression  1. No acute bony abnormality.    Electronically Signed By-Minna Ahmadi MD On:6/27/2022 5:53 PM  This report was finalized on [id number] by  Minna Ahmadi MD.       Results for orders placed during the hospital encounter of 06/27/22    CT Abdomen Pelvis Without Contrast    Narrative  CT CHEST WO CONTRAST DIAGNOSTIC-, CT ABDOMEN PELVIS WO CONTRAST-    Date of Exam: 6/28/2022 12:10 PM    Indication: Pneumothorax; R06.00-Dyspnea, unspecified; shortness of  breath. Epigastric pain. Comparison: PA and lateral chest 6/27/2022. CT  chest 6/30/2012    Technique: Serial and axial CT images of the chest were obtained.  Reconstructions in the coronal and sagittal planes were performed.  Automated exposure control and iterative reconstruction methods were  used.    FINDINGS:      CHEST: No pneumothorax is seen. There is no pneumomediastinum.    There is no acute airspace disease. A large esophageal hiatal hernia  extends slightly right of midline, with resulting passive right lower  lobe atelectasis. The herniated stomach appears to have generalized wall  thickening, although this is similar to the 2012 examination. No  perigastric inflammatory changes are identified. There is no indication  of gastric volvulus.. The esophagogastric junction is above the level of  the diaphragm.    Heart size is upper limits normal. Pacemaker device is in place. Left  atrial appendage occlusion device is seen. Moderate coronary artery  calcifications are present. No pathologically enlarged nodes are  identified. There is no pericardial effusion or pleural effusion. There  is mild thoracic aortic calcific atherosclerosis.    No acute or suspicious osseous abnormalities are identified. Chronic  mild concavity of the superior endplate of T5.        ABDOMEN AND PELVIS: THERE are surgical changes of the sigmoid colon.  Mild diverticular changes are present in the distal colon, without  evidence of acute  diverticulitis. Mild colonic stool burden is present.  The appendix is not discretely visualized but no pericecal inflammation  is seen.    Cholecystectomy. No abnormal biliary dilation. Noncontrast appearance of  the liver, spleen, pancreas and adrenal glands is within normal limits.  There is mild left renal atrophy, similar to prior examination. The  right kidney has an unremarkable noncontrast appearance. Abdominal aorta  caliber is normal with mild to moderate calcific atherosclerosis.    Hysterectomy changes are present. The urinary bladder and rectum are  normal.    Degenerative changes in the lumbar spine. Mild chronic concavity of the  superior endplate of L3. No acute or suspicious osseous abnormalities.    Impression  1. No pneumothorax. No acute chest findings.  2. Large hiatal hernia. Herniated stomach appears to have mild  generalized wall thickening which could reflect changes of gastritis,  but this appears similar to the prior 2012 comparison study. No evidence  of gastric volvulus.  3. Passive atelectasis in the right lower lobe.  4. Cholecystectomy. Hysterectomy. Surgical changes of the sigmoid colon.  5. Uncomplicated mild colonic diverticulosis.    Electronically Signed By-Nohemi Hurd MD On:6/28/2022 1:35 PM  This report was finalized on [id number] by  Nohemi Hurd MD.      Results for orders placed during the hospital encounter of 06/27/22    CT Abdomen Pelvis Without Contrast    Narrative  CT CHEST WO CONTRAST DIAGNOSTIC-, CT ABDOMEN PELVIS WO CONTRAST-    Date of Exam: 6/28/2022 12:10 PM    Indication: Pneumothorax; R06.00-Dyspnea, unspecified; shortness of  breath. Epigastric pain. Comparison: PA and lateral chest 6/27/2022. CT  chest 6/30/2012    Technique: Serial and axial CT images of the chest were obtained.  Reconstructions in the coronal and sagittal planes were performed.  Automated exposure control and iterative reconstruction methods were  used.    FINDINGS:      CHEST: No  pneumothorax is seen. There is no pneumomediastinum.    There is no acute airspace disease. A large esophageal hiatal hernia  extends slightly right of midline, with resulting passive right lower  lobe atelectasis. The herniated stomach appears to have generalized wall  thickening, although this is similar to the 2012 examination. No  perigastric inflammatory changes are identified. There is no indication  of gastric volvulus.. The esophagogastric junction is above the level of  the diaphragm.    Heart size is upper limits normal. Pacemaker device is in place. Left  atrial appendage occlusion device is seen. Moderate coronary artery  calcifications are present. No pathologically enlarged nodes are  identified. There is no pericardial effusion or pleural effusion. There  is mild thoracic aortic calcific atherosclerosis.    No acute or suspicious osseous abnormalities are identified. Chronic  mild concavity of the superior endplate of T5.        ABDOMEN AND PELVIS: THERE are surgical changes of the sigmoid colon.  Mild diverticular changes are present in the distal colon, without  evidence of acute diverticulitis. Mild colonic stool burden is present.  The appendix is not discretely visualized but no pericecal inflammation  is seen.    Cholecystectomy. No abnormal biliary dilation. Noncontrast appearance of  the liver, spleen, pancreas and adrenal glands is within normal limits.  There is mild left renal atrophy, similar to prior examination. The  right kidney has an unremarkable noncontrast appearance. Abdominal aorta  caliber is normal with mild to moderate calcific atherosclerosis.    Hysterectomy changes are present. The urinary bladder and rectum are  normal.    Degenerative changes in the lumbar spine. Mild chronic concavity of the  superior endplate of L3. No acute or suspicious osseous abnormalities.    Impression  1. No pneumothorax. No acute chest findings.  2. Large hiatal hernia. Herniated stomach appears  to have mild  generalized wall thickening which could reflect changes of gastritis,  but this appears similar to the prior 2012 comparison study. No evidence  of gastric volvulus.  3. Passive atelectasis in the right lower lobe.  4. Cholecystectomy. Hysterectomy. Surgical changes of the sigmoid colon.  5. Uncomplicated mild colonic diverticulosis.    Electronically Signed By-Nohemi Hurd MD On:6/28/2022 1:35 PM  This report was finalized on [id number] by  Nohemi Hurd MD.      Lab Review:   No results displayed because visit has over 200 results.      Admission on 05/06/2022, Discharged on 05/06/2022   Component Date Value   • Glucose 05/06/2022 152 (A)   • BUN 05/06/2022 29 (A)   • Creatinine 05/06/2022 1.24 (A)   • Sodium 05/06/2022 137    • Potassium 05/06/2022 4.2    • Chloride 05/06/2022 100    • CO2 05/06/2022 23.0    • Calcium 05/06/2022 9.7    • BUN/Creatinine Ratio 05/06/2022 23.4    • Anion Gap 05/06/2022 14.0    • eGFR 05/06/2022 41.9 (A)   • QT Interval 05/06/2022 390    • WBC 05/06/2022 7.30    • RBC 05/06/2022 3.92    • Hemoglobin 05/06/2022 8.7 (A)   • Hematocrit 05/06/2022 29.2 (A)   • MCV 05/06/2022 74.5 (A)   • MCH 05/06/2022 22.3 (A)   • MCHC 05/06/2022 29.9 (A)   • RDW 05/06/2022 17.4 (A)   • RDW-SD 05/06/2022 45.9    • MPV 05/06/2022 8.1    • Platelets 05/06/2022 311    • Neutrophil % 05/06/2022 73.5    • Lymphocyte % 05/06/2022 18.9 (A)   • Monocyte % 05/06/2022 6.0    • Eosinophil % 05/06/2022 0.3    • Basophil % 05/06/2022 1.3    • Neutrophils, Absolute 05/06/2022 5.40    • Lymphocytes, Absolute 05/06/2022 1.40    • Monocytes, Absolute 05/06/2022 0.40    • Eosinophils, Absolute 05/06/2022 0.00    • Basophils, Absolute 05/06/2022 0.10    • nRBC 05/06/2022 0.1      Recent labs reviewed and interpreted for clinical significance and application            Level of Care:           Mich Araujo MD  07/26/22  .

## 2022-12-07 DIAGNOSIS — I42.0 DILATED CARDIOMYOPATHY: ICD-10-CM

## 2022-12-07 DIAGNOSIS — N18.30 HYPERTENSIVE HEART AND CHRONIC KIDNEY DISEASE STAGE 3: ICD-10-CM

## 2022-12-07 DIAGNOSIS — I13.10 HYPERTENSIVE HEART AND CHRONIC KIDNEY DISEASE STAGE 3: ICD-10-CM

## 2022-12-07 RX ORDER — OMEPRAZOLE 40 MG/1
CAPSULE, DELAYED RELEASE ORAL
Qty: 180 CAPSULE | Refills: 1 | Status: SHIPPED | OUTPATIENT
Start: 2022-12-07

## 2022-12-07 RX ORDER — DILTIAZEM HYDROCHLORIDE 240 MG/1
CAPSULE, COATED, EXTENDED RELEASE ORAL
Qty: 90 CAPSULE | Refills: 1 | Status: SHIPPED | OUTPATIENT
Start: 2022-12-07

## 2022-12-07 RX ORDER — ATORVASTATIN CALCIUM 10 MG/1
10 TABLET, FILM COATED ORAL NIGHTLY
Qty: 90 TABLET | Refills: 1 | Status: SHIPPED | OUTPATIENT
Start: 2022-12-07

## 2022-12-14 ENCOUNTER — TRANSCRIBE ORDERS (OUTPATIENT)
Dept: ADMINISTRATIVE | Facility: HOSPITAL | Age: 87
End: 2022-12-14

## 2022-12-14 DIAGNOSIS — R53.83 FATIGUE, UNSPECIFIED TYPE: ICD-10-CM

## 2022-12-14 DIAGNOSIS — R01.1 MURMUR: Primary | ICD-10-CM

## 2022-12-15 ENCOUNTER — HOSPITAL ENCOUNTER (OUTPATIENT)
Dept: CARDIOLOGY | Facility: HOSPITAL | Age: 87
Discharge: HOME | End: 2022-12-15
Admitting: PHYSICIAN ASSISTANT

## 2022-12-15 VITALS — BODY MASS INDEX: 30.12 KG/M2 | WEIGHT: 170 LBS | HEIGHT: 63 IN

## 2022-12-15 DIAGNOSIS — R53.83 FATIGUE, UNSPECIFIED TYPE: ICD-10-CM

## 2022-12-15 DIAGNOSIS — R01.1 MURMUR: ICD-10-CM

## 2022-12-15 PROCEDURE — 93306 TTE W/DOPPLER COMPLETE: CPT | Performed by: INTERNAL MEDICINE

## 2022-12-15 PROCEDURE — 93306 TTE W/DOPPLER COMPLETE: CPT

## 2022-12-16 LAB
BH CV ECHO MEAS - ACS: 1.55 CM
BH CV ECHO MEAS - AI P1/2T: 596.3 MSEC
BH CV ECHO MEAS - AO MAX PG: 18 MMHG
BH CV ECHO MEAS - AO MEAN PG: 10.5 MMHG
BH CV ECHO MEAS - AO ROOT DIAM: 2.8 CM
BH CV ECHO MEAS - AO V2 MAX: 212 CM/SEC
BH CV ECHO MEAS - AO V2 VTI: 51.3 CM
BH CV ECHO MEAS - AVA(I,D): 1.04 CM2
BH CV ECHO MEAS - EDV(CUBED): 103.8 ML
BH CV ECHO MEAS - EDV(MOD-SP4): 69.9 ML
BH CV ECHO MEAS - EF(MOD-SP4): 55 %
BH CV ECHO MEAS - ESV(CUBED): 27.8 ML
BH CV ECHO MEAS - ESV(MOD-SP4): 31.5 ML
BH CV ECHO MEAS - FS: 35.5 %
BH CV ECHO MEAS - IVS/LVPW: 1.44 CM
BH CV ECHO MEAS - IVSD: 1.28 CM
BH CV ECHO MEAS - LA DIMENSION: 4.8 CM
BH CV ECHO MEAS - LV DIASTOLIC VOL/BSA (35-75): 38.7 CM2
BH CV ECHO MEAS - LV MASS(C)D: 183.8 GRAMS
BH CV ECHO MEAS - LV MAX PG: 1.64 MMHG
BH CV ECHO MEAS - LV MEAN PG: 1.04 MMHG
BH CV ECHO MEAS - LV SYSTOLIC VOL/BSA (12-30): 17.4 CM2
BH CV ECHO MEAS - LV V1 MAX: 64.1 CM/SEC
BH CV ECHO MEAS - LV V1 VTI: 15.3 CM
BH CV ECHO MEAS - LVIDD: 4.7 CM
BH CV ECHO MEAS - LVIDS: 3 CM
BH CV ECHO MEAS - LVOT AREA: 3.5 CM2
BH CV ECHO MEAS - LVOT DIAM: 2.1 CM
BH CV ECHO MEAS - LVPWD: 0.89 CM
BH CV ECHO MEAS - MV E MAX VEL: 153 CM/SEC
BH CV ECHO MEAS - MV MAX PG: 10 MMHG
BH CV ECHO MEAS - MV MEAN PG: 4.2 MMHG
BH CV ECHO MEAS - MV V2 VTI: 29.8 CM
BH CV ECHO MEAS - MVA(VTI): 1.78 CM2
BH CV ECHO MEAS - PA ACC TIME: 0.1 SEC
BH CV ECHO MEAS - PA PR(ACCEL): 32.3 MMHG
BH CV ECHO MEAS - PA V2 MAX: 83.6 CM/SEC
BH CV ECHO MEAS - RAP SYSTOLE: 3 MMHG
BH CV ECHO MEAS - RV MAX PG: 2.8 MMHG
BH CV ECHO MEAS - RV V1 MAX: 84 CM/SEC
BH CV ECHO MEAS - RV V1 VTI: 21.2 CM
BH CV ECHO MEAS - RVDD: 3.2 CM
BH CV ECHO MEAS - RVSP: 23.9 MMHG
BH CV ECHO MEAS - SI(MOD-SP4): 21.3 ML/M2
BH CV ECHO MEAS - SV(LVOT): 53.1 ML
BH CV ECHO MEAS - SV(MOD-SP4): 38.4 ML
BH CV ECHO MEAS - TR MAX PG: 20.9 MMHG
BH CV ECHO MEAS - TR MAX VEL: 227.7 CM/SEC
MAXIMAL PREDICTED HEART RATE: 132 BPM
STRESS TARGET HR: 112 BPM

## 2022-12-20 ENCOUNTER — TELEPHONE (OUTPATIENT)
Dept: CARDIOLOGY | Facility: CLINIC | Age: 87
End: 2022-12-20

## 2022-12-20 NOTE — TELEPHONE ENCOUNTER
REQUESTING RESULTS OF ECHO. PATIENT HAS NEW PATIENT APT 12/29 WITH DR. JONES. PATIENT SWITCHING FROM DR. MONTANEZ.

## 2022-12-22 ENCOUNTER — TELEPHONE (OUTPATIENT)
Dept: CARDIOLOGY | Facility: CLINIC | Age: 87
End: 2022-12-22

## 2022-12-22 NOTE — TELEPHONE ENCOUNTER
Adult Transthoracic Echo Complete W/ Cont if Necessary Per Protocol (12/15/2022 13:02)      Patient is requesting to get a call to have her echo results.     Thank you.

## 2022-12-29 ENCOUNTER — OFFICE VISIT (OUTPATIENT)
Dept: CARDIOLOGY | Facility: CLINIC | Age: 87
End: 2022-12-29

## 2022-12-29 ENCOUNTER — TELEPHONE (OUTPATIENT)
Dept: CARDIOLOGY | Facility: CLINIC | Age: 87
End: 2022-12-29

## 2022-12-29 ENCOUNTER — CLINICAL SUPPORT NO REQUIREMENTS (OUTPATIENT)
Dept: CARDIOLOGY | Facility: CLINIC | Age: 87
End: 2022-12-29

## 2022-12-29 VITALS
HEART RATE: 90 BPM | WEIGHT: 173 LBS | OXYGEN SATURATION: 97 % | BODY MASS INDEX: 30.65 KG/M2 | HEIGHT: 63 IN | SYSTOLIC BLOOD PRESSURE: 150 MMHG | DIASTOLIC BLOOD PRESSURE: 74 MMHG

## 2022-12-29 DIAGNOSIS — Z95.0 PACEMAKER: Primary | ICD-10-CM

## 2022-12-29 DIAGNOSIS — I49.5 TACHY-BRADY SYNDROME: ICD-10-CM

## 2022-12-29 DIAGNOSIS — Z95.0 PACEMAKER: ICD-10-CM

## 2022-12-29 DIAGNOSIS — I34.0 NONRHEUMATIC MITRAL VALVE REGURGITATION: ICD-10-CM

## 2022-12-29 DIAGNOSIS — E78.2 HYPERLIPIDEMIA, MIXED: ICD-10-CM

## 2022-12-29 DIAGNOSIS — I48.21 PERMANENT ATRIAL FIBRILLATION: Primary | ICD-10-CM

## 2022-12-29 DIAGNOSIS — I10 ESSENTIAL HYPERTENSION: ICD-10-CM

## 2022-12-29 DIAGNOSIS — I25.10 CORONARY ARTERY DISEASE INVOLVING NATIVE CORONARY ARTERY OF NATIVE HEART WITHOUT ANGINA PECTORIS: ICD-10-CM

## 2022-12-29 DIAGNOSIS — Z95.818 PRESENCE OF WATCHMAN LEFT ATRIAL APPENDAGE CLOSURE DEVICE: ICD-10-CM

## 2022-12-29 PROCEDURE — 99214 OFFICE O/P EST MOD 30 MIN: CPT | Performed by: INTERNAL MEDICINE

## 2022-12-29 PROCEDURE — 93279 PRGRMG DEV EVAL PM/LDLS PM: CPT | Performed by: INTERNAL MEDICINE

## 2022-12-29 NOTE — PROGRESS NOTES
Subjective:     Encounter Date:12/29/2022      Patient ID: Cherie Hinton is a 88 y.o. female.    Chief Complaint:  History of Present Illness 88-year-old white female with history of coronary disease mitral regurgitation hypertension hyperlipidemia pacemaker placement atrial fibrillation status post watchman procedure presents to my office for a new consultation.  Patient is currently stable without any signs of chest pain but has some shortness of breath with exertion.  No complains any PND orthopnea.  No palpitation dizziness syncope or swelling of the feet.  She is taking her medicines regularly.  She does not smoke.  She is quite active.    The following portions of the patient's history were reviewed and updated as appropriate: allergies, current medications, past family history, past medical history, past social history, past surgical history and problem list.  Past Medical History:   Diagnosis Date   • Antral erosion    • Aortic valve regurgitation 12/26/2018   • Atrial fibrillation (HCC)    • Atrial fibrillation with controlled ventricular response ([facility])    • B12 deficiency    • CAD (coronary artery disease)    • Cellulitis 04/2011    on face    • Cerebrovascular accident (CVA) (Formerly [facility]) 12/07/2016   • CHF (congestive heart failure) (Formerly [facility])    • Colon cancer ([facility]ly [facility])    • Coronary artery disease involving native coronary artery of native heart without angina pectoris 06/18/2019   • Depression    • Diabetes mellitus, type 2 (Formerly [facility])    • Essential hypertension 02/13/2012   • Fatigue    • GERD (gastroesophageal reflux disease)    • Hiatal hernia    • History of colonoscopy 04/2010    last done 4/10   • History of esophagogastroduodenoscopy (EGD) 04/2010    last done 4/10   • Hyperlipidemia, mixed 02/13/2012   • Hypertension     white coat htn   • HALEIGH (iron deficiency anemia)    • LAD (lymphadenopathy)     40% lesion LAD    • Left pontine CVA ([facility])    • Mitral regurgitation 05/21/2012   • STORMY (obstructive sleep  apnea)     not treating   • Osteoarthritis    • Pacemaker 06/18/2019   • Permanent atrial fibrillation (HCC) 06/18/2019   • Presence of Watchman left atrial appendage closure device 01/04/2020   • 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HCC)    • Right kidney mass     1.4 cm- following urology    • Sick sinus syndrome (HCC)    • Stroke (HCC) 11/2016   • TIA (transient ischemic attack)     left CVA, interrupted TPA; As (moderate-severe)    • Vestibular nerve disease 2017     Past Surgical History:   Procedure Laterality Date   • ATRIAL APPENDAGE EXCLUSION LEFT WITH TRANSESOPHAGEAL ECHOCARDIOGRAM N/A 10/10/2019    Procedure: Atrial Appendage Occlusion;  Surgeon: Richie Chapman MD;  Location: [facility] CATH INVASIVE LOCATION;  Service: Cardiovascular   • ATRIAL APPENDAGE EXCLUSION LEFT WITH TRANSESOPHAGEAL ECHOCARDIOGRAM N/A 10/10/2019    Procedure: Atrial Appendage Occlusion;  Surgeon: Je Rivera MD;  Location: [facility] CATH INVASIVE LOCATION;  Service: Cardiology   • BLADDER REPAIR     • CARDIAC CATHETERIZATION  05/2010   • CHOLECYSTECTOMY     • COLECTOMY PARTIAL / TOTAL     • COLONOSCOPY  04/16/2018    negative    • COLONOSCOPY N/A 7/1/2022    Procedure: COLONOSCOPY with polypectomy x1;  Surgeon: ADOLFO Boss MD;  Location: [facility] ENDOSCOPY;  Service: Gastroenterology;  Laterality: N/A;  post: diverticulosis, hemorrhoids, polyps   • ENDOSCOPY  04/16/2018    negative    • ENDOSCOPY N/A 6/29/2022    Procedure: ESOPHAGOGASTRODUODENOSCOPY with biopsy of duodenum r/o celiac;  Surgeon: ADOLFO Boss MD;  Location: [facility] ENDOSCOPY;  Service: Gastroenterology;  Laterality: N/A;  hiatial hernia  Gastric Polyps       • HYSTERECTOMY     • INSERT / REPLACE / REMOVE PACEMAKER  09/2018    Pacemaker gen change 9/2018    • OTHER SURGICAL HISTORY  04/25/2015    Exercise myoview, normal    • PACEMAKER IMPLANTATION  01/22/2010    Dual Chamber - 1/22/2010; RA Lead Revision- 5/7/2012- BS    • TOOTH EXTRACTION     • TRANSESOPHAGEAL  "ECHOCARDIOGRAM (DHEERAJ)  07/2019     /74   Pulse 90   Ht 160 cm (63\")   Wt 78.5 kg (173 lb)   SpO2 97%   BMI 30.65 kg/m²   Family History   Problem Relation Age of Onset   • Hypertension Mother    • Diabetes Mother    • Coronary artery disease Mother    • Other Father         CVA   • Heart disease Other    • Stroke Other    • Hypertension Other        Current Outpatient Medications:   •  atorvastatin (LIPITOR) 10 MG tablet, TAKE 1 TABLET BY MOUTH EVERY NIGHT., Disp: 90 tablet, Rfl: 1  •  dilTIAZem CD (CARDIZEM CD) 240 MG 24 hr capsule, TAKE 1 CAPSULE EVERY DAY, Disp: 90 capsule, Rfl: 1  •  ferrous sulfate 325 (65 FE) MG EC tablet, Take 1 tablet by mouth Daily With Breakfast., Disp: 90 tablet, Rfl: 1  •  furosemide (LASIX) 20 MG tablet, Take 1 tablet by mouth Daily., Disp: 90 tablet, Rfl: 3  •  magnesium oxide (MAG-OX) 400 MG tablet, Take 1 tablet by mouth Daily., Disp: 90 tablet, Rfl: 0  •  metFORMIN (Glucophage) 500 MG tablet, Take 1 tablet by mouth Daily With Breakfast. For diabetes, Disp: 30 tablet, Rfl: 6  •  nebivolol (Bystolic) 20 MG tablet, Take 1 tablet by mouth Daily., Disp: 90 tablet, Rfl: 3  •  omeprazole (priLOSEC) 40 MG capsule, TAKE 1 CAPSULE TWICE DAILY, Disp: 180 capsule, Rfl: 1  •  spironolactone (ALDACTONE) 25 MG tablet, Take 1 tablet by mouth Daily., Disp: 90 tablet, Rfl: 3  •  acetaminophen (TYLENOL) 325 MG tablet, Take 2 tablets by mouth Every 4 (Four) Hours As Needed for Mild Pain ., Disp: , Rfl:   •  isosorbide mononitrate (IMDUR) 30 MG 24 hr tablet, Take 1 tablet by mouth Daily., Disp: 30 tablet, Rfl: 11  •  meclizine (ANTIVERT) 25 MG tablet, Take 1 tablet by mouth 3 (Three) Times a Day As Needed for Dizziness., Disp: 30 tablet, Rfl: 0  Allergies   Allergen Reactions   • Contrast Dye Anaphylaxis   • Adhesive Tape Itching   • Latex Hives     Social History     Socioeconomic History   • Marital status:    Tobacco Use   • Smoking status: Never   • Smokeless tobacco: Never   Vaping " Use   • Vaping Use: Never used   Substance and Sexual Activity   • Alcohol use: No   • Drug use: No   • Sexual activity: Defer     Review of Systems   Constitutional: Positive for malaise/fatigue.   Cardiovascular: Negative for chest pain, dyspnea on exertion, leg swelling and palpitations.   Respiratory: Positive for shortness of breath. Negative for cough.    Gastrointestinal: Negative for abdominal pain, nausea and vomiting.   Neurological: Negative for dizziness, focal weakness, headaches, light-headedness and numbness.   All other systems reviewed and are negative.             Objective:     Constitutional:       Appearance: Well-developed.   Eyes:      General: No scleral icterus.     Conjunctiva/sclera: Conjunctivae normal.      Pupils: Pupils are equal, round, and reactive to light.   HENT:      Head: Normocephalic and atraumatic.   Neck:      Vascular: No carotid bruit or JVD.   Pulmonary:      Effort: Pulmonary effort is normal.      Breath sounds: Normal breath sounds. No wheezing. No rales.   Cardiovascular:      Normal rate. Regular rhythm.      Murmurs: There is a systolic murmur.   Pulses:     Intact distal pulses.   Abdominal:      General: Bowel sounds are normal.      Palpations: Abdomen is soft.   Musculoskeletal: Normal range of motion.      Cervical back: Normal range of motion and neck supple. Skin:     General: Skin is warm and dry.      Findings: No rash.   Neurological:      Mental Status: Alert.      Comments: No focal deficits       Procedures    Lab Review:         MDM #1 coronary disease  Patient had nonobstructive disease in the past and is currently stable on medications with normal V function  2.  Mitral gestation  Patient has moderate mitral regurgitation is currently stable on medical therapy  3.  Hypertension  Patient blood pressure currently stable on diltiazem and nebivolol and isosorbide and diuretics  4.  Hyperlipidemia  Patient on atorvastatin and the lipid levels are well  within normal limits  5.  Atrial fibrillation  Patient is currently on diltiazem and nebivolol but does not take anticoagulation because she had a watchman procedure.  6.  Pacemaker placement  Patient had tachybradycardia syndrome and status post pacemaker placement and her pacemaker is working very well    Patient's previous medical records, labs, and EKG were reviewed and discussed with the patient at today's visit.

## 2022-12-30 ENCOUNTER — APPOINTMENT (OUTPATIENT)
Dept: CARDIOLOGY | Facility: HOSPITAL | Age: 87
End: 2022-12-30

## 2023-02-03 NOTE — OUTREACH NOTE
"Medical Week 1 Survey    Flowsheet Row Responses   [facility] patient discharged from? [facility]   Does the patient have one of the following disease processes/diagnoses(primary or secondary)? Other   Week 1 attempt successful? Yes   Call start time 1909   Call end time 1912   Discharge diagnosis Iron deficiency anemia due to chronic blood lossEGD    Meds reviewed with patient/caregiver? Yes   Does the patient have all medications ordered at discharge? No   Prescription comments Explained she can get these OTC   Is the patient taking all medications as directed (includes completed medication regime)? No   Medication comments Will  have someone  medications   Does the patient have a primary care provider?  Yes   Does the patient have an appointment with their PCP within 7 days of discharge? Yes   Has the patient kept scheduled appointments due by today? N/A   Comments States she may need to change appt and is going to call the office tomorro.   Psychosocial issues? No   Did the patient receive a copy of their discharge instructions? Yes   Nursing interventions Reviewed instructions with patient   What is the patient's perception of their health status since discharge? Improving   Is the patient/caregiver able to teach back signs and symptoms related to disease process for when to call PCP? Yes   Is the patient/caregiver able to teach back signs and symptoms related to disease process for when to call 911? Yes   Is the patient/caregiver able to teach back the hierarchy of who to call/visit for symptoms/problems? PCP, Specialist, Home health nurse, Urgent Care, ED, 911 Yes   If the patient is a current smoker, are they able to teach back resources for cessation? Not a smoker   Additional teach back comments Staes she is doing \"ok\".   Week 1 call completed? Yes   Wrap up additional comments Pt to  medications.          MARCOS AMAYA - Licensed Nurse  "
03-Feb-2023 03:57

## 2023-04-05 ENCOUNTER — HOSPITAL ENCOUNTER (EMERGENCY)
Facility: HOSPITAL | Age: 88
Discharge: HOME | End: 2023-04-05
Attending: EMERGENCY MEDICINE | Admitting: EMERGENCY MEDICINE
Payer: MEDICARE

## 2023-04-05 ENCOUNTER — APPOINTMENT (OUTPATIENT)
Dept: GENERAL RADIOLOGY | Facility: HOSPITAL | Age: 88
End: 2023-04-05
Payer: MEDICARE

## 2023-04-05 VITALS
SYSTOLIC BLOOD PRESSURE: 136 MMHG | BODY MASS INDEX: 30.36 KG/M2 | HEIGHT: 62 IN | DIASTOLIC BLOOD PRESSURE: 64 MMHG | RESPIRATION RATE: 18 BRPM | OXYGEN SATURATION: 97 % | TEMPERATURE: 98.2 F | HEART RATE: 88 BPM | WEIGHT: 165 LBS

## 2023-04-05 DIAGNOSIS — R53.1 WEAKNESS: Primary | ICD-10-CM

## 2023-04-05 LAB
ANION GAP SERPL CALCULATED.3IONS-SCNC: 14 MMOL/L (ref 5–15)
BASOPHILS # BLD AUTO: 0.1 10*3/MM3 (ref 0–0.2)
BASOPHILS NFR BLD AUTO: 1 % (ref 0–1.5)
BILIRUB UR QL STRIP: NEGATIVE
BUN SERPL-MCNC: 26 MG/DL (ref 8–23)
BUN/CREAT SERPL: 22.6 (ref 7–25)
CALCIUM SPEC-SCNC: 9.9 MG/DL (ref 8.6–10.5)
CHLORIDE SERPL-SCNC: 99 MMOL/L (ref 98–107)
CLARITY UR: CLEAR
CO2 SERPL-SCNC: 24 MMOL/L (ref 22–29)
COLOR UR: YELLOW
CREAT SERPL-MCNC: 1.15 MG/DL (ref 0.57–1)
DEPRECATED RDW RBC AUTO: 50.8 FL (ref 37–54)
EGFRCR SERPLBLD CKD-EPI 2021: 45.6 ML/MIN/1.73
EOSINOPHIL # BLD AUTO: 0 10*3/MM3 (ref 0–0.4)
EOSINOPHIL NFR BLD AUTO: 0.1 % (ref 0.3–6.2)
ERYTHROCYTE [DISTWIDTH] IN BLOOD BY AUTOMATED COUNT: 19.4 % (ref 12.3–15.4)
GLUCOSE SERPL-MCNC: 157 MG/DL (ref 65–99)
GLUCOSE UR STRIP-MCNC: NEGATIVE MG/DL
HCT VFR BLD AUTO: 27.2 % (ref 34–46.6)
HGB BLD-MCNC: 8.1 G/DL (ref 12–15.9)
HGB UR QL STRIP.AUTO: NEGATIVE
KETONES UR QL STRIP: NEGATIVE
LEUKOCYTE ESTERASE UR QL STRIP.AUTO: NEGATIVE
LYMPHOCYTES # BLD AUTO: 1.2 10*3/MM3 (ref 0.7–3.1)
LYMPHOCYTES NFR BLD AUTO: 14.7 % (ref 19.6–45.3)
MCH RBC QN AUTO: 22.1 PG (ref 26.6–33)
MCHC RBC AUTO-ENTMCNC: 29.8 G/DL (ref 31.5–35.7)
MCV RBC AUTO: 74.2 FL (ref 79–97)
MONOCYTES # BLD AUTO: 0.6 10*3/MM3 (ref 0.1–0.9)
MONOCYTES NFR BLD AUTO: 6.9 % (ref 5–12)
NEUTROPHILS NFR BLD AUTO: 6.3 10*3/MM3 (ref 1.7–7)
NEUTROPHILS NFR BLD AUTO: 77.3 % (ref 42.7–76)
NITRITE UR QL STRIP: NEGATIVE
NRBC BLD AUTO-RTO: 0.1 /100 WBC (ref 0–0.2)
PH UR STRIP.AUTO: <=5 [PH] (ref 5–8)
PLATELET # BLD AUTO: 361 10*3/MM3 (ref 140–450)
PMV BLD AUTO: 8.3 FL (ref 6–12)
POTASSIUM SERPL-SCNC: 4.4 MMOL/L (ref 3.5–5.2)
PROT UR QL STRIP: NEGATIVE
RBC # BLD AUTO: 3.66 10*6/MM3 (ref 3.77–5.28)
SARS-COV-2 RNA RESP QL NAA+PROBE: NOT DETECTED
SODIUM SERPL-SCNC: 137 MMOL/L (ref 136–145)
SP GR UR STRIP: 1.01 (ref 1–1.03)
UROBILINOGEN UR QL STRIP: NORMAL
WBC NRBC COR # BLD: 8.2 10*3/MM3 (ref 3.4–10.8)

## 2023-04-05 PROCEDURE — 71045 X-RAY EXAM CHEST 1 VIEW: CPT

## 2023-04-05 PROCEDURE — 99283 EMERGENCY DEPT VISIT LOW MDM: CPT

## 2023-04-05 PROCEDURE — 93005 ELECTROCARDIOGRAM TRACING: CPT

## 2023-04-05 PROCEDURE — U0003 INFECTIOUS AGENT DETECTION BY NUCLEIC ACID (DNA OR RNA); SEVERE ACUTE RESPIRATORY SYNDROME CORONAVIRUS 2 (SARS-COV-2) (CORONAVIRUS DISEASE [COVID-19]), AMPLIFIED PROBE TECHNIQUE, MAKING USE OF HIGH THROUGHPUT TECHNOLOGIES AS DESCRIBED BY CMS-2020-01-R: HCPCS | Performed by: EMERGENCY MEDICINE

## 2023-04-05 PROCEDURE — 80048 BASIC METABOLIC PNL TOTAL CA: CPT | Performed by: EMERGENCY MEDICINE

## 2023-04-05 PROCEDURE — 85025 COMPLETE CBC W/AUTO DIFF WBC: CPT | Performed by: EMERGENCY MEDICINE

## 2023-04-05 PROCEDURE — U0005 INFEC AGEN DETEC AMPLI PROBE: HCPCS | Performed by: EMERGENCY MEDICINE

## 2023-04-05 PROCEDURE — 81003 URINALYSIS AUTO W/O SCOPE: CPT | Performed by: EMERGENCY MEDICINE

## 2023-04-05 PROCEDURE — P9612 CATHETERIZE FOR URINE SPEC: HCPCS

## 2023-04-05 RX ORDER — SODIUM CHLORIDE 0.9 % (FLUSH) 0.9 %
10 SYRINGE (ML) INJECTION AS NEEDED
Status: DISCONTINUED | OUTPATIENT
Start: 2023-04-05 | End: 2023-04-05 | Stop reason: HOSPADM

## 2023-04-05 NOTE — ED PROVIDER NOTES
Subjective   History of Present Illness  Patient is an 89-year-old female complaining of increasing weakness over the past several weeks.  She denies other complaining cough fever chest pain balm diarrhea or other complaint.        Review of Systems    Past Medical History:   Diagnosis Date   • Antral erosion    • Aortic valve regurgitation 12/26/2018   • Atrial fibrillation    • Atrial fibrillation with controlled ventricular response    • B12 deficiency    • CAD (coronary artery disease)    • Cellulitis 04/2011    on face    • Cerebrovascular accident (CVA) 12/07/2016   • CHF (congestive heart failure)    • Colon cancer    • Coronary artery disease involving native coronary artery of native heart without angina pectoris 06/18/2019   • Depression    • Diabetes mellitus, type 2    • Essential hypertension 02/13/2012   • Fatigue    • GERD (gastroesophageal reflux disease)    • Hiatal hernia    • History of colonoscopy 04/2010    last done 4/10   • History of esophagogastroduodenoscopy (EGD) 04/2010    last done 4/10   • Hyperlipidemia, mixed 02/13/2012   • Hypertension     white coat htn   • HALEIGH (iron deficiency anemia)    • LAD (lymphadenopathy)     40% lesion LAD    • Left pontine CVA    • Mitral regurgitation 05/21/2012   • STORMY (obstructive sleep apnea)     not treating   • Osteoarthritis    • Pacemaker 06/18/2019   • Permanent atrial fibrillation 06/18/2019   • Presence of Watchman left atrial appendage closure device 01/04/2020   • Prinzmetal's angina    • Right kidney mass     1.4 cm- following urology    • Sick sinus syndrome    • Stroke 11/2016   • TIA (transient ischemic attack)     left CVA, interrupted TPA; As (moderate-severe)    • Vestibular nerve disease 2017       Allergies   Allergen Reactions   • Contrast Dye (Echo Or Unknown Ct/Mr) Anaphylaxis   • Adhesive Tape Itching   • Latex Hives       Past Surgical History:   Procedure Laterality Date   • ATRIAL APPENDAGE EXCLUSION LEFT WITH TRANSESOPHAGEAL  ECHOCARDIOGRAM N/A 10/10/2019    Procedure: Atrial Appendage Occlusion;  Surgeon: Richie Chapman MD;  Location: [facility] CATH INVASIVE LOCATION;  Service: Cardiovascular   • ATRIAL APPENDAGE EXCLUSION LEFT WITH TRANSESOPHAGEAL ECHOCARDIOGRAM N/A 10/10/2019    Procedure: Atrial Appendage Occlusion;  Surgeon: Je Rivera MD;  Location: [facility] CATH INVASIVE LOCATION;  Service: Cardiology   • BLADDER REPAIR     • CARDIAC CATHETERIZATION  05/2010   • CHOLECYSTECTOMY     • COLECTOMY PARTIAL / TOTAL     • COLONOSCOPY  04/16/2018    negative    • COLONOSCOPY N/A 7/1/2022    Procedure: COLONOSCOPY with polypectomy x1;  Surgeon: ADOLFO Boss MD;  Location: [facility] ENDOSCOPY;  Service: Gastroenterology;  Laterality: N/A;  post: diverticulosis, hemorrhoids, polyps   • ENDOSCOPY  04/16/2018    negative    • ENDOSCOPY N/A 6/29/2022    Procedure: ESOPHAGOGASTRODUODENOSCOPY with biopsy of duodenum r/o celiac;  Surgeon: ADOLFO Boss MD;  Location: [facility] ENDOSCOPY;  Service: Gastroenterology;  Laterality: N/A;  hiatial hernia  Gastric Polyps       • HYSTERECTOMY     • INSERT / REPLACE / REMOVE PACEMAKER  09/2018    Pacemaker gen change 9/2018    • OTHER SURGICAL HISTORY  04/25/2015    Exercise myoview, normal    • PACEMAKER IMPLANTATION  01/22/2010    Dual Chamber - 1/22/2010; RA Lead Revision- 5/7/2012- BS    • TOOTH EXTRACTION     • TRANSESOPHAGEAL ECHOCARDIOGRAM (DHEERAJ)  07/2019       Family History   Problem Relation Age of Onset   • Hypertension Mother    • Diabetes Mother    • Coronary artery disease Mother    • Other Father         CVA   • Heart disease Other    • Stroke Other    • Hypertension Other        Social History     Socioeconomic History   • Marital status:    Tobacco Use   • Smoking status: Never   • Smokeless tobacco: Never   Vaping Use   • Vaping Use: Never used   Substance and Sexual Activity   • Alcohol use: No   • Drug use: No   • Sexual activity: Defer           Objective    Physical Exam  HEENT exam shows TMs to be clear.  Oropharynx comers but sclerae nonicteric.  Neck has no adenopathy JVD or bruits.  Lungs clear.  Heart has regular rhythm without murmur.  Abdomen soft nontender.  Patient has normal bowel sounds.  Extremities M is no cyanosis or edema.  Procedures     My EKG interpretation was a fibrillation at a rate of 97 with no acute ST change      ED Course      Results for orders placed or performed during the hospital encounter of 04/05/23   COVID-19,CEPHEID/HERBERT,COR/BASIL/PAD/YOUSUF/MAD IN-HOUSE(OR EMERGENT/ADD-ON),NP SWAB IN TRANSPORT MEDIA 3-4 HR TAT, RT-PCR - Swab, Nasopharynx    Specimen: Nasopharynx; Swab   Result Value Ref Range    COVID19 Not Detected Not Detected - Ref. Range   Basic Metabolic Panel    Specimen: Blood   Result Value Ref Range    Glucose 157 (H) 65 - 99 mg/dL    BUN 26 (H) 8 - 23 mg/dL    Creatinine 1.15 (H) 0.57 - 1.00 mg/dL    Sodium 137 136 - 145 mmol/L    Potassium 4.4 3.5 - 5.2 mmol/L    Chloride 99 98 - 107 mmol/L    CO2 24.0 22.0 - 29.0 mmol/L    Calcium 9.9 8.6 - 10.5 mg/dL    BUN/Creatinine Ratio 22.6 7.0 - 25.0    Anion Gap 14.0 5.0 - 15.0 mmol/L    eGFR 45.6 (L) >60.0 mL/min/1.73   Urinalysis With Microscopic If Indicated (No Culture) - Urine, Catheter    Specimen: Urine, Catheter   Result Value Ref Range    Color, UA Yellow Yellow, Straw    Appearance, UA Clear Clear    pH, UA <=5.0 5.0 - 8.0    Specific Gravity, UA 1.009 1.005 - 1.030    Glucose, UA Negative Negative    Ketones, UA Negative Negative    Bilirubin, UA Negative Negative    Blood, UA Negative Negative    Protein, UA Negative Negative    Leuk Esterase, UA Negative Negative    Nitrite, UA Negative Negative    Urobilinogen, UA 0.2 E.U./dL 0.2 - 1.0 E.U./dL   CBC Auto Differential    Specimen: Blood   Result Value Ref Range    WBC 8.20 3.40 - 10.80 10*3/mm3    RBC 3.66 (L) 3.77 - 5.28 10*6/mm3    Hemoglobin 8.1 (L) 12.0 - 15.9 g/dL    Hematocrit 27.2 (L) 34.0 - 46.6 %    MCV 74.2 (L)  79.0 - 97.0 fL    MCH 22.1 (L) 26.6 - 33.0 pg    MCHC 29.8 (L) 31.5 - 35.7 g/dL    RDW 19.4 (H) 12.3 - 15.4 %    RDW-SD 50.8 37.0 - 54.0 fl    MPV 8.3 6.0 - 12.0 fL    Platelets 361 140 - 450 10*3/mm3    Neutrophil % 77.3 (H) 42.7 - 76.0 %    Lymphocyte % 14.7 (L) 19.6 - 45.3 %    Monocyte % 6.9 5.0 - 12.0 %    Eosinophil % 0.1 (L) 0.3 - 6.2 %    Basophil % 1.0 0.0 - 1.5 %    Neutrophils, Absolute 6.30 1.70 - 7.00 10*3/mm3    Lymphocytes, Absolute 1.20 0.70 - 3.10 10*3/mm3    Monocytes, Absolute 0.60 0.10 - 0.90 10*3/mm3    Eosinophils, Absolute 0.00 0.00 - 0.40 10*3/mm3    Basophils, Absolute 0.10 0.00 - 0.20 10*3/mm3    nRBC 0.1 0.0 - 0.2 /100 WBC   ECG 12 Lead Other; weakness   Result Value Ref Range    QT Interval 376 ms     XR Chest 1 View    Result Date: 4/5/2023  Impression: Stable chronic findings without acute process. Electronically Signed: Julian Valdez  4/5/2023 1:30 PM EDT  Workstation ID: [id number]                                         Medical Decision Making  Patient has controlled atrial fibrillation which is her baseline.  There is no acute ST change.  Metabolic panel is at baseline without renal insufficiency or electrolyte and balance compared to her old laboratory data.  Patient is anemic at 8.1 and hemoglobin however she generally runs in the low 8 range when I reviewed her old chart.  My chest x-ray interpretation shows no cardiomegaly effusion or infiltrate.  Patient will be discharged.  She will follow with MD for further outpatient evaluation as needed.    Amount and/or Complexity of Data Reviewed  Labs: ordered. Decision-making details documented in ED Course.  Radiology: ordered and independent interpretation performed.  ECG/medicine tests: ordered and independent interpretation performed.      Risk  Prescription drug management.          Final diagnoses:   Weakness       ED Disposition  ED Disposition     ED Disposition   Discharge    Condition   Stable    Comment   --             No  follow-up provider specified.       Medication List      No changes were made to your prescriptions during this visit.          Sonu Flor MD  04/05/23 [id number]

## 2023-04-06 LAB — QT INTERVAL: 376 MS

## 2023-04-10 ENCOUNTER — TELEPHONE (OUTPATIENT)
Dept: CARDIOLOGY | Facility: CLINIC | Age: 88
End: 2023-04-10
Payer: MEDICARE

## 2023-04-10 NOTE — TELEPHONE ENCOUNTER
Er  bhf 4/5, still having soa and weakness, Friday night she felt a shock around pm site.please advise

## 2023-04-11 NOTE — TELEPHONE ENCOUNTER
Patient needs to be scheduled with Cris.    Please and thank you!   Subjective    Prema Kenney is a 15 year old female who presents to clinic today with mother because of:  RECHECK     HPI   General Follow Up    Concern: follow-up for diarrhea and not feeling well  Problem started: 2 weeks ago  Progression of symptoms: better since stopping Imuran  Description: nausea, dizzy, vomiting, diarrhea and fatigue, started antibiotics, no improvement noted; Imuran was stopped and symptoms resolved within a day or so.  Patient is feeling much better now        Review of Systems  Constitutional, eye, ENT, skin, respiratory, cardiac, and GI are normal except as otherwise noted.    Problem List  Patient Active Problem List    Diagnosis Date Noted     Menorrhagia with regular cycle 10/29/2019     Priority: Medium     Myasthenia gravis (H) 2019     Priority: Medium      Medications  adapalene 0.3 % gel, Apply topically At Bedtime  albuterol (PROAIR HFA/PROVENTIL HFA/VENTOLIN HFA) 108 (90 Base) MCG/ACT inhaler, Inhale 2 puffs into the lungs every 6 hours  azaTHIOprine (IMURAN) 50 MG tablet, 25 mg daily for 1 week, then 50 mg daily  [] azithromycin (ZITHROMAX) 250 MG tablet, Take 2 tablets (500 mg) by mouth daily for 1 day, THEN 1 tablet (250 mg) daily for 4 days.  clindamycin (CLEOCIN T) 1 % lotion, APPLY A PEA SIZE AMOUNT TO ENTIRE FACE QAM  ferrous fumarate 65 mg, [place]. FE,-Vitamin C 125 mg (VITRON C)  MG TABS tablet, Take 1 tablet by mouth daily  Lactobacillus (PROBIOTIC CHILDRENS PO), Take by mouth daily  norethindrone-ethinyl estradiol (ORTHO-NOVUM 1-35 TAB,NORTREL 1-35 TAB) 1-35 MG-MCG tablet, Take 1 tablet by mouth daily  PREDNISONE PO, 10mg every other day for August, then 5mg every other day in September  pyridostigmine (MESTINON) 60 MG tablet, Take 15 mg by mouth  pyridostigmine ER (MESTINON) 180 MG CR tablet,   vitamin D3 (CHOLECALCIFEROL) 2000 units tablet, Take 1 tablet by mouth daily    No current facility-administered medications on file prior to visit.  "    Allergies  Allergies   Allergen Reactions     Tamiflu [Oseltamivir] Nausea and Vomiting     Amoxicillin      Whole body rash     Cefprozil      Lactose      Toradol [Ketorolac Tromethamine] Difficulty breathing     Reviewed and updated as needed this visit by Provider  Tobacco  Allergies  Meds  Problems  Med Hx  Surg Hx  Fam Hx           Objective    BP 92/62 (BP Location: Left arm, Patient Position: Sitting, Cuff Size: Adult Regular)   Pulse 85   Temp 97.4  F (36.3  C) (Tympanic)   Resp 14   Ht 1.562 m (5' 1.5\")   Wt 54 kg (119 lb)   SpO2 97%   BMI 22.12 kg/m    51 %ile based on [facility] (Girls, 2-20 Years) weight-for-age data based on Weight recorded on 1/23/2020.  Blood pressure reading is in the normal blood pressure range based on the 2017 AAP Clinical Practice Guideline.    Physical Exam  GENERAL: healthy, alert and no distress  PSYCH: mentation appears normal, affect normal/bright    Diagnostics: See orders      Assessment & Plan    1. Diarrhea, unspecified type  Will recheck labs and make sure WBC is down again (wouldn't be surprised with mild elevation due to steroids but last time is was checked it had made a large jump).  Follow-up as directed.  - CBC with platelets and differential    2. Fatigue, unspecified type  Will recheck ferritin  - Ferritin    Follow Up  Return if symptoms worsen or fail to improve.      Kemi Arnold MD        "

## 2023-04-11 NOTE — PROGRESS NOTES
"    Subjective:     Encounter Date:04/12/2023      Patient ID: Cherie Hinton is a 89 y.o. female.    Chief Complaint:  History of Present Illness  Cherie Hinton is an 89-year-old female with a medical history to include permanent atrial fibrillation s/p Watchman, tachybradycardia syndrome with the presence of permanent pacemaker, CHF, dilated cardiomyopathy, hypertension, hyperlipidemia, CAD, valvular disease, GERD, type 2 diabetes, CVA, STORMY who presents to the office today for evaluation after recent emergency department visit for which she was evaluated for increasing weakness over the last few months.  Patient's EKG showed atrial fibrillation with controlled ventricular rate, chest x-ray showed no cardiomegaly and no evidence of infiltrate or effusion.  Patient's hemoglobin was noted to be 8.1, however she usually runs close to 8 so no intervention was done.  Patient's most recent echocardiogram from December 2022 shows preserved EF of 56 to 60% with current MR and mild AS.   Patient seen and examined.  Patient states she has been experiencing progressively worsening weakness and shortness of breath for the past 3 weeks. Patient states now just talking for long periods of time or minimal exertion causes extreme dyspnea which is a change in her baseline.  Patient has associated symptoms of palpitations, lightheadedness, dizziness, nausea. Patient also states on Friday night into Saturday morning she awoke with sudden chest pain that felt as if her \"permanent pacemaker had shocked her\".  Patient states chest pain was an isolated incident, sharp in nature, nonradiating and left-sided.  Patient has a history of anemia and GI bleeds for which she underwent a Watchman procedure so she could be taken off of anticoagulation.  Patient states that when she previously required blood transfusions for anemia her symptoms feel similar to as they are now.  Patient's most recent echocardiogram from 4 months ago shows she " has preserved LVEF with moderate MR.  Patient has not undergone an ischemic work-up in >3 years. 12-lead EKG today shows atrial fibrillation with controlled ventricular rate, incomplete RBBB, nonspecific ST T wave abnormality. Patient's blood pressure slightly elevated today but she states that usually runs lower at home.  Patient has never been a smoker and takes all medications as prescribed.  Patient does have sleep apnea but does not wear a CPAP machine.  Patient currently denies chest pain, numbness, tingling, edema, syncope.     The following portions of the patient's history were reviewed and updated as appropriate: allergies, current medications, past family history, past medical history, past social history, past surgical history and problem list.     Past Medical History:   Diagnosis Date   • Antral erosion    • Aortic valve regurgitation 12/26/2018   • Atrial fibrillation    • Atrial fibrillation with controlled ventricular response    • B12 deficiency    • CAD (coronary artery disease)    • Cellulitis 04/2011    on face    • Cerebrovascular accident (CVA) 12/07/2016   • CHF (congestive heart failure)    • Colon cancer    • Coronary artery disease involving native coronary artery of native heart without angina pectoris 06/18/2019   • Depression    • Diabetes mellitus, type 2    • Essential hypertension 02/13/2012   • Fatigue    • GERD (gastroesophageal reflux disease)    • Hiatal hernia    • History of colonoscopy 04/2010    last done 4/10   • History of esophagogastroduodenoscopy (EGD) 04/2010    last done 4/10   • Hyperlipidemia, mixed 02/13/2012   • Hypertension     white coat htn   • HALEIGH (iron deficiency anemia)    • LAD (lymphadenopathy)     40% lesion LAD    • Left pontine CVA    • Mitral regurgitation 05/21/2012   • STORMY (obstructive sleep apnea)     not treating   • Osteoarthritis    • Pacemaker 06/18/2019   • Permanent atrial fibrillation 06/18/2019   • Presence of Watchman left atrial appendage  closure device 01/04/2020   • Prinzmetal's angina    • Right kidney mass     1.4 cm- following urology    • Sick sinus syndrome    • Stroke 11/2016   • TIA (transient ischemic attack)     left CVA, interrupted TPA; As (moderate-severe)    • Vestibular nerve disease 2017     Past Surgical History:   Procedure Laterality Date   • ATRIAL APPENDAGE EXCLUSION LEFT WITH TRANSESOPHAGEAL ECHOCARDIOGRAM N/A 10/10/2019    Procedure: Atrial Appendage Occlusion;  Surgeon: Richie Chapman MD;  Location: [facility] CATH INVASIVE LOCATION;  Service: Cardiovascular   • ATRIAL APPENDAGE EXCLUSION LEFT WITH TRANSESOPHAGEAL ECHOCARDIOGRAM N/A 10/10/2019    Procedure: Atrial Appendage Occlusion;  Surgeon: Je Rivera MD;  Location: [facility] CATH INVASIVE LOCATION;  Service: Cardiology   • BLADDER REPAIR     • CARDIAC CATHETERIZATION  05/2010   • CHOLECYSTECTOMY     • COLECTOMY PARTIAL / TOTAL     • COLONOSCOPY  04/16/2018    negative    • COLONOSCOPY N/A 7/1/2022    Procedure: COLONOSCOPY with polypectomy x1;  Surgeon: ADOLFO Boss MD;  Location: [facility] ENDOSCOPY;  Service: Gastroenterology;  Laterality: N/A;  post: diverticulosis, hemorrhoids, polyps   • ENDOSCOPY  04/16/2018    negative    • ENDOSCOPY N/A 6/29/2022    Procedure: ESOPHAGOGASTRODUODENOSCOPY with biopsy of duodenum r/o celiac;  Surgeon: ADOLFO Boss MD;  Location: [facility] ENDOSCOPY;  Service: Gastroenterology;  Laterality: N/A;  hiatial hernia  Gastric Polyps       • HYSTERECTOMY     • INSERT / REPLACE / REMOVE PACEMAKER  09/2018    Pacemaker gen change 9/2018    • OTHER SURGICAL HISTORY  04/25/2015    Exercise myoview, normal    • PACEMAKER IMPLANTATION  01/22/2010    Dual Chamber - 1/22/2010; RA Lead Revision- 5/7/2012- BS    • TOOTH EXTRACTION     • TRANSESOPHAGEAL ECHOCARDIOGRAM (DHEERAJ)  07/2019     There were no vitals taken for this visit.  Family History   Problem Relation Age of Onset   • Hypertension Mother    • Diabetes Mother    • Coronary  artery disease Mother    • Other Father         CVA   • Heart disease Other    • Stroke Other    • Hypertension Other        Current Outpatient Medications:   •  acetaminophen (TYLENOL) 325 MG tablet, Take 2 tablets by mouth Every 4 (Four) Hours As Needed for Mild Pain ., Disp: , Rfl:   •  atorvastatin (LIPITOR) 10 MG tablet, TAKE 1 TABLET BY MOUTH EVERY NIGHT., Disp: 90 tablet, Rfl: 1  •  dilTIAZem CD (CARDIZEM CD) 240 MG 24 hr capsule, TAKE 1 CAPSULE EVERY DAY, Disp: 90 capsule, Rfl: 1  •  ferrous sulfate 325 (65 FE) MG EC tablet, Take 1 tablet by mouth Daily With Breakfast., Disp: 90 tablet, Rfl: 1  •  furosemide (LASIX) 20 MG tablet, Take 1 tablet by mouth Daily., Disp: 90 tablet, Rfl: 3  •  isosorbide mononitrate (IMDUR) 30 MG 24 hr tablet, Take 1 tablet by mouth Daily., Disp: 30 tablet, Rfl: 11  •  magnesium oxide (MAG-OX) 400 MG tablet, Take 1 tablet by mouth Daily., Disp: 90 tablet, Rfl: 0  •  meclizine (ANTIVERT) 25 MG tablet, Take 1 tablet by mouth 3 (Three) Times a Day As Needed for Dizziness., Disp: 30 tablet, Rfl: 0  •  metFORMIN (Glucophage) 500 MG tablet, Take 1 tablet by mouth Daily With Breakfast. For diabetes, Disp: 30 tablet, Rfl: 6  •  nebivolol (Bystolic) 20 MG tablet, Take 1 tablet by mouth Daily., Disp: 90 tablet, Rfl: 3  •  omeprazole (priLOSEC) 40 MG capsule, TAKE 1 CAPSULE TWICE DAILY, Disp: 180 capsule, Rfl: 1  •  spironolactone (ALDACTONE) 25 MG tablet, Take 1 tablet by mouth Daily., Disp: 90 tablet, Rfl: 3  Allergies   Allergen Reactions   • Contrast Dye (Echo Or Unknown Ct/Mr) Anaphylaxis   • Adhesive Tape Itching   • Latex Hives     Social History     Socioeconomic History   • Marital status:    Tobacco Use   • Smoking status: Never   • Smokeless tobacco: Never   Vaping Use   • Vaping Use: Never used   Substance and Sexual Activity   • Alcohol use: No   • Drug use: No   • Sexual activity: Defer     Review of Systems   Constitutional: Positive for malaise/fatigue. Negative for  chills and fever.   Cardiovascular: Positive for leg swelling and palpitations. Negative for chest pain and dyspnea on exertion.   Respiratory: Positive for shortness of breath. Negative for cough.    Gastrointestinal: Positive for nausea. Negative for abdominal pain and vomiting.   Neurological: Positive for dizziness and light-headedness. Negative for focal weakness, headaches and numbness.   All other systems reviewed and are negative.           Objective:     Vitals reviewed.   Constitutional:       Appearance: Not in distress.   Eyes:      Conjunctiva/sclera: Conjunctivae normal.      Pupils: Pupils are equal, round, and reactive to light.   HENT:      Nose: Nose normal.    Mouth/Throat:      Pharynx: Oropharynx is clear.   Pulmonary:      Effort: Pulmonary effort is normal.      Breath sounds: Normal breath sounds.   Cardiovascular:      Normal rate. Irregularly irregular rhythm.   Pulses:     Intact distal pulses.   Edema:     Peripheral edema absent.   Abdominal:      General: Bowel sounds are normal.   Musculoskeletal: Normal range of motion.      Cervical back: Normal range of motion and neck supple. Skin:     General: Skin is warm.   Neurological:      General: No focal deficit present.      Mental Status: Alert and oriented to person, place and time.         ECG 12 Lead    Date/Time: 4/12/2023 10:44 AM  Performed by: Cris Green APRN  Authorized by: Cris Green APRN   Comparison: compared with previous ECG from 4/6/2023  Rhythm: atrial fibrillation  Rate: normal  Conduction: incomplete right bundle branch block  Other findings: non-specific ST-T wave changes and T wave abnormality    Clinical impression: abnormal EKG            Lab Review:    Diagnosis Plan   1. Coronary artery disease involving native coronary artery of native heart without angina pectoris        2. Dilated cardiomyopathy (HCC)        3. Aortic stenosis, moderate        4. Permanent atrial fibrillation        5. Pacemaker         6. Hyperlipidemia, mixed        7. Essential hypertension        8. Tachy-morgan syndrome        9. Cerebrovascular accident (CVA), unspecified mechanism        10. Type 2 diabetes mellitus with stage 3a chronic kidney disease, without long-term current use of insulin        11. 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        LAB RESULTS (LAST 7 DAYS)    CBC  Results from last 7 days   Lab Units 04/05/23  1355   WBC 10*3/mm3 8.20   RBC 10*6/mm3 3.66*   HEMOGLOBIN g/dL 8.1*   HEMATOCRIT % 27.2*   MCV fL 74.2*   PLATELETS 10*3/mm3 361       BMP  Results from last 7 days   Lab Units 04/05/23  1355   SODIUM mmol/L 137   POTASSIUM mmol/L 4.4   CHLORIDE mmol/L 99   CO2 mmol/L 24.0   BUN mg/dL 26*   CREATININE mg/dL 1.15*   GLUCOSE mg/dL 157*       CMP   Results from last 7 days   Lab Units 04/05/23  1355   SODIUM mmol/L 137   POTASSIUM mmol/L 4.4   CHLORIDE mmol/L 99   CO2 mmol/L 24.0   BUN mg/dL 26*   CREATININE mg/dL 1.15*   GLUCOSE mg/dL 157*         BNP        TROPONIN        CoAg        Creatinine Clearance  Estimated Creatinine Clearance: 31.4 mL/min (A) (by C-G formula based on SCr of 1.15 mg/dL (H)).    ABG        Radiology  No radiology results for the last day        Plan/recommendation:  Fatigue, shortness of breath, palpitations  Patient states she has had progressively worsening shortness of breath and fatigue/weakness for over 3 weeks  Patient now experiences dyspnea with minimal exertion which is a change in baseline  Patient recently had echocardiogram which showed preserved LVEF with moderate MR and mild AS  Will obtain Myoview study to evaluate for ischemia presence of patient's symptoms  Will have permanent pacemaker interrogated at that time to ensure it is working properly  Patient's most recent H&H 8.1/27.2 which is close to baseline since 2021  Patient has previously required blood transfusion and is currently on ferrous sulfate  Patient to follow with PCP for further evaluation of  anemia tomorrow    Permanent atrial fibrillation s/p watchman  Patient is currently on diltiazem 240 mg daily and nebivolol 20 mg daily  Patient underwent Watchman procedure in 2019 due to GI bleeds therefore is not on anticoagulation  [id number] score is 9    Tachybradycardia syndrome s/p pacemaker  Patient most recent generator change in 2018  Most recent pacemaker check from December 2022 shows pacemaker is working well with about 8 years battery life    Coronary artery disease  Patient is currently on medical therapy to include statin, Imdur, beta-blocker  Recent cardiac catheterization available through chart review from 2010 showed proximal LAD disease of 60%  12-lead EKG today shows atrial fibrillation with controlled ventricular rate, incomplete RBBB, nonspecific ST-T wave changes    HFpEF/dilated cardiomyopathy  Patient's most recent echocardiogram shows preserved LVEF of 56 to 60% with moderately dilated LV, moderate MR, mild AS and a mildly dilated RV  Patient is currently on medical therapy to include Lasix, spironolactone, beta-blocker therapy    Hypertension  BP today 153/67 and 156/69  Patient is currently on nebivolol, diltiazem, and Imdur  Will have patient monitor blood pressure and adjust medications as needed at that time    Hyperlipidemia  Patient is currently on atorvastatin 10 mg daily    Valvular disease  Patient underwent DHEERAJ in 2020 which showed moderate AS and moderate to severe MR  On most recent echocardiogram from 12/2022 AS was noted to be mild and MR unchanged at moderate  We will continue to monitor with annual echocardiograms based on patient's symptoms    Type 2 diabetes  Patient is currently on metformin  Most recent A1c from June 2022 elevated at 7.9  Managed by PCP    Further cardiac care based on results of Myoview study and pacemaker interrogation    Patient's previous medical records, labs, and EKG were reviewed and discussed with the patient at today's visit.     Patient  to follow-up as needed or at next scheduled with Dr. Benito    Electronically signed by DAYANARA Martinez, 04/12/23, 10:54 AM EDT.

## 2023-04-12 ENCOUNTER — OFFICE VISIT (OUTPATIENT)
Dept: CARDIOLOGY | Facility: CLINIC | Age: 88
End: 2023-04-12
Payer: MEDICARE

## 2023-04-12 VITALS
HEART RATE: 83 BPM | DIASTOLIC BLOOD PRESSURE: 69 MMHG | WEIGHT: 169 LBS | HEIGHT: 62 IN | OXYGEN SATURATION: 97 % | BODY MASS INDEX: 31.1 KG/M2 | SYSTOLIC BLOOD PRESSURE: 156 MMHG

## 2023-04-12 DIAGNOSIS — N18.30 HYPERTENSIVE HEART AND CHRONIC KIDNEY DISEASE STAGE 3: ICD-10-CM

## 2023-04-12 DIAGNOSIS — E78.2 HYPERLIPIDEMIA, MIXED: ICD-10-CM

## 2023-04-12 DIAGNOSIS — R06.02 SHORTNESS OF BREATH: Primary | ICD-10-CM

## 2023-04-12 DIAGNOSIS — R53.83 FATIGUE, UNSPECIFIED TYPE: ICD-10-CM

## 2023-04-12 DIAGNOSIS — I35.0 AORTIC STENOSIS, MODERATE: ICD-10-CM

## 2023-04-12 DIAGNOSIS — I48.21 PERMANENT ATRIAL FIBRILLATION: ICD-10-CM

## 2023-04-12 DIAGNOSIS — I10 ESSENTIAL HYPERTENSION: ICD-10-CM

## 2023-04-12 DIAGNOSIS — I49.5 TACHY-BRADY SYNDROME: ICD-10-CM

## 2023-04-12 DIAGNOSIS — I25.10 CORONARY ARTERY DISEASE INVOLVING NATIVE CORONARY ARTERY OF NATIVE HEART WITHOUT ANGINA PECTORIS: ICD-10-CM

## 2023-04-12 DIAGNOSIS — I42.0 DILATED CARDIOMYOPATHY: ICD-10-CM

## 2023-04-12 DIAGNOSIS — I13.10 HYPERTENSIVE HEART AND CHRONIC KIDNEY DISEASE STAGE 3: ICD-10-CM

## 2023-04-12 DIAGNOSIS — N18.31 TYPE 2 DIABETES MELLITUS WITH STAGE 3A CHRONIC KIDNEY DISEASE, WITHOUT LONG-TERM CURRENT USE OF INSULIN: ICD-10-CM

## 2023-04-12 DIAGNOSIS — Z95.0 PACEMAKER: ICD-10-CM

## 2023-04-12 DIAGNOSIS — E11.22 TYPE 2 DIABETES MELLITUS WITH STAGE 3A CHRONIC KIDNEY DISEASE, WITHOUT LONG-TERM CURRENT USE OF INSULIN: ICD-10-CM

## 2023-04-12 DIAGNOSIS — I63.9 CEREBROVASCULAR ACCIDENT (CVA), UNSPECIFIED MECHANISM: ICD-10-CM

## 2023-04-14 ENCOUNTER — HOSPITAL ENCOUNTER (OUTPATIENT)
Dept: CARDIOLOGY | Facility: HOSPITAL | Age: 88
Discharge: HOME | End: 2023-04-14
Payer: MEDICARE

## 2023-04-14 ENCOUNTER — CLINICAL SUPPORT NO REQUIREMENTS (OUTPATIENT)
Dept: CARDIOLOGY | Facility: CLINIC | Age: 88
End: 2023-04-14
Payer: MEDICARE

## 2023-04-14 DIAGNOSIS — Z95.0 PACEMAKER: Primary | ICD-10-CM

## 2023-04-14 DIAGNOSIS — I49.5 TACHY-BRADY SYNDROME: ICD-10-CM

## 2023-04-14 DIAGNOSIS — I25.10 CORONARY ARTERY DISEASE INVOLVING NATIVE CORONARY ARTERY OF NATIVE HEART WITHOUT ANGINA PECTORIS: ICD-10-CM

## 2023-04-14 DIAGNOSIS — R06.02 SHORTNESS OF BREATH: ICD-10-CM

## 2023-04-14 LAB
BH CV REST NUCLEAR ISOTOPE DOSE: 10.9 MCI
BH CV STRESS BP STAGE 1: NORMAL
BH CV STRESS COMMENTS STAGE 1: NORMAL
BH CV STRESS DOSE REGADENOSON STAGE 1: 0.4
BH CV STRESS DURATION MIN STAGE 1: 0
BH CV STRESS DURATION SEC STAGE 1: 10
BH CV STRESS HR STAGE 1: 85
BH CV STRESS NUCLEAR ISOTOPE DOSE: 32.3 MCI
BH CV STRESS PROTOCOL 1: NORMAL
BH CV STRESS RECOVERY BP: NORMAL MMHG
BH CV STRESS RECOVERY HR: 85 BPM
BH CV STRESS STAGE 1: 1
LV EF NUC BP: 58 %
MAXIMAL PREDICTED HEART RATE: 131 BPM
STRESS BASELINE BP: NORMAL MMHG
STRESS BASELINE HR: 85 BPM
STRESS TARGET HR: 111 BPM

## 2023-04-14 PROCEDURE — 78452 HT MUSCLE IMAGE SPECT MULT: CPT

## 2023-04-14 PROCEDURE — 0 TECHNETIUM SESTAMIBI

## 2023-04-14 PROCEDURE — A9500 TC99M SESTAMIBI: HCPCS

## 2023-04-14 PROCEDURE — 25010000002 REGADENOSON 0.4 MG/5ML SOLUTION

## 2023-04-14 PROCEDURE — 78452 HT MUSCLE IMAGE SPECT MULT: CPT | Performed by: INTERNAL MEDICINE

## 2023-04-14 PROCEDURE — 93017 CV STRESS TEST TRACING ONLY: CPT

## 2023-04-14 PROCEDURE — 93018 CV STRESS TEST I&R ONLY: CPT | Performed by: INTERNAL MEDICINE

## 2023-04-14 RX ADMIN — TECHNETIUM TC 99M SESTAMIBI 1 DOSE: 1 INJECTION INTRAVENOUS at 08:51

## 2023-04-14 RX ADMIN — TECHNETIUM TC 99M SESTAMIBI 1 DOSE: 1 INJECTION INTRAVENOUS at 09:55

## 2023-04-14 RX ADMIN — REGADENOSON 0.4 MG: 0.08 INJECTION, SOLUTION INTRAVENOUS at 09:55

## 2023-05-24 RX ORDER — FUROSEMIDE 20 MG/1
TABLET ORAL
Qty: 90 TABLET | Refills: 3 | Status: SHIPPED | OUTPATIENT
Start: 2023-05-24

## 2023-07-26 DIAGNOSIS — I42.0 DILATED CARDIOMYOPATHY: ICD-10-CM

## 2023-07-26 DIAGNOSIS — I13.10 HYPERTENSIVE HEART AND CHRONIC KIDNEY DISEASE STAGE 3: ICD-10-CM

## 2023-07-26 DIAGNOSIS — N18.30 HYPERTENSIVE HEART AND CHRONIC KIDNEY DISEASE STAGE 3: ICD-10-CM

## 2023-07-27 RX ORDER — OMEPRAZOLE 40 MG/1
CAPSULE, DELAYED RELEASE ORAL
Qty: 180 CAPSULE | Refills: 1 | Status: SHIPPED | OUTPATIENT
Start: 2023-07-27

## 2023-07-27 RX ORDER — SPIRONOLACTONE 25 MG/1
TABLET ORAL
Qty: 90 TABLET | Refills: 3 | Status: SHIPPED | OUTPATIENT
Start: 2023-07-27

## 2023-07-27 RX ORDER — ATORVASTATIN CALCIUM 10 MG/1
10 TABLET, FILM COATED ORAL NIGHTLY
Qty: 90 TABLET | Refills: 1 | Status: SHIPPED | OUTPATIENT
Start: 2023-07-27

## 2023-08-02 DIAGNOSIS — N18.30 HYPERTENSIVE HEART AND CHRONIC KIDNEY DISEASE STAGE 3: ICD-10-CM

## 2023-08-02 DIAGNOSIS — I42.0 DILATED CARDIOMYOPATHY: ICD-10-CM

## 2023-08-02 DIAGNOSIS — I13.10 HYPERTENSIVE HEART AND CHRONIC KIDNEY DISEASE STAGE 3: ICD-10-CM

## 2023-08-02 RX ORDER — DILTIAZEM HYDROCHLORIDE 240 MG/1
CAPSULE, COATED, EXTENDED RELEASE ORAL
Qty: 90 CAPSULE | Refills: 1 | Status: SHIPPED | OUTPATIENT
Start: 2023-08-02

## 2024-01-09 ENCOUNTER — APPOINTMENT (OUTPATIENT)
Dept: GENERAL RADIOLOGY | Facility: HOSPITAL | Age: 89
End: 2024-01-09
Payer: MEDICARE

## 2024-01-09 ENCOUNTER — APPOINTMENT (OUTPATIENT)
Dept: CT IMAGING | Facility: HOSPITAL | Age: 89
End: 2024-01-09
Payer: MEDICARE

## 2024-01-09 ENCOUNTER — HOSPITAL ENCOUNTER (INPATIENT)
Facility: HOSPITAL | Age: 89
LOS: 1 days | Discharge: HOME | End: 2024-01-11
Attending: EMERGENCY MEDICINE | Admitting: INTERNAL MEDICINE
Payer: MEDICARE

## 2024-01-09 DIAGNOSIS — R55 RECURRENT SYNCOPE: ICD-10-CM

## 2024-01-09 DIAGNOSIS — E86.0 DEHYDRATION, SEVERE: Primary | ICD-10-CM

## 2024-01-09 DIAGNOSIS — I48.91 ATRIAL FIBRILLATION WITH RAPID VENTRICULAR RESPONSE: ICD-10-CM

## 2024-01-09 DIAGNOSIS — B34.9 ACUTE VIRAL SYNDROME: ICD-10-CM

## 2024-01-09 DIAGNOSIS — N28.9 RENAL INSUFFICIENCY: ICD-10-CM

## 2024-01-09 LAB
ALBUMIN SERPL-MCNC: 3.8 G/DL (ref 3.5–5.2)
ALBUMIN/GLOB SERPL: 1.4 G/DL
ALP SERPL-CCNC: 76 U/L (ref 39–117)
ALT SERPL W P-5'-P-CCNC: 11 U/L (ref 1–33)
ANION GAP SERPL CALCULATED.3IONS-SCNC: 13 MMOL/L (ref 5–15)
APTT PPP: 21 SECONDS (ref 61–76.5)
AST SERPL-CCNC: 17 U/L (ref 1–32)
BASOPHILS # BLD AUTO: 0.1 10*3/MM3 (ref 0–0.2)
BASOPHILS NFR BLD AUTO: 1.4 % (ref 0–1.5)
BILIRUB SERPL-MCNC: 0.7 MG/DL (ref 0–1.2)
BUN SERPL-MCNC: 65 MG/DL (ref 8–23)
BUN/CREAT SERPL: 61.3 (ref 7–25)
CALCIUM SPEC-SCNC: 9.8 MG/DL (ref 8.6–10.5)
CHLORIDE SERPL-SCNC: 104 MMOL/L (ref 98–107)
CO2 SERPL-SCNC: 23 MMOL/L (ref 22–29)
CREAT SERPL-MCNC: 1.06 MG/DL (ref 0.57–1)
DEPRECATED RDW RBC AUTO: 44.6 FL (ref 37–54)
EGFRCR SERPLBLD CKD-EPI 2021: 50.3 ML/MIN/1.73
EOSINOPHIL # BLD AUTO: 0 10*3/MM3 (ref 0–0.4)
EOSINOPHIL NFR BLD AUTO: 0.2 % (ref 0.3–6.2)
ERYTHROCYTE [DISTWIDTH] IN BLOOD BY AUTOMATED COUNT: 13.8 % (ref 12.3–15.4)
FLUAV SUBTYP SPEC NAA+PROBE: NOT DETECTED
FLUBV RNA ISLT QL NAA+PROBE: NOT DETECTED
GLOBULIN UR ELPH-MCNC: 2.8 GM/DL
GLUCOSE SERPL-MCNC: 178 MG/DL (ref 65–99)
HCT VFR BLD AUTO: 36.5 % (ref 34–46.6)
HGB BLD-MCNC: 11.7 G/DL (ref 12–15.9)
HOLD SPECIMEN: NORMAL
INR PPP: 1.04 (ref 0.93–1.1)
LYMPHOCYTES # BLD AUTO: 1.5 10*3/MM3 (ref 0.7–3.1)
LYMPHOCYTES NFR BLD AUTO: 16 % (ref 19.6–45.3)
MCH RBC QN AUTO: 29.8 PG (ref 26.6–33)
MCHC RBC AUTO-ENTMCNC: 32 G/DL (ref 31.5–35.7)
MCV RBC AUTO: 93 FL (ref 79–97)
MONOCYTES # BLD AUTO: 0.7 10*3/MM3 (ref 0.1–0.9)
MONOCYTES NFR BLD AUTO: 7.9 % (ref 5–12)
NEUTROPHILS NFR BLD AUTO: 7 10*3/MM3 (ref 1.7–7)
NEUTROPHILS NFR BLD AUTO: 74.5 % (ref 42.7–76)
NRBC BLD AUTO-RTO: 0 /100 WBC (ref 0–0.2)
PLATELET # BLD AUTO: 227 10*3/MM3 (ref 140–450)
PMV BLD AUTO: 10.1 FL (ref 6–12)
POTASSIUM SERPL-SCNC: 5.4 MMOL/L (ref 3.5–5.2)
PROT SERPL-MCNC: 6.6 G/DL (ref 6–8.5)
PROTHROMBIN TIME: 11.3 SECONDS (ref 9.6–11.7)
RBC # BLD AUTO: 3.93 10*6/MM3 (ref 3.77–5.28)
SARS-COV-2 RNA RESP QL NAA+PROBE: NOT DETECTED
SODIUM SERPL-SCNC: 140 MMOL/L (ref 136–145)
TROPONIN T SERPL HS-MCNC: 17 NG/L
WBC NRBC COR # BLD AUTO: 9.4 10*3/MM3 (ref 3.4–10.8)

## 2024-01-09 PROCEDURE — 99285 EMERGENCY DEPT VISIT HI MDM: CPT

## 2024-01-09 PROCEDURE — 87636 SARSCOV2 & INF A&B AMP PRB: CPT | Performed by: EMERGENCY MEDICINE

## 2024-01-09 PROCEDURE — 25010000002 ONDANSETRON PER 1 MG: Performed by: EMERGENCY MEDICINE

## 2024-01-09 PROCEDURE — 84484 ASSAY OF TROPONIN QUANT: CPT | Performed by: EMERGENCY MEDICINE

## 2024-01-09 PROCEDURE — 25810000003 SODIUM CHLORIDE 0.9 % SOLUTION: Performed by: EMERGENCY MEDICINE

## 2024-01-09 PROCEDURE — 80053 COMPREHEN METABOLIC PANEL: CPT | Performed by: EMERGENCY MEDICINE

## 2024-01-09 PROCEDURE — 85730 THROMBOPLASTIN TIME PARTIAL: CPT | Performed by: EMERGENCY MEDICINE

## 2024-01-09 PROCEDURE — 96366 THER/PROPH/DIAG IV INF ADDON: CPT

## 2024-01-09 PROCEDURE — G0378 HOSPITAL OBSERVATION PER HR: HCPCS

## 2024-01-09 PROCEDURE — 70450 CT HEAD/BRAIN W/O DYE: CPT

## 2024-01-09 PROCEDURE — 85025 COMPLETE CBC W/AUTO DIFF WBC: CPT | Performed by: EMERGENCY MEDICINE

## 2024-01-09 PROCEDURE — 85610 PROTHROMBIN TIME: CPT | Performed by: EMERGENCY MEDICINE

## 2024-01-09 PROCEDURE — 96376 TX/PRO/DX INJ SAME DRUG ADON: CPT

## 2024-01-09 PROCEDURE — 93005 ELECTROCARDIOGRAM TRACING: CPT

## 2024-01-09 PROCEDURE — 71045 X-RAY EXAM CHEST 1 VIEW: CPT

## 2024-01-09 PROCEDURE — 93005 ELECTROCARDIOGRAM TRACING: CPT | Performed by: EMERGENCY MEDICINE

## 2024-01-09 PROCEDURE — 96365 THER/PROPH/DIAG IV INF INIT: CPT

## 2024-01-09 PROCEDURE — 96375 TX/PRO/DX INJ NEW DRUG ADDON: CPT

## 2024-01-09 RX ORDER — ISOSORBIDE MONONITRATE 30 MG/1
30 TABLET, EXTENDED RELEASE ORAL DAILY
Status: DISCONTINUED | OUTPATIENT
Start: 2024-01-10 | End: 2024-01-10

## 2024-01-09 RX ORDER — POLYETHYLENE GLYCOL 3350 17 G/17G
17 POWDER, FOR SOLUTION ORAL DAILY PRN
Status: DISCONTINUED | OUTPATIENT
Start: 2024-01-09 | End: 2024-01-11 | Stop reason: HOSPADM

## 2024-01-09 RX ORDER — SODIUM CHLORIDE 0.9 % (FLUSH) 0.9 %
10 SYRINGE (ML) INJECTION AS NEEDED
Status: DISCONTINUED | OUTPATIENT
Start: 2024-01-09 | End: 2024-01-11 | Stop reason: HOSPADM

## 2024-01-09 RX ORDER — OMEPRAZOLE 40 MG/1
40 CAPSULE, DELAYED RELEASE ORAL 2 TIMES DAILY
COMMUNITY

## 2024-01-09 RX ORDER — ZOLPIDEM TARTRATE 5 MG/1
5 TABLET ORAL NIGHTLY PRN
COMMUNITY

## 2024-01-09 RX ORDER — SODIUM CHLORIDE 9 MG/ML
40 INJECTION, SOLUTION INTRAVENOUS AS NEEDED
Status: DISCONTINUED | OUTPATIENT
Start: 2024-01-09 | End: 2024-01-11 | Stop reason: HOSPADM

## 2024-01-09 RX ORDER — FUROSEMIDE 20 MG/1
20 TABLET ORAL 2 TIMES DAILY
COMMUNITY
End: 2024-01-11

## 2024-01-09 RX ORDER — DILTIAZEM HYDROCHLORIDE 5 MG/ML
20 INJECTION INTRAVENOUS ONCE
Status: COMPLETED | OUTPATIENT
Start: 2024-01-09 | End: 2024-01-09

## 2024-01-09 RX ORDER — ZOLPIDEM TARTRATE 5 MG/1
5 TABLET ORAL NIGHTLY PRN
Status: DISCONTINUED | OUTPATIENT
Start: 2024-01-09 | End: 2024-01-11 | Stop reason: HOSPADM

## 2024-01-09 RX ORDER — FUROSEMIDE 40 MG/1
20 TABLET ORAL 2 TIMES DAILY
Status: DISCONTINUED | OUTPATIENT
Start: 2024-01-09 | End: 2024-01-11

## 2024-01-09 RX ORDER — DILTIAZEM HCL/D5W 125 MG/125
5-15 PLASTIC BAG, INJECTION (ML) INTRAVENOUS
Status: DISCONTINUED | OUTPATIENT
Start: 2024-01-09 | End: 2024-01-10

## 2024-01-09 RX ORDER — ACETAMINOPHEN 325 MG/1
650 TABLET ORAL EVERY 4 HOURS PRN
Status: DISCONTINUED | OUTPATIENT
Start: 2024-01-09 | End: 2024-01-11 | Stop reason: HOSPADM

## 2024-01-09 RX ORDER — ATORVASTATIN CALCIUM 10 MG/1
10 TABLET, FILM COATED ORAL DAILY
Status: DISCONTINUED | OUTPATIENT
Start: 2024-01-10 | End: 2024-01-11 | Stop reason: HOSPADM

## 2024-01-09 RX ORDER — PANTOPRAZOLE SODIUM 40 MG/1
40 TABLET, DELAYED RELEASE ORAL
Status: DISCONTINUED | OUTPATIENT
Start: 2024-01-10 | End: 2024-01-11 | Stop reason: HOSPADM

## 2024-01-09 RX ORDER — BISACODYL 10 MG
10 SUPPOSITORY, RECTAL RECTAL DAILY PRN
Status: DISCONTINUED | OUTPATIENT
Start: 2024-01-09 | End: 2024-01-11 | Stop reason: HOSPADM

## 2024-01-09 RX ORDER — MECLIZINE HYDROCHLORIDE 25 MG/1
25 TABLET ORAL 3 TIMES DAILY PRN
Status: DISCONTINUED | OUTPATIENT
Start: 2024-01-09 | End: 2024-01-11 | Stop reason: HOSPADM

## 2024-01-09 RX ORDER — NEBIVOLOL 10 MG/1
20 TABLET ORAL DAILY
Status: DISCONTINUED | OUTPATIENT
Start: 2024-01-10 | End: 2024-01-11 | Stop reason: HOSPADM

## 2024-01-09 RX ORDER — FERROUS SULFATE 324(65)MG
324 TABLET, DELAYED RELEASE (ENTERIC COATED) ORAL
Status: DISCONTINUED | OUTPATIENT
Start: 2024-01-10 | End: 2024-01-11 | Stop reason: HOSPADM

## 2024-01-09 RX ORDER — SPIRONOLACTONE 25 MG/1
25 TABLET ORAL DAILY
Status: DISCONTINUED | OUTPATIENT
Start: 2024-01-10 | End: 2024-01-09 | Stop reason: SINTOL

## 2024-01-09 RX ORDER — NITROGLYCERIN 0.4 MG/1
0.4 TABLET SUBLINGUAL
Status: DISCONTINUED | OUTPATIENT
Start: 2024-01-09 | End: 2024-01-11 | Stop reason: HOSPADM

## 2024-01-09 RX ORDER — LANOLIN ALCOHOL/MO/W.PET/CERES
1000 CREAM (GRAM) TOPICAL DAILY
COMMUNITY

## 2024-01-09 RX ORDER — ONDANSETRON 2 MG/ML
4 INJECTION INTRAMUSCULAR; INTRAVENOUS ONCE
Status: COMPLETED | OUTPATIENT
Start: 2024-01-09 | End: 2024-01-09

## 2024-01-09 RX ORDER — BISACODYL 5 MG/1
5 TABLET, DELAYED RELEASE ORAL DAILY PRN
Status: DISCONTINUED | OUTPATIENT
Start: 2024-01-09 | End: 2024-01-11 | Stop reason: HOSPADM

## 2024-01-09 RX ORDER — SODIUM CHLORIDE 0.9 % (FLUSH) 0.9 %
10 SYRINGE (ML) INJECTION EVERY 12 HOURS SCHEDULED
Status: DISCONTINUED | OUTPATIENT
Start: 2024-01-09 | End: 2024-01-11 | Stop reason: HOSPADM

## 2024-01-09 RX ORDER — AMOXICILLIN 250 MG
2 CAPSULE ORAL 2 TIMES DAILY
Status: DISCONTINUED | OUTPATIENT
Start: 2024-01-09 | End: 2024-01-11 | Stop reason: HOSPADM

## 2024-01-09 RX ORDER — ONDANSETRON 2 MG/ML
4 INJECTION INTRAMUSCULAR; INTRAVENOUS EVERY 6 HOURS PRN
Status: DISCONTINUED | OUTPATIENT
Start: 2024-01-09 | End: 2024-01-11 | Stop reason: HOSPADM

## 2024-01-09 RX ORDER — SPIRONOLACTONE 25 MG/1
25 TABLET ORAL DAILY
COMMUNITY
End: 2024-01-11

## 2024-01-09 RX ORDER — DILTIAZEM HYDROCHLORIDE 240 MG/1
240 CAPSULE, COATED, EXTENDED RELEASE ORAL DAILY
COMMUNITY

## 2024-01-09 RX ORDER — ATORVASTATIN CALCIUM 10 MG/1
10 TABLET, FILM COATED ORAL DAILY
COMMUNITY

## 2024-01-09 RX ADMIN — FUROSEMIDE 20 MG: 40 TABLET ORAL at 22:08

## 2024-01-09 RX ADMIN — SODIUM CHLORIDE 1000 ML: 9 INJECTION, SOLUTION INTRAVENOUS at 18:39

## 2024-01-09 RX ADMIN — Medication 5 MG/HR: at 14:29

## 2024-01-09 RX ADMIN — Medication 10 ML: at 22:09

## 2024-01-09 RX ADMIN — SODIUM CHLORIDE 500 ML: 0.9 INJECTION, SOLUTION INTRAVENOUS at 14:34

## 2024-01-09 RX ADMIN — ONDANSETRON 4 MG: 2 INJECTION INTRAMUSCULAR; INTRAVENOUS at 14:29

## 2024-01-09 RX ADMIN — DILTIAZEM HYDROCHLORIDE 20 MG: 5 INJECTION, SOLUTION INTRAVENOUS at 14:29

## 2024-01-09 NOTE — ED PROVIDER NOTES
Subjective   History of Present Illness  89-year-old female with the onset of syncopal episode today while having a bowel movement.  The patient reports that she has had some nausea and vomiting for the last couple of days.  She reports no melena hematemesis hematochezia.  She reports she has been dizzy and short of breath with exertion for the last 5 days as she reports she has syncopal episode over 24 hours ago and a near syncopal episode 5 days ago.  She reports that she occasionally has some chest tightness and shortness of breath.  She states she generally becomes lightheaded with very little exertion in the last 5 days.  She reports no definite fever or chills.  She denies dysuria or hematuria.  No hemoptysis.  She reports no overall change in bowel or bladder habits      Review of Systems   Respiratory:  Positive for chest tightness and shortness of breath. Negative for cough.    Cardiovascular:  Positive for chest pain and palpitations. Negative for leg swelling.   Gastrointestinal:  Positive for diarrhea, nausea and vomiting.   Neurological:  Positive for syncope. Negative for speech difficulty and headaches.   Hematological:  Does not bruise/bleed easily.   All other systems reviewed and are negative.  Reportedly hit occipital area with near syncopal episode    Past Medical History:   Diagnosis Date    Antral erosion     Aortic valve regurgitation 12/26/2018    Atrial fibrillation     Atrial fibrillation with controlled ventricular response     B12 deficiency     CAD (coronary artery disease)     Cellulitis 04/2011    on face     Cerebrovascular accident (CVA) 12/07/2016    CHF (congestive heart failure)     Colon cancer     Coronary artery disease involving native coronary artery of native heart without angina pectoris 06/18/2019    Depression     Diabetes mellitus, type 2     Essential hypertension 02/13/2012    Fatigue     GERD (gastroesophageal reflux disease)     Hiatal hernia     History of  colonoscopy 04/2010    last done 4/10    History of esophagogastroduodenoscopy (EGD) 04/2010    last done 4/10    Hyperlipidemia, mixed 02/13/2012    Hypertension     white coat htn    HALEIGH (iron deficiency anemia)     LAD (lymphadenopathy)     40% lesion LAD     Left pontine CVA     Mitral regurgitation 05/21/2012    STORMY (obstructive sleep apnea)     not treating    Osteoarthritis     Pacemaker 06/18/2019    Permanent atrial fibrillation 06/18/2019    Presence of Watchman left atrial appendage closure device 01/04/2020    Prinzmetal's angina     Right kidney mass     1.4 cm- following urology     Sick sinus syndrome     Stroke 11/2016    TIA (transient ischemic attack)     left CVA, interrupted TPA; As (moderate-severe)     Vestibular nerve disease 2017       Allergies   Allergen Reactions    Contrast Dye (Echo Or Unknown Ct/Mr) Anaphylaxis    Adhesive Tape Itching    Latex Hives       Past Surgical History:   Procedure Laterality Date    ATRIAL APPENDAGE EXCLUSION LEFT WITH TRANSESOPHAGEAL ECHOCARDIOGRAM N/A 10/10/2019    Procedure: Atrial Appendage Occlusion;  Surgeon: Richie Chapman MD;  Location: [facility] CATH INVASIVE LOCATION;  Service: Cardiovascular    ATRIAL APPENDAGE EXCLUSION LEFT WITH TRANSESOPHAGEAL ECHOCARDIOGRAM N/A 10/10/2019    Procedure: Atrial Appendage Occlusion;  Surgeon: Je Rivera MD;  Location: [facility] CATH INVASIVE LOCATION;  Service: Cardiology    BLADDER REPAIR      CARDIAC CATHETERIZATION  05/2010    CHOLECYSTECTOMY      COLECTOMY PARTIAL / TOTAL      COLONOSCOPY  04/16/2018    negative     COLONOSCOPY N/A 7/1/2022    Procedure: COLONOSCOPY with polypectomy x1;  Surgeon: ADOLFO Boss MD;  Location: [facility] ENDOSCOPY;  Service: Gastroenterology;  Laterality: N/A;  post: diverticulosis, hemorrhoids, polyps    ENDOSCOPY  04/16/2018    negative     ENDOSCOPY N/A 6/29/2022    Procedure: ESOPHAGOGASTRODUODENOSCOPY with biopsy of duodenum r/o celiac;  Surgeon: ADOLFO Boss  MD Kraig;  Location: [facility] ENDOSCOPY;  Service: Gastroenterology;  Laterality: N/A;  hiatial hernia  Gastric Polyps        HYSTERECTOMY      INSERT / REPLACE / REMOVE PACEMAKER  09/2018    Pacemaker gen change 9/2018     OTHER SURGICAL HISTORY  04/25/2015    Exercise myoview, normal     PACEMAKER IMPLANTATION  01/22/2010    Dual Chamber - 1/22/2010; RA Lead Revision- 5/7/2012- BS     TOOTH EXTRACTION      TRANSESOPHAGEAL ECHOCARDIOGRAM (DHEERAJ)  07/2019       Family History   Problem Relation Age of Onset    Hypertension Mother     Diabetes Mother     Coronary artery disease Mother     Other Father         CVA    No Known Problems Sister     No Known Problems Brother     No Known Problems Maternal Aunt     No Known Problems Maternal Uncle     No Known Problems Paternal Aunt     No Known Problems Paternal Uncle     No Known Problems Maternal Grandmother     No Known Problems Maternal Grandfather     No Known Problems Paternal Grandmother     No Known Problems Paternal Grandfather     Heart disease Other     Stroke Other     Hypertension Other     Anemia Neg Hx     Arrhythmia Neg Hx     Asthma Neg Hx     Clotting disorder Neg Hx     Fainting Neg Hx     Heart attack Neg Hx     Heart failure Neg Hx     Hyperlipidemia Neg Hx        Social History     Socioeconomic History    Marital status:    Tobacco Use    Smoking status: Never     Passive exposure: Never    Smokeless tobacco: Never   Vaping Use    Vaping Use: Never used   Substance and Sexual Activity    Alcohol use: No    Drug use: No    Sexual activity: Defer         No reported safety issues  Objective   Physical Exam  Alert Garfield Coma Scale 15   HEENT: Pupils equal and reactive to light. Conjunctivae are not injected. Normal tympanic membranes. Oropharynx and nares are normal.   Neck: Supple. Midline trachea. No JVD. No goiter.   Chest: biBasilar Rales are identified and equal breath sounds bilaterally, regular rate and rhythm without murmur or rub.    Abdomen: Positive bowel sounds, nontender, nondistended. No rebound or peritoneal signs. No CVA tenderness.   Extremities/neuro right-hand-dominant no focal deficits identified cranial nerves are grossly intact no drift no clubbing. cyanosis or edema. Motor sensory exam is normal. The full range of motion is intact   Skin: Warm and dry, no rashes or petechia.   Lymphatic: No regional lymphadenopathy. No calf pain, swelling or Homans sign    Procedures           ED Course  ED Course as of 01/09/24 1854 Tue Jan 09, 2024 1821 Due to significant overcrowding in the emergency department patient was evaluated by myself in a hallway bed.  This examination might be limited by privacy concerns, noise, exposure issues, and the patient not wearing a hospital gown.  Explained to the patient our limitations and our overcrowding.  They were in agreement to continue the exam and treatment at this time. [TH]      ED Course User Index  [TH] Dean Lamb MD                                   Labs Reviewed   COMPREHENSIVE METABOLIC PANEL - Abnormal; Notable for the following components:       Result Value    Glucose 178 (*)     BUN 65 (*)     Creatinine 1.06 (*)     Potassium 5.4 (*)     BUN/Creatinine Ratio 61.3 (*)     eGFR 50.3 (*)     All other components within normal limits    Narrative:     GFR Normal >60  Chronic Kidney Disease <60  Kidney Failure <15    The GFR formula is only valid for adults with stable renal function between ages 18 and 70.   APTT - Abnormal; Notable for the following components:    PTT 21.0 (*)     All other components within normal limits   SINGLE HSTROPONIN T - Abnormal; Notable for the following components:    HS Troponin T 17 (*)     All other components within normal limits    Narrative:     High Sensitive Troponin T Reference Range:  <14.0 ng/L- Negative Female for AMI  <22.0 ng/L- Negative Male for AMI  >=14 - Abnormal Female indicating possible myocardial injury.  >=22 - Abnormal Male  indicating possible myocardial injury.   Clinicians would have to utilize clinical acumen, EKG, Troponin, and serial changes to determine if it is an Acute Myocardial Infarction or myocardial injury due to an underlying chronic condition.        CBC WITH AUTO DIFFERENTIAL - Abnormal; Notable for the following components:    Hemoglobin 11.7 (*)     Lymphocyte % 16.0 (*)     Eosinophil % 0.2 (*)     All other components within normal limits   PROTIME-INR - Normal   GASTROINTESTINAL PANEL, PCR (PREFERRED) DOES NOT INCLUDE CDIFF   COVID-19 AND FLU A/B, NP SWAB IN TRANSPORT MEDIA 1 HR TAT   CBC AND DIFFERENTIAL    Narrative:     The following orders were created for panel order CBC & Differential.  Procedure                               Abnormality         Status                     ---------                               -----------         ------                     CBC Auto Differential[[id number]]        Abnormal            Final result                 Please view results for these tests on the individual orders.   EXTRA TUBES    Narrative:     The following orders were created for panel order Extra Tubes.  Procedure                               Abnormality         Status                     ---------                               -----------         ------                     Gold Top - SST[[id number]]                                   Final result                 Please view results for these tests on the individual orders.   GOLD TOP - SST     Medications   dilTIAZem (CARDIZEM) 125 mg in 125 mL D5W infusion (7.5 mg/hr Intravenous Rate/Dose Change 1/9/24 1836)   sodium chloride 0.9 % bolus 1,000 mL (1,000 mL Intravenous New Bag 1/9/24 1839)   sodium chloride 0.9 % bolus 500 mL (0 mL Intravenous Stopped 1/9/24 1521)   dilTIAZem (CARDIZEM) injection 20 mg (20 mg Intravenous Given 1/9/24 1429)   ondansetron (ZOFRAN) injection 4 mg (4 mg Intravenous Given 1/9/24 1429)     No radiology results for the last day             Medical Decision Making  The patient was hydrated in emergency department.  Rate was controlled with IV Cardizem bolus and drip.  The patient stated she felt better and family noted that she appeared somewhat stronger.  The patient has pending CT and chest x-ray still at this time.  The patient's BUN was 26 9 months ago compared unfavorably with 65 today with similar creatinine.  The patient was unable to have a stool while in the emergency department.  The patient and family were agreeable with this plan of treatment, which involves admission for continued rate control hydration and evaluation the source of the viral syndrome    Amount and/or Complexity of Data Reviewed  Labs: ordered. Decision-making details documented in ED Course.  Radiology: ordered and independent interpretation performed.  ECG/medicine tests: ordered and independent interpretation performed.     Details: Atrial fibrillation with rapid ventricular response, old inverted T waves inferiorly with comparison of 4/5/2023    Risk  OTC drugs.  Prescription drug management.  Decision regarding hospitalization.  Risk Details: Risk of progression to heart failure discussed multisystem organ failure risk        Final diagnoses:   Dehydration, severe   Recurrent syncope   Atrial fibrillation with rapid ventricular response   Acute viral syndrome       ED Disposition  ED Disposition       ED Disposition   Decision to Admit    Condition   --    Comment   Level of Care: Telemetry [5]   Diagnosis: Recurrent syncope [[id number]]   Admitting Physician: JEFFREY BROOKS [[id number]]   Attending Physician: JEFFREY BROOKS [[id number]]                 No follow-up provider specified.       Medication List        ASK your doctor about these medications      atorvastatin 10 MG tablet  Commonly known as: LIPITOR  Ask about: Which instructions should I use?     dilTIAZem  MG 24 hr capsule  Commonly known as: CARDIZEM CD  Ask about: Which instructions should I use?     furosemide 20  MG tablet  Commonly known as: LASIX  Ask about: Which instructions should I use?     omeprazole 40 MG capsule  Commonly known as: priLOSEC  Ask about: Which instructions should I use?     spironolactone 25 MG tablet  Commonly known as: ALDACTONE  Ask about: Which instructions should I use?                 Dean Lamb MD  01/09/24 [id number]

## 2024-01-09 NOTE — Clinical Note
Level of Care: Med/Surg [1]   Diagnosis: Syncope [206001]   Admitting Physician: JEFFREY BROOKS [[id number]]   Attending Physician: JEFFREY BROOKS [[id number]]   Certification: I Certify That Inpatient Hospital Services Are Medically Necessary For Greater Than 2 Midnights

## 2024-01-10 ENCOUNTER — APPOINTMENT (OUTPATIENT)
Dept: CARDIOLOGY | Facility: HOSPITAL | Age: 89
End: 2024-01-10
Payer: MEDICARE

## 2024-01-10 PROBLEM — R55 SYNCOPE: Status: ACTIVE | Noted: 2024-01-10

## 2024-01-10 LAB
ANION GAP SERPL CALCULATED.3IONS-SCNC: 10 MMOL/L (ref 5–15)
BASOPHILS # BLD AUTO: 0.1 10*3/MM3 (ref 0–0.2)
BASOPHILS NFR BLD AUTO: 1 % (ref 0–1.5)
BH CV ECHO MEAS - AO MAX PG: 12.8 MMHG
BH CV ECHO MEAS - AO MEAN PG: 8 MMHG
BH CV ECHO MEAS - AO V2 MAX: 179 CM/SEC
BH CV ECHO MEAS - AO V2 VTI: 33.4 CM
BH CV ECHO MEAS - AVA(I,D): 1.34 CM2
BH CV ECHO MEAS - EDV(CUBED): 46.7 ML
BH CV ECHO MEAS - EDV(MOD-SP4): 45.3 ML
BH CV ECHO MEAS - EF(MOD-SP4): 56.3 %
BH CV ECHO MEAS - ESV(CUBED): 12.2 ML
BH CV ECHO MEAS - ESV(MOD-SP4): 19.8 ML
BH CV ECHO MEAS - FS: 36.1 %
BH CV ECHO MEAS - IVS/LVPW: 1.07 CM
BH CV ECHO MEAS - IVSD: 1.6 CM
BH CV ECHO MEAS - LA DIMENSION: 3.7 CM
BH CV ECHO MEAS - LAT PEAK E' VEL: 10.8 CM/SEC
BH CV ECHO MEAS - LV DIASTOLIC VOL/BSA (35-75): 26.2 CM2
BH CV ECHO MEAS - LV MASS(C)D: 212 GRAMS
BH CV ECHO MEAS - LV MAX PG: 1.98 MMHG
BH CV ECHO MEAS - LV MEAN PG: 1 MMHG
BH CV ECHO MEAS - LV SYSTOLIC VOL/BSA (12-30): 11.4 CM2
BH CV ECHO MEAS - LV V1 MAX: 70.3 CM/SEC
BH CV ECHO MEAS - LV V1 VTI: 12.9 CM
BH CV ECHO MEAS - LVIDD: 3.6 CM
BH CV ECHO MEAS - LVIDS: 2.3 CM
BH CV ECHO MEAS - LVOT AREA: 3.5 CM2
BH CV ECHO MEAS - LVOT DIAM: 2.1 CM
BH CV ECHO MEAS - LVPWD: 1.5 CM
BH CV ECHO MEAS - MED PEAK E' VEL: 6.6 CM/SEC
BH CV ECHO MEAS - MV DEC SLOPE: 312 CM/SEC2
BH CV ECHO MEAS - MV E MAX VEL: 115 CM/SEC
BH CV ECHO MEAS - MV MAX PG: 3.9 MMHG
BH CV ECHO MEAS - MV MEAN PG: 2 MMHG
BH CV ECHO MEAS - MV P1/2T: 95.8 MSEC
BH CV ECHO MEAS - MV V2 VTI: 23.5 CM
BH CV ECHO MEAS - MVA(P1/2T): 2.3 CM2
BH CV ECHO MEAS - MVA(VTI): 1.9 CM2
BH CV ECHO MEAS - PA V2 MAX: 68.5 CM/SEC
BH CV ECHO MEAS - RV MAX PG: 2.3 MMHG
BH CV ECHO MEAS - RV V1 MAX: 75.8 CM/SEC
BH CV ECHO MEAS - RV V1 VTI: 10.7 CM
BH CV ECHO MEAS - RVDD: 2.6 CM
BH CV ECHO MEAS - SI(MOD-SP4): 14.7 ML/M2
BH CV ECHO MEAS - SV(LVOT): 44.7 ML
BH CV ECHO MEAS - SV(MOD-SP4): 25.5 ML
BH CV ECHO MEAS - TAPSE (>1.6): 1.6 CM
BH CV ECHO MEAS - TR MAX PG: 19.2 MMHG
BH CV ECHO MEAS - TR MAX VEL: 219 CM/SEC
BH CV ECHO MEASUREMENTS AVERAGE E/E' RATIO: 13.22
BH CV XLRA - RV BASE: 3.9 CM
BH CV XLRA - RV LENGTH: 6.4 CM
BH CV XLRA - RV MID: 2.8 CM
BH CV XLRA - TDI S': 9.1 CM/SEC
BUN SERPL-MCNC: 53 MG/DL (ref 8–23)
BUN/CREAT SERPL: 46.9 (ref 7–25)
CALCIUM SPEC-SCNC: 9.1 MG/DL (ref 8.6–10.5)
CHLORIDE SERPL-SCNC: 108 MMOL/L (ref 98–107)
CO2 SERPL-SCNC: 23 MMOL/L (ref 22–29)
CREAT SERPL-MCNC: 1.13 MG/DL (ref 0.57–1)
DEPRECATED RDW RBC AUTO: 45.9 FL (ref 37–54)
EGFRCR SERPLBLD CKD-EPI 2021: 46.6 ML/MIN/1.73
EOSINOPHIL # BLD AUTO: 0.1 10*3/MM3 (ref 0–0.4)
EOSINOPHIL NFR BLD AUTO: 1.1 % (ref 0.3–6.2)
ERYTHROCYTE [DISTWIDTH] IN BLOOD BY AUTOMATED COUNT: 14.2 % (ref 12.3–15.4)
GEN 5 2HR TROPONIN T REFLEX: 20 NG/L
GLUCOSE SERPL-MCNC: 137 MG/DL (ref 65–99)
HCT VFR BLD AUTO: 31.3 % (ref 34–46.6)
HGB BLD-MCNC: 10.3 G/DL (ref 12–15.9)
LYMPHOCYTES # BLD AUTO: 3 10*3/MM3 (ref 0.7–3.1)
LYMPHOCYTES NFR BLD AUTO: 33.7 % (ref 19.6–45.3)
MCH RBC QN AUTO: 30.6 PG (ref 26.6–33)
MCHC RBC AUTO-ENTMCNC: 32.8 G/DL (ref 31.5–35.7)
MCV RBC AUTO: 93.1 FL (ref 79–97)
MONOCYTES # BLD AUTO: 0.8 10*3/MM3 (ref 0.1–0.9)
MONOCYTES NFR BLD AUTO: 8.9 % (ref 5–12)
NEUTROPHILS NFR BLD AUTO: 4.8 10*3/MM3 (ref 1.7–7)
NEUTROPHILS NFR BLD AUTO: 55.3 % (ref 42.7–76)
NRBC BLD AUTO-RTO: 0 /100 WBC (ref 0–0.2)
PLATELET # BLD AUTO: 197 10*3/MM3 (ref 140–450)
PMV BLD AUTO: 9.6 FL (ref 6–12)
POTASSIUM SERPL-SCNC: 4.1 MMOL/L (ref 3.5–5.2)
QT INTERVAL: 330 MS
QTC INTERVAL: 472 MS
RBC # BLD AUTO: 3.36 10*6/MM3 (ref 3.77–5.28)
SINUS: 2.8 CM
SODIUM SERPL-SCNC: 141 MMOL/L (ref 136–145)
TROPONIN T DELTA: -4 NG/L
TROPONIN T SERPL HS-MCNC: 24 NG/L
WBC NRBC COR # BLD AUTO: 8.8 10*3/MM3 (ref 3.4–10.8)

## 2024-01-10 PROCEDURE — 99222 1ST HOSP IP/OBS MODERATE 55: CPT | Performed by: INTERNAL MEDICINE

## 2024-01-10 PROCEDURE — G0378 HOSPITAL OBSERVATION PER HR: HCPCS

## 2024-01-10 PROCEDURE — 93306 TTE W/DOPPLER COMPLETE: CPT | Performed by: INTERNAL MEDICINE

## 2024-01-10 PROCEDURE — 36415 COLL VENOUS BLD VENIPUNCTURE: CPT

## 2024-01-10 PROCEDURE — 85025 COMPLETE CBC W/AUTO DIFF WBC: CPT

## 2024-01-10 PROCEDURE — 80048 BASIC METABOLIC PNL TOTAL CA: CPT

## 2024-01-10 PROCEDURE — 96366 THER/PROPH/DIAG IV INF ADDON: CPT

## 2024-01-10 PROCEDURE — 84484 ASSAY OF TROPONIN QUANT: CPT

## 2024-01-10 PROCEDURE — 93306 TTE W/DOPPLER COMPLETE: CPT

## 2024-01-10 RX ADMIN — METFORMIN HYDROCHLORIDE 500 MG: 500 TABLET ORAL at 08:35

## 2024-01-10 RX ADMIN — Medication 400 MG: at 08:36

## 2024-01-10 RX ADMIN — FUROSEMIDE 20 MG: 40 TABLET ORAL at 08:36

## 2024-01-10 RX ADMIN — Medication 10 ML: at 08:36

## 2024-01-10 RX ADMIN — DOCUSATE SODIUM 50 MG AND SENNOSIDES 8.6 MG 2 TABLET: 8.6; 5 TABLET, FILM COATED ORAL at 08:35

## 2024-01-10 RX ADMIN — FERROUS SULFATE TAB EC 324 MG (65 MG FE EQUIVALENT) 324 MG: 324 (65 FE) TABLET DELAYED RESPONSE at 08:35

## 2024-01-10 RX ADMIN — NEBIVOLOL 20 MG: 10 TABLET ORAL at 08:35

## 2024-01-10 RX ADMIN — ISOSORBIDE MONONITRATE 30 MG: 30 TABLET, EXTENDED RELEASE ORAL at 08:35

## 2024-01-10 RX ADMIN — ZOLPIDEM TARTRATE 5 MG: 5 TABLET ORAL at 20:02

## 2024-01-10 RX ADMIN — PANTOPRAZOLE SODIUM 40 MG: 40 TABLET, DELAYED RELEASE ORAL at 05:00

## 2024-01-10 RX ADMIN — ATORVASTATIN CALCIUM 10 MG: 10 TABLET, FILM COATED ORAL at 08:35

## 2024-01-10 RX ADMIN — Medication 10 ML: at 20:03

## 2024-01-10 NOTE — H&P
Patient Care Team:  Andrew Antunez MD as PCP - General (Family Medicine)  Maurice Benito MD as Consulting Physician (Cardiology)  Cris Green APRN as Nurse Practitioner (Cardiology)    Chief complaint syncopal episode    Subjective     Patient is a 89 y.o. female who presented to the ER today after syncopal episode while having a bowel movement. She states she has had nausea and vomiting the last 2 days and dizziness and shortness of breath with exertion the past 5 days. She reports she had another syncopal episode yesterday and a near syncopal episode 5 days ago. She denies any melena, hematemesis, hematochezia. Denies any fever, chills, urinary complaints, or changes in bowels. She reports felling overall generally weak and lightheaded that last few days and has had no energy.   In the ER patient was found to be in atrial fibrillation with RVR rate 123 and started on cardizem gtt. Troponin 17, potassium 5.4, BUN 64, Creatinine 1.06, COVID and flu swab negative. CXR unremarkable, CT head with no acute findings.      Onset of symptoms was 5 days    Review of Systems   Constitutional:  Positive for activity change and fatigue.   HENT: Negative.     Eyes: Negative.    Respiratory: Negative.     Cardiovascular: Negative.    Gastrointestinal:  Positive for nausea and vomiting.   Endocrine: Negative.    Genitourinary: Negative.    Musculoskeletal: Negative.    Skin: Negative.    Neurological:  Positive for dizziness and syncope.   Psychiatric/Behavioral: Negative.            History  Past Medical History:   Diagnosis Date    Antral erosion     Aortic valve regurgitation 12/26/2018    Atrial fibrillation     Atrial fibrillation with controlled ventricular response     B12 deficiency     CAD (coronary artery disease)     Cellulitis 04/2011    on face     Cerebrovascular accident (CVA) 12/07/2016    CHF (congestive heart failure)     Colon cancer     Coronary artery disease involving native coronary artery of native  heart without angina pectoris 06/18/2019    Depression     Diabetes mellitus, type 2     Essential hypertension 02/13/2012    Fatigue     GERD (gastroesophageal reflux disease)     Hiatal hernia     History of colonoscopy 04/2010    last done 4/10    History of esophagogastroduodenoscopy (EGD) 04/2010    last done 4/10    Hyperlipidemia, mixed 02/13/2012    Hypertension     white coat htn    HALEIGH (iron deficiency anemia)     LAD (lymphadenopathy)     40% lesion LAD     Left pontine CVA     Mitral regurgitation 05/21/2012    STORMY (obstructive sleep apnea)     not treating    Osteoarthritis     Pacemaker 06/18/2019    Permanent atrial fibrillation 06/18/2019    Presence of Watchman left atrial appendage closure device 01/04/2020    Prinzmetal's angina     Right kidney mass     1.4 cm- following urology     Sick sinus syndrome     Stroke 11/2016    TIA (transient ischemic attack)     left CVA, interrupted TPA; As (moderate-severe)     Vestibular nerve disease 2017     Past Surgical History:   Procedure Laterality Date    ATRIAL APPENDAGE EXCLUSION LEFT WITH TRANSESOPHAGEAL ECHOCARDIOGRAM N/A 10/10/2019    Procedure: Atrial Appendage Occlusion;  Surgeon: Richie Chapman MD;  Location: [facility] CATH INVASIVE LOCATION;  Service: Cardiovascular    ATRIAL APPENDAGE EXCLUSION LEFT WITH TRANSESOPHAGEAL ECHOCARDIOGRAM N/A 10/10/2019    Procedure: Atrial Appendage Occlusion;  Surgeon: Je Rivera MD;  Location: [facility] CATH INVASIVE LOCATION;  Service: Cardiology    BLADDER REPAIR      CARDIAC CATHETERIZATION  05/2010    CHOLECYSTECTOMY      COLECTOMY PARTIAL / TOTAL      COLONOSCOPY  04/16/2018    negative     COLONOSCOPY N/A 7/1/2022    Procedure: COLONOSCOPY with polypectomy x1;  Surgeon: ADOLFO Boss MD;  Location: [facility] ENDOSCOPY;  Service: Gastroenterology;  Laterality: N/A;  post: diverticulosis, hemorrhoids, polyps    ENDOSCOPY  04/16/2018    negative     ENDOSCOPY N/A 6/29/2022    Procedure:  ESOPHAGOGASTRODUODENOSCOPY with biopsy of duodenum r/o celiac;  Surgeon: ADOLFO Boss MD;  Location: [facility] ENDOSCOPY;  Service: Gastroenterology;  Laterality: N/A;  hiatial hernia  Gastric Polyps        HYSTERECTOMY      INSERT / REPLACE / REMOVE PACEMAKER  09/2018    Pacemaker gen change 9/2018     OTHER SURGICAL HISTORY  04/25/2015    Exercise myoview, normal     PACEMAKER IMPLANTATION  01/22/2010    Dual Chamber - 1/22/2010; RA Lead Revision- 5/7/2012- BS     TOOTH EXTRACTION      TRANSESOPHAGEAL ECHOCARDIOGRAM (DHEERAJ)  07/2019     Family History   Problem Relation Age of Onset    Hypertension Mother     Diabetes Mother     Coronary artery disease Mother     Other Father         CVA    No Known Problems Sister     No Known Problems Brother     No Known Problems Maternal Aunt     No Known Problems Maternal Uncle     No Known Problems Paternal Aunt     No Known Problems Paternal Uncle     No Known Problems Maternal Grandmother     No Known Problems Maternal Grandfather     No Known Problems Paternal Grandmother     No Known Problems Paternal Grandfather     Heart disease Other     Stroke Other     Hypertension Other     Anemia Neg Hx     Arrhythmia Neg Hx     Asthma Neg Hx     Clotting disorder Neg Hx     Fainting Neg Hx     Heart attack Neg Hx     Heart failure Neg Hx     Hyperlipidemia Neg Hx      Social History     Tobacco Use    Smoking status: Never     Passive exposure: Never    Smokeless tobacco: Never   Vaping Use    Vaping Use: Never used   Substance Use Topics    Alcohol use: No    Drug use: No     (Not in a hospital admission)    Allergies:  Contrast dye (echo or unknown ct/mr), Adhesive tape, and Latex    Objective     Vital Signs  Temp:  [97.4 °F (36.3 °C)-97.8 °F (36.6 °C)] 97.4 °F (36.3 °C)  Heart Rate:  [] 83  Resp:  [18-24] 21  BP: (115-156)/(58-90) 130/60     Physical Exam:      General Appearance:    Alert, cooperative, in no acute distress   Head:    Normocephalic, without obvious  abnormality, atraumatic   Eyes:            Lids and lashes normal, conjunctivae and sclerae normal, no   icterus, no pallor, corneas clear, PERRLA   Ears:    Ears appear intact with no abnormalities noted   Throat:   No oral lesions, no thrush, oral mucosa moist   Neck:   No adenopathy, supple, trachea midline, no thyromegaly, no   carotid bruit, no JVD   Lungs:     Clear to auscultation,respirations regular, even and                  unlabored    Heart:    Regular rhythm and normal rate, normal S1 and S2, no            murmur, no gallop, no rub, no click   Chest Wall:    No abnormalities observed   Abdomen:     Normal bowel sounds, no masses, no organomegaly, soft        non-tender, non-distended, no guarding, no rebound                tenderness   Extremities:   Moves all extremities well, no edema, no cyanosis, no             redness   Pulses:   Pulses palpable and equal bilaterally   Skin:   No bleeding, bruising or rash   Lymph nodes:   No palpable adenopathy   Neurologic:   No focal deficits noted       Results Review:     Imaging Results (Last 24 Hours)       Procedure Component Value Units Date/Time    CT Head Without Contrast [[id number]] Collected: 01/09/24 1927     Updated: 01/09/24 1931    Narrative:      CT HEAD WO CONTRAST    Date of Exam: 1/9/2024 6:50 PM EST    Indication: hx recent fall hit occip.    Comparison: 6/27/2022    Technique: Axial CT images were obtained of the head without contrast administration.  Coronal reconstructions were performed.  Automated exposure control and iterative reconstruction methods were used.    Structured dose report sent to the ACR Radiation Dose Index Registry.    Exposure: The patient has had 0 known prior CTs and cardiac nuclear medicine studies within the last 12 months.    This CT exam was performed using one or more of the following dose reduction techniques: Automated exposure control, adjustment of the mA and/or kV according to patient size, or use of  iterative reconstruction technique.    Findings: Gray-white matter differentiation is maintained without evidence of an acute infarction. No intracranial mass or mass effect. No extra-axial mass or collection. The ventricles and sulci are normal in size and configuration. The posterior fossa   appears normal. Sellar and suprasellar structures are normal.    Previous bilateral lens replacements. The paranasal sinuses, ethmoid air cells, and mastoid air cells are aerated. The bony calvarium appears intact. No acute fractures. No lytic or blastic bony diseases.      Impression:      Impression: No acute intracranial pathology.      Study is available in DICOM format to non-affiliated external healthcare facilities or entities on a secure, media free, reciprocally searchable basis with patient authorization for at least a 12 month period after the study. Search conducted for prior   patient CT studies completed at nonaffiliated external healthcare facilities or entities within the past 12 months and are available through a secure, authorized, media free, shared archive prior to an imaging study being performed.    Electronically Signed: Ruben Arce MD    1/9/2024 7:28 PM EST    Workstation ID: [id number]    XR Chest 1 View [[id number]] Collected: 01/09/24 1857     Updated: 01/09/24 1900    Narrative:      XR CHEST 1 VW    Date of Exam: 1/9/2024 6:52 PM EST    Indication: chest pain    Comparison: None available.    Findings: No focal consolidation. No pneumothorax or pleural effusion. Cardiac size is normal. The visualized clavicles appear intact. No displaced rib fractures. The visualized upper abdomen is normal. Triple lead cardiac pacemaker.      Impression:      Impression: No acute cardiopulmonary disease.    Electronically Signed: Ruben Arce MD    1/9/2024 6:58 PM EST    Workstation ID: [id number]             Lab Results (last 24 hours)       Procedure Component Value Units Date/Time    COVID-19 and FLU A/B PCR, 1  HR TAT - Swab, Nasopharynx [[id number]]  (Normal) Collected: 01/09/24 1841    Specimen: Swab from Nasopharynx Updated: 01/09/24 1904     COVID19 Not Detected     Influenza A PCR Not Detected     Influenza B PCR Not Detected    Narrative:      Fact sheet for providers: https://www.fda.gov/media/875424/download    Fact sheet for patients: https://www.fda.gov/media/589738/download    Test performed by PCR.    Extra Tubes [[id number]] Collected: 01/09/24 1431    Specimen: Blood, Venous Line Updated: 01/09/24 1531    Narrative:      The following orders were created for panel order Extra Tubes.  Procedure                               Abnormality         Status                     ---------                               -----------         ------                     Gold Top - SST[[id number]]                                   Final result                 Please view results for these tests on the individual orders.    Gold Top - SST [[id number]] Collected: 01/09/24 1431    Specimen: Blood Updated: 01/09/24 1531     Extra Tube Hold for add-ons.     Comment: Auto resulted.       Comprehensive Metabolic Panel [[id number]]  (Abnormal) Collected: 01/09/24 1431    Specimen: Blood Updated: 01/09/24 1458     Glucose 178 mg/dL      BUN 65 mg/dL      Creatinine 1.06 mg/dL      Sodium 140 mmol/L      Potassium 5.4 mmol/L      Chloride 104 mmol/L      CO2 23.0 mmol/L      Calcium 9.8 mg/dL      Total Protein 6.6 g/dL      Albumin 3.8 g/dL      ALT (SGPT) 11 U/L      AST (SGOT) 17 U/L      Alkaline Phosphatase 76 U/L      Total Bilirubin 0.7 mg/dL      Globulin 2.8 gm/dL      A/G Ratio 1.4 g/dL      BUN/Creatinine Ratio 61.3     Anion Gap 13.0 mmol/L      eGFR 50.3 mL/min/1.73     Narrative:      GFR Normal >60  Chronic Kidney Disease <60  Kidney Failure <15    The GFR formula is only valid for adults with stable renal function between ages 18 and 70.    Single High Sensitivity Troponin T [[id number]]  (Abnormal) Collected: 01/09/24 1431     Specimen: Blood Updated: 01/09/24 1458     HS Troponin T 17 ng/L     Narrative:      High Sensitive Troponin T Reference Range:  <14.0 ng/L- Negative Female for AMI  <22.0 ng/L- Negative Male for AMI  >=14 - Abnormal Female indicating possible myocardial injury.  >=22 - Abnormal Male indicating possible myocardial injury.   Clinicians would have to utilize clinical acumen, EKG, Troponin, and serial changes to determine if it is an Acute Myocardial Infarction or myocardial injury due to an underlying chronic condition.         Protime-INR [[id number]]  (Normal) Collected: 01/09/24 1431    Specimen: Blood Updated: 01/09/24 1457     Protime 11.3 Seconds      INR 1.04    aPTT [[id number]]  (Abnormal) Collected: 01/09/24 1431    Specimen: Blood Updated: 01/09/24 1457     PTT 21.0 seconds     CBC & Differential [[id number]]  (Abnormal) Collected: 01/09/24 1431    Specimen: Blood Updated: 01/09/24 1445    Narrative:      The following orders were created for panel order CBC & Differential.  Procedure                               Abnormality         Status                     ---------                               -----------         ------                     CBC Auto Differential[[id number]]        Abnormal            Final result                 Please view results for these tests on the individual orders.    CBC Auto Differential [[id number]]  (Abnormal) Collected: 01/09/24 1431    Specimen: Blood Updated: 01/09/24 1445     WBC 9.40 10*3/mm3      RBC 3.93 10*6/mm3      Hemoglobin 11.7 g/dL      Hematocrit 36.5 %      MCV 93.0 fL      MCH 29.8 pg      MCHC 32.0 g/dL      RDW 13.8 %      RDW-SD 44.6 fl      MPV 10.1 fL      Platelets 227 10*3/mm3      Neutrophil % 74.5 %      Lymphocyte % 16.0 %      Monocyte % 7.9 %      Eosinophil % 0.2 %      Basophil % 1.4 %      Neutrophils, Absolute 7.00 10*3/mm3      Lymphocytes, Absolute 1.50 10*3/mm3      Monocytes, Absolute 0.70 10*3/mm3      Eosinophils, Absolute 0.00 10*3/mm3       Basophils, Absolute 0.10 10*3/mm3      nRBC 0.0 /100 WBC              I reviewed the patient's new clinical results.    Assessment & Plan       Recurrent syncope  -atrial fibrillation with RVR rate 123 and started on cardizem gtt  -Troponin 17  -potassium 5.4  -BUN 64  -Creatinine 1.06  -COVID and flu swab negative  -CXR unremarkable  -CT head with no acute findings      DVT prophylaxis- Scd's  GI prophylaxis- ppi    I discussed the patient's findings and my recommendations with patient.     Lynnette Wayne, APRN  01/09/24  20:18 EST

## 2024-01-10 NOTE — PROGRESS NOTES
"     LOS: 0 days   Patient Care Team:  Andrew Antunez MD as PCP - General (Family Medicine)  Maurice Benito MD as Consulting Physician (Cardiology)  Cris Green APRN as Nurse Practitioner (Cardiology)    Subjective     Interval History: Stable overnight, remains in atrial fibs; diltiazem drip discontinued due to hypotension    Patient Complaints: \"I am just worn out\".  No further episodes of dizziness, lightheadedness or near syncope.    History taken from: patient    Review of Systems   Constitutional:  Positive for activity change and fatigue. Negative for appetite change, chills, diaphoresis and fever.   HENT:  Negative for facial swelling.    Eyes:  Negative for visual disturbance.   Respiratory:  Positive for shortness of breath. Negative for cough, wheezing and stridor.    Cardiovascular:  Negative for chest pain, palpitations and leg swelling.   Gastrointestinal:  Negative for abdominal pain, constipation, diarrhea, nausea and vomiting.   Endocrine: Negative for polyuria.   Genitourinary:  Negative for dysuria.   Musculoskeletal:  Negative for arthralgias, back pain and gait problem.   Skin:  Negative for rash and wound.   Neurological:  Negative for dizziness, tremors, seizures, weakness and headaches.   Psychiatric/Behavioral:  Negative for confusion.            Objective     Vital Signs  Temp:  [97.4 °F (36.3 °C)-98.3 °F (36.8 °C)] 98.3 °F (36.8 °C)  Heart Rate:  [] 88  Resp:  [16-24] 16  BP: ()/(46-90) 117/63    Physical Exam:     General Appearance:    Alert, cooperative, in no acute distress,   Head:    Normocephalic, without obvious abnormality, atraumatic   Eyes:            Lids and lashes normal, conjunctivae and sclerae normal, no   icterus, no pallor, corneas clear, PERRLA   Ears:    Ears appear intact with no abnormalities noted   Throat:   No oral lesions, no thrush, oral mucosa moist   Neck:   No adenopathy, supple, trachea midline, no thyromegaly, no   carotid bruit, no JVD "   Lungs:     Clear to auscultation,respirations regular, even and                  unlabored    Heart:  Irregularly irregular   Chest Wall:    No abnormalities observed   Abdomen:     Normal bowel sounds, no masses, no organomegaly, soft        Non-tender non-distended, no guarding,   Extremities:   Moves all extremities well, no edema, no cyanosis, no             Redness   Pulses:   Pulses palpable and equal bilaterally   Skin:   No bleeding, bruising or rash   Lymph nodes:   No palpable adenopathy   Neurologic:   Cranial nerves 2 - 12 grossly intact, sensation intact, DTR       present and equal bilaterally        Results Review:    Lab Results (last 24 hours)       Procedure Component Value Units Date/Time    High Sensitivity Troponin T 2Hr [[id number]]  (Abnormal) Collected: 01/10/24 0650    Specimen: Blood Updated: 01/10/24 0721     HS Troponin T 20 ng/L      Troponin T Delta -4 ng/L     Narrative:      High Sensitive Troponin T Reference Range:  <14.0 ng/L- Negative Female for AMI  <22.0 ng/L- Negative Male for AMI  >=14 - Abnormal Female indicating possible myocardial injury.  >=22 - Abnormal Male indicating possible myocardial injury.   Clinicians would have to utilize clinical acumen, EKG, Troponin, and serial changes to determine if it is an Acute Myocardial Infarction or myocardial injury due to an underlying chronic condition.         Basic Metabolic Panel [[id number]]  (Abnormal) Collected: 01/10/24 0456    Specimen: Blood Updated: 01/10/24 0522     Glucose 137 mg/dL      BUN 53 mg/dL      Creatinine 1.13 mg/dL      Sodium 141 mmol/L      Potassium 4.1 mmol/L      Chloride 108 mmol/L      CO2 23.0 mmol/L      Calcium 9.1 mg/dL      BUN/Creatinine Ratio 46.9     Anion Gap 10.0 mmol/L      eGFR 46.6 mL/min/1.73     Narrative:      GFR Normal >60  Chronic Kidney Disease <60  Kidney Failure <15    The GFR formula is only valid for adults with stable renal function between ages 18 and 70.    High Sensitivity  Troponin T [[id number]]  (Abnormal) Collected: 01/10/24 0456    Specimen: Blood Updated: 01/10/24 0522     HS Troponin T 24 ng/L     Narrative:      High Sensitive Troponin T Reference Range:  <14.0 ng/L- Negative Female for AMI  <22.0 ng/L- Negative Male for AMI  >=14 - Abnormal Female indicating possible myocardial injury.  >=22 - Abnormal Male indicating possible myocardial injury.   Clinicians would have to utilize clinical acumen, EKG, Troponin, and serial changes to determine if it is an Acute Myocardial Infarction or myocardial injury due to an underlying chronic condition.         CBC & Differential [[id number]]  (Abnormal) Collected: 01/10/24 0456    Specimen: Blood Updated: 01/10/24 0502    Narrative:      The following orders were created for panel order CBC & Differential.  Procedure                               Abnormality         Status                     ---------                               -----------         ------                     CBC Auto Differential[[id number]]        Abnormal            Final result                 Please view results for these tests on the individual orders.    CBC Auto Differential [[id number]]  (Abnormal) Collected: 01/10/24 0456    Specimen: Blood Updated: 01/10/24 0502     WBC 8.80 10*3/mm3      RBC 3.36 10*6/mm3      Hemoglobin 10.3 g/dL      Hematocrit 31.3 %      MCV 93.1 fL      MCH 30.6 pg      MCHC 32.8 g/dL      RDW 14.2 %      RDW-SD 45.9 fl      MPV 9.6 fL      Platelets 197 10*3/mm3      Neutrophil % 55.3 %      Lymphocyte % 33.7 %      Monocyte % 8.9 %      Eosinophil % 1.1 %      Basophil % 1.0 %      Neutrophils, Absolute 4.80 10*3/mm3      Lymphocytes, Absolute 3.00 10*3/mm3      Monocytes, Absolute 0.80 10*3/mm3      Eosinophils, Absolute 0.10 10*3/mm3      Basophils, Absolute 0.10 10*3/mm3      nRBC 0.0 /100 WBC     COVID-19 and FLU A/B PCR, 1 HR TAT - Swab, Nasopharynx [[id number]]  (Normal) Collected: 01/09/24 1841    Specimen: Swab from Nasopharynx  Updated: 01/09/24 1904     COVID19 Not Detected     Influenza A PCR Not Detected     Influenza B PCR Not Detected    Narrative:      Fact sheet for providers: https://www.fda.gov/media/160604/download    Fact sheet for patients: https://www.fda.gov/media/998272/download    Test performed by PCR.    Extra Tubes [[id number]] Collected: 01/09/24 1431    Specimen: Blood, Venous Line Updated: 01/09/24 1531    Narrative:      The following orders were created for panel order Extra Tubes.  Procedure                               Abnormality         Status                     ---------                               -----------         ------                     Gold Top - SST[[id number]]                                   Final result                 Please view results for these tests on the individual orders.    Gold Top - SST [[id number]] Collected: 01/09/24 1431    Specimen: Blood Updated: 01/09/24 1531     Extra Tube Hold for add-ons.     Comment: Auto resulted.       Comprehensive Metabolic Panel [[id number]]  (Abnormal) Collected: 01/09/24 1431    Specimen: Blood Updated: 01/09/24 1458     Glucose 178 mg/dL      BUN 65 mg/dL      Creatinine 1.06 mg/dL      Sodium 140 mmol/L      Potassium 5.4 mmol/L      Chloride 104 mmol/L      CO2 23.0 mmol/L      Calcium 9.8 mg/dL      Total Protein 6.6 g/dL      Albumin 3.8 g/dL      ALT (SGPT) 11 U/L      AST (SGOT) 17 U/L      Alkaline Phosphatase 76 U/L      Total Bilirubin 0.7 mg/dL      Globulin 2.8 gm/dL      A/G Ratio 1.4 g/dL      BUN/Creatinine Ratio 61.3     Anion Gap 13.0 mmol/L      eGFR 50.3 mL/min/1.73     Narrative:      GFR Normal >60  Chronic Kidney Disease <60  Kidney Failure <15    The GFR formula is only valid for adults with stable renal function between ages 18 and 70.    Single High Sensitivity Troponin T [[id number]]  (Abnormal) Collected: 01/09/24 1431    Specimen: Blood Updated: 01/09/24 1458     HS Troponin T 17 ng/L     Narrative:      High Sensitive  Troponin T Reference Range:  <14.0 ng/L- Negative Female for AMI  <22.0 ng/L- Negative Male for AMI  >=14 - Abnormal Female indicating possible myocardial injury.  >=22 - Abnormal Male indicating possible myocardial injury.   Clinicians would have to utilize clinical acumen, EKG, Troponin, and serial changes to determine if it is an Acute Myocardial Infarction or myocardial injury due to an underlying chronic condition.         Protime-INR [[id number]]  (Normal) Collected: 01/09/24 1431    Specimen: Blood Updated: 01/09/24 1457     Protime 11.3 Seconds      INR 1.04    aPTT [[id number]]  (Abnormal) Collected: 01/09/24 1431    Specimen: Blood Updated: 01/09/24 1457     PTT 21.0 seconds     CBC & Differential [[id number]]  (Abnormal) Collected: 01/09/24 1431    Specimen: Blood Updated: 01/09/24 1445    Narrative:      The following orders were created for panel order CBC & Differential.  Procedure                               Abnormality         Status                     ---------                               -----------         ------                     CBC Auto Differential[[id number]]        Abnormal            Final result                 Please view results for these tests on the individual orders.    CBC Auto Differential [[id number]]  (Abnormal) Collected: 01/09/24 1431    Specimen: Blood Updated: 01/09/24 1445     WBC 9.40 10*3/mm3      RBC 3.93 10*6/mm3      Hemoglobin 11.7 g/dL      Hematocrit 36.5 %      MCV 93.0 fL      MCH 29.8 pg      MCHC 32.0 g/dL      RDW 13.8 %      RDW-SD 44.6 fl      MPV 10.1 fL      Platelets 227 10*3/mm3      Neutrophil % 74.5 %      Lymphocyte % 16.0 %      Monocyte % 7.9 %      Eosinophil % 0.2 %      Basophil % 1.4 %      Neutrophils, Absolute 7.00 10*3/mm3      Lymphocytes, Absolute 1.50 10*3/mm3      Monocytes, Absolute 0.70 10*3/mm3      Eosinophils, Absolute 0.00 10*3/mm3      Basophils, Absolute 0.10 10*3/mm3      nRBC 0.0 /100 WBC              Imaging Results (Last 24  Hours)       Procedure Component Value Units Date/Time    CT Head Without Contrast [[id number]] Collected: 01/09/24 1927     Updated: 01/09/24 1931    Narrative:      CT HEAD WO CONTRAST    Date of Exam: 1/9/2024 6:50 PM EST    Indication: hx recent fall hit occip.    Comparison: 6/27/2022    Technique: Axial CT images were obtained of the head without contrast administration.  Coronal reconstructions were performed.  Automated exposure control and iterative reconstruction methods were used.    Structured dose report sent to the ACR Radiation Dose Index Registry.    Exposure: The patient has had 0 known prior CTs and cardiac nuclear medicine studies within the last 12 months.    This CT exam was performed using one or more of the following dose reduction techniques: Automated exposure control, adjustment of the mA and/or kV according to patient size, or use of iterative reconstruction technique.    Findings: Gray-white matter differentiation is maintained without evidence of an acute infarction. No intracranial mass or mass effect. No extra-axial mass or collection. The ventricles and sulci are normal in size and configuration. The posterior fossa   appears normal. Sellar and suprasellar structures are normal.    Previous bilateral lens replacements. The paranasal sinuses, ethmoid air cells, and mastoid air cells are aerated. The bony calvarium appears intact. No acute fractures. No lytic or blastic bony diseases.      Impression:      Impression: No acute intracranial pathology.      Study is available in DICOM format to non-affiliated external healthcare facilities or entities on a secure, media free, reciprocally searchable basis with patient authorization for at least a 12 month period after the study. Search conducted for prior   patient CT studies completed at nonaffiliated external healthcare facilities or entities within the past 12 months and are available through a secure, authorized, media free, shared  archive prior to an imaging study being performed.    Electronically Signed: Ruben Arce MD    1/9/2024 7:28 PM EST    Workstation ID: [id number]    XR Chest 1 View [[id number]] Collected: 01/09/24 1857     Updated: 01/09/24 1900    Narrative:      XR CHEST 1 VW    Date of Exam: 1/9/2024 6:52 PM EST    Indication: chest pain    Comparison: None available.    Findings: No focal consolidation. No pneumothorax or pleural effusion. Cardiac size is normal. The visualized clavicles appear intact. No displaced rib fractures. The visualized upper abdomen is normal. Triple lead cardiac pacemaker.      Impression:      Impression: No acute cardiopulmonary disease.    Electronically Signed: Ruben Arce MD    1/9/2024 6:58 PM EST    Workstation ID: [id number]                 I reviewed the patient's new clinical results.    Medication Review:   Scheduled Meds:atorvastatin, 10 mg, Oral, Daily  ferrous sulfate, 324 mg, Oral, Daily With Breakfast  furosemide, 20 mg, Oral, BID  isosorbide mononitrate, 30 mg, Oral, Daily  magnesium oxide, 400 mg, Oral, Daily  metFORMIN, 500 mg, Oral, Daily With Breakfast  nebivolol, 20 mg, Oral, Daily  pantoprazole, 40 mg, Oral, Q AM  senna-docusate sodium, 2 tablet, Oral, BID  sodium chloride, 10 mL, Intravenous, Q12H      Continuous Infusions:dilTIAZem, 5-15 mg/hr, Last Rate: Stopped (01/10/24 0100)      PRN Meds:.  acetaminophen    senna-docusate sodium **AND** polyethylene glycol **AND** bisacodyl **AND** bisacodyl    Calcium Replacement - Follow Nurse / BPA Driven Protocol    Magnesium Standard Dose Replacement - Follow Nurse / BPA Driven Protocol    meclizine    nitroglycerin    ondansetron    Phosphorus Replacement - Follow Nurse / BPA Driven Protocol    Potassium Replacement - Follow Nurse / BPA Driven Protocol    sodium chloride    sodium chloride    zolpidem     Assessment & Plan       Recurrent syncope  - 2 episodes of syncope in the last 24 hours, suspicious for cardiac origin.  Pt has 3  lead pacer.  Was in atrial fib with rate in 120's on admission.  I have asked Dr. Chapman to evaluate.  Continue nebivolol.  She has a Watchman and so is not anticoagulated.  Echo is pending.  Further workup per cardiology    HLD - statin  Chronic anemia - oral iron  DM-II - metformin    PPI for stress ulcer prophy  SCD's for dvt prophy          Plan for disposition:[place]IANA Fields MD  01/10/24  11:52 EST

## 2024-01-10 NOTE — PLAN OF CARE
Goal Outcome Evaluation:      Pt rested throughout shift. Cardizem stopped due to blood pressure. PT able to maintain HR <100 without cardizem. Pt continues to be short of air when ambulating. All questions and concerns addressed.

## 2024-01-10 NOTE — CONSULTS
CARDIOLOGY CONSULT:    Cherie Hinton  1934  female  [id number]      Referring Provider: Dr. Fields  Reason for Consultation: Syncope    Patient Care Team:  Andrew Antunez MD as PCP - General (Family Medicine)  Maurice Benito MD as Consulting Physician (Cardiology)  Cris Green APRN as Nurse Practitioner (Cardiology)    Chief complaint syncope    Subjective .     History of present illness:  Cherie Hinton is a 89 y.o. female with history of coronary disease history of atrial fibrillation with tachybradycardia syndrome status post pacemaker placement diabetes hypertension hyperlipidemia presented to the hospital after syncopal episode.  Patient states that she woke up early Tuesday morning and went to the bathroom and had a syncopal episode but later in the day also had another episode of dizziness and syncope.  Then she presented to the ER.  No complaints of any chest pain or shortness of breath with rest but at exertion she has been having some shortness of breath.  No PND orthopnea.  No palpitations.  No swelling of the feet.  In the ER patient was noted to have atrial fibrillation with rapid response and was started on Cardizem drip..     Review of Systems   Constitutional: Negative for fever and malaise/fatigue.   HENT:  Negative for ear pain and nosebleeds.    Eyes:  Negative for blurred vision and double vision.   Cardiovascular:  Positive for syncope. Negative for chest pain, dyspnea on exertion and palpitations.   Respiratory:  Positive for shortness of breath. Negative for cough.    Skin:  Negative for rash.   Musculoskeletal:  Negative for joint pain.   Gastrointestinal:  Negative for abdominal pain, nausea and vomiting.   Neurological:  Positive for dizziness. Negative for focal weakness and headaches.   Psychiatric/Behavioral:  Negative for depression. The patient is not nervous/anxious.    All other systems reviewed and are negative.      History  Past Medical History:   Diagnosis Date     Antral erosion     Aortic valve regurgitation 12/26/2018    Atrial fibrillation     Atrial fibrillation with controlled ventricular response     B12 deficiency     CAD (coronary artery disease)     Cellulitis 04/2011    on face     Cerebrovascular accident (CVA) 12/07/2016    CHF (congestive heart failure)     Colon cancer     Coronary artery disease involving native coronary artery of native heart without angina pectoris 06/18/2019    Depression     Diabetes mellitus, type 2     Essential hypertension 02/13/2012    Fatigue     GERD (gastroesophageal reflux disease)     Hiatal hernia     History of colonoscopy 04/2010    last done 4/10    History of esophagogastroduodenoscopy (EGD) 04/2010    last done 4/10    Hyperlipidemia, mixed 02/13/2012    Hypertension     white coat htn    HALEIGH (iron deficiency anemia)     LAD (lymphadenopathy)     40% lesion LAD     Left pontine CVA     Mitral regurgitation 05/21/2012    STORMY (obstructive sleep apnea)     not treating    Osteoarthritis     Pacemaker 06/18/2019    Permanent atrial fibrillation 06/18/2019    Presence of Watchman left atrial appendage closure device 01/04/2020    Prinzmetal's angina     Right kidney mass     1.4 cm- following urology     Sick sinus syndrome     Stroke 11/2016    TIA (transient ischemic attack)     left CVA, interrupted TPA; As (moderate-severe)     Vestibular nerve disease 2017       Past Surgical History:   Procedure Laterality Date    ATRIAL APPENDAGE EXCLUSION LEFT WITH TRANSESOPHAGEAL ECHOCARDIOGRAM N/A 10/10/2019    Procedure: Atrial Appendage Occlusion;  Surgeon: Richie Chapman MD;  Location: [facility] CATH INVASIVE LOCATION;  Service: Cardiovascular    ATRIAL APPENDAGE EXCLUSION LEFT WITH TRANSESOPHAGEAL ECHOCARDIOGRAM N/A 10/10/2019    Procedure: Atrial Appendage Occlusion;  Surgeon: Je Rivera MD;  Location: [facility] CATH INVASIVE LOCATION;  Service: Cardiology    BLADDER REPAIR      CARDIAC CATHETERIZATION  05/2010     CHOLECYSTECTOMY      COLECTOMY PARTIAL / TOTAL      COLONOSCOPY  04/16/2018    negative     COLONOSCOPY N/A 7/1/2022    Procedure: COLONOSCOPY with polypectomy x1;  Surgeon: ADOLFO Boss MD;  Location: [facility] ENDOSCOPY;  Service: Gastroenterology;  Laterality: N/A;  post: diverticulosis, hemorrhoids, polyps    ENDOSCOPY  04/16/2018    negative     ENDOSCOPY N/A 6/29/2022    Procedure: ESOPHAGOGASTRODUODENOSCOPY with biopsy of duodenum r/o celiac;  Surgeon: ADOLFO Boss MD;  Location: [facility] ENDOSCOPY;  Service: Gastroenterology;  Laterality: N/A;  hiatial hernia  Gastric Polyps        HYSTERECTOMY      INSERT / REPLACE / REMOVE PACEMAKER  09/2018    Pacemaker gen change 9/2018     OTHER SURGICAL HISTORY  04/25/2015    Exercise myoview, normal     PACEMAKER IMPLANTATION  01/22/2010    Dual Chamber - 1/22/2010; RA Lead Revision- 5/7/2012- BS     TOOTH EXTRACTION      TRANSESOPHAGEAL ECHOCARDIOGRAM (DHEERAJ)  07/2019       Family History   Problem Relation Age of Onset    Hypertension Mother     Diabetes Mother     Coronary artery disease Mother     Other Father         CVA    No Known Problems Sister     No Known Problems Brother     No Known Problems Maternal Aunt     No Known Problems Maternal Uncle     No Known Problems Paternal Aunt     No Known Problems Paternal Uncle     No Known Problems Maternal Grandmother     No Known Problems Maternal Grandfather     No Known Problems Paternal Grandmother     No Known Problems Paternal Grandfather     Heart disease Other     Stroke Other     Hypertension Other     Anemia Neg Hx     Arrhythmia Neg Hx     Asthma Neg Hx     Clotting disorder Neg Hx     Fainting Neg Hx     Heart attack Neg Hx     Heart failure Neg Hx     Hyperlipidemia Neg Hx        Social History     Tobacco Use    Smoking status: Never     Passive exposure: Never    Smokeless tobacco: Never   Vaping Use    Vaping Use: Never used   Substance Use Topics    Alcohol use: No    Drug use: No        (Not in a  "hospital admission)      Allergies: Contrast dye (echo or unknown ct/mr), Adhesive tape, and Latex    Scheduled Meds:atorvastatin, 10 mg, Oral, Daily  ferrous sulfate, 324 mg, Oral, Daily With Breakfast  furosemide, 20 mg, Oral, BID  isosorbide mononitrate, 30 mg, Oral, Daily  magnesium oxide, 400 mg, Oral, Daily  metFORMIN, 500 mg, Oral, Daily With Breakfast  nebivolol, 20 mg, Oral, Daily  pantoprazole, 40 mg, Oral, Q AM  senna-docusate sodium, 2 tablet, Oral, BID  sodium chloride, 10 mL, Intravenous, Q12H      Continuous Infusions:dilTIAZem, 5-15 mg/hr, Last Rate: Stopped (01/10/24 0100)      PRN Meds:.  acetaminophen    senna-docusate sodium **AND** polyethylene glycol **AND** bisacodyl **AND** bisacodyl    Calcium Replacement - Follow Nurse / BPA Driven Protocol    Magnesium Standard Dose Replacement - Follow Nurse / BPA Driven Protocol    meclizine    nitroglycerin    ondansetron    Phosphorus Replacement - Follow Nurse / BPA Driven Protocol    Potassium Replacement - Follow Nurse / BPA Driven Protocol    sodium chloride    sodium chloride    zolpidem    Objective     VITAL SIGNS  Vitals:    01/10/24 0600 01/10/24 0835 01/10/24 1200 01/10/24 1600   BP: 118/58 117/63     BP Location:       Patient Position:       Pulse: 82 88     Resp:       Temp:   97.5 °F (36.4 °C) 97 °F (36.1 °C)   TempSrc:       SpO2: 92%      Weight:       Height:           Flowsheet Rows      Flowsheet Row First Filed Value   Admission Height 160 cm (63\") Documented at 01/09/2024 1325   Admission Weight 70.8 kg (156 lb) Documented at 01/09/2024 1325             TELEMETRY: Atrial fibrillation with controlled ventricular response    Physical Exam:  Constitutional:       Appearance: Well-developed.   Eyes:      General: No scleral icterus.     Conjunctiva/sclera: Conjunctivae normal.      Pupils: Pupils are equal, round, and reactive to light.   HENT:      Head: Normocephalic and atraumatic.   Neck:      Vascular: No carotid bruit or JVD. "   Pulmonary:      Effort: Pulmonary effort is normal.      Breath sounds: Normal breath sounds. No wheezing. No rales.   Cardiovascular:      Normal rate. Irregularly irregular rhythm.   Pulses:     Intact distal pulses.   Abdominal:      General: Bowel sounds are normal.      Palpations: Abdomen is soft.   Musculoskeletal: Normal range of motion.      Cervical back: Normal range of motion and neck supple. Skin:     General: Skin is warm and dry.      Findings: No rash.   Neurological:      Mental Status: Alert.      Comments: No focal deficits          Results Review:   I reviewed the patient's new clinical results.  Lab Results (last 24 hours)       Procedure Component Value Units Date/Time    High Sensitivity Troponin T 2Hr [[id number]]  (Abnormal) Collected: 01/10/24 0650    Specimen: Blood Updated: 01/10/24 0721     HS Troponin T 20 ng/L      Troponin T Delta -4 ng/L     Narrative:      High Sensitive Troponin T Reference Range:  <14.0 ng/L- Negative Female for AMI  <22.0 ng/L- Negative Male for AMI  >=14 - Abnormal Female indicating possible myocardial injury.  >=22 - Abnormal Male indicating possible myocardial injury.   Clinicians would have to utilize clinical acumen, EKG, Troponin, and serial changes to determine if it is an Acute Myocardial Infarction or myocardial injury due to an underlying chronic condition.         Basic Metabolic Panel [[id number]]  (Abnormal) Collected: 01/10/24 0456    Specimen: Blood Updated: 01/10/24 0522     Glucose 137 mg/dL      BUN 53 mg/dL      Creatinine 1.13 mg/dL      Sodium 141 mmol/L      Potassium 4.1 mmol/L      Chloride 108 mmol/L      CO2 23.0 mmol/L      Calcium 9.1 mg/dL      BUN/Creatinine Ratio 46.9     Anion Gap 10.0 mmol/L      eGFR 46.6 mL/min/1.73     Narrative:      GFR Normal >60  Chronic Kidney Disease <60  Kidney Failure <15    The GFR formula is only valid for adults with stable renal function between ages 18 and 70.    High Sensitivity Troponin T  [[id number]]  (Abnormal) Collected: 01/10/24 0456    Specimen: Blood Updated: 01/10/24 0522     HS Troponin T 24 ng/L     Narrative:      High Sensitive Troponin T Reference Range:  <14.0 ng/L- Negative Female for AMI  <22.0 ng/L- Negative Male for AMI  >=14 - Abnormal Female indicating possible myocardial injury.  >=22 - Abnormal Male indicating possible myocardial injury.   Clinicians would have to utilize clinical acumen, EKG, Troponin, and serial changes to determine if it is an Acute Myocardial Infarction or myocardial injury due to an underlying chronic condition.         CBC & Differential [[id number]]  (Abnormal) Collected: 01/10/24 0456    Specimen: Blood Updated: 01/10/24 0502    Narrative:      The following orders were created for panel order CBC & Differential.  Procedure                               Abnormality         Status                     ---------                               -----------         ------                     CBC Auto Differential[[id number]]        Abnormal            Final result                 Please view results for these tests on the individual orders.    CBC Auto Differential [[id number]]  (Abnormal) Collected: 01/10/24 0456    Specimen: Blood Updated: 01/10/24 0502     WBC 8.80 10*3/mm3      RBC 3.36 10*6/mm3      Hemoglobin 10.3 g/dL      Hematocrit 31.3 %      MCV 93.1 fL      MCH 30.6 pg      MCHC 32.8 g/dL      RDW 14.2 %      RDW-SD 45.9 fl      MPV 9.6 fL      Platelets 197 10*3/mm3      Neutrophil % 55.3 %      Lymphocyte % 33.7 %      Monocyte % 8.9 %      Eosinophil % 1.1 %      Basophil % 1.0 %      Neutrophils, Absolute 4.80 10*3/mm3      Lymphocytes, Absolute 3.00 10*3/mm3      Monocytes, Absolute 0.80 10*3/mm3      Eosinophils, Absolute 0.10 10*3/mm3      Basophils, Absolute 0.10 10*3/mm3      nRBC 0.0 /100 WBC     COVID-19 and FLU A/B PCR, 1 HR TAT - Swab, Nasopharynx [[id number]]  (Normal) Collected: 01/09/24 1841    Specimen: Swab from Nasopharynx Updated:  01/09/24 1904     COVID19 Not Detected     Influenza A PCR Not Detected     Influenza B PCR Not Detected    Narrative:      Fact sheet for providers: https://www.fda.gov/media/557549/download    Fact sheet for patients: https://www.fda.gov/media/992816/download    Test performed by PCR.            Imaging Results (Last 24 Hours)       Procedure Component Value Units Date/Time    CT Head Without Contrast [[id number]] Collected: 01/09/24 1927     Updated: 01/09/24 1931    Narrative:      CT HEAD WO CONTRAST    Date of Exam: 1/9/2024 6:50 PM EST    Indication: hx recent fall hit occip.    Comparison: 6/27/2022    Technique: Axial CT images were obtained of the head without contrast administration.  Coronal reconstructions were performed.  Automated exposure control and iterative reconstruction methods were used.    Structured dose report sent to the ACR Radiation Dose Index Registry.    Exposure: The patient has had 0 known prior CTs and cardiac nuclear medicine studies within the last 12 months.    This CT exam was performed using one or more of the following dose reduction techniques: Automated exposure control, adjustment of the mA and/or kV according to patient size, or use of iterative reconstruction technique.    Findings: Gray-white matter differentiation is maintained without evidence of an acute infarction. No intracranial mass or mass effect. No extra-axial mass or collection. The ventricles and sulci are normal in size and configuration. The posterior fossa   appears normal. Sellar and suprasellar structures are normal.    Previous bilateral lens replacements. The paranasal sinuses, ethmoid air cells, and mastoid air cells are aerated. The bony calvarium appears intact. No acute fractures. No lytic or blastic bony diseases.      Impression:      Impression: No acute intracranial pathology.      Study is available in DICOM format to non-affiliated external healthcare facilities or entities on a secure, media  free, reciprocally searchable basis with patient authorization for at least a 12 month period after the study. Search conducted for prior   patient CT studies completed at nonaffiliated external healthcare facilities or entities within the past 12 months and are available through a secure, authorized, media free, shared archive prior to an imaging study being performed.    Electronically Signed: Ruben Arce MD    1/9/2024 7:28 PM EST    Workstation ID: [id number]    XR Chest 1 View [[id number]] Collected: 01/09/24 1857     Updated: 01/09/24 1900    Narrative:      XR CHEST 1 VW    Date of Exam: 1/9/2024 6:52 PM EST    Indication: chest pain    Comparison: None available.    Findings: No focal consolidation. No pneumothorax or pleural effusion. Cardiac size is normal. The visualized clavicles appear intact. No displaced rib fractures. The visualized upper abdomen is normal. Triple lead cardiac pacemaker.      Impression:      Impression: No acute cardiopulmonary disease.    Electronically Signed: Ruben Arce MD    1/9/2024 6:58 PM EST    Workstation ID: [id number]            EKG      I personally viewed and interpreted the patient's EKG/Telemetry data:    ECHOCARDIOGRAM:  Results for orders placed during the hospital encounter of 01/09/24    Adult Transthoracic Echo Complete W/ Cont if Necessary Per Protocol    Interpretation Summary    Left ventricular ejection fraction appears to be 56 - 60%.    There is calcification of the aortic valve.         STRESS MYOVIEW:  Results for orders placed during the hospital encounter of 04/14/23    Stress Test With Myocardial Perfusion One Day    Interpretation Summary    Left ventricular ejection fraction is normal (Calculated EF = 58%).    Myocardial perfusion imaging indicates a normal myocardial perfusion study with no evidence of ischemia.    Impressions are consistent with a low risk study.       CARDIAC CATHETERIZATION:    No results found for this or any previous visit.        OTHER:         MDM      Syncope  Patient presented with dizziness and syncope and is being ruled out for MI by EKG and enzymes  Patient had a pacemaker which is being interrogated but it most likely appears she had a hypotensive episode.  Patient's blood pressure [place] in the lower side hence we will hold her blood pressure medicines.    Atrial fibrillation  Patient has atrial fibrillation with rapid response and is on Cardizem drip but her blood pressure is low and hence I will start on low-dose digoxin    Coronary disease  Patient has nonapproved disease in the past and does not need any further cardiac workup and had a normal LV function and also abnormal stress moistly recently    Pacemaker placement  Patient has a permanent pacemaker which will be interrogated.    Mild renal insufficiency  Patient has mild renal insufficiency with slightly high potassium and is most likely secondary to dehydration.  Patient needs IV fluid hydration at this time.  I discussed the patients findings and my recommendations with patient and nurse    Maurice Beniot MD  01/10/24  18:14 EST

## 2024-01-10 NOTE — CASE MANAGEMENT/SOCIAL WORK
Discharge Planning Assessment  [facility]     Patient Name: Cherie Hinton  MRN: [id number]  Today's Date: 1/10/2024    Admit Date: 1/9/2024    Plan: DC PLAN: From home alone     Discharge Needs Assessment       Row Name 01/10/24 1349       Living Environment    People in Home alone    Current Living Arrangements home    Potentially Unsafe Housing Conditions none    In the past 12 months has the electric, gas, oil, or water company threatened to shut off services in your home? No    Primary Care Provided by self    Provides Primary Care For no one    Quality of Family Relationships helpful;involved;supportive    Able to Return to Prior Arrangements yes       Resource/Environmental Concerns    Resource/Environmental Concerns none    Transportation Concerns none       Transportation Needs    In the past 12 months, has lack of transportation kept you from medical appointments or from getting medications? no    In the past 12 months, has lack of transportation kept you from meetings, work, or from getting things needed for daily living? No       Food Insecurity    Within the past 12 months, you worried that your food would run out before you got the money to buy more. Never true    Within the past 12 months, the food you bought just didn't last and you didn't have money to get more. Never true       Transition Planning    Patient/Family Anticipates Transition to home    Patient/Family Anticipated Services at Transition none    Transportation Anticipated car, drives self;family or friend will provide       Discharge Needs Assessment    Readmission Within the Last 30 Days no previous admission in last 30 days    Equipment Currently Used at Home none    Anticipated Changes Related to Illness none    Equipment Needed After Discharge none                   Discharge Plan       Row Name 01/10/24 1349       Plan    Plan DC PLAN: From home alone    Patient/Family in Agreement with Plan yes    Plan Comments Met with patient at  bedside, from routine home alone. Independent with ADLs no DME. PCP is [facility], Pharmacy is [facility]. Able to afford medications and denies any transportation issues, still drives. Denies any concerns about return home. DC BARRIERS: A.fib with RVR, pendng echo, pending cardiology consult.                  Continued Care and Services - Admitted Since 1/9/2024    Coordination has not been started for this encounter.          Demographic Summary       Row Name 01/10/24 1340       General Information    Admission Type observation    Arrived From emergency department    Required Notices Provided Observation Status Notice    Referral Source admission list    Reason for Consult discharge planning    Preferred Language English       Contact Information    Permission Granted to Share Info With     Contact Information Obtained for                    Functional Status       Row Name 01/10/24 1340       Functional Status    Usual Activity Tolerance excellent    Current Activity Tolerance excellent       Physical Activity    On average, how many days per week do you engage in moderate to strenuous exercise (like a brisk walk)? 0 days    On average, how many minutes do you engage in exercise at this level? 0 min    Number of minutes of exercise per week 0       Assessment of Health Literacy    How often do you have someone help you read hospital materials? Sometimes    How often do you have problems learning about your medical condition because of difficulty understanding written information? Sometimes    How often do you have a problem understanding what is told to you about your medical condition? Sometimes    How confident are you filling out medical forms by yourself? Somewhat    Health Literacy Moderate       Functional Status, IADL    Medications independent    Meal Preparation independent    Housekeeping independent    Laundry independent    Shopping independent       Mental Status    General Appearance  WDL WDL       Mental Status Summary    Recent Changes in Mental Status/Cognitive Functioning no changes                  Shana Carvajal, RN

## 2024-01-11 ENCOUNTER — READMISSION MANAGEMENT (OUTPATIENT)
Dept: CALL CENTER | Facility: HOSPITAL | Age: 89
End: 2024-01-11
Payer: MEDICARE

## 2024-01-11 VITALS
TEMPERATURE: 97.6 F | BODY MASS INDEX: 27.38 KG/M2 | HEART RATE: 85 BPM | WEIGHT: 154.54 LBS | HEIGHT: 63 IN | OXYGEN SATURATION: 94 % | DIASTOLIC BLOOD PRESSURE: 69 MMHG | SYSTOLIC BLOOD PRESSURE: 119 MMHG | RESPIRATION RATE: 12 BRPM

## 2024-01-11 PROBLEM — N28.9 RENAL INSUFFICIENCY: Status: ACTIVE | Noted: 2024-01-11

## 2024-01-11 PROBLEM — I95.89 HYPOTENSION DUE TO HYPOVOLEMIA: Status: ACTIVE | Noted: 2024-01-11

## 2024-01-11 PROBLEM — E86.1 HYPOTENSION DUE TO HYPOVOLEMIA: Status: ACTIVE | Noted: 2024-01-11

## 2024-01-11 LAB
ANION GAP SERPL CALCULATED.3IONS-SCNC: 10 MMOL/L (ref 5–15)
BASOPHILS # BLD AUTO: 0 10*3/MM3 (ref 0–0.2)
BASOPHILS NFR BLD AUTO: 0.1 % (ref 0–1.5)
BUN SERPL-MCNC: 40 MG/DL (ref 8–23)
BUN/CREAT SERPL: 35.1 (ref 7–25)
CALCIUM SPEC-SCNC: 9.2 MG/DL (ref 8.6–10.5)
CHLORIDE SERPL-SCNC: 107 MMOL/L (ref 98–107)
CO2 SERPL-SCNC: 25 MMOL/L (ref 22–29)
CREAT SERPL-MCNC: 1.14 MG/DL (ref 0.57–1)
DEPRECATED RDW RBC AUTO: 44.2 FL (ref 37–54)
EGFRCR SERPLBLD CKD-EPI 2021: 46.1 ML/MIN/1.73
EOSINOPHIL # BLD AUTO: 0.2 10*3/MM3 (ref 0–0.4)
EOSINOPHIL NFR BLD AUTO: 2.5 % (ref 0.3–6.2)
ERYTHROCYTE [DISTWIDTH] IN BLOOD BY AUTOMATED COUNT: 13.8 % (ref 12.3–15.4)
GLUCOSE SERPL-MCNC: 134 MG/DL (ref 65–99)
HCT VFR BLD AUTO: 28.2 % (ref 34–46.6)
HGB BLD-MCNC: 9.3 G/DL (ref 12–15.9)
LYMPHOCYTES # BLD AUTO: 2.1 10*3/MM3 (ref 0.7–3.1)
LYMPHOCYTES NFR BLD AUTO: 32.7 % (ref 19.6–45.3)
MCH RBC QN AUTO: 30.3 PG (ref 26.6–33)
MCHC RBC AUTO-ENTMCNC: 32.8 G/DL (ref 31.5–35.7)
MCV RBC AUTO: 92.2 FL (ref 79–97)
MONOCYTES # BLD AUTO: 0.6 10*3/MM3 (ref 0.1–0.9)
MONOCYTES NFR BLD AUTO: 9 % (ref 5–12)
NEUTROPHILS NFR BLD AUTO: 3.5 10*3/MM3 (ref 1.7–7)
NEUTROPHILS NFR BLD AUTO: 55.7 % (ref 42.7–76)
NRBC BLD AUTO-RTO: 0 /100 WBC (ref 0–0.2)
PLATELET # BLD AUTO: 192 10*3/MM3 (ref 140–450)
PMV BLD AUTO: 9.8 FL (ref 6–12)
POTASSIUM SERPL-SCNC: 3.8 MMOL/L (ref 3.5–5.2)
RBC # BLD AUTO: 3.06 10*6/MM3 (ref 3.77–5.28)
RSV RNA NPH QL NAA+NON-PROBE: NOT DETECTED
SODIUM SERPL-SCNC: 142 MMOL/L (ref 136–145)
WBC NRBC COR # BLD AUTO: 6.3 10*3/MM3 (ref 3.4–10.8)

## 2024-01-11 PROCEDURE — 96361 HYDRATE IV INFUSION ADD-ON: CPT

## 2024-01-11 PROCEDURE — 25810000003 SODIUM CHLORIDE 0.9 % SOLUTION: Performed by: NURSE PRACTITIONER

## 2024-01-11 PROCEDURE — 99232 SBSQ HOSP IP/OBS MODERATE 35: CPT

## 2024-01-11 PROCEDURE — 85025 COMPLETE CBC W/AUTO DIFF WBC: CPT

## 2024-01-11 PROCEDURE — 87634 RSV DNA/RNA AMP PROBE: CPT | Performed by: INTERNAL MEDICINE

## 2024-01-11 PROCEDURE — 80048 BASIC METABOLIC PNL TOTAL CA: CPT

## 2024-01-11 RX ADMIN — Medication 400 MG: at 08:43

## 2024-01-11 RX ADMIN — Medication 10 ML: at 08:43

## 2024-01-11 RX ADMIN — FUROSEMIDE 20 MG: 40 TABLET ORAL at 08:42

## 2024-01-11 RX ADMIN — FERROUS SULFATE TAB EC 324 MG (65 MG FE EQUIVALENT) 324 MG: 324 (65 FE) TABLET DELAYED RESPONSE at 08:43

## 2024-01-11 RX ADMIN — SODIUM CHLORIDE 500 ML: 9 INJECTION, SOLUTION INTRAVENOUS at 14:30

## 2024-01-11 RX ADMIN — PANTOPRAZOLE SODIUM 40 MG: 40 TABLET, DELAYED RELEASE ORAL at 05:27

## 2024-01-11 RX ADMIN — METFORMIN HYDROCHLORIDE 500 MG: 500 TABLET ORAL at 08:43

## 2024-01-11 RX ADMIN — ATORVASTATIN CALCIUM 10 MG: 10 TABLET, FILM COATED ORAL at 08:42

## 2024-01-11 NOTE — PROGRESS NOTES
CARDIOLOGY PROGRESS NOTE:    Cherie Hinton  89 y.o.  female  1934  [id number]      Referring Provider: Coni Fields MD    Reason for follow-up: Syncope     Patient Care Team:  Andrew Antunez MD as PCP - General (Family Medicine)  Maurice Benito MD as Consulting Physician (Cardiology)  Cris Green APRN as Nurse Practitioner (Cardiology)    Subjective  Patient seen and examined.  Labs and chart reviewed.  Patient denies further episodes of dizziness, lightheadedness or syncope.  Device interrogation shows it is working well with no acute events and presents for recent syncopal episode    Objective  Patient lying in bed resting comfortably on room air with family at bedside     Review of Systems   Constitutional: Negative for chills, fever and malaise/fatigue.   Cardiovascular:  Negative for chest pain, leg swelling, palpitations and syncope.   Respiratory:  Negative for cough and shortness of breath.    Gastrointestinal:  Negative for abdominal pain, nausea and vomiting.   Neurological:  Negative for dizziness, headaches and weakness.   Psychiatric/Behavioral:  Negative for altered mental status.        Allergies: Contrast dye (echo or unknown ct/mr), Adhesive tape, and Latex    Scheduled Meds:atorvastatin, 10 mg, Oral, Daily  ferrous sulfate, 324 mg, Oral, Daily With Breakfast  magnesium oxide, 400 mg, Oral, Daily  metFORMIN, 500 mg, Oral, Daily With Breakfast  nebivolol, 20 mg, Oral, Daily  pantoprazole, 40 mg, Oral, Q AM  senna-docusate sodium, 2 tablet, Oral, BID  sodium chloride, 10 mL, Intravenous, Q12H      Continuous Infusions:   PRN Meds:.  acetaminophen    senna-docusate sodium **AND** polyethylene glycol **AND** bisacodyl **AND** bisacodyl    Calcium Replacement - Follow Nurse / BPA Driven Protocol    Magnesium Standard Dose Replacement - Follow Nurse / BPA Driven Protocol    meclizine    nitroglycerin    ondansetron    Phosphorus Replacement - Follow Nurse / BPA Driven Protocol    Potassium  "Replacement - Follow Nurse / BPA Driven Protocol    sodium chloride    sodium chloride    zolpidem        VITAL SIGNS  Vitals:    01/10/24 2319 01/11/24 0323 01/11/24 0724 01/11/24 1045   BP: 99/44 100/50 107/46 119/69   BP Location: Left arm Left arm Right arm    Patient Position: Lying Lying Lying    Pulse: 94 82 83 85   Resp: 11 11 12    Temp: 98.4 °F (36.9 °C) 97.6 °F (36.4 °C)     TempSrc: Oral Oral     SpO2: 95% 96% 96% 94%   Weight:       Height:           Flowsheet Rows      Flowsheet Row First Filed Value   Admission Height 160 cm (63\") Documented at 01/09/2024 1325   Admission Weight 70.8 kg (156 lb) Documented at 01/09/2024 1325             TELEMETRY: Atrial fibrillation with controlled ventricular response    Physical Exam:  Vitals reviewed.   Constitutional:       Appearance: Well-developed and not in distress.   Eyes:      Pupils: Pupils are equal, round, and reactive to light.   HENT:      Nose: Nose normal.    Mouth/Throat:      Pharynx: Oropharynx is clear.   Pulmonary:      Effort: Pulmonary effort is normal.      Breath sounds: Normal breath sounds.   Cardiovascular:      Normal rate. Irregularly irregular rhythm.   Pulses:     Intact distal pulses.   Edema:     Peripheral edema absent.   Abdominal:      General: Bowel sounds are normal.      Palpations: Abdomen is soft.   Musculoskeletal: Normal range of motion.      Cervical back: Normal range of motion and neck supple. Skin:     General: Skin is warm.   Neurological:      General: No focal deficit present.      Mental Status: Alert and oriented to person, place and time.          Results Review:   I reviewed the patient's new clinical results.  Lab Results (last 24 hours)       Procedure Component Value Units Date/Time    RSV PCR - Swab, Nasopharynx [[id number]]  (Normal) Collected: 01/11/24 1202    Specimen: Swab from Nasopharynx Updated: 01/11/24 1245     RSV, PCR Not Detected    Basic Metabolic Panel [[id number]]  (Abnormal) Collected: " 01/11/24 0442    Specimen: Blood Updated: 01/11/24 0508     Glucose 134 mg/dL      BUN 40 mg/dL      Creatinine 1.14 mg/dL      Sodium 142 mmol/L      Potassium 3.8 mmol/L      Chloride 107 mmol/L      CO2 25.0 mmol/L      Calcium 9.2 mg/dL      BUN/Creatinine Ratio 35.1     Anion Gap 10.0 mmol/L      eGFR 46.1 mL/min/1.73     Narrative:      GFR Normal >60  Chronic Kidney Disease <60  Kidney Failure <15    The GFR formula is only valid for adults with stable renal function between ages 18 and 70.    CBC & Differential [[id number]]  (Abnormal) Collected: 01/11/24 0442    Specimen: Blood Updated: 01/11/24 0449    Narrative:      The following orders were created for panel order CBC & Differential.  Procedure                               Abnormality         Status                     ---------                               -----------         ------                     CBC Auto Differential[[id number]]        Abnormal            Final result                 Please view results for these tests on the individual orders.    CBC Auto Differential [[id number]]  (Abnormal) Collected: 01/11/24 0442    Specimen: Blood Updated: 01/11/24 0449     WBC 6.30 10*3/mm3      RBC 3.06 10*6/mm3      Hemoglobin 9.3 g/dL      Hematocrit 28.2 %      MCV 92.2 fL      MCH 30.3 pg      MCHC 32.8 g/dL      RDW 13.8 %      RDW-SD 44.2 fl      MPV 9.8 fL      Platelets 192 10*3/mm3      Neutrophil % 55.7 %      Lymphocyte % 32.7 %      Monocyte % 9.0 %      Eosinophil % 2.5 %      Basophil % 0.1 %      Neutrophils, Absolute 3.50 10*3/mm3      Lymphocytes, Absolute 2.10 10*3/mm3      Monocytes, Absolute 0.60 10*3/mm3      Eosinophils, Absolute 0.20 10*3/mm3      Basophils, Absolute 0.00 10*3/mm3      nRBC 0.0 /100 WBC             Imaging Results (Last 24 Hours)       ** No results found for the last 24 hours. **            EKG        I personally viewed and interpreted the patient's EKG/Telemetry data:    ECHOCARDIOGRAM:  Results for orders placed  during the hospital encounter of 01/09/24    Adult Transthoracic Echo Complete W/ Cont if Necessary Per Protocol    Interpretation Summary    Left ventricular ejection fraction appears to be 56 - 60%.    There is calcification of the aortic valve.       STRESS MYOVIEW:  Results for orders placed during the hospital encounter of 04/14/23    Stress Test With Myocardial Perfusion One Day    Interpretation Summary    Left ventricular ejection fraction is normal (Calculated EF = 58%).    Myocardial perfusion imaging indicates a normal myocardial perfusion study with no evidence of ischemia.    Impressions are consistent with a low risk study.       CARDIAC CATHETERIZATION:  No results found for this or any previous visit.       OTHER:         Assessment & Plan     Principal Problem:    Recurrent syncope  Active Problems:    Pacemaker    Atrial fibrillation    Coronary artery disease involving native coronary artery of native heart without angina pectoris    Hyperlipidemia, mixed    Essential hypertension    Tachy-morgan syndrome    Syncope       Syncope  Patient presented with dizziness and syncope  Pacemaker interrogation shows device is working well with no acute events  Echocardiogram with normal LVEF 56 to 60%  Syncopal episode likely secondary to hypotension and dehydration  Will adjust medications accordingly  Imdur, Lasix, spironolactone on hold    Atrial fibrillation  Patient also presented with A-fib RVR  Cardizem drip discontinued due to hypotension  Heart rates better controlled  Continue Bystolic for rate control  s/p Watchman (2019)     Coronary artery disease  History of nonobstructive disease  Previous cardiac catheterization showed proximal LAD disease of 60%  Imdur on hold due to hypotension  Continue beta-blocker statin therapy  Troponin flat  Patient denies chest pain    Tachybradycardia syndrome  Pacemaker placement  Presence of Micra pacemaker (2019)   Device interrogation shows working well with no  acute events noted     Mild renal insufficiency  Hyperkalemia resolved  Creatinine 1.14/eGFR 46.1  Lasix and spironolactone on hold    Type 2 diabetes  Currently on metformin  Managed by primary    Following pacemaker interrogation patient is okay to discharge from cardiology standpoint, will follow as outpatient in next scheduled visit 1/22/2024    I discussed the patients findings and my recommendations with patient and nurse    DAYANARA Martinez  01/11/24  13:19 EST

## 2024-01-11 NOTE — CASE MANAGEMENT/SOCIAL WORK
Continued Stay Note  WILBERT Youssef     Patient Name: Cherie Hinton  MRN: [id number]  Today's Date: 1/11/2024    Admit Date: 1/9/2024    Plan: From home alone. Pt declines HH.   Discharge Plan       Row Name 01/11/24 1115       Plan    Plan From home alone. Pt declines HH.    Patient/Family in Agreement with Plan yes    Plan Comments CM met with patient and granddaughter, Pauline at bedside. Pt lives alone and states she does not need PT/HH. She is IADLs and drives herself. DC Barriers: Afib, Cardiology following           Danette Sullivan RN    phone [phone]  fax [phone]

## 2024-01-11 NOTE — DISCHARGE SUMMARY
Date of Discharge:  1/11/2024    Discharge Diagnosis:   Recurrent syncope  Hypertension  Hyperlipidemia  Atrial fibrillation  Coronary artery disease  Tacky bradycardia syndrome  Presence of pacemaker  Mild renal insufficiency  Chronic anemia  Diabetes type 2    Presenting Problem/History of Present Illness  Active Hospital Problems    Diagnosis  POA    **Recurrent syncope [R55]  Yes    Hypotension due to hypovolemia [I95.89, E86.1]  Unknown    Renal insufficiency [N28.9]  Unknown    Syncope [R55]  Yes    Tachy-morgan syndrome [I49.5]  Yes    Pacemaker [Z95.0]  Yes    Atrial fibrillation [I48.91]  Yes    Coronary artery disease involving native coronary artery of native heart without angina pectoris [I25.10]  Yes    Hyperlipidemia, mixed [E78.2]  Yes    Essential hypertension [I10]  Yes      Resolved Hospital Problems   No resolved problems to display.          Hospital Course    Patient is a 89 y.o. female presented with past medical history atrial fibrillation, hypertension, hyperlipidemia, tachybradycardia syndrome with pacemaker placement, chronic anemia and diabetes type 2.  Patient presented to emergency department on 1/9, after having 2 syncopal episodes in 1 day.  She also reported nausea/vomiting prior to admission.  She has been admitted lightheaded short of breath with exertion last 5 days.  Patient also states while she is on 2 different diuretics she only drinks two 8 ounces of water daily.  On admission she was found to be in mild renal insufficiency with direct relation to continued use of diuretics, decreased intake with nausea and vomiting.  Her pacemaker was interrogated and no acute findings were noted.  She was also found to be found to have atrial fibrillation RVR.  She required a Cardizem drip briefly. She will have  ml prior to discharge. I am holding all diuretics with instructions to restart lasix if SBP is > 130. She will continue on cardizem/BB for rate control. Bp has improveds and  while she continues with some exertional dyspnea there has been no further dizziness or syncope. She has been asked to monitor BP and take to follow up appointment. Imdur has also been held. She has f/u with PCP in couple weeks. And I will have BMP repeated next week prior to f/u with primary.       Procedures Performed         Consults:   Consults       Date and Time Order Name Status Description    1/10/2024  9:54 AM Inpatient Cardiology Consult Completed             Pertinent Test Results:    Lab Results (most recent)       Procedure Component Value Units Date/Time    RSV PCR - Swab, Nasopharynx [[id number]]  (Normal) Collected: 01/11/24 1202    Specimen: Swab from Nasopharynx Updated: 01/11/24 1245     RSV, PCR Not Detected    Basic Metabolic Panel [[id number]]  (Abnormal) Collected: 01/11/24 0442    Specimen: Blood Updated: 01/11/24 0508     Glucose 134 mg/dL      BUN 40 mg/dL      Creatinine 1.14 mg/dL      Sodium 142 mmol/L      Potassium 3.8 mmol/L      Chloride 107 mmol/L      CO2 25.0 mmol/L      Calcium 9.2 mg/dL      BUN/Creatinine Ratio 35.1     Anion Gap 10.0 mmol/L      eGFR 46.1 mL/min/1.73     Narrative:      GFR Normal >60  Chronic Kidney Disease <60  Kidney Failure <15    The GFR formula is only valid for adults with stable renal function between ages 18 and 70.    CBC & Differential [[id number]]  (Abnormal) Collected: 01/11/24 0442    Specimen: Blood Updated: 01/11/24 0449    Narrative:      The following orders were created for panel order CBC & Differential.  Procedure                               Abnormality         Status                     ---------                               -----------         ------                     CBC Auto Differential[[id number]]        Abnormal            Final result                 Please view results for these tests on the individual orders.    CBC Auto Differential [[id number]]  (Abnormal) Collected: 01/11/24 0442    Specimen: Blood Updated: 01/11/24 0449      WBC 6.30 10*3/mm3      RBC 3.06 10*6/mm3      Hemoglobin 9.3 g/dL      Hematocrit 28.2 %      MCV 92.2 fL      MCH 30.3 pg      MCHC 32.8 g/dL      RDW 13.8 %      RDW-SD 44.2 fl      MPV 9.8 fL      Platelets 192 10*3/mm3      Neutrophil % 55.7 %      Lymphocyte % 32.7 %      Monocyte % 9.0 %      Eosinophil % 2.5 %      Basophil % 0.1 %      Neutrophils, Absolute 3.50 10*3/mm3      Lymphocytes, Absolute 2.10 10*3/mm3      Monocytes, Absolute 0.60 10*3/mm3      Eosinophils, Absolute 0.20 10*3/mm3      Basophils, Absolute 0.00 10*3/mm3      nRBC 0.0 /100 WBC     High Sensitivity Troponin T 2Hr [[id number]]  (Abnormal) Collected: 01/10/24 0650    Specimen: Blood Updated: 01/10/24 0721     HS Troponin T 20 ng/L      Troponin T Delta -4 ng/L     Narrative:      High Sensitive Troponin T Reference Range:  <14.0 ng/L- Negative Female for AMI  <22.0 ng/L- Negative Male for AMI  >=14 - Abnormal Female indicating possible myocardial injury.  >=22 - Abnormal Male indicating possible myocardial injury.   Clinicians would have to utilize clinical acumen, EKG, Troponin, and serial changes to determine if it is an Acute Myocardial Infarction or myocardial injury due to an underlying chronic condition.         Basic Metabolic Panel [[id number]]  (Abnormal) Collected: 01/10/24 0456    Specimen: Blood Updated: 01/10/24 0522     Glucose 137 mg/dL      BUN 53 mg/dL      Creatinine 1.13 mg/dL      Sodium 141 mmol/L      Potassium 4.1 mmol/L      Chloride 108 mmol/L      CO2 23.0 mmol/L      Calcium 9.1 mg/dL      BUN/Creatinine Ratio 46.9     Anion Gap 10.0 mmol/L      eGFR 46.6 mL/min/1.73     Narrative:      GFR Normal >60  Chronic Kidney Disease <60  Kidney Failure <15    The GFR formula is only valid for adults with stable renal function between ages 18 and 70.    High Sensitivity Troponin T [[id number]]  (Abnormal) Collected: 01/10/24 0456    Specimen: Blood Updated: 01/10/24 0522     HS Troponin T 24 ng/L     Narrative:      High  Sensitive Troponin T Reference Range:  <14.0 ng/L- Negative Female for AMI  <22.0 ng/L- Negative Male for AMI  >=14 - Abnormal Female indicating possible myocardial injury.  >=22 - Abnormal Male indicating possible myocardial injury.   Clinicians would have to utilize clinical acumen, EKG, Troponin, and serial changes to determine if it is an Acute Myocardial Infarction or myocardial injury due to an underlying chronic condition.         CBC & Differential [[id number]]  (Abnormal) Collected: 01/10/24 0456    Specimen: Blood Updated: 01/10/24 0502    Narrative:      The following orders were created for panel order CBC & Differential.  Procedure                               Abnormality         Status                     ---------                               -----------         ------                     CBC Auto Differential[[id number]]        Abnormal            Final result                 Please view results for these tests on the individual orders.    CBC Auto Differential [[id number]]  (Abnormal) Collected: 01/10/24 0456    Specimen: Blood Updated: 01/10/24 0502     WBC 8.80 10*3/mm3      RBC 3.36 10*6/mm3      Hemoglobin 10.3 g/dL      Hematocrit 31.3 %      MCV 93.1 fL      MCH 30.6 pg      MCHC 32.8 g/dL      RDW 14.2 %      RDW-SD 45.9 fl      MPV 9.6 fL      Platelets 197 10*3/mm3      Neutrophil % 55.3 %      Lymphocyte % 33.7 %      Monocyte % 8.9 %      Eosinophil % 1.1 %      Basophil % 1.0 %      Neutrophils, Absolute 4.80 10*3/mm3      Lymphocytes, Absolute 3.00 10*3/mm3      Monocytes, Absolute 0.80 10*3/mm3      Eosinophils, Absolute 0.10 10*3/mm3      Basophils, Absolute 0.10 10*3/mm3      nRBC 0.0 /100 WBC     COVID-19 and FLU A/B PCR, 1 HR TAT - Swab, Nasopharynx [[id number]]  (Normal) Collected: 01/09/24 1841    Specimen: Swab from Nasopharynx Updated: 01/09/24 1904     COVID19 Not Detected     Influenza A PCR Not Detected     Influenza B PCR Not Detected    Narrative:      Fact sheet for  providers: https://www.fda.gov/media/843294/download    Fact sheet for patients: https://www.fda.gov/media/921784/download    Test performed by PCR.    Extra Tubes [[id number]] Collected: 01/09/24 1431    Specimen: Blood, Venous Line Updated: 01/09/24 1531    Narrative:      The following orders were created for panel order Extra Tubes.  Procedure                               Abnormality         Status                     ---------                               -----------         ------                     Gold Top - SST[[id number]]                                   Final result                 Please view results for these tests on the individual orders.    Gold Top - SST [[id number]] Collected: 01/09/24 1431    Specimen: Blood Updated: 01/09/24 1531     Extra Tube Hold for add-ons.     Comment: Auto resulted.       Comprehensive Metabolic Panel [[id number]]  (Abnormal) Collected: 01/09/24 1431    Specimen: Blood Updated: 01/09/24 1458     Glucose 178 mg/dL      BUN 65 mg/dL      Creatinine 1.06 mg/dL      Sodium 140 mmol/L      Potassium 5.4 mmol/L      Chloride 104 mmol/L      CO2 23.0 mmol/L      Calcium 9.8 mg/dL      Total Protein 6.6 g/dL      Albumin 3.8 g/dL      ALT (SGPT) 11 U/L      AST (SGOT) 17 U/L      Alkaline Phosphatase 76 U/L      Total Bilirubin 0.7 mg/dL      Globulin 2.8 gm/dL      A/G Ratio 1.4 g/dL      BUN/Creatinine Ratio 61.3     Anion Gap 13.0 mmol/L      eGFR 50.3 mL/min/1.73     Narrative:      GFR Normal >60  Chronic Kidney Disease <60  Kidney Failure <15    The GFR formula is only valid for adults with stable renal function between ages 18 and 70.    Single High Sensitivity Troponin T [[id number]]  (Abnormal) Collected: 01/09/24 1431    Specimen: Blood Updated: 01/09/24 1458     HS Troponin T 17 ng/L     Narrative:      High Sensitive Troponin T Reference Range:  <14.0 ng/L- Negative Female for AMI  <22.0 ng/L- Negative Male for AMI  >=14 - Abnormal Female indicating possible myocardial  injury.  >=22 - Abnormal Male indicating possible myocardial injury.   Clinicians would have to utilize clinical acumen, EKG, Troponin, and serial changes to determine if it is an Acute Myocardial Infarction or myocardial injury due to an underlying chronic condition.         Protime-INR [[id number]]  (Normal) Collected: 01/09/24 1431    Specimen: Blood Updated: 01/09/24 1457     Protime 11.3 Seconds      INR 1.04    aPTT [[id number]]  (Abnormal) Collected: 01/09/24 1431    Specimen: Blood Updated: 01/09/24 1457     PTT 21.0 seconds              Results for orders placed during the hospital encounter of 01/09/24    Adult Transthoracic Echo Complete W/ Cont if Necessary Per Protocol    Interpretation Summary    Left ventricular ejection fraction appears to be 56 - 60%.    There is calcification of the aortic valve.         Condition on Discharge:  Stable    Vital Signs  Temp:  [97 °F (36.1 °C)-98.4 °F (36.9 °C)] 97.6 °F (36.4 °C)  Heart Rate:  [] 85  Resp:  [11-20] 12  BP: ()/(44-69) 119/69      Physical Exam  Vitals reviewed.   Constitutional:       Appearance: She is not ill-appearing.   HENT:      Head: Normocephalic and atraumatic.      Right Ear: External ear normal.      Left Ear: External ear normal.      Nose: Nose normal.      Mouth/Throat:      Mouth: Mucous membranes are moist.   Eyes:      General:         Right eye: No discharge.         Left eye: No discharge.   Cardiovascular:      Rate and Rhythm: Normal rate and regular rhythm.      Pulses: Normal pulses.      Heart sounds: Normal heart sounds.   Pulmonary:      Effort: Pulmonary effort is normal.      Breath sounds: Normal breath sounds.   Abdominal:      General: Bowel sounds are normal.      Palpations: Abdomen is soft.   Musculoskeletal:         General: Normal range of motion.      Cervical back: Normal range of motion.   Skin:     General: Skin is warm.      Coloration: Skin is pale.   Neurological:      Mental Status: She is alert  and oriented to person, place, and time.   Psychiatric:         Behavior: Behavior normal.              Discharge Disposition  Home or Self Care    Discharge Medications     Discharge Medications        Continue These Medications        Instructions Start Date   atorvastatin 10 MG tablet  Commonly known as: LIPITOR   10 mg, Oral, Daily      dilTIAZem  MG 24 hr capsule  Commonly known as: CARDIZEM CD   240 mg, Oral, Daily      ferrous sulfate 325 (65 FE) MG EC tablet   324 mg, Oral, Daily With Breakfast      meclizine 25 MG tablet  Commonly known as: ANTIVERT   25 mg, Oral, 3 Times Daily PRN      metFORMIN 500 MG tablet  Commonly known as: Glucophage   500 mg, Oral, Daily With Breakfast, For diabetes      nebivolol 20 MG tablet  Commonly known as: Bystolic   20 mg, Oral, Daily      omeprazole 40 MG capsule  Commonly known as: priLOSEC   40 mg, Oral, 2 Times Daily      vitamin B-12 1000 MCG tablet  Commonly known as: CYANOCOBALAMIN   1,000 mcg, Oral, Daily      zolpidem 5 MG tablet  Commonly known as: AMBIEN   5 mg, Oral, Nightly PRN             Stop These Medications      furosemide 20 MG tablet  Commonly known as: LASIX     isosorbide mononitrate 30 MG 24 hr tablet  Commonly known as: IMDUR     magnesium oxide 400 MG tablet  Commonly known as: MAG-OX     spironolactone 25 MG tablet  Commonly known as: ALDACTONE              Discharge Diet:   Diet Instructions       Diet: Cardiac Diets, Diabetic Diets; Healthy Heart (2-3 Na+); Regular Texture (IDDSI 7); Thin (IDDSI 0); Consistent Carbohydrate      Discharge Diet:  Cardiac Diets  Diabetic Diets       Cardiac Diet: Healthy Heart (2-3 Na+)    Texture: Regular Texture (IDDSI 7)    Fluid Consistency: Thin (IDDSI 0)    Diabetic Diet: Consistent Carbohydrate            Activity at Discharge:   Activity Instructions       Gradually Increase Activity Until at Pre-Hospitalization Level              Follow-up Appointments  Future Appointments   Date Time Provider  Department Center   1/22/2024  1:30 PM [facility] [place] DEVICE [facility]   1/22/2024  2:00 PM Maurice Benito MD [facility]     Additional Instructions for the Follow-ups that You Need to Schedule       Discharge Follow-up with PCP   As directed       Currently Documented PCP:    Andrew Antunez MD    PCP Phone Number:    [phone]     Follow Up Details: Follow-up appointment scheduled for 1/19 at 7:30 AM        Comprehensive Metabolic Panel    Jan 16, 2024 (Approximate)      Release to patient: Routine Release                Test Results Pending at Discharge       Steff Middleton, DAYANARA  01/11/24  14:18 EST    Time: Discharge 25 min

## 2024-01-11 NOTE — PLAN OF CARE
Problem: Adult Inpatient Plan of Care  Goal: Absence of Hospital-Acquired Illness or Injury  Intervention: Identify and Manage Fall Risk  Recent Flowsheet Documentation  Taken 1/10/2024 2017 by Deborah Harden RN  Safety Promotion/Fall Prevention:   room organization consistent   safety round/check completed   nonskid shoes/slippers when out of bed   activity supervised   assistive device/personal items within reach   clutter free environment maintained   fall prevention program maintained  Intervention: Prevent and Manage VTE (Venous Thromboembolism) Risk  Recent Flowsheet Documentation  Taken 1/10/2024 2017 by Deborah Harden RN  Activity Management: activity encouraged  Goal: Optimal Comfort and Wellbeing  Intervention: Provide Person-Centered Care  Recent Flowsheet Documentation  Taken 1/10/2024 2017 by Deborah Harden RN  Trust Relationship/Rapport:   care explained   questions answered   questions encouraged   thoughts/feelings acknowledged     Problem: Fall Injury Risk  Goal: Absence of Fall and Fall-Related Injury  Intervention: Promote Injury-Free Environment  Recent Flowsheet Documentation  Taken 1/10/2024 2017 by Deborah Harden RN  Safety Promotion/Fall Prevention:   room organization consistent   safety round/check completed   nonskid shoes/slippers when out of bed   activity supervised   assistive device/personal items within reach   clutter free environment maintained   fall prevention program maintained   Goal Outcome Evaluation:

## 2024-01-12 NOTE — OUTREACH NOTE
Prep Survey      Flowsheet Row Responses   [facility] facility patient discharged from? [facility]   Is LACE score < 7 ? No   Eligibility Readm Mgmt   Discharge diagnosis recurrent syncope, A-fib RVR   Does the patient have one of the following disease processes/diagnoses(primary or secondary)? Other   Does the patient have Home health ordered? No   Is there a DME ordered? No   Prep survey completed? Yes            Aimee MARTINEZ - Registered Nurse

## 2024-01-15 ENCOUNTER — TELEPHONE (OUTPATIENT)
Dept: CARDIOLOGY | Facility: CLINIC | Age: 89
End: 2024-01-15
Payer: MEDICARE

## 2024-01-15 NOTE — TELEPHONE ENCOUNTER
Caller: Cherie Hinton    Relationship to patient: Self    Best call back number: [phone]    Chief complaint: NONE    Type of visit: HFU    Requested date: 1 MONTH     If rescheduling, when is the original appointment: NONE     Additional notes:NO AVAILABILITY

## 2024-01-16 ENCOUNTER — READMISSION MANAGEMENT (OUTPATIENT)
Dept: CALL CENTER | Facility: HOSPITAL | Age: 89
End: 2024-01-16
Payer: MEDICARE

## 2024-01-16 NOTE — OUTREACH NOTE
Medical Week 1 Survey      Flowsheet Row Responses   [facility] facility patient discharged from? [facility]   Does the patient have one of the following disease processes/diagnoses(primary or secondary)? Other   Week 1 attempt successful? No   Unsuccessful attempts Attempt 1            Tana AMAYA - Licensed Nurse

## 2024-01-19 ENCOUNTER — READMISSION MANAGEMENT (OUTPATIENT)
Dept: CALL CENTER | Facility: HOSPITAL | Age: 89
End: 2024-01-19
Payer: MEDICARE

## 2024-01-19 NOTE — OUTREACH NOTE
Medical Week 1 Survey      Flowsheet Row Responses   [facility] patient discharged from? [facility]   Does the patient have one of the following disease processes/diagnoses(primary or secondary)? Other   Week 1 attempt successful? Yes   Call start time 1231   Call end time 1233   Discharge diagnosis recurrent syncope, A-fib RVR   Person spoke with today (if not patient) and relationship patient   Does the patient have a primary care provider?  Yes   Comments regarding PCP Had to cancel pcp fu because of the weather.   Has home health visited the patient within 72 hours of discharge? N/A   Psychosocial issues? No   Did the patient receive a copy of their discharge instructions? Yes   Nursing interventions Reviewed instructions with patient   What is the patient's perception of their health status since discharge? Improving   Is the patient/caregiver able to teach back signs and symptoms related to disease process for when to call PCP? Yes   Is the patient/caregiver able to teach back signs and symptoms related to disease process for when to call 911? Yes   Is the patient/caregiver able to teach back the hierarchy of who to call/visit for symptoms/problems? PCP, Specialist, Home health nurse, Urgent Care, ED, 911 Yes   Additional teach back comments 135/62   Week 1 call completed? Yes   Wrap up additional comments Patient reports doing well> /62 no concerns or questions noted.   Call end time 1233            Minna ARRIAGA - Registered Nurse  
no

## 2024-01-29 ENCOUNTER — TELEPHONE (OUTPATIENT)
Dept: CARDIOLOGY | Facility: CLINIC | Age: 89
End: 2024-01-29
Payer: MEDICARE

## 2024-01-29 NOTE — TELEPHONE ENCOUNTER
Caller: KWAME BOYLE     Relationship: DAUGHTER    Best call back number: [phone]    What is your medical concern? PATIENT HAS  BEEN EXPERIENCING SHORTNESS OF BREATH ALL WEEKEND AND DAUGHTER SAYS SATURDAY WAS REALLY BAD. PATIENT IS VERY WEAK. SHE WAS TAKEN OFF OF LASIX AND HER DAUGHTER THINKS THAT MAY BE A REASON WHY AND TOLD HER TO START MEDICATION BACK LAST NIGHT    How long has this issue been going on? OVER THE WEEKEND    Is your provider already aware of this issue? NO    Have you been treated for this issue? NO

## 2024-01-30 ENCOUNTER — TELEPHONE (OUTPATIENT)
Dept: CARDIOLOGY | Facility: CLINIC | Age: 89
End: 2024-01-30
Payer: MEDICARE

## 2024-01-30 NOTE — TELEPHONE ENCOUNTER
Caller: KWAME    Relationship: DAUGHTER    Best call back number: [phone]    Who is your current provider: DR. JONES    Is your current provider offboarding? NO    Who would you like your new provider to be: DR. MONTANEZ    What are your reasons for transferring care: DAUGHTER SENT AN URGENT MESSAGE YESTERDAY AND DID NOT GET A CALL BACK, SHE IS REALLY UPSET AND SAID THIS IS NOT THE FIRST TIME IT HAS HAPPENED. HER FAMILY ALSO IS WITH DR. MONTANEZ AND WOULD LIKE TO TRANSFER EVERYONE TO HIM.    Additional notes:     FROM THE MESSAGE YESTERDAY:     PATIENT HAS  BEEN EXPERIENCING SHORTNESS OF BREATH ALL WEEKEND AND DAUGHTER SAYS SATURDAY WAS REALLY BAD. PATIENT IS VERY WEAK. SHE WAS TAKEN OFF OF LASIX AND HER DAUGHTER THINKS THAT MAY BE A REASON WHY AND TOLD HER TO START MEDICATION BACK LAST NIGHT

## 2024-01-31 ENCOUNTER — OFFICE VISIT (OUTPATIENT)
Dept: CARDIOLOGY | Facility: CLINIC | Age: 89
End: 2024-01-31
Payer: MEDICARE

## 2024-01-31 VITALS
SYSTOLIC BLOOD PRESSURE: 136 MMHG | DIASTOLIC BLOOD PRESSURE: 75 MMHG | HEART RATE: 69 BPM | BODY MASS INDEX: 28.88 KG/M2 | WEIGHT: 163 LBS | OXYGEN SATURATION: 94 % | RESPIRATION RATE: 18 BRPM | HEIGHT: 63 IN

## 2024-01-31 DIAGNOSIS — I35.0 NONRHEUMATIC AORTIC VALVE STENOSIS: Primary | ICD-10-CM

## 2024-01-31 PROCEDURE — 99214 OFFICE O/P EST MOD 30 MIN: CPT | Performed by: INTERNAL MEDICINE

## 2024-02-02 RX ORDER — FUROSEMIDE 20 MG/1
20 TABLET ORAL DAILY
Qty: 30 TABLET | Refills: 11 | Status: SHIPPED | OUTPATIENT
Start: 2024-02-02

## 2024-02-08 NOTE — PROGRESS NOTES
Cardiology Clinic Note  Mich Araujo MD, PhD    Subjective:     Encounter Date:01/31/2024      Patient ID: Cherie Hinton is a 89 y.o. female.    Chief Complaint:  Chief Complaint   Patient presents with    Atrial Fibrillation    Coronary Artery Disease       HPI:      I the pleasure to see this 89-year-old female in follow-up with history of stroke diabetes hypertension hyperlipidemia and paroxysmal atrial fibrillation along with obstructive sleep apnea and CKD in the past as well as 2 out of 6 to 3 out of 6 murmur on exam consistent with aortic valve stenosis.  Her last echo was in November of last year demonstrating EF of 50% moderate concentric LVH grade 2 diastolic dysfunction normal RV size and function, severely enlarged left atrium, moderately to severely enlarged right atrium, mild TR, mild MR, moderate aortic valve stenosis with thickening of the valve significantly but gradients more consistent with mild to moderate disease.   CV therapies consist of atorvastatin diltiazem Lasix Imdur Bystolic and Aldactone.  We had seen her in the past but ultimately she was lost to follow-up changing her care within the [facility] system.  She is back to our system for second opinion with ongoing dyspnea on exertion, exertional fatigue, hospitalization as described below and CV comorbidities addressed above.  She says she has felt poorly for quite some time  She is here for follow-up after hospitalization with syncopal episode, possible dehydration with nausea and vomiting preceding the hospital admission.  She has had some dizziness and shortness of breath.  She received 2D echo during her hospitalization demonstrating severely restricted aortic valve however gradients were not indicating severe however she has significant LVH and small LV cavity with [id number] variability with underlying atrial fibrillation with concern for low gradient severe aortic valve stenosis.  This was discussed with the patient and she is  agreeable for transesophageal echo for better visualization of the aortic valve possibly with gentle dobutamine challenge.      Most recent 2D echo reviewed and interpreted by me demonstrates small LV cavity, concentric LVH, severely restricted aortic valve, concern for low gradient severe aortic stenosis    Most recent EKG, supraventricular rhythm, occasional ventricularly paced complexes, no ischemic findings, PVCs     Review of systems otherwise negative x14 point review of systems except as mentioned above     Historical data copied forward from previous encounters in EMR including the history, exam, and assessment/plan has been reviewed and is unchanged unless noted otherwise.     Cardiac medicines reviewed with risk, benefits, and necessity of each discussed.     Risk and benefit of cardiac testing reviewed including death heart attack stroke pain bleeding infection need for vascular /cardiovascular surgery were discussed and the patient      Objective:      Vitals reviewed 136/75 heart rate 69 weight 163     Physical Exam  Irregular, no rubs gallops heave or lift, stable 2-3 out of 6 systolic ejection murmur crescendo   No clubbing cyanosis or edema  Clear to auscultation  Soft nontender nondistended  Normal radial pulses normal cap refill  Assessment:         Assessment   Essential hypertension well-controlled  LVH  Permanent pacemaker in place normal functioning device routine interrogation every 3 to 6 months  History of watchman procedure off anticoagulation  History of stroke  Chronic persistent atrial fibrillation, rate controlled, device in place with tachybradycardia syndrome  Hyperlipidemia controlled  Diabetes per primary  EF 50%, volume status well-controlled continue Lasix  Possible low gradient severe AS    Exertional fatigue, history of syncope recently, dyspnea on exertion are her chief complaints today    Order transesophageal echo for evaluation of low gradient severe aortic valve stenosis  "with severely restricted valve, significant murmur on exam  General Lasix 20 daily    Follow-up 30 days for results  Continue other pharmacotherapy at this time    Will need referral to valve team and left heart catheterization if has severe aortic stenosis needing intervention    Mich AMAYA, PhD    Objective:         /75 (BP Location: Right arm, Patient Position: Sitting)   Pulse 69   Resp 18   Ht 160 cm (62.99\")   Wt 73.9 kg (163 lb)   SpO2 94%   BMI 28.88 kg/m²     Diagnoses and all orders for this visit:    1. Nonrheumatic aortic valve stenosis (Primary)  -     Adult Transesophageal Echo (DHEERAJ) W/ Cont if Necessary Per Protocol; Future    Other orders  -     furosemide (LASIX) 20 MG tablet; Take 1 tablet by mouth Daily.  Dispense: 30 tablet; Refill: 11      The pleasure to be involved in this patient's cardiovascular care.  Please call with any questions or concerns  Mich Araujo MD, PhD    Most recent EKG as reviewed and interpreted by me:  Procedures     Most recent echo as reviewed and interpreted by me:  Results for orders placed during the hospital encounter of 01/09/24    Adult Transthoracic Echo Complete W/ Cont if Necessary Per Protocol    Interpretation Summary    Left ventricular ejection fraction appears to be 56 - 60%.    There is calcification of the aortic valve.      Most recent stress test as reviewed and interpreted by me:  Results for orders placed during the hospital encounter of 04/14/23    Stress Test With Myocardial Perfusion One Day    Interpretation Summary    Left ventricular ejection fraction is normal (Calculated EF = 58%).    Myocardial perfusion imaging indicates a normal myocardial perfusion study with no evidence of ischemia.    Impressions are consistent with a low risk study.      Most recent cardiac catheterization as reviewed interpreted by me:  No results found for this or any previous visit.    The following portions of the patient's history were reviewed and " updated as appropriate: allergies, current medications, past family history, past medical history, past social history, past surgical history, and problem list.      ROS:  14 point review of systems negative except as mentioned above    Current Outpatient Medications:     atorvastatin (LIPITOR) 10 MG tablet, Take 1 tablet by mouth Daily., Disp: , Rfl:     dilTIAZem CD (CARDIZEM CD) 240 MG 24 hr capsule, Take 1 capsule by mouth Daily., Disp: , Rfl:     ferrous sulfate 325 (65 FE) MG EC tablet, Take 1 tablet by mouth Daily With Breakfast., Disp: 90 tablet, Rfl: 1    meclizine (ANTIVERT) 25 MG tablet, Take 1 tablet by mouth 3 (Three) Times a Day As Needed for Dizziness., Disp: 30 tablet, Rfl: 0    metFORMIN (Glucophage) 500 MG tablet, Take 1 tablet by mouth Daily With Breakfast. For diabetes, Disp: 30 tablet, Rfl: 6    nebivolol (Bystolic) 20 MG tablet, Take 1 tablet by mouth Daily., Disp: 90 tablet, Rfl: 3    omeprazole (priLOSEC) 40 MG capsule, Take 1 capsule by mouth 2 (Two) Times a Day., Disp: , Rfl:     vitamin B-12 (CYANOCOBALAMIN) 1000 MCG tablet, Take 1 tablet by mouth Daily., Disp: , Rfl:     zolpidem (AMBIEN) 5 MG tablet, Take 1 tablet by mouth At Night As Needed for Sleep., Disp: , Rfl:     furosemide (LASIX) 20 MG tablet, Take 1 tablet by mouth Daily., Disp: 30 tablet, Rfl: 11    Problem List:  Patient Active Problem List   Diagnosis    Pacemaker    Atrial fibrillation    Coronary artery disease involving native coronary artery of native heart without angina pectoris    Hyperlipidemia, mixed    Essential hypertension    Tachy-morgan syndrome    Iron deficiency anemia due to chronic blood loss    Antral erosion    B12 deficiency    Cerebrovascular accident (CVA)    Colon cancer    Depression    Type 2 diabetes mellitus with stage 3a chronic kidney disease, without long-term current use of insulin    GERD (gastroesophageal reflux disease)    Hiatal hernia    HALEIGH (iron deficiency anemia)    LAD  (lymphadenopathy)    Left pontine CVA    Mitral regurgitation    STORMY (obstructive sleep apnea)    Osteoarthritis    Prinzmetal's angina    Right kidney mass    TIA (transient ischemic attack)    Vestibular nerve disease    Presence of Watchman left atrial appendage closure device    Ataxia    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    Dilated cardiomyopathy    Unstable angina    Aortic stenosis, moderate    Other fatigue    Dyspnea on exertion    Dyspnea, unspecified type    Hypomagnesemia    Acute right ankle pain    Allergic conjunctivitis    Posterior vitreous detachment    Exudative age-related macular degeneration    Recurrent syncope    Syncope    Hypotension due to hypovolemia    Renal insufficiency     Past Medical History:  Past Medical History:   Diagnosis Date    Antral erosion     Aortic valve regurgitation 12/26/2018    Atrial fibrillation     Atrial fibrillation with controlled ventricular response     B12 deficiency     CAD (coronary artery disease)     Cellulitis 04/2011    on face     Cerebrovascular accident (CVA) 12/07/2016    CHF (congestive heart failure)     Colon cancer     Coronary artery disease involving native coronary artery of native heart without angina pectoris 06/18/2019    Depression     Diabetes mellitus, type 2     Essential hypertension 02/13/2012    Fatigue     GERD (gastroesophageal reflux disease)     Hiatal hernia     History of colonoscopy 04/2010    last done 4/10    History of esophagogastroduodenoscopy (EGD) 04/2010    last done 4/10    Hyperlipidemia, mixed 02/13/2012    Hypertension     white coat htn    HALEIGH (iron deficiency anemia)     LAD (lymphadenopathy)     40% lesion LAD     Left pontine CVA     Mitral regurgitation 05/21/2012    STORMY (obstructive sleep apnea)     not treating    Osteoarthritis     Pacemaker 06/18/2019    Permanent atrial fibrillation 06/18/2019    Presence of Watchman left atrial appendage closure device 01/04/2020    Prinzmetal's  angina     Right kidney mass     1.4 cm- following urology     Sick sinus syndrome     Stroke 11/2016    TIA (transient ischemic attack)     left CVA, interrupted TPA; As (moderate-severe)     Vestibular nerve disease 2017     Past Surgical History:  Past Surgical History:   Procedure Laterality Date    ATRIAL APPENDAGE EXCLUSION LEFT WITH TRANSESOPHAGEAL ECHOCARDIOGRAM N/A 10/10/2019    Procedure: Atrial Appendage Occlusion;  Surgeon: Richie Chapman MD;  Location: [facility] CATH INVASIVE LOCATION;  Service: Cardiovascular    ATRIAL APPENDAGE EXCLUSION LEFT WITH TRANSESOPHAGEAL ECHOCARDIOGRAM N/A 10/10/2019    Procedure: Atrial Appendage Occlusion;  Surgeon: Je Rivera MD;  Location: [facility] CATH INVASIVE LOCATION;  Service: Cardiology    BLADDER REPAIR      CARDIAC CATHETERIZATION  05/2010    CHOLECYSTECTOMY      COLECTOMY PARTIAL / TOTAL      COLONOSCOPY  04/16/2018    negative     COLONOSCOPY N/A 7/1/2022    Procedure: COLONOSCOPY with polypectomy x1;  Surgeon: ADOLFO Boss MD;  Location: [facility] ENDOSCOPY;  Service: Gastroenterology;  Laterality: N/A;  post: diverticulosis, hemorrhoids, polyps    ENDOSCOPY  04/16/2018    negative     ENDOSCOPY N/A 6/29/2022    Procedure: ESOPHAGOGASTRODUODENOSCOPY with biopsy of duodenum r/o celiac;  Surgeon: ADOLFO Boss MD;  Location: [facility] ENDOSCOPY;  Service: Gastroenterology;  Laterality: N/A;  hiatial hernia  Gastric Polyps        HYSTERECTOMY      INSERT / REPLACE / REMOVE PACEMAKER  09/2018    Pacemaker gen change 9/2018     OTHER SURGICAL HISTORY  04/25/2015    Exercise myoview, normal     PACEMAKER IMPLANTATION  01/22/2010    Dual Chamber - 1/22/2010; RA Lead Revision- 5/7/2012- BS     TOOTH EXTRACTION      TRANSESOPHAGEAL ECHOCARDIOGRAM (DHEERAJ)  07/2019     Social History:  Social History     Socioeconomic History    Marital status:    Tobacco Use    Smoking status: Never     Passive exposure: Never    Smokeless tobacco: Never   Vaping  Use    Vaping Use: Never used   Substance and Sexual Activity    Alcohol use: No    Drug use: No    Sexual activity: Defer     Allergies:  Allergies   Allergen Reactions    Contrast Dye (Echo Or Unknown Ct/Mr) Anaphylaxis    Adhesive Tape Itching    Latex Hives     Immunizations:  Immunization History   Administered Date(s) Administered    Flu Vaccine Quad PF >36MO 10/16/2015    Fluzone High Dose =>65 Years ([employer] ONLY) 10/18/2014, 10/25/2017, 10/03/2020    Influenza, Unspecified 10/21/2019    Pneumococcal Conjugate 13-Valent (PCV13) 01/05/2021    flucelvax quad pfs =>4 YRS 10/21/2019            In-Office Procedure(s):  No orders to display        ASCVD RIsk Score::  The ASCVD Risk score (Helen DK, et al., 2019) failed to calculate for the following reasons:    The 2019 ASCVD risk score is only valid for ages 40 to 79    The patient has a prior MI or stroke diagnosis    Imaging:    Results for orders placed during the hospital encounter of 01/09/24    XR Chest 1 View    Narrative  XR CHEST 1 VW    Date of Exam: 1/9/2024 6:52 PM EST    Indication: chest pain    Comparison: None available.    Findings: No focal consolidation. No pneumothorax or pleural effusion. Cardiac size is normal. The visualized clavicles appear intact. No displaced rib fractures. The visualized upper abdomen is normal. Triple lead cardiac pacemaker.    Impression  Impression: No acute cardiopulmonary disease.    Electronically Signed: Ruben Arce MD  1/9/2024 6:58 PM EST  Workstation ID: [id number]       Results for orders placed during the hospital encounter of 01/09/24    CT Head Without Contrast    Narrative  CT HEAD WO CONTRAST    Date of Exam: 1/9/2024 6:50 PM EST    Indication: hx recent fall hit occip.    Comparison: 6/27/2022    Technique: Axial CT images were obtained of the head without contrast administration.  Coronal reconstructions were performed.  Automated exposure control and iterative reconstruction methods were  used.    Structured dose report sent to the ACR Radiation Dose Index Registry.    Exposure: The patient has had 0 known prior CTs and cardiac nuclear medicine studies within the last 12 months.    This CT exam was performed using one or more of the following dose reduction techniques: Automated exposure control, adjustment of the mA and/or kV according to patient size, or use of iterative reconstruction technique.    Findings: Gray-white matter differentiation is maintained without evidence of an acute infarction. No intracranial mass or mass effect. No extra-axial mass or collection. The ventricles and sulci are normal in size and configuration. The posterior fossa  appears normal. Sellar and suprasellar structures are normal.    Previous bilateral lens replacements. The paranasal sinuses, ethmoid air cells, and mastoid air cells are aerated. The bony calvarium appears intact. No acute fractures. No lytic or blastic bony diseases.    Impression  Impression: No acute intracranial pathology.      Study is available in DICOM format to non-affiliated external healthcare facilities or entities on a secure, media free, reciprocally searchable basis with patient authorization for at least a 12 month period after the study. Search conducted for prior  patient CT studies completed at nonaffiliated external healthcare facilities or entities within the past 12 months and are available through a secure, authorized, media free, shared archive prior to an imaging study being performed.    Electronically Signed: Ruben Arce MD  1/9/2024 7:28 PM EST  Workstation ID: [id number]      Results for orders placed during the hospital encounter of 01/09/24    CT Head Without Contrast    Narrative  CT HEAD WO CONTRAST    Date of Exam: 1/9/2024 6:50 PM EST    Indication: hx recent fall hit occip.    Comparison: 6/27/2022    Technique: Axial CT images were obtained of the head without contrast administration.  Coronal reconstructions were  performed.  Automated exposure control and iterative reconstruction methods were used.    Structured dose report sent to the ACR Radiation Dose Index Registry.    Exposure: The patient has had 0 known prior CTs and cardiac nuclear medicine studies within the last 12 months.    This CT exam was performed using one or more of the following dose reduction techniques: Automated exposure control, adjustment of the mA and/or kV according to patient size, or use of iterative reconstruction technique.    Findings: Gray-white matter differentiation is maintained without evidence of an acute infarction. No intracranial mass or mass effect. No extra-axial mass or collection. The ventricles and sulci are normal in size and configuration. The posterior fossa  appears normal. Sellar and suprasellar structures are normal.    Previous bilateral lens replacements. The paranasal sinuses, ethmoid air cells, and mastoid air cells are aerated. The bony calvarium appears intact. No acute fractures. No lytic or blastic bony diseases.    Impression  Impression: No acute intracranial pathology.      Study is available in DICOM format to non-affiliated external healthcare facilities or entities on a secure, media free, reciprocally searchable basis with patient authorization for at least a 12 month period after the study. Search conducted for prior  patient CT studies completed at nonaffiliated external healthcare facilities or entities within the past 12 months and are available through a secure, authorized, media free, shared archive prior to an imaging study being performed.    Electronically Signed: Rbuen Arce MD  1/9/2024 7:28 PM EST  Workstation ID: [id number]      Lab Review:   Admission on 01/09/2024, Discharged on 01/11/2024   Component Date Value    QT Interval 01/09/2024 330     QTC Interval 01/09/2024 472     Glucose 01/09/2024 178 (H)     BUN 01/09/2024 65 (H)     Creatinine 01/09/2024 1.06 (H)     Sodium 01/09/2024 140      Potassium 01/09/2024 5.4 (H)     Chloride 01/09/2024 104     CO2 01/09/2024 23.0     Calcium 01/09/2024 9.8     Total Protein 01/09/2024 6.6     Albumin 01/09/2024 3.8     ALT (SGPT) 01/09/2024 11     AST (SGOT) 01/09/2024 17     Alkaline Phosphatase 01/09/2024 76     Total Bilirubin 01/09/2024 0.7     Globulin 01/09/2024 2.8     A/G Ratio 01/09/2024 1.4     BUN/Creatinine Ratio 01/09/2024 61.3 (H)     Anion Gap 01/09/2024 13.0     eGFR 01/09/2024 50.3 (L)     Protime 01/09/2024 11.3     INR 01/09/2024 1.04     PTT 01/09/2024 21.0 (L)     HS Troponin T 01/09/2024 17 (H)     WBC 01/09/2024 9.40     RBC 01/09/2024 3.93     Hemoglobin 01/09/2024 11.7 (L)     Hematocrit 01/09/2024 36.5     MCV 01/09/2024 93.0     MCH 01/09/2024 29.8     MCHC 01/09/2024 32.0     RDW 01/09/2024 13.8     RDW-SD 01/09/2024 44.6     MPV 01/09/2024 10.1     Platelets 01/09/2024 227     Neutrophil % 01/09/2024 74.5     Lymphocyte % 01/09/2024 16.0 (L)     Monocyte % 01/09/2024 7.9     Eosinophil % 01/09/2024 0.2 (L)     Basophil % 01/09/2024 1.4     Neutrophils, Absolute 01/09/2024 7.00     Lymphocytes, Absolute 01/09/2024 1.50     Monocytes, Absolute 01/09/2024 0.70     Eosinophils, Absolute 01/09/2024 0.00     Basophils, Absolute 01/09/2024 0.10     nRBC 01/09/2024 0.0     Extra Tube 01/09/2024 Hold for add-ons.     COVID19 01/09/2024 Not Detected     Influenza A PCR 01/09/2024 Not Detected     Influenza B PCR 01/09/2024 Not Detected     Glucose 01/10/2024 137 (H)     BUN 01/10/2024 53 (H)     Creatinine 01/10/2024 1.13 (H)     Sodium 01/10/2024 141     Potassium 01/10/2024 4.1     Chloride 01/10/2024 108 (H)     CO2 01/10/2024 23.0     Calcium 01/10/2024 9.1     BUN/Creatinine Ratio 01/10/2024 46.9 (H)     Anion Gap 01/10/2024 10.0     eGFR 01/10/2024 46.6 (L)     HS Troponin T 01/10/2024 24 (H)     WBC 01/10/2024 8.80     RBC 01/10/2024 3.36 (L)     Hemoglobin 01/10/2024 10.3 (L)     Hematocrit 01/10/2024 31.3 (L)     MCV 01/10/2024 93.1      MCH 01/10/2024 30.6     MCHC 01/10/2024 32.8     RDW 01/10/2024 14.2     RDW-SD 01/10/2024 45.9     MPV 01/10/2024 9.6     Platelets 01/10/2024 197     Neutrophil % 01/10/2024 55.3     Lymphocyte % 01/10/2024 33.7     Monocyte % 01/10/2024 8.9     Eosinophil % 01/10/2024 1.1     Basophil % 01/10/2024 1.0     Neutrophils, Absolute 01/10/2024 4.80     Lymphocytes, Absolute 01/10/2024 3.00     Monocytes, Absolute 01/10/2024 0.80     Eosinophils, Absolute 01/10/2024 0.10     Basophils, Absolute 01/10/2024 0.10     nRBC 01/10/2024 0.0     HS Troponin T 01/10/2024 20 (H)     Troponin T Delta 01/10/2024 -4 (L)     LVIDd 01/10/2024 3.6     LVIDs 01/10/2024 2.30     IVSd 01/10/2024 1.60     LVPWd 01/10/2024 1.50     FS 01/10/2024 36.1     IVS/LVPW 01/10/2024 1.07     ESV(cubed) 01/10/2024 12.2     LV Sys Vol (BSA correcte* 01/10/2024 11.4     EDV(cubed) 01/10/2024 46.7     LV Gilmore Vol (BSA correct* 01/10/2024 26.2     LV mass(C)d 01/10/2024 212.0     LVOT area 01/10/2024 3.5     LVOT diam 01/10/2024 2.10     EDV(MOD-sp4) 01/10/2024 45.3     ESV(MOD-sp4) 01/10/2024 19.8     SV(MOD-sp4) 01/10/2024 25.5     SI(MOD-sp4) 01/10/2024 14.7     EF(MOD-sp4) 01/10/2024 56.3     MV E max jefe 01/10/2024 115.0     Med Peak E' Jefe 01/10/2024 6.6     Lat Peak E' Jefe 01/10/2024 10.8     TR max jefe 01/10/2024 219.0     Avg E/e' ratio 01/10/2024 13.22     SV(LVOT) 01/10/2024 44.7     RVIDd 01/10/2024 2.6     RV Base 01/10/2024 3.9     RV Mid 01/10/2024 2.8     RV Length 01/10/2024 6.4     TAPSE (>1.6) 01/10/2024 1.60     RV S' 01/10/2024 9.1     LA dimension (2D)  01/10/2024 3.7     LV V1 max 01/10/2024 70.3     LV V1 max PG 01/10/2024 1.98     LV V1 mean PG 01/10/2024 1.00     LV V1 VTI 01/10/2024 12.9     Ao pk jefe 01/10/2024 179.0     Ao max PG 01/10/2024 12.8     Ao mean PG 01/10/2024 8.0     Ao V2 VTI 01/10/2024 33.4     NAVI(I,D) 01/10/2024 1.34     MV max PG 01/10/2024 3.9     MV mean PG 01/10/2024 2.00     MV V2 VTI 01/10/2024 23.5      MV P1/2t 01/10/2024 95.8     MVA(P1/2t) 01/10/2024 2.30     MVA(VTI) 01/10/2024 1.90     MV dec slope 01/10/2024 312.0     TR max PG 01/10/2024 19.2     RV V1 max PG 01/10/2024 2.30     RV V1 max 01/10/2024 75.8     RV V1 VTI 01/10/2024 10.7     PA V2 max 01/10/2024 68.5     Sinus 01/10/2024 2.8     Glucose 01/11/2024 134 (H)     BUN 01/11/2024 40 (H)     Creatinine 01/11/2024 1.14 (H)     Sodium 01/11/2024 142     Potassium 01/11/2024 3.8     Chloride 01/11/2024 107     CO2 01/11/2024 25.0     Calcium 01/11/2024 9.2     BUN/Creatinine Ratio 01/11/2024 35.1 (H)     Anion Gap 01/11/2024 10.0     eGFR 01/11/2024 46.1 (L)     WBC 01/11/2024 6.30     RBC 01/11/2024 3.06 (L)     Hemoglobin 01/11/2024 9.3 (L)     Hematocrit 01/11/2024 28.2 (L)     MCV 01/11/2024 92.2     MCH 01/11/2024 30.3     MCHC 01/11/2024 32.8     RDW 01/11/2024 13.8     RDW-SD 01/11/2024 44.2     MPV 01/11/2024 9.8     Platelets 01/11/2024 192     Neutrophil % 01/11/2024 55.7     Lymphocyte % 01/11/2024 32.7     Monocyte % 01/11/2024 9.0     Eosinophil % 01/11/2024 2.5     Basophil % 01/11/2024 0.1     Neutrophils, Absolute 01/11/2024 3.50     Lymphocytes, Absolute 01/11/2024 2.10     Monocytes, Absolute 01/11/2024 0.60     Eosinophils, Absolute 01/11/2024 0.20     Basophils, Absolute 01/11/2024 0.00     nRBC 01/11/2024 0.0     RSV, PCR 01/11/2024 Not Detected      Recent labs reviewed and interpreted for clinical significance and application            Level of Care:           Mich Araujo MD  02/08/24  .

## 2024-02-13 ENCOUNTER — HOSPITAL ENCOUNTER (OUTPATIENT)
Dept: CARDIOLOGY | Facility: HOSPITAL | Age: 89
Discharge: HOME | End: 2024-02-13
Payer: MEDICARE

## 2024-02-13 ENCOUNTER — ANESTHESIA EVENT (OUTPATIENT)
Dept: CARDIOLOGY | Facility: HOSPITAL | Age: 89
End: 2024-02-13
Payer: MEDICARE

## 2024-02-13 ENCOUNTER — ANESTHESIA (OUTPATIENT)
Dept: CARDIOLOGY | Facility: HOSPITAL | Age: 89
End: 2024-02-13
Payer: MEDICARE

## 2024-02-13 VITALS
TEMPERATURE: 98.1 F | OXYGEN SATURATION: 98 % | BODY MASS INDEX: 28.67 KG/M2 | WEIGHT: 161.82 LBS | RESPIRATION RATE: 20 BRPM | DIASTOLIC BLOOD PRESSURE: 41 MMHG | HEART RATE: 60 BPM | SYSTOLIC BLOOD PRESSURE: 111 MMHG | HEIGHT: 63 IN

## 2024-02-13 DIAGNOSIS — I35.0 NONRHEUMATIC AORTIC VALVE STENOSIS: ICD-10-CM

## 2024-02-13 PROCEDURE — 25010000002 PHENYLEPHRINE 10 MG/ML SOLUTION 5 ML VIAL: Performed by: NURSE ANESTHETIST, CERTIFIED REGISTERED

## 2024-02-13 PROCEDURE — 25810000003 SODIUM CHLORIDE 0.9 % SOLUTION: Performed by: NURSE ANESTHETIST, CERTIFIED REGISTERED

## 2024-02-13 PROCEDURE — 93320 DOPPLER ECHO COMPLETE: CPT

## 2024-02-13 PROCEDURE — 93312 ECHO TRANSESOPHAGEAL: CPT

## 2024-02-13 PROCEDURE — 25810000003 SODIUM CHLORIDE 0.9 % SOLUTION 250 ML FLEX CONT: Performed by: NURSE ANESTHETIST, CERTIFIED REGISTERED

## 2024-02-13 PROCEDURE — 25010000002 PROPOFOL 200 MG/20ML EMULSION: Performed by: NURSE ANESTHETIST, CERTIFIED REGISTERED

## 2024-02-13 PROCEDURE — 93325 DOPPLER ECHO COLOR FLOW MAPG: CPT

## 2024-02-13 RX ORDER — IPRATROPIUM BROMIDE AND ALBUTEROL SULFATE 2.5; .5 MG/3ML; MG/3ML
3 SOLUTION RESPIRATORY (INHALATION) ONCE AS NEEDED
OUTPATIENT
Start: 2024-02-13

## 2024-02-13 RX ORDER — DIPHENHYDRAMINE HYDROCHLORIDE 50 MG/ML
12.5 INJECTION INTRAMUSCULAR; INTRAVENOUS
OUTPATIENT
Start: 2024-02-13

## 2024-02-13 RX ORDER — MEPERIDINE HYDROCHLORIDE 25 MG/ML
12.5 INJECTION INTRAMUSCULAR; INTRAVENOUS; SUBCUTANEOUS
OUTPATIENT
Start: 2024-02-13 | End: 2024-02-14

## 2024-02-13 RX ORDER — LABETALOL HYDROCHLORIDE 5 MG/ML
5 INJECTION, SOLUTION INTRAVENOUS
OUTPATIENT
Start: 2024-02-13

## 2024-02-13 RX ORDER — ONDANSETRON 2 MG/ML
4 INJECTION INTRAMUSCULAR; INTRAVENOUS ONCE AS NEEDED
OUTPATIENT
Start: 2024-02-13

## 2024-02-13 RX ORDER — SODIUM CHLORIDE 9 MG/ML
INJECTION, SOLUTION INTRAVENOUS CONTINUOUS PRN
Status: DISCONTINUED | OUTPATIENT
Start: 2024-02-13 | End: 2024-02-13 | Stop reason: SURG

## 2024-02-13 RX ORDER — HYDRALAZINE HYDROCHLORIDE 20 MG/ML
5 INJECTION INTRAMUSCULAR; INTRAVENOUS
OUTPATIENT
Start: 2024-02-13

## 2024-02-13 RX ORDER — LIDOCAINE HYDROCHLORIDE 20 MG/ML
INJECTION, SOLUTION EPIDURAL; INFILTRATION; INTRACAUDAL; PERINEURAL AS NEEDED
Status: DISCONTINUED | OUTPATIENT
Start: 2024-02-13 | End: 2024-02-13 | Stop reason: SURG

## 2024-02-13 RX ORDER — PROPOFOL 10 MG/ML
INJECTION, EMULSION INTRAVENOUS AS NEEDED
Status: DISCONTINUED | OUTPATIENT
Start: 2024-02-13 | End: 2024-02-13 | Stop reason: SURG

## 2024-02-13 RX ORDER — EPHEDRINE SULFATE 5 MG/ML
5 INJECTION INTRAVENOUS ONCE AS NEEDED
OUTPATIENT
Start: 2024-02-13

## 2024-02-13 RX ADMIN — PHENYLEPHRINE HYDROCHLORIDE 100 MCG: 10 INJECTION INTRAVENOUS at 14:02

## 2024-02-13 RX ADMIN — PROPOFOL 10 MG: 10 INJECTION, EMULSION INTRAVENOUS at 13:59

## 2024-02-13 RX ADMIN — SODIUM CHLORIDE: 9 INJECTION, SOLUTION INTRAVENOUS at 13:27

## 2024-02-13 RX ADMIN — LIDOCAINE HYDROCHLORIDE 80 MG: 20 INJECTION, SOLUTION EPIDURAL; INFILTRATION; INTRACAUDAL; PERINEURAL at 13:45

## 2024-02-13 RX ADMIN — PROPOFOL 80 MCG/KG/MIN: 10 INJECTION, EMULSION INTRAVENOUS at 13:47

## 2024-02-13 RX ADMIN — PROPOFOL 70 MG: 10 INJECTION, EMULSION INTRAVENOUS at 13:45

## 2024-02-18 LAB
BH CV ECHO MEAS - AO MAX PG: 15.2 MMHG
BH CV ECHO MEAS - AO MEAN PG: 9 MMHG
BH CV ECHO MEAS - AO V2 MAX: 195 CM/SEC
BH CV ECHO MEAS - AO V2 VTI: 48.4 CM
BH CV ECHO MEAS - AVA(I,D): 1.3 CM2
BH CV ECHO MEAS - LV MAX PG: 3.8 MMHG
BH CV ECHO MEAS - LV MEAN PG: 2 MMHG
BH CV ECHO MEAS - LV V1 MAX: 96.9 CM/SEC
BH CV ECHO MEAS - LV V1 VTI: 23.8 CM
BH CV ECHO MEAS - LVOT AREA: 3.8 CM2
BH CV ECHO MEAS - LVOT DIAM: 2 CM
BH CV ECHO MEAS - SV(LVOT): 90.5 ML
BH CV ECHO SHUNT ASSESSMENT PERFORMED (HIDDEN SCRIPTING): 1

## 2024-03-13 ENCOUNTER — OFFICE VISIT (OUTPATIENT)
Dept: CARDIOLOGY | Facility: CLINIC | Age: 89
End: 2024-03-13
Payer: MEDICARE

## 2024-03-13 VITALS
HEART RATE: 71 BPM | RESPIRATION RATE: 18 BRPM | WEIGHT: 164 LBS | HEIGHT: 63 IN | SYSTOLIC BLOOD PRESSURE: 149 MMHG | DIASTOLIC BLOOD PRESSURE: 62 MMHG | BODY MASS INDEX: 29.06 KG/M2

## 2024-03-13 DIAGNOSIS — I35.0 NONRHEUMATIC AORTIC VALVE STENOSIS: Primary | ICD-10-CM

## 2024-03-14 PROBLEM — I35.0 NONRHEUMATIC AORTIC VALVE STENOSIS: Status: ACTIVE | Noted: 2024-03-13

## 2024-03-14 NOTE — H&P (VIEW-ONLY)
Cardiology Clinic Note  Mich Araujo MD, PhD    Subjective:     Encounter Date:03/13/2024      Patient ID: Cherie Hinton is a 89 y.o. female.    Chief Complaint:  Chief Complaint   Patient presents with    Follow-up       HPI:        I the pleasure to see this 89-year-old female in follow-up with history of stroke diabetes hypertension hyperlipidemia and paroxysmal atrial fibrillation along with obstructive sleep apnea and CKD in the past as well as 2 out of 6 to 3 out of 6 murmur on exam consistent with aortic valve stenosis.  Her last echo was in November of last year demonstrating EF of 50% moderate concentric LVH grade 2 diastolic dysfunction normal RV size and function, severely enlarged left atrium, moderately to severely enlarged right atrium, mild TR, mild MR, moderate aortic valve stenosis with thickening of the valve significantly but gradients more consistent with mild to moderate disease.   CV therapies consist of atorvastatin diltiazem Lasix Imdur Bystolic and Aldactone.  We had seen her in the past but ultimately she was lost to follow-up changing her care within the [facility] system.  She is back to our system for second opinion with ongoing dyspnea on exertion, exertional fatigue, hospitalization as described below and CV comorbidities addressed above.  She says she has felt poorly for quite some time  She is here for follow-up after hospitalization with syncopal episode, unclear whether related to dehydration with nausea and vomiting preceding the hospital admission possibly worsened by valvular heart disease with severe aortic valve stenosis.  She has had some dizziness and shortness of breath.  She received 2D echo during her hospitalization demonstrating severely restricted aortic valve however gradients were not indicating severe however she has significant LVH and small LV cavity with [id number] variability with underlying atrial fibrillation with concern for low gradient severe aortic  valve stenosis.  She underwent transesophageal echo which revealed severely restricted leaflet motion, aortic valve area less than 1 cm² with severe calcific aortic valve stenosis.  With this finding we discussed TAVR evaluation as she is otherwise highly functional at 89 although having more exertional fatigue dyspnea possibly related to her valve now with an episode of syncope.  We discussed left heart catheterization for which she is agreeable for and referral to the valve team for TAVR evaluation and workup further        Most recent 2D echo reviewed and interpreted by me demonstrates small LV cavity, concentric LVH, severely restricted aortic valve, concern for low gradient severe aortic stenosis     Most recent EKG, supraventricular rhythm, occasional ventricularly paced complexes, no ischemic findings, PVCs     Review of systems otherwise negative x14 point review of systems except as mentioned above     Historical data copied forward from previous encounters in EMR including the history, exam, and assessment/plan has been reviewed and is unchanged unless noted otherwise.     Cardiac medicines reviewed with risk, benefits, and necessity of each discussed.     Risk and benefit of cardiac testing reviewed including death heart attack stroke pain bleeding infection need for vascular /cardiovascular surgery were discussed and the patient      Objective:      Vitals reviewed 136/75 heart rate 69 weight 163     Physical Exam  Irregular, no rubs gallops heave or lift, stable 2-3 out of 6 systolic ejection murmur crescendo   No clubbing cyanosis or edema  Clear to auscultation  Soft nontender nondistended  Normal radial pulses normal cap refill  Assessment:         Assessment   Essential hypertension well-controlled  LVH  Permanent pacemaker in place normal functioning device routine interrogation every 3 to 6 months  History of watchman procedure off anticoagulation  History of stroke  Chronic persistent atrial  "fibrillation, rate controlled, device in place with tachybradycardia syndrome  Hyperlipidemia controlled  Diabetes per primary  EF 50%, volume status well-controlled continue Lasix  Possible low gradient severe AS    2D echo consistent with severe senile calcific aortic valve stenosis  Referred to valvular heart team with Dr. Callejas and Dr. Shanks for TAVR evaluation  Left heart catheterization right radial coronary angiography  Deferred to valvular team for TAVR CT    Patient with symptomatic event, prior episode of syncope, continued exertional symptoms with dyspnea on exertion and exertional fatigue although she has a preserved ejection fraction      Exertional fatigue, history of syncope recently, dyspnea on exertion are her chief complaints today     Order transesophageal echo for evaluation of low gradient severe aortic valve stenosis with severely restricted valve, significant murmur on exam  significant aortic valve stenosis as above      See her back in 3 to 6 months    Mich Araujo MD, PhD    Objective:         /62 (BP Location: Left arm, Patient Position: Sitting)   Pulse 71   Resp 18   Ht 160 cm (63\")   Wt 74.4 kg (164 lb)   BMI 29.05 kg/m²     Diagnoses and all orders for this visit:    1. Nonrheumatic aortic valve stenosis (Primary)  -     Ambulatory Referral to Cardiology  -     Case Request Cath Lab: Left Heart Cath  -     CBC & Differential  -     Comprehensive Metabolic Panel  -     Protime-INR          The pleasure to be involved in this patient's cardiovascular care.  Please call with any questions or concerns  Mich Araujo MD, PhD    Most recent EKG as reviewed and interpreted by me:  Procedures     Most recent echo as reviewed and interpreted by me:  Results for orders placed during the hospital encounter of 02/13/24    Adult Transesophageal Echo (DHEERAJ) W/ Cont if Necessary Per Protocol    Interpretation Summary    Left ventricular systolic function is low normal. Left ventricular " ejection fraction appears to be 55%.    Left ventricular wall thickness is consistent with mild concentric hypertrophy.    The left atrial cavity is moderately dilated.    Saline test results are negative.    Moderate to severe aortic valve stenosis is present.    Aortic valve area by planimetry method is 1.0 cm².  Peak aortic velocity is noted to be 2.0 m/s      Most recent stress test as reviewed and interpreted by me:  Results for orders placed during the hospital encounter of 04/14/23    Stress Test With Myocardial Perfusion One Day    Interpretation Summary    Left ventricular ejection fraction is normal (Calculated EF = 58%).    Myocardial perfusion imaging indicates a normal myocardial perfusion study with no evidence of ischemia.    Impressions are consistent with a low risk study.      Most recent cardiac catheterization as reviewed interpreted by me:  No results found for this or any previous visit.    The following portions of the patient's history were reviewed and updated as appropriate: allergies, current medications, past family history, past medical history, past social history, past surgical history, and problem list.      ROS:  14 point review of systems negative except as mentioned above    Current Outpatient Medications:     atorvastatin (LIPITOR) 10 MG tablet, Take 1 tablet by mouth Daily., Disp: , Rfl:     dilTIAZem CD (CARDIZEM CD) 240 MG 24 hr capsule, Take 1 capsule by mouth Daily., Disp: , Rfl:     ferrous sulfate 325 (65 FE) MG EC tablet, Take 1 tablet by mouth Daily With Breakfast., Disp: 90 tablet, Rfl: 1    furosemide (LASIX) 20 MG tablet, Take 1 tablet by mouth Daily., Disp: 30 tablet, Rfl: 11    meclizine (ANTIVERT) 25 MG tablet, Take 1 tablet by mouth 3 (Three) Times a Day As Needed for Dizziness., Disp: 30 tablet, Rfl: 0    metFORMIN (Glucophage) 500 MG tablet, Take 1 tablet by mouth Daily With Breakfast. For diabetes, Disp: 30 tablet, Rfl: 6    nebivolol (Bystolic) 20 MG tablet,  Take 1 tablet by mouth Daily., Disp: 90 tablet, Rfl: 3    omeprazole (priLOSEC) 40 MG capsule, Take 1 capsule by mouth 2 (Two) Times a Day., Disp: , Rfl:     vitamin B-12 (CYANOCOBALAMIN) 1000 MCG tablet, Take 1 tablet by mouth Daily., Disp: , Rfl:     zolpidem (AMBIEN) 5 MG tablet, Take 1 tablet by mouth At Night As Needed for Sleep., Disp: , Rfl:     Problem List:  Patient Active Problem List   Diagnosis    Pacemaker    Atrial fibrillation    Coronary artery disease involving native coronary artery of native heart without angina pectoris    Hyperlipidemia, mixed    Essential hypertension    Tachy-morgan syndrome    Iron deficiency anemia due to chronic blood loss    Antral erosion    B12 deficiency    Cerebrovascular accident (CVA)    Colon cancer    Depression    Type 2 diabetes mellitus with stage 3a chronic kidney disease, without long-term current use of insulin    GERD (gastroesophageal reflux disease)    Hiatal hernia    HALEIGH (iron deficiency anemia)    LAD (lymphadenopathy)    Left pontine CVA    Mitral regurgitation    STORMY (obstructive sleep apnea)    Osteoarthritis    Prinzmetal's angina    Right kidney mass    TIA (transient ischemic attack)    Vestibular nerve disease    Presence of Watchman left atrial appendage closure device    Ataxia    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    Dilated cardiomyopathy    Unstable angina    Aortic stenosis, moderate    Other fatigue    Dyspnea on exertion    Dyspnea, unspecified type    Hypomagnesemia    Acute right ankle pain    Allergic conjunctivitis    Posterior vitreous detachment    Exudative age-related macular degeneration    Recurrent syncope    Syncope    Hypotension due to hypovolemia    Renal insufficiency    Nonrheumatic aortic valve stenosis     Past Medical History:  Past Medical History:   Diagnosis Date    Antral erosion     Aortic valve regurgitation 12/26/2018    Atrial fibrillation     Atrial fibrillation with controlled ventricular  response     B12 deficiency     CAD (coronary artery disease)     Cellulitis 04/2011    on face     Cerebrovascular accident (CVA) 12/07/2016    CHF (congestive heart failure)     Colon cancer     Coronary artery disease involving native coronary artery of native heart without angina pectoris 06/18/2019    Depression     Diabetes mellitus, type 2     Essential hypertension 02/13/2012    Fatigue     GERD (gastroesophageal reflux disease)     Hiatal hernia     History of colonoscopy 04/2010    last done 4/10    History of esophagogastroduodenoscopy (EGD) 04/2010    last done 4/10    Hyperlipidemia, mixed 02/13/2012    Hypertension     white coat htn    HALEIGH (iron deficiency anemia)     LAD (lymphadenopathy)     40% lesion LAD     Left pontine CVA     Mitral regurgitation 05/21/2012    STORMY (obstructive sleep apnea)     not treating    Osteoarthritis     Pacemaker 06/18/2019    Permanent atrial fibrillation 06/18/2019    Presence of Watchman left atrial appendage closure device 01/04/2020    Prinzmetal's angina     Right kidney mass     1.4 cm- following urology     Sick sinus syndrome     Stroke 11/2016    TIA (transient ischemic attack)     left CVA, interrupted TPA; As (moderate-severe)     Vestibular nerve disease 2017     Past Surgical History:  Past Surgical History:   Procedure Laterality Date    ATRIAL APPENDAGE EXCLUSION LEFT WITH TRANSESOPHAGEAL ECHOCARDIOGRAM N/A 10/10/2019    Procedure: Atrial Appendage Occlusion;  Surgeon: Richie Chapman MD;  Location: [facility] CATH INVASIVE LOCATION;  Service: Cardiovascular    ATRIAL APPENDAGE EXCLUSION LEFT WITH TRANSESOPHAGEAL ECHOCARDIOGRAM N/A 10/10/2019    Procedure: Atrial Appendage Occlusion;  Surgeon: Je Rivera MD;  Location: [facility] CATH INVASIVE LOCATION;  Service: Cardiology    BLADDER REPAIR      CARDIAC CATHETERIZATION  05/2010    CHOLECYSTECTOMY      COLECTOMY PARTIAL / TOTAL      COLONOSCOPY  04/16/2018    negative     COLONOSCOPY N/A  7/1/2022    Procedure: COLONOSCOPY with polypectomy x1;  Surgeon: ADOLFO Boss MD;  Location: [facility] ENDOSCOPY;  Service: Gastroenterology;  Laterality: N/A;  post: diverticulosis, hemorrhoids, polyps    ENDOSCOPY  04/16/2018    negative     ENDOSCOPY N/A 6/29/2022    Procedure: ESOPHAGOGASTRODUODENOSCOPY with biopsy of duodenum r/o celiac;  Surgeon: ADOLFO Boss MD;  Location: [facility] ENDOSCOPY;  Service: Gastroenterology;  Laterality: N/A;  hiatial hernia  Gastric Polyps        HYSTERECTOMY      INSERT / REPLACE / REMOVE PACEMAKER  09/2018    Pacemaker gen change 9/2018     OTHER SURGICAL HISTORY  04/25/2015    Exercise myoview, normal     PACEMAKER IMPLANTATION  01/22/2010    Dual Chamber - 1/22/2010; RA Lead Revision- 5/7/2012- BS     TOOTH EXTRACTION      TRANSESOPHAGEAL ECHOCARDIOGRAM (DHEERAJ)  07/2019     Social History:  Social History     Socioeconomic History    Marital status:    Tobacco Use    Smoking status: Never     Passive exposure: Never    Smokeless tobacco: Never   Vaping Use    Vaping status: Never Used   Substance and Sexual Activity    Alcohol use: No    Drug use: No    Sexual activity: Defer     Allergies:  Allergies   Allergen Reactions    Contrast Dye (Echo Or Unknown Ct/Mr) Anaphylaxis    Adhesive Tape Itching    Latex Hives     Immunizations:  Immunization History   Administered Date(s) Administered    Flu Vaccine Quad PF >36MO 10/16/2015    Fluzone High Dose =>65 Years ([employer] ONLY) 10/18/2014, 10/25/2017, 10/03/2020    Influenza, Unspecified 10/21/2019    Pneumococcal Conjugate 13-Valent (PCV13) 01/05/2021    flucelvax quad pfs =>4 YRS 10/21/2019            In-Office Procedure(s):  No orders to display        ASCVD RIsk Score::  The ASCVD Risk score (Helen DK, et al., 2019) failed to calculate for the following reasons:    The 2019 ASCVD risk score is only valid for ages 40 to 79    The patient has a prior MI or stroke diagnosis    Imaging:    Results for orders placed  during the hospital encounter of 01/09/24    XR Chest 1 View    Narrative  XR CHEST 1 VW    Date of Exam: 1/9/2024 6:52 PM EST    Indication: chest pain    Comparison: None available.    Findings: No focal consolidation. No pneumothorax or pleural effusion. Cardiac size is normal. The visualized clavicles appear intact. No displaced rib fractures. The visualized upper abdomen is normal. Triple lead cardiac pacemaker.    Impression  Impression: No acute cardiopulmonary disease.    Electronically Signed: Ruben Arce MD  1/9/2024 6:58 PM EST  Workstation ID: [id number]       Results for orders placed during the hospital encounter of 01/09/24    CT Head Without Contrast    Narrative  CT HEAD WO CONTRAST    Date of Exam: 1/9/2024 6:50 PM EST    Indication: hx recent fall hit occip.    Comparison: 6/27/2022    Technique: Axial CT images were obtained of the head without contrast administration.  Coronal reconstructions were performed.  Automated exposure control and iterative reconstruction methods were used.    Structured dose report sent to the ACR Radiation Dose Index Registry.    Exposure: The patient has had 0 known prior CTs and cardiac nuclear medicine studies within the last 12 months.    This CT exam was performed using one or more of the following dose reduction techniques: Automated exposure control, adjustment of the mA and/or kV according to patient size, or use of iterative reconstruction technique.    Findings: Gray-white matter differentiation is maintained without evidence of an acute infarction. No intracranial mass or mass effect. No extra-axial mass or collection. The ventricles and sulci are normal in size and configuration. The posterior fossa  appears normal. Sellar and suprasellar structures are normal.    Previous bilateral lens replacements. The paranasal sinuses, ethmoid air cells, and mastoid air cells are aerated. The bony calvarium appears intact. No acute fractures. No lytic or blastic bony  diseases.    Impression  Impression: No acute intracranial pathology.      Study is available in DICOM format to non-affiliated external healthcare facilities or entities on a secure, media free, reciprocally searchable basis with patient authorization for at least a 12 month period after the study. Search conducted for prior  patient CT studies completed at nonaffiliated external healthcare facilities or entities within the past 12 months and are available through a secure, authorized, media free, shared archive prior to an imaging study being performed.    Electronically Signed: Ruben Arce MD  1/9/2024 7:28 PM EST  Workstation ID: [id number]      Results for orders placed during the hospital encounter of 01/09/24    CT Head Without Contrast    Narrative  CT HEAD WO CONTRAST    Date of Exam: 1/9/2024 6:50 PM EST    Indication: hx recent fall hit occip.    Comparison: 6/27/2022    Technique: Axial CT images were obtained of the head without contrast administration.  Coronal reconstructions were performed.  Automated exposure control and iterative reconstruction methods were used.    Structured dose report sent to the ACR Radiation Dose Index Registry.    Exposure: The patient has had 0 known prior CTs and cardiac nuclear medicine studies within the last 12 months.    This CT exam was performed using one or more of the following dose reduction techniques: Automated exposure control, adjustment of the mA and/or kV according to patient size, or use of iterative reconstruction technique.    Findings: Gray-white matter differentiation is maintained without evidence of an acute infarction. No intracranial mass or mass effect. No extra-axial mass or collection. The ventricles and sulci are normal in size and configuration. The posterior fossa  appears normal. Sellar and suprasellar structures are normal.    Previous bilateral lens replacements. The paranasal sinuses, ethmoid air cells, and mastoid air cells are aerated.  The bony calvarium appears intact. No acute fractures. No lytic or blastic bony diseases.    Impression  Impression: No acute intracranial pathology.      Study is available in DICOM format to non-affiliated external healthcare facilities or entities on a secure, media free, reciprocally searchable basis with patient authorization for at least a 12 month period after the study. Search conducted for prior  patient CT studies completed at nonaffiliated external healthcare facilities or entities within the past 12 months and are available through a secure, authorized, media free, shared archive prior to an imaging study being performed.    Electronically Signed: Ruben Arce MD  1/9/2024 7:28 PM EST  Workstation ID: [id number]      Lab Review:   Hospital Outpatient Visit on 02/13/2024   Component Date Value    BH CV ECHO SHUNT ASSESSM* 02/13/2024 1     LVOT area 02/13/2024 3.8     LVOT diam 02/13/2024 2.0     SV(LVOT) 02/13/2024 90.5     LV V1 max 02/13/2024 96.9     LV V1 max PG 02/13/2024 3.8     LV V1 mean PG 02/13/2024 2.00     LV V1 VTI 02/13/2024 23.8     Ao pk alfonzo 02/13/2024 195.0     Ao max PG 02/13/2024 15.2     Ao mean PG 02/13/2024 9.0     Ao V2 VTI 02/13/2024 48.4     NAVI(I,D) 02/13/2024 1.3    Admission on 01/09/2024, Discharged on 01/11/2024   Component Date Value    QT Interval 01/09/2024 330     QTC Interval 01/09/2024 472     Glucose 01/09/2024 178 (H)     BUN 01/09/2024 65 (H)     Creatinine 01/09/2024 1.06 (H)     Sodium 01/09/2024 140     Potassium 01/09/2024 5.4 (H)     Chloride 01/09/2024 104     CO2 01/09/2024 23.0     Calcium 01/09/2024 9.8     Total Protein 01/09/2024 6.6     Albumin 01/09/2024 3.8     ALT (SGPT) 01/09/2024 11     AST (SGOT) 01/09/2024 17     Alkaline Phosphatase 01/09/2024 76     Total Bilirubin 01/09/2024 0.7     Globulin 01/09/2024 2.8     A/G Ratio 01/09/2024 1.4     BUN/Creatinine Ratio 01/09/2024 61.3 (H)     Anion Gap 01/09/2024 13.0     eGFR 01/09/2024 50.3 (L)      Protime 01/09/2024 11.3     INR 01/09/2024 1.04     PTT 01/09/2024 21.0 (L)     HS Troponin T 01/09/2024 17 (H)     WBC 01/09/2024 9.40     RBC 01/09/2024 3.93     Hemoglobin 01/09/2024 11.7 (L)     Hematocrit 01/09/2024 36.5     MCV 01/09/2024 93.0     MCH 01/09/2024 29.8     MCHC 01/09/2024 32.0     RDW 01/09/2024 13.8     RDW-SD 01/09/2024 44.6     MPV 01/09/2024 10.1     Platelets 01/09/2024 227     Neutrophil % 01/09/2024 74.5     Lymphocyte % 01/09/2024 16.0 (L)     Monocyte % 01/09/2024 7.9     Eosinophil % 01/09/2024 0.2 (L)     Basophil % 01/09/2024 1.4     Neutrophils, Absolute 01/09/2024 7.00     Lymphocytes, Absolute 01/09/2024 1.50     Monocytes, Absolute 01/09/2024 0.70     Eosinophils, Absolute 01/09/2024 0.00     Basophils, Absolute 01/09/2024 0.10     nRBC 01/09/2024 0.0     Extra Tube 01/09/2024 Hold for add-ons.     COVID19 01/09/2024 Not Detected     Influenza A PCR 01/09/2024 Not Detected     Influenza B PCR 01/09/2024 Not Detected     Glucose 01/10/2024 137 (H)     BUN 01/10/2024 53 (H)     Creatinine 01/10/2024 1.13 (H)     Sodium 01/10/2024 141     Potassium 01/10/2024 4.1     Chloride 01/10/2024 108 (H)     CO2 01/10/2024 23.0     Calcium 01/10/2024 9.1     BUN/Creatinine Ratio 01/10/2024 46.9 (H)     Anion Gap 01/10/2024 10.0     eGFR 01/10/2024 46.6 (L)     HS Troponin T 01/10/2024 24 (H)     WBC 01/10/2024 8.80     RBC 01/10/2024 3.36 (L)     Hemoglobin 01/10/2024 10.3 (L)     Hematocrit 01/10/2024 31.3 (L)     MCV 01/10/2024 93.1     MCH 01/10/2024 30.6     MCHC 01/10/2024 32.8     RDW 01/10/2024 14.2     RDW-SD 01/10/2024 45.9     MPV 01/10/2024 9.6     Platelets 01/10/2024 197     Neutrophil % 01/10/2024 55.3     Lymphocyte % 01/10/2024 33.7     Monocyte % 01/10/2024 8.9     Eosinophil % 01/10/2024 1.1     Basophil % 01/10/2024 1.0     Neutrophils, Absolute 01/10/2024 4.80     Lymphocytes, Absolute 01/10/2024 3.00     Monocytes, Absolute 01/10/2024 0.80     Eosinophils, Absolute  01/10/2024 0.10     Basophils, Absolute 01/10/2024 0.10     nRBC 01/10/2024 0.0     HS Troponin T 01/10/2024 20 (H)     Troponin T Delta 01/10/2024 -4 (L)     LVIDd 01/10/2024 3.6     LVIDs 01/10/2024 2.30     IVSd 01/10/2024 1.60     LVPWd 01/10/2024 1.50     FS 01/10/2024 36.1     IVS/LVPW 01/10/2024 1.07     ESV(cubed) 01/10/2024 12.2     LV Sys Vol (BSA correcte* 01/10/2024 11.4     EDV(cubed) 01/10/2024 46.7     LV Gilmore Vol (BSA correct* 01/10/2024 26.2     LV mass(C)d 01/10/2024 212.0     LVOT area 01/10/2024 3.5     LVOT diam 01/10/2024 2.10     EDV(MOD-sp4) 01/10/2024 45.3     ESV(MOD-sp4) 01/10/2024 19.8     SV(MOD-sp4) 01/10/2024 25.5     SI(MOD-sp4) 01/10/2024 14.7     EF(MOD-sp4) 01/10/2024 56.3     MV E max jefe 01/10/2024 115.0     Med Peak E' Jefe 01/10/2024 6.6     Lat Peak E' Jefe 01/10/2024 10.8     TR max jefe 01/10/2024 219.0     Avg E/e' ratio 01/10/2024 13.22     SV(LVOT) 01/10/2024 44.7     RVIDd 01/10/2024 2.6     RV Base 01/10/2024 3.9     RV Mid 01/10/2024 2.8     RV Length 01/10/2024 6.4     TAPSE (>1.6) 01/10/2024 1.60     RV S' 01/10/2024 9.1     LA dimension (2D)  01/10/2024 3.7     LV V1 max 01/10/2024 70.3     LV V1 max PG 01/10/2024 1.98     LV V1 mean PG 01/10/2024 1.00     LV V1 VTI 01/10/2024 12.9     Ao pk jefe 01/10/2024 179.0     Ao max PG 01/10/2024 12.8     Ao mean PG 01/10/2024 8.0     Ao V2 VTI 01/10/2024 33.4     NAVI(I,D) 01/10/2024 1.34     MV max PG 01/10/2024 3.9     MV mean PG 01/10/2024 2.00     MV V2 VTI 01/10/2024 23.5     MV P1/2t 01/10/2024 95.8     MVA(P1/2t) 01/10/2024 2.30     MVA(VTI) 01/10/2024 1.90     MV dec slope 01/10/2024 312.0     TR max PG 01/10/2024 19.2     RV V1 max PG 01/10/2024 2.30     RV V1 max 01/10/2024 75.8     RV V1 VTI 01/10/2024 10.7     PA V2 max 01/10/2024 68.5     Sinus 01/10/2024 2.8     Glucose 01/11/2024 134 (H)     BUN 01/11/2024 40 (H)     Creatinine 01/11/2024 1.14 (H)     Sodium 01/11/2024 142     Potassium 01/11/2024 3.8     Chloride  01/11/2024 107     CO2 01/11/2024 25.0     Calcium 01/11/2024 9.2     BUN/Creatinine Ratio 01/11/2024 35.1 (H)     Anion Gap 01/11/2024 10.0     eGFR 01/11/2024 46.1 (L)     WBC 01/11/2024 6.30     RBC 01/11/2024 3.06 (L)     Hemoglobin 01/11/2024 9.3 (L)     Hematocrit 01/11/2024 28.2 (L)     MCV 01/11/2024 92.2     MCH 01/11/2024 30.3     MCHC 01/11/2024 32.8     RDW 01/11/2024 13.8     RDW-SD 01/11/2024 44.2     MPV 01/11/2024 9.8     Platelets 01/11/2024 192     Neutrophil % 01/11/2024 55.7     Lymphocyte % 01/11/2024 32.7     Monocyte % 01/11/2024 9.0     Eosinophil % 01/11/2024 2.5     Basophil % 01/11/2024 0.1     Neutrophils, Absolute 01/11/2024 3.50     Lymphocytes, Absolute 01/11/2024 2.10     Monocytes, Absolute 01/11/2024 0.60     Eosinophils, Absolute 01/11/2024 0.20     Basophils, Absolute 01/11/2024 0.00     nRBC 01/11/2024 0.0     RSV, PCR 01/11/2024 Not Detected      Recent labs reviewed and interpreted for clinical significance and application            Level of Care:           Mich Araujo MD  03/14/24  .

## 2024-03-14 NOTE — PROGRESS NOTES
Cardiology Clinic Note  Mich Araujo MD, PhD    Subjective:     Encounter Date:03/13/2024      Patient ID: Cherie Hinton is a 89 y.o. female.    Chief Complaint:  Chief Complaint   Patient presents with    Follow-up       HPI:        I the pleasure to see this 89-year-old female in follow-up with history of stroke diabetes hypertension hyperlipidemia and paroxysmal atrial fibrillation along with obstructive sleep apnea and CKD in the past as well as 2 out of 6 to 3 out of 6 murmur on exam consistent with aortic valve stenosis.  Her last echo was in November of last year demonstrating EF of 50% moderate concentric LVH grade 2 diastolic dysfunction normal RV size and function, severely enlarged left atrium, moderately to severely enlarged right atrium, mild TR, mild MR, moderate aortic valve stenosis with thickening of the valve significantly but gradients more consistent with mild to moderate disease.   CV therapies consist of atorvastatin diltiazem Lasix Imdur Bystolic and Aldactone.  We had seen her in the past but ultimately she was lost to follow-up changing her care within the [facility] system.  She is back to our system for second opinion with ongoing dyspnea on exertion, exertional fatigue, hospitalization as described below and CV comorbidities addressed above.  She says she has felt poorly for quite some time  She is here for follow-up after hospitalization with syncopal episode, unclear whether related to dehydration with nausea and vomiting preceding the hospital admission possibly worsened by valvular heart disease with severe aortic valve stenosis.  She has had some dizziness and shortness of breath.  She received 2D echo during her hospitalization demonstrating severely restricted aortic valve however gradients were not indicating severe however she has significant LVH and small LV cavity with [id number] variability with underlying atrial fibrillation with concern for low gradient severe aortic  valve stenosis.  She underwent transesophageal echo which revealed severely restricted leaflet motion, aortic valve area less than 1 cm² with severe calcific aortic valve stenosis.  With this finding we discussed TAVR evaluation as she is otherwise highly functional at 89 although having more exertional fatigue dyspnea possibly related to her valve now with an episode of syncope.  We discussed left heart catheterization for which she is agreeable for and referral to the valve team for TAVR evaluation and workup further        Most recent 2D echo reviewed and interpreted by me demonstrates small LV cavity, concentric LVH, severely restricted aortic valve, concern for low gradient severe aortic stenosis     Most recent EKG, supraventricular rhythm, occasional ventricularly paced complexes, no ischemic findings, PVCs     Review of systems otherwise negative x14 point review of systems except as mentioned above     Historical data copied forward from previous encounters in EMR including the history, exam, and assessment/plan has been reviewed and is unchanged unless noted otherwise.     Cardiac medicines reviewed with risk, benefits, and necessity of each discussed.     Risk and benefit of cardiac testing reviewed including death heart attack stroke pain bleeding infection need for vascular /cardiovascular surgery were discussed and the patient      Objective:      Vitals reviewed 136/75 heart rate 69 weight 163     Physical Exam  Irregular, no rubs gallops heave or lift, stable 2-3 out of 6 systolic ejection murmur crescendo   No clubbing cyanosis or edema  Clear to auscultation  Soft nontender nondistended  Normal radial pulses normal cap refill  Assessment:         Assessment   Essential hypertension well-controlled  LVH  Permanent pacemaker in place normal functioning device routine interrogation every 3 to 6 months  History of watchman procedure off anticoagulation  History of stroke  Chronic persistent atrial  "fibrillation, rate controlled, device in place with tachybradycardia syndrome  Hyperlipidemia controlled  Diabetes per primary  EF 50%, volume status well-controlled continue Lasix  Possible low gradient severe AS    2D echo consistent with severe senile calcific aortic valve stenosis  Referred to valvular heart team with Dr. Callejas and Dr. Shanks for TAVR evaluation  Left heart catheterization right radial coronary angiography  Deferred to valvular team for TAVR CT    Patient with symptomatic event, prior episode of syncope, continued exertional symptoms with dyspnea on exertion and exertional fatigue although she has a preserved ejection fraction      Exertional fatigue, history of syncope recently, dyspnea on exertion are her chief complaints today     Order transesophageal echo for evaluation of low gradient severe aortic valve stenosis with severely restricted valve, significant murmur on exam  significant aortic valve stenosis as above      See her back in 3 to 6 months    Mich Araujo MD, PhD    Objective:         /62 (BP Location: Left arm, Patient Position: Sitting)   Pulse 71   Resp 18   Ht 160 cm (63\")   Wt 74.4 kg (164 lb)   BMI 29.05 kg/m²     Diagnoses and all orders for this visit:    1. Nonrheumatic aortic valve stenosis (Primary)  -     Ambulatory Referral to Cardiology  -     Case Request Cath Lab: Left Heart Cath  -     CBC & Differential  -     Comprehensive Metabolic Panel  -     Protime-INR          The pleasure to be involved in this patient's cardiovascular care.  Please call with any questions or concerns  Mich Araujo MD, PhD    Most recent EKG as reviewed and interpreted by me:  Procedures     Most recent echo as reviewed and interpreted by me:  Results for orders placed during the hospital encounter of 02/13/24    Adult Transesophageal Echo (DHEERAJ) W/ Cont if Necessary Per Protocol    Interpretation Summary    Left ventricular systolic function is low normal. Left ventricular " ejection fraction appears to be 55%.    Left ventricular wall thickness is consistent with mild concentric hypertrophy.    The left atrial cavity is moderately dilated.    Saline test results are negative.    Moderate to severe aortic valve stenosis is present.    Aortic valve area by planimetry method is 1.0 cm².  Peak aortic velocity is noted to be 2.0 m/s      Most recent stress test as reviewed and interpreted by me:  Results for orders placed during the hospital encounter of 04/14/23    Stress Test With Myocardial Perfusion One Day    Interpretation Summary    Left ventricular ejection fraction is normal (Calculated EF = 58%).    Myocardial perfusion imaging indicates a normal myocardial perfusion study with no evidence of ischemia.    Impressions are consistent with a low risk study.      Most recent cardiac catheterization as reviewed interpreted by me:  No results found for this or any previous visit.    The following portions of the patient's history were reviewed and updated as appropriate: allergies, current medications, past family history, past medical history, past social history, past surgical history, and problem list.      ROS:  14 point review of systems negative except as mentioned above    Current Outpatient Medications:     atorvastatin (LIPITOR) 10 MG tablet, Take 1 tablet by mouth Daily., Disp: , Rfl:     dilTIAZem CD (CARDIZEM CD) 240 MG 24 hr capsule, Take 1 capsule by mouth Daily., Disp: , Rfl:     ferrous sulfate 325 (65 FE) MG EC tablet, Take 1 tablet by mouth Daily With Breakfast., Disp: 90 tablet, Rfl: 1    furosemide (LASIX) 20 MG tablet, Take 1 tablet by mouth Daily., Disp: 30 tablet, Rfl: 11    meclizine (ANTIVERT) 25 MG tablet, Take 1 tablet by mouth 3 (Three) Times a Day As Needed for Dizziness., Disp: 30 tablet, Rfl: 0    metFORMIN (Glucophage) 500 MG tablet, Take 1 tablet by mouth Daily With Breakfast. For diabetes, Disp: 30 tablet, Rfl: 6    nebivolol (Bystolic) 20 MG tablet,  Take 1 tablet by mouth Daily., Disp: 90 tablet, Rfl: 3    omeprazole (priLOSEC) 40 MG capsule, Take 1 capsule by mouth 2 (Two) Times a Day., Disp: , Rfl:     vitamin B-12 (CYANOCOBALAMIN) 1000 MCG tablet, Take 1 tablet by mouth Daily., Disp: , Rfl:     zolpidem (AMBIEN) 5 MG tablet, Take 1 tablet by mouth At Night As Needed for Sleep., Disp: , Rfl:     Problem List:  Patient Active Problem List   Diagnosis    Pacemaker    Atrial fibrillation    Coronary artery disease involving native coronary artery of native heart without angina pectoris    Hyperlipidemia, mixed    Essential hypertension    Tachy-morgan syndrome    Iron deficiency anemia due to chronic blood loss    Antral erosion    B12 deficiency    Cerebrovascular accident (CVA)    Colon cancer    Depression    Type 2 diabetes mellitus with stage 3a chronic kidney disease, without long-term current use of insulin    GERD (gastroesophageal reflux disease)    Hiatal hernia    HALEIGH (iron deficiency anemia)    LAD (lymphadenopathy)    Left pontine CVA    Mitral regurgitation    STORMY (obstructive sleep apnea)    Osteoarthritis    Prinzmetal's angina    Right kidney mass    TIA (transient ischemic attack)    Vestibular nerve disease    Presence of Watchman left atrial appendage closure device    Ataxia    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    Dilated cardiomyopathy    Unstable angina    Aortic stenosis, moderate    Other fatigue    Dyspnea on exertion    Dyspnea, unspecified type    Hypomagnesemia    Acute right ankle pain    Allergic conjunctivitis    Posterior vitreous detachment    Exudative age-related macular degeneration    Recurrent syncope    Syncope    Hypotension due to hypovolemia    Renal insufficiency    Nonrheumatic aortic valve stenosis     Past Medical History:  Past Medical History:   Diagnosis Date    Antral erosion     Aortic valve regurgitation 12/26/2018    Atrial fibrillation     Atrial fibrillation with controlled ventricular  response     B12 deficiency     CAD (coronary artery disease)     Cellulitis 04/2011    on face     Cerebrovascular accident (CVA) 12/07/2016    CHF (congestive heart failure)     Colon cancer     Coronary artery disease involving native coronary artery of native heart without angina pectoris 06/18/2019    Depression     Diabetes mellitus, type 2     Essential hypertension 02/13/2012    Fatigue     GERD (gastroesophageal reflux disease)     Hiatal hernia     History of colonoscopy 04/2010    last done 4/10    History of esophagogastroduodenoscopy (EGD) 04/2010    last done 4/10    Hyperlipidemia, mixed 02/13/2012    Hypertension     white coat htn    HALEIGH (iron deficiency anemia)     LAD (lymphadenopathy)     40% lesion LAD     Left pontine CVA     Mitral regurgitation 05/21/2012    STORMY (obstructive sleep apnea)     not treating    Osteoarthritis     Pacemaker 06/18/2019    Permanent atrial fibrillation 06/18/2019    Presence of Watchman left atrial appendage closure device 01/04/2020    Prinzmetal's angina     Right kidney mass     1.4 cm- following urology     Sick sinus syndrome     Stroke 11/2016    TIA (transient ischemic attack)     left CVA, interrupted TPA; As (moderate-severe)     Vestibular nerve disease 2017     Past Surgical History:  Past Surgical History:   Procedure Laterality Date    ATRIAL APPENDAGE EXCLUSION LEFT WITH TRANSESOPHAGEAL ECHOCARDIOGRAM N/A 10/10/2019    Procedure: Atrial Appendage Occlusion;  Surgeon: Richie Chapman MD;  Location: [facility] CATH INVASIVE LOCATION;  Service: Cardiovascular    ATRIAL APPENDAGE EXCLUSION LEFT WITH TRANSESOPHAGEAL ECHOCARDIOGRAM N/A 10/10/2019    Procedure: Atrial Appendage Occlusion;  Surgeon: Je Rivera MD;  Location: [facility] CATH INVASIVE LOCATION;  Service: Cardiology    BLADDER REPAIR      CARDIAC CATHETERIZATION  05/2010    CHOLECYSTECTOMY      COLECTOMY PARTIAL / TOTAL      COLONOSCOPY  04/16/2018    negative     COLONOSCOPY N/A  7/1/2022    Procedure: COLONOSCOPY with polypectomy x1;  Surgeon: ADOLFO Boss MD;  Location: [facility] ENDOSCOPY;  Service: Gastroenterology;  Laterality: N/A;  post: diverticulosis, hemorrhoids, polyps    ENDOSCOPY  04/16/2018    negative     ENDOSCOPY N/A 6/29/2022    Procedure: ESOPHAGOGASTRODUODENOSCOPY with biopsy of duodenum r/o celiac;  Surgeon: ADOLFO Boss MD;  Location: [facility] ENDOSCOPY;  Service: Gastroenterology;  Laterality: N/A;  hiatial hernia  Gastric Polyps        HYSTERECTOMY      INSERT / REPLACE / REMOVE PACEMAKER  09/2018    Pacemaker gen change 9/2018     OTHER SURGICAL HISTORY  04/25/2015    Exercise myoview, normal     PACEMAKER IMPLANTATION  01/22/2010    Dual Chamber - 1/22/2010; RA Lead Revision- 5/7/2012- BS     TOOTH EXTRACTION      TRANSESOPHAGEAL ECHOCARDIOGRAM (DHEERAJ)  07/2019     Social History:  Social History     Socioeconomic History    Marital status:    Tobacco Use    Smoking status: Never     Passive exposure: Never    Smokeless tobacco: Never   Vaping Use    Vaping status: Never Used   Substance and Sexual Activity    Alcohol use: No    Drug use: No    Sexual activity: Defer     Allergies:  Allergies   Allergen Reactions    Contrast Dye (Echo Or Unknown Ct/Mr) Anaphylaxis    Adhesive Tape Itching    Latex Hives     Immunizations:  Immunization History   Administered Date(s) Administered    Flu Vaccine Quad PF >36MO 10/16/2015    Fluzone High Dose =>65 Years ([employer] ONLY) 10/18/2014, 10/25/2017, 10/03/2020    Influenza, Unspecified 10/21/2019    Pneumococcal Conjugate 13-Valent (PCV13) 01/05/2021    flucelvax quad pfs =>4 YRS 10/21/2019            In-Office Procedure(s):  No orders to display        ASCVD RIsk Score::  The ASCVD Risk score (Helen DK, et al., 2019) failed to calculate for the following reasons:    The 2019 ASCVD risk score is only valid for ages 40 to 79    The patient has a prior MI or stroke diagnosis    Imaging:    Results for orders placed  during the hospital encounter of 01/09/24    XR Chest 1 View    Narrative  XR CHEST 1 VW    Date of Exam: 1/9/2024 6:52 PM EST    Indication: chest pain    Comparison: None available.    Findings: No focal consolidation. No pneumothorax or pleural effusion. Cardiac size is normal. The visualized clavicles appear intact. No displaced rib fractures. The visualized upper abdomen is normal. Triple lead cardiac pacemaker.    Impression  Impression: No acute cardiopulmonary disease.    Electronically Signed: Ruben Arce MD  1/9/2024 6:58 PM EST  Workstation ID: [id number]       Results for orders placed during the hospital encounter of 01/09/24    CT Head Without Contrast    Narrative  CT HEAD WO CONTRAST    Date of Exam: 1/9/2024 6:50 PM EST    Indication: hx recent fall hit occip.    Comparison: 6/27/2022    Technique: Axial CT images were obtained of the head without contrast administration.  Coronal reconstructions were performed.  Automated exposure control and iterative reconstruction methods were used.    Structured dose report sent to the ACR Radiation Dose Index Registry.    Exposure: The patient has had 0 known prior CTs and cardiac nuclear medicine studies within the last 12 months.    This CT exam was performed using one or more of the following dose reduction techniques: Automated exposure control, adjustment of the mA and/or kV according to patient size, or use of iterative reconstruction technique.    Findings: Gray-white matter differentiation is maintained without evidence of an acute infarction. No intracranial mass or mass effect. No extra-axial mass or collection. The ventricles and sulci are normal in size and configuration. The posterior fossa  appears normal. Sellar and suprasellar structures are normal.    Previous bilateral lens replacements. The paranasal sinuses, ethmoid air cells, and mastoid air cells are aerated. The bony calvarium appears intact. No acute fractures. No lytic or blastic bony  diseases.    Impression  Impression: No acute intracranial pathology.      Study is available in DICOM format to non-affiliated external healthcare facilities or entities on a secure, media free, reciprocally searchable basis with patient authorization for at least a 12 month period after the study. Search conducted for prior  patient CT studies completed at nonaffiliated external healthcare facilities or entities within the past 12 months and are available through a secure, authorized, media free, shared archive prior to an imaging study being performed.    Electronically Signed: Ruben Arce MD  1/9/2024 7:28 PM EST  Workstation ID: [id number]      Results for orders placed during the hospital encounter of 01/09/24    CT Head Without Contrast    Narrative  CT HEAD WO CONTRAST    Date of Exam: 1/9/2024 6:50 PM EST    Indication: hx recent fall hit occip.    Comparison: 6/27/2022    Technique: Axial CT images were obtained of the head without contrast administration.  Coronal reconstructions were performed.  Automated exposure control and iterative reconstruction methods were used.    Structured dose report sent to the ACR Radiation Dose Index Registry.    Exposure: The patient has had 0 known prior CTs and cardiac nuclear medicine studies within the last 12 months.    This CT exam was performed using one or more of the following dose reduction techniques: Automated exposure control, adjustment of the mA and/or kV according to patient size, or use of iterative reconstruction technique.    Findings: Gray-white matter differentiation is maintained without evidence of an acute infarction. No intracranial mass or mass effect. No extra-axial mass or collection. The ventricles and sulci are normal in size and configuration. The posterior fossa  appears normal. Sellar and suprasellar structures are normal.    Previous bilateral lens replacements. The paranasal sinuses, ethmoid air cells, and mastoid air cells are aerated.  The bony calvarium appears intact. No acute fractures. No lytic or blastic bony diseases.    Impression  Impression: No acute intracranial pathology.      Study is available in DICOM format to non-affiliated external healthcare facilities or entities on a secure, media free, reciprocally searchable basis with patient authorization for at least a 12 month period after the study. Search conducted for prior  patient CT studies completed at nonaffiliated external healthcare facilities or entities within the past 12 months and are available through a secure, authorized, media free, shared archive prior to an imaging study being performed.    Electronically Signed: Ruben Arce MD  1/9/2024 7:28 PM EST  Workstation ID: [id number]      Lab Review:   Hospital Outpatient Visit on 02/13/2024   Component Date Value    BH CV ECHO SHUNT ASSESSM* 02/13/2024 1     LVOT area 02/13/2024 3.8     LVOT diam 02/13/2024 2.0     SV(LVOT) 02/13/2024 90.5     LV V1 max 02/13/2024 96.9     LV V1 max PG 02/13/2024 3.8     LV V1 mean PG 02/13/2024 2.00     LV V1 VTI 02/13/2024 23.8     Ao pk alfonzo 02/13/2024 195.0     Ao max PG 02/13/2024 15.2     Ao mean PG 02/13/2024 9.0     Ao V2 VTI 02/13/2024 48.4     NAVI(I,D) 02/13/2024 1.3    Admission on 01/09/2024, Discharged on 01/11/2024   Component Date Value    QT Interval 01/09/2024 330     QTC Interval 01/09/2024 472     Glucose 01/09/2024 178 (H)     BUN 01/09/2024 65 (H)     Creatinine 01/09/2024 1.06 (H)     Sodium 01/09/2024 140     Potassium 01/09/2024 5.4 (H)     Chloride 01/09/2024 104     CO2 01/09/2024 23.0     Calcium 01/09/2024 9.8     Total Protein 01/09/2024 6.6     Albumin 01/09/2024 3.8     ALT (SGPT) 01/09/2024 11     AST (SGOT) 01/09/2024 17     Alkaline Phosphatase 01/09/2024 76     Total Bilirubin 01/09/2024 0.7     Globulin 01/09/2024 2.8     A/G Ratio 01/09/2024 1.4     BUN/Creatinine Ratio 01/09/2024 61.3 (H)     Anion Gap 01/09/2024 13.0     eGFR 01/09/2024 50.3 (L)      Protime 01/09/2024 11.3     INR 01/09/2024 1.04     PTT 01/09/2024 21.0 (L)     HS Troponin T 01/09/2024 17 (H)     WBC 01/09/2024 9.40     RBC 01/09/2024 3.93     Hemoglobin 01/09/2024 11.7 (L)     Hematocrit 01/09/2024 36.5     MCV 01/09/2024 93.0     MCH 01/09/2024 29.8     MCHC 01/09/2024 32.0     RDW 01/09/2024 13.8     RDW-SD 01/09/2024 44.6     MPV 01/09/2024 10.1     Platelets 01/09/2024 227     Neutrophil % 01/09/2024 74.5     Lymphocyte % 01/09/2024 16.0 (L)     Monocyte % 01/09/2024 7.9     Eosinophil % 01/09/2024 0.2 (L)     Basophil % 01/09/2024 1.4     Neutrophils, Absolute 01/09/2024 7.00     Lymphocytes, Absolute 01/09/2024 1.50     Monocytes, Absolute 01/09/2024 0.70     Eosinophils, Absolute 01/09/2024 0.00     Basophils, Absolute 01/09/2024 0.10     nRBC 01/09/2024 0.0     Extra Tube 01/09/2024 Hold for add-ons.     COVID19 01/09/2024 Not Detected     Influenza A PCR 01/09/2024 Not Detected     Influenza B PCR 01/09/2024 Not Detected     Glucose 01/10/2024 137 (H)     BUN 01/10/2024 53 (H)     Creatinine 01/10/2024 1.13 (H)     Sodium 01/10/2024 141     Potassium 01/10/2024 4.1     Chloride 01/10/2024 108 (H)     CO2 01/10/2024 23.0     Calcium 01/10/2024 9.1     BUN/Creatinine Ratio 01/10/2024 46.9 (H)     Anion Gap 01/10/2024 10.0     eGFR 01/10/2024 46.6 (L)     HS Troponin T 01/10/2024 24 (H)     WBC 01/10/2024 8.80     RBC 01/10/2024 3.36 (L)     Hemoglobin 01/10/2024 10.3 (L)     Hematocrit 01/10/2024 31.3 (L)     MCV 01/10/2024 93.1     MCH 01/10/2024 30.6     MCHC 01/10/2024 32.8     RDW 01/10/2024 14.2     RDW-SD 01/10/2024 45.9     MPV 01/10/2024 9.6     Platelets 01/10/2024 197     Neutrophil % 01/10/2024 55.3     Lymphocyte % 01/10/2024 33.7     Monocyte % 01/10/2024 8.9     Eosinophil % 01/10/2024 1.1     Basophil % 01/10/2024 1.0     Neutrophils, Absolute 01/10/2024 4.80     Lymphocytes, Absolute 01/10/2024 3.00     Monocytes, Absolute 01/10/2024 0.80     Eosinophils, Absolute  01/10/2024 0.10     Basophils, Absolute 01/10/2024 0.10     nRBC 01/10/2024 0.0     HS Troponin T 01/10/2024 20 (H)     Troponin T Delta 01/10/2024 -4 (L)     LVIDd 01/10/2024 3.6     LVIDs 01/10/2024 2.30     IVSd 01/10/2024 1.60     LVPWd 01/10/2024 1.50     FS 01/10/2024 36.1     IVS/LVPW 01/10/2024 1.07     ESV(cubed) 01/10/2024 12.2     LV Sys Vol (BSA correcte* 01/10/2024 11.4     EDV(cubed) 01/10/2024 46.7     LV Gilmore Vol (BSA correct* 01/10/2024 26.2     LV mass(C)d 01/10/2024 212.0     LVOT area 01/10/2024 3.5     LVOT diam 01/10/2024 2.10     EDV(MOD-sp4) 01/10/2024 45.3     ESV(MOD-sp4) 01/10/2024 19.8     SV(MOD-sp4) 01/10/2024 25.5     SI(MOD-sp4) 01/10/2024 14.7     EF(MOD-sp4) 01/10/2024 56.3     MV E max jefe 01/10/2024 115.0     Med Peak E' Jefe 01/10/2024 6.6     Lat Peak E' Jefe 01/10/2024 10.8     TR max jefe 01/10/2024 219.0     Avg E/e' ratio 01/10/2024 13.22     SV(LVOT) 01/10/2024 44.7     RVIDd 01/10/2024 2.6     RV Base 01/10/2024 3.9     RV Mid 01/10/2024 2.8     RV Length 01/10/2024 6.4     TAPSE (>1.6) 01/10/2024 1.60     RV S' 01/10/2024 9.1     LA dimension (2D)  01/10/2024 3.7     LV V1 max 01/10/2024 70.3     LV V1 max PG 01/10/2024 1.98     LV V1 mean PG 01/10/2024 1.00     LV V1 VTI 01/10/2024 12.9     Ao pk jefe 01/10/2024 179.0     Ao max PG 01/10/2024 12.8     Ao mean PG 01/10/2024 8.0     Ao V2 VTI 01/10/2024 33.4     NAVI(I,D) 01/10/2024 1.34     MV max PG 01/10/2024 3.9     MV mean PG 01/10/2024 2.00     MV V2 VTI 01/10/2024 23.5     MV P1/2t 01/10/2024 95.8     MVA(P1/2t) 01/10/2024 2.30     MVA(VTI) 01/10/2024 1.90     MV dec slope 01/10/2024 312.0     TR max PG 01/10/2024 19.2     RV V1 max PG 01/10/2024 2.30     RV V1 max 01/10/2024 75.8     RV V1 VTI 01/10/2024 10.7     PA V2 max 01/10/2024 68.5     Sinus 01/10/2024 2.8     Glucose 01/11/2024 134 (H)     BUN 01/11/2024 40 (H)     Creatinine 01/11/2024 1.14 (H)     Sodium 01/11/2024 142     Potassium 01/11/2024 3.8     Chloride  01/11/2024 107     CO2 01/11/2024 25.0     Calcium 01/11/2024 9.2     BUN/Creatinine Ratio 01/11/2024 35.1 (H)     Anion Gap 01/11/2024 10.0     eGFR 01/11/2024 46.1 (L)     WBC 01/11/2024 6.30     RBC 01/11/2024 3.06 (L)     Hemoglobin 01/11/2024 9.3 (L)     Hematocrit 01/11/2024 28.2 (L)     MCV 01/11/2024 92.2     MCH 01/11/2024 30.3     MCHC 01/11/2024 32.8     RDW 01/11/2024 13.8     RDW-SD 01/11/2024 44.2     MPV 01/11/2024 9.8     Platelets 01/11/2024 192     Neutrophil % 01/11/2024 55.7     Lymphocyte % 01/11/2024 32.7     Monocyte % 01/11/2024 9.0     Eosinophil % 01/11/2024 2.5     Basophil % 01/11/2024 0.1     Neutrophils, Absolute 01/11/2024 3.50     Lymphocytes, Absolute 01/11/2024 2.10     Monocytes, Absolute 01/11/2024 0.60     Eosinophils, Absolute 01/11/2024 0.20     Basophils, Absolute 01/11/2024 0.00     nRBC 01/11/2024 0.0     RSV, PCR 01/11/2024 Not Detected      Recent labs reviewed and interpreted for clinical significance and application            Level of Care:           Mich Araujo MD  03/14/24  .

## 2024-03-23 NOTE — PROGRESS NOTES
Encounter Date:03/26/2024        Patient ID: Cherie Hinton is a 89 y.o. female.      Chief Complaint:      History of Present Illness  89 years old woman, patient of Dr. Araujo, with hypertension, hyperlipidemia, diabetes, stroke, paroxysmal atrial fibrillation, chronic kidney disease comes to structural heart clinic to discuss therapeutic options for severe aortic stenosis.  She has recently been complaining of worsening shortness of breath, exertional fatigue, syncopal episode leading to hospital admission.  A DHEERAJ has shown severely restricted aortic valve leaflet motion with aortic valve area of less than 1 cm² suggestive of severe symptomatic aortic stenosis.  Of note she has a permanent pacemaker in place, she also has a Micra and a Watchman device.  She is currently not on anticoagulation.    The following portions of the patient's history were reviewed and updated as appropriate: allergies, current medications, past family history, past medical history, past social history, past surgical history, and problem list.    Review of Systems   Constitutional: Positive for malaise/fatigue.   Cardiovascular:  Positive for dyspnea on exertion and leg swelling. Negative for chest pain and palpitations.   Respiratory:  Positive for shortness of breath. Negative for cough.    Gastrointestinal:  Negative for abdominal pain, nausea and vomiting.   Neurological:  Positive for dizziness and light-headedness. Negative for focal weakness, headaches and numbness.   All other systems reviewed and are negative.        Current Outpatient Medications:     atorvastatin (LIPITOR) 10 MG tablet, Take 1 tablet by mouth Daily., Disp: , Rfl:     dilTIAZem CD (CARDIZEM CD) 240 MG 24 hr capsule, Take 1 capsule by mouth Daily., Disp: , Rfl:     ferrous sulfate 325 (65 FE) MG EC tablet, Take 1 tablet by mouth Daily With Breakfast., Disp: 90 tablet, Rfl: 1    furosemide (LASIX) 20 MG tablet, Take 1 tablet by mouth Daily., Disp: 30 tablet,  Rfl: 11    meclizine (ANTIVERT) 25 MG tablet, Take 1 tablet by mouth 3 (Three) Times a Day As Needed for Dizziness., Disp: 30 tablet, Rfl: 0    metFORMIN (Glucophage) 500 MG tablet, Take 1 tablet by mouth Daily With Breakfast. For diabetes, Disp: 30 tablet, Rfl: 6    nebivolol (Bystolic) 20 MG tablet, Take 1 tablet by mouth Daily., Disp: 90 tablet, Rfl: 3    omeprazole (priLOSEC) 40 MG capsule, Take 1 capsule by mouth 2 (Two) Times a Day., Disp: , Rfl:     vitamin B-12 (CYANOCOBALAMIN) 1000 MCG tablet, Take 1 tablet by mouth Daily., Disp: , Rfl:     zolpidem (AMBIEN) 5 MG tablet, Take 1 tablet by mouth At Night As Needed for Sleep., Disp: , Rfl:     Current outpatient and discharge medications have been reconciled for the patient.  Reviewed by: Leandro Callejas MD       Allergies   Allergen Reactions    Contrast Dye (Echo Or Unknown Ct/Mr) Anaphylaxis    Adhesive Tape Itching    Latex Hives       Family History   Problem Relation Age of Onset    Hypertension Mother     Diabetes Mother     Coronary artery disease Mother     Other Father         CVA    No Known Problems Sister     No Known Problems Brother     No Known Problems Maternal Aunt     No Known Problems Maternal Uncle     No Known Problems Paternal Aunt     No Known Problems Paternal Uncle     No Known Problems Maternal Grandmother     No Known Problems Maternal Grandfather     No Known Problems Paternal Grandmother     No Known Problems Paternal Grandfather     Heart disease Other     Stroke Other     Hypertension Other     Anemia Neg Hx     Arrhythmia Neg Hx     Asthma Neg Hx     Clotting disorder Neg Hx     Fainting Neg Hx     Heart attack Neg Hx     Heart failure Neg Hx     Hyperlipidemia Neg Hx        Past Surgical History:   Procedure Laterality Date    ATRIAL APPENDAGE EXCLUSION LEFT WITH TRANSESOPHAGEAL ECHOCARDIOGRAM N/A 10/10/2019    Procedure: Atrial Appendage Occlusion;  Surgeon: Richie Chapman MD;  Location: [facility] INVASIVE LOCATION;   Service: Cardiovascular    ATRIAL APPENDAGE EXCLUSION LEFT WITH TRANSESOPHAGEAL ECHOCARDIOGRAM N/A 10/10/2019    Procedure: Atrial Appendage Occlusion;  Surgeon: Je Rivera MD;  Location: [facility] CATH INVASIVE LOCATION;  Service: Cardiology    BLADDER REPAIR      CARDIAC CATHETERIZATION  05/2010    CHOLECYSTECTOMY      COLECTOMY PARTIAL / TOTAL      COLONOSCOPY  04/16/2018    negative     COLONOSCOPY N/A 7/1/2022    Procedure: COLONOSCOPY with polypectomy x1;  Surgeon: ADOLFO Boss MD;  Location: [facility] ENDOSCOPY;  Service: Gastroenterology;  Laterality: N/A;  post: diverticulosis, hemorrhoids, polyps    ENDOSCOPY  04/16/2018    negative     ENDOSCOPY N/A 6/29/2022    Procedure: ESOPHAGOGASTRODUODENOSCOPY with biopsy of duodenum r/o celiac;  Surgeon: ADOLFO Boss MD;  Location: [facility] ENDOSCOPY;  Service: Gastroenterology;  Laterality: N/A;  hiatial hernia  Gastric Polyps        HYSTERECTOMY      INSERT / REPLACE / REMOVE PACEMAKER  09/2018    Pacemaker gen change 9/2018     OTHER SURGICAL HISTORY  04/25/2015    Exercise myoview, normal     PACEMAKER IMPLANTATION  01/22/2010    Dual Chamber - 1/22/2010; RA Lead Revision- 5/7/2012- BS     TOOTH EXTRACTION      TRANSESOPHAGEAL ECHOCARDIOGRAM (DHEERAJ)  07/2019       Past Medical History:   Diagnosis Date    Antral erosion     Aortic valve regurgitation 12/26/2018    Atrial fibrillation     Atrial fibrillation with controlled ventricular response     B12 deficiency     CAD (coronary artery disease)     Cellulitis 04/2011    on face     Cerebrovascular accident (CVA) 12/07/2016    CHF (congestive heart failure)     Colon cancer     Coronary artery disease involving native coronary artery of native heart without angina pectoris 06/18/2019    Depression     Diabetes mellitus, type 2     Essential hypertension 02/13/2012    Fatigue     GERD (gastroesophageal reflux disease)     Hiatal hernia     History of colonoscopy 04/2010    last done 4/10     History of esophagogastroduodenoscopy (EGD) 04/2010    last done 4/10    Hyperlipidemia, mixed 02/13/2012    Hypertension     white coat htn    HALEIGH (iron deficiency anemia)     LAD (lymphadenopathy)     40% lesion LAD     Left pontine CVA     Mitral regurgitation 05/21/2012    STORMY (obstructive sleep apnea)     not treating    Osteoarthritis     Pacemaker 06/18/2019    Permanent atrial fibrillation 06/18/2019    Presence of Watchman left atrial appendage closure device 01/04/2020    Prinzmetal's angina     Right kidney mass     1.4 cm- following urology     Sick sinus syndrome     Stroke 11/2016    TIA (transient ischemic attack)     left CVA, interrupted TPA; As (moderate-severe)     Vestibular nerve disease 2017       Family History   Problem Relation Age of Onset    Hypertension Mother     Diabetes Mother     Coronary artery disease Mother     Other Father         CVA    No Known Problems Sister     No Known Problems Brother     No Known Problems Maternal Aunt     No Known Problems Maternal Uncle     No Known Problems Paternal Aunt     No Known Problems Paternal Uncle     No Known Problems Maternal Grandmother     No Known Problems Maternal Grandfather     No Known Problems Paternal Grandmother     No Known Problems Paternal Grandfather     Heart disease Other     Stroke Other     Hypertension Other     Anemia Neg Hx     Arrhythmia Neg Hx     Asthma Neg Hx     Clotting disorder Neg Hx     Fainting Neg Hx     Heart attack Neg Hx     Heart failure Neg Hx     Hyperlipidemia Neg Hx        Social History     Socioeconomic History    Marital status:    Tobacco Use    Smoking status: Never     Passive exposure: Never    Smokeless tobacco: Never   Vaping Use    Vaping status: Never Used   Substance and Sexual Activity    Alcohol use: No    Drug use: No    Sexual activity: Defer               Objective:       Physical Exam    /70 (BP Location: Right arm, Patient Position: Sitting)   Pulse 72   Ht 160 cm  "(63\")   Wt 75.8 kg (167 lb)   SpO2 97%   BMI 29.58 kg/m²   The patient is alert, oriented and in no distress.    Vital signs as noted above.    Head and neck revealed no carotid bruits or jugular venous distension.  No thyromegaly or lymphadenopathy is present.    Lungs clear.  No wheezing.  Breath sounds are normal bilaterally.    Heart normal first and second heart sounds.  Late peaking systolic ejection murmur..  No pericardial rub is present.  No gallop is present.    Abdomen soft and nontender.  No organomegaly is present.    Extremities revealed good peripheral pulses without any pedal edema.    Skin warm and dry.    Musculoskeletal system is grossly normal.    CNS grossly normal.           Diagnosis Plan   1. Nonrheumatic aortic valve stenosis        2. Essential hypertension        3. Hyperlipidemia, mixed        4. Type 2 diabetes mellitus without complication, without long-term current use of insulin        5. Sick sinus syndrome        6. Permanent atrial fibrillation        LAB RESULTS (LAST 7 DAYS)    CBC        BMP        CMP         BNP        TROPONIN        CoAg  Results from last 7 days   Lab Units 03/26/24  1129   INR  0.99       Creatinine Clearance  CrCl cannot be calculated (Patient's most recent lab result is older than the maximum 30 days allowed.).    ABG        Radiology  No radiology results for the last day    EKG  Procedures    Stress test  Results for orders placed during the hospital encounter of 04/14/23    Stress Test With Myocardial Perfusion One Day    Interpretation Summary    Left ventricular ejection fraction is normal (Calculated EF = 58%).    Myocardial perfusion imaging indicates a normal myocardial perfusion study with no evidence of ischemia.    Impressions are consistent with a low risk study.      Echocardiogram  Results for orders placed during the hospital encounter of 02/13/24    Adult Transesophageal Echo (DHEERAJ) W/ Cont if Necessary Per Protocol    Interpretation " Summary    Left ventricular systolic function is low normal. Left ventricular ejection fraction appears to be 55%.    Left ventricular wall thickness is consistent with mild concentric hypertrophy.    The left atrial cavity is moderately dilated.    Saline test results are negative.    Moderate to severe aortic valve stenosis is present.    Aortic valve area by planimetry method is 1.0 cm².  Peak aortic velocity is noted to be 2.0 m/s      Cardiac catheterization  No results found for this or any previous visit.          Assessment and Plan       Diagnoses and all orders for this visit:    1. Nonrheumatic aortic valve stenosis (Primary)  Severe symptomatic aortic stenosis based on echocardiogram and transesophageal echocardiogram.  She is in NYHA class III  Plan for cardiac catheterization later this week  I have described the natural history and treatment options available to her.  I have described the TAVR procedure in details with the help of a heart model.  Risk and benefit of the procedure specially complications discussed with the patient.  We will obtain a TAVR CTA to evaluate anatomical suitability for femoral TAVR.  Premedication for contrast allergy  We will also make a cardiac surgery appointment after cardiac catheterization has been completed.    STS  Operative Mortality 12.5%   Morbidity & Mortality 16.1%   Stroke 2.17%   Renal Failure 4.28%   Reoperation 2.9%   Prolonged Ventilation 12.5%   Deep Sternal Wound Infection 0.064%   Long Hospital Stay (>14 days) 16.5%   Short Hospital Stay (<6 days)* 12.5%     2. Essential hypertension  Well-controlled on Bystolic and Cardizem    3. Hyperlipidemia, mixed  Continue high intensity statin    4. Type 2 diabetes mellitus without complication, without long-term current use of insulin  She is on metformin    5.  Atrial fibrillation/sick sinus syndrome  Permanent pacemaker in place.  Micra device please  Watchman device in place.  Not on  anticoagulation.  Currently on Cardizem for rate control    6. HFpEF  Secondary to hypertension, atrial fibrillation, valvular heart disease  Continue diuretics as needed

## 2024-03-26 ENCOUNTER — OFFICE VISIT (OUTPATIENT)
Dept: CARDIOLOGY | Facility: CLINIC | Age: 89
End: 2024-03-26
Payer: MEDICARE

## 2024-03-26 ENCOUNTER — LAB (OUTPATIENT)
Dept: LAB | Facility: HOSPITAL | Age: 89
End: 2024-03-26
Payer: MEDICARE

## 2024-03-26 VITALS
OXYGEN SATURATION: 97 % | SYSTOLIC BLOOD PRESSURE: 156 MMHG | HEART RATE: 72 BPM | HEIGHT: 63 IN | WEIGHT: 167 LBS | DIASTOLIC BLOOD PRESSURE: 70 MMHG | BODY MASS INDEX: 29.59 KG/M2

## 2024-03-26 DIAGNOSIS — I35.0 NONRHEUMATIC AORTIC VALVE STENOSIS: Primary | ICD-10-CM

## 2024-03-26 DIAGNOSIS — I49.5 SICK SINUS SYNDROME: ICD-10-CM

## 2024-03-26 DIAGNOSIS — I48.21 PERMANENT ATRIAL FIBRILLATION: ICD-10-CM

## 2024-03-26 DIAGNOSIS — E78.2 HYPERLIPIDEMIA, MIXED: ICD-10-CM

## 2024-03-26 DIAGNOSIS — E11.9 TYPE 2 DIABETES MELLITUS WITHOUT COMPLICATION, WITHOUT LONG-TERM CURRENT USE OF INSULIN: ICD-10-CM

## 2024-03-26 DIAGNOSIS — I10 ESSENTIAL HYPERTENSION: ICD-10-CM

## 2024-03-26 LAB
ALBUMIN SERPL-MCNC: 4 G/DL (ref 3.5–5.2)
ALBUMIN/GLOB SERPL: 1.5 G/DL
ALP SERPL-CCNC: 98 U/L (ref 39–117)
ALT SERPL W P-5'-P-CCNC: 11 U/L (ref 1–33)
ANION GAP SERPL CALCULATED.3IONS-SCNC: 11.6 MMOL/L (ref 5–15)
AST SERPL-CCNC: 18 U/L (ref 1–32)
BASOPHILS # BLD AUTO: 0.05 10*3/MM3 (ref 0–0.2)
BASOPHILS NFR BLD AUTO: 0.9 % (ref 0–1.5)
BILIRUB SERPL-MCNC: 0.4 MG/DL (ref 0–1.2)
BUN SERPL-MCNC: 15 MG/DL (ref 8–23)
BUN/CREAT SERPL: 13.6 (ref 7–25)
CALCIUM SPEC-SCNC: 9.4 MG/DL (ref 8.2–9.6)
CHLORIDE SERPL-SCNC: 105 MMOL/L (ref 98–107)
CO2 SERPL-SCNC: 24.4 MMOL/L (ref 22–29)
CREAT SERPL-MCNC: 1.1 MG/DL (ref 0.57–1)
DEPRECATED RDW RBC AUTO: 39.5 FL (ref 37–54)
EGFRCR SERPLBLD CKD-EPI 2021: 47.8 ML/MIN/1.73
EOSINOPHIL # BLD AUTO: 0.11 10*3/MM3 (ref 0–0.4)
EOSINOPHIL NFR BLD AUTO: 2.1 % (ref 0.3–6.2)
ERYTHROCYTE [DISTWIDTH] IN BLOOD BY AUTOMATED COUNT: 13.4 % (ref 12.3–15.4)
GLOBULIN UR ELPH-MCNC: 2.6 GM/DL
GLUCOSE SERPL-MCNC: 173 MG/DL (ref 65–99)
HCT VFR BLD AUTO: 33.6 % (ref 34–46.6)
HGB BLD-MCNC: 10.3 G/DL (ref 12–15.9)
IMM GRANULOCYTES # BLD AUTO: 0.01 10*3/MM3 (ref 0–0.05)
IMM GRANULOCYTES NFR BLD AUTO: 0.2 % (ref 0–0.5)
INR PPP: 0.99 (ref 0.93–1.1)
LYMPHOCYTES # BLD AUTO: 1.45 10*3/MM3 (ref 0.7–3.1)
LYMPHOCYTES NFR BLD AUTO: 27.4 % (ref 19.6–45.3)
MCH RBC QN AUTO: 25.2 PG (ref 26.6–33)
MCHC RBC AUTO-ENTMCNC: 30.7 G/DL (ref 31.5–35.7)
MCV RBC AUTO: 82.4 FL (ref 79–97)
MONOCYTES # BLD AUTO: 0.54 10*3/MM3 (ref 0.1–0.9)
MONOCYTES NFR BLD AUTO: 10.2 % (ref 5–12)
NEUTROPHILS NFR BLD AUTO: 3.14 10*3/MM3 (ref 1.7–7)
NEUTROPHILS NFR BLD AUTO: 59.2 % (ref 42.7–76)
NRBC BLD AUTO-RTO: 0 /100 WBC (ref 0–0.2)
PLATELET # BLD AUTO: 317 10*3/MM3 (ref 140–450)
PMV BLD AUTO: 11.2 FL (ref 6–12)
POTASSIUM SERPL-SCNC: 4.2 MMOL/L (ref 3.5–5.2)
PROT SERPL-MCNC: 6.6 G/DL (ref 6–8.5)
PROTHROMBIN TIME: 10.8 SECONDS (ref 9.6–11.7)
RBC # BLD AUTO: 4.08 10*6/MM3 (ref 3.77–5.28)
SODIUM SERPL-SCNC: 141 MMOL/L (ref 136–145)
WBC NRBC COR # BLD AUTO: 5.3 10*3/MM3 (ref 3.4–10.8)

## 2024-03-26 PROCEDURE — 80053 COMPREHEN METABOLIC PANEL: CPT | Performed by: INTERNAL MEDICINE

## 2024-03-26 PROCEDURE — 85610 PROTHROMBIN TIME: CPT | Performed by: INTERNAL MEDICINE

## 2024-03-26 PROCEDURE — 85025 COMPLETE CBC W/AUTO DIFF WBC: CPT | Performed by: INTERNAL MEDICINE

## 2024-03-26 RX ORDER — FAMOTIDINE 20 MG/1
20 TABLET, FILM COATED ORAL 2 TIMES DAILY
Qty: 2 TABLET | Refills: 0 | Status: SHIPPED | OUTPATIENT
Start: 2024-03-26 | End: 2024-03-28 | Stop reason: HOSPADM

## 2024-03-26 RX ORDER — PREDNISONE 50 MG/1
50 TABLET ORAL EVERY 6 HOURS
Qty: 3 TABLET | Refills: 0 | Status: SHIPPED | OUTPATIENT
Start: 2024-03-26 | End: 2024-03-28 | Stop reason: HOSPADM

## 2024-03-27 ENCOUNTER — PATIENT ROUNDING (BHMG ONLY) (OUTPATIENT)
Dept: CARDIOLOGY | Facility: CLINIC | Age: 89
End: 2024-03-27
Payer: MEDICARE

## 2024-03-28 ENCOUNTER — HOSPITAL ENCOUNTER (OUTPATIENT)
Facility: HOSPITAL | Age: 89
Setting detail: HOSPITAL OUTPATIENT SURGERY
Discharge: HOME | End: 2024-03-28
Attending: INTERNAL MEDICINE | Admitting: INTERNAL MEDICINE
Payer: MEDICARE

## 2024-03-28 VITALS
DIASTOLIC BLOOD PRESSURE: 87 MMHG | WEIGHT: 166.23 LBS | OXYGEN SATURATION: 97 % | SYSTOLIC BLOOD PRESSURE: 185 MMHG | TEMPERATURE: 98 F | RESPIRATION RATE: 13 BRPM | HEIGHT: 63 IN | BODY MASS INDEX: 29.45 KG/M2 | HEART RATE: 82 BPM

## 2024-03-28 DIAGNOSIS — I35.0 NONRHEUMATIC AORTIC VALVE STENOSIS: ICD-10-CM

## 2024-03-28 PROCEDURE — 25510000001 IOPAMIDOL PER 1 ML: Performed by: INTERNAL MEDICINE

## 2024-03-28 PROCEDURE — C1769 GUIDE WIRE: HCPCS | Performed by: INTERNAL MEDICINE

## 2024-03-28 PROCEDURE — 25010000002 HEPARIN (PORCINE) PER 1000 UNITS: Performed by: INTERNAL MEDICINE

## 2024-03-28 PROCEDURE — 25010000002 MIDAZOLAM PER 1 MG: Performed by: INTERNAL MEDICINE

## 2024-03-28 PROCEDURE — 93458 L HRT ARTERY/VENTRICLE ANGIO: CPT | Performed by: INTERNAL MEDICINE

## 2024-03-28 PROCEDURE — 99152 MOD SED SAME PHYS/QHP 5/>YRS: CPT | Performed by: INTERNAL MEDICINE

## 2024-03-28 PROCEDURE — 25010000002 METHYLPREDNISOLONE PER 125 MG: Performed by: INTERNAL MEDICINE

## 2024-03-28 PROCEDURE — 25010000002 FENTANYL CITRATE (PF) 100 MCG/2ML SOLUTION: Performed by: INTERNAL MEDICINE

## 2024-03-28 PROCEDURE — 25010000002 LIDOCAINE 1 % SOLUTION: Performed by: INTERNAL MEDICINE

## 2024-03-28 PROCEDURE — 25010000002 NICARDIPINE 2.5 MG/ML SOLUTION: Performed by: INTERNAL MEDICINE

## 2024-03-28 PROCEDURE — 99153 MOD SED SAME PHYS/QHP EA: CPT | Performed by: INTERNAL MEDICINE

## 2024-03-28 PROCEDURE — C1894 INTRO/SHEATH, NON-LASER: HCPCS | Performed by: INTERNAL MEDICINE

## 2024-03-28 PROCEDURE — 25010000002 DIPHENHYDRAMINE PER 50 MG: Performed by: INTERNAL MEDICINE

## 2024-03-28 RX ORDER — METHYLPREDNISOLONE SODIUM SUCCINATE 125 MG/2ML
125 INJECTION, POWDER, LYOPHILIZED, FOR SOLUTION INTRAMUSCULAR; INTRAVENOUS
Qty: 4 ML | Refills: 0 | Status: COMPLETED | OUTPATIENT
Start: 2024-03-28 | End: 2024-03-28

## 2024-03-28 RX ORDER — MEPERIDINE HYDROCHLORIDE 25 MG/ML
12.5 INJECTION INTRAMUSCULAR; INTRAVENOUS; SUBCUTANEOUS
Status: DISCONTINUED | OUTPATIENT
Start: 2024-03-28 | End: 2024-03-28

## 2024-03-28 RX ORDER — SODIUM CHLORIDE 9 MG/ML
3 INJECTION, SOLUTION INTRAVENOUS CONTINUOUS
Status: DISCONTINUED | OUTPATIENT
Start: 2024-03-28 | End: 2024-03-28 | Stop reason: HOSPADM

## 2024-03-28 RX ORDER — FAMOTIDINE 10 MG/ML
20 INJECTION, SOLUTION INTRAVENOUS DAILY
Status: COMPLETED | OUTPATIENT
Start: 2024-03-28 | End: 2024-03-28

## 2024-03-28 RX ORDER — HYDRALAZINE HYDROCHLORIDE 20 MG/ML
5 INJECTION INTRAMUSCULAR; INTRAVENOUS
Status: DISCONTINUED | OUTPATIENT
Start: 2024-03-28 | End: 2024-03-28 | Stop reason: HOSPADM

## 2024-03-28 RX ORDER — EPHEDRINE SULFATE 5 MG/ML
5 INJECTION INTRAVENOUS ONCE AS NEEDED
Status: DISCONTINUED | OUTPATIENT
Start: 2024-03-28 | End: 2024-03-28 | Stop reason: HOSPADM

## 2024-03-28 RX ORDER — IPRATROPIUM BROMIDE AND ALBUTEROL SULFATE 2.5; .5 MG/3ML; MG/3ML
3 SOLUTION RESPIRATORY (INHALATION) ONCE AS NEEDED
Status: DISCONTINUED | OUTPATIENT
Start: 2024-03-28 | End: 2024-03-28 | Stop reason: HOSPADM

## 2024-03-28 RX ORDER — NICARDIPINE HYDROCHLORIDE 2.5 MG/ML
INJECTION INTRAVENOUS
Status: DISCONTINUED | OUTPATIENT
Start: 2024-03-28 | End: 2024-03-28 | Stop reason: HOSPADM

## 2024-03-28 RX ORDER — NITROGLYCERIN 0.4 MG/1
0.4 TABLET SUBLINGUAL
Status: DISCONTINUED | OUTPATIENT
Start: 2024-03-28 | End: 2024-03-28 | Stop reason: HOSPADM

## 2024-03-28 RX ORDER — DIPHENHYDRAMINE HYDROCHLORIDE 50 MG/ML
12.5 INJECTION INTRAMUSCULAR; INTRAVENOUS
Status: DISCONTINUED | OUTPATIENT
Start: 2024-03-28 | End: 2024-03-28 | Stop reason: HOSPADM

## 2024-03-28 RX ORDER — FENTANYL CITRATE 50 UG/ML
INJECTION, SOLUTION INTRAMUSCULAR; INTRAVENOUS
Status: DISCONTINUED | OUTPATIENT
Start: 2024-03-28 | End: 2024-03-28 | Stop reason: HOSPADM

## 2024-03-28 RX ORDER — ACETAMINOPHEN 325 MG/1
650 TABLET ORAL EVERY 4 HOURS PRN
Status: DISCONTINUED | OUTPATIENT
Start: 2024-03-28 | End: 2024-03-28 | Stop reason: HOSPADM

## 2024-03-28 RX ORDER — ONDANSETRON 2 MG/ML
4 INJECTION INTRAMUSCULAR; INTRAVENOUS ONCE AS NEEDED
Status: DISCONTINUED | OUTPATIENT
Start: 2024-03-28 | End: 2024-03-28 | Stop reason: HOSPADM

## 2024-03-28 RX ORDER — LABETALOL HYDROCHLORIDE 5 MG/ML
5 INJECTION, SOLUTION INTRAVENOUS
Status: DISCONTINUED | OUTPATIENT
Start: 2024-03-28 | End: 2024-03-28 | Stop reason: HOSPADM

## 2024-03-28 RX ORDER — MIDAZOLAM HYDROCHLORIDE 1 MG/ML
INJECTION INTRAMUSCULAR; INTRAVENOUS
Status: DISCONTINUED | OUTPATIENT
Start: 2024-03-28 | End: 2024-03-28 | Stop reason: HOSPADM

## 2024-03-28 RX ORDER — DIPHENHYDRAMINE HYDROCHLORIDE 50 MG/ML
50 INJECTION INTRAMUSCULAR; INTRAVENOUS ONCE
Qty: 1 ML | Refills: 0 | Status: COMPLETED | OUTPATIENT
Start: 2024-03-28 | End: 2024-03-28

## 2024-03-28 RX ORDER — HEPARIN SODIUM 1000 [USP'U]/ML
INJECTION, SOLUTION INTRAVENOUS; SUBCUTANEOUS
Status: DISCONTINUED | OUTPATIENT
Start: 2024-03-28 | End: 2024-03-28 | Stop reason: HOSPADM

## 2024-03-28 RX ORDER — LIDOCAINE HYDROCHLORIDE 10 MG/ML
INJECTION, SOLUTION INFILTRATION; PERINEURAL
Status: DISCONTINUED | OUTPATIENT
Start: 2024-03-28 | End: 2024-03-28 | Stop reason: HOSPADM

## 2024-03-28 RX ADMIN — METHYLPREDNISOLONE SODIUM SUCCINATE 125 MG: 125 INJECTION, POWDER, FOR SOLUTION INTRAMUSCULAR; INTRAVENOUS at 10:40

## 2024-03-28 RX ADMIN — METHYLPREDNISOLONE SODIUM SUCCINATE 125 MG: 125 INJECTION, POWDER, FOR SOLUTION INTRAMUSCULAR; INTRAVENOUS at 08:55

## 2024-03-28 RX ADMIN — DIPHENHYDRAMINE HYDROCHLORIDE 50 MG: 50 INJECTION, SOLUTION INTRAMUSCULAR; INTRAVENOUS at 08:55

## 2024-03-28 RX ADMIN — FAMOTIDINE 20 MG: 10 INJECTION INTRAVENOUS at 08:55

## 2024-03-28 NOTE — Clinical Note
A 6 fr sheath was  inserted using micropuncture technique with ultrasound guidance into the right radial artery. Sheath insertion delayed.

## 2024-03-28 NOTE — DISCHARGE INSTRUCTIONS
Post Cath Instructions      Drink plenty of fluids for the next 24 hours.  This helps to eliminate the dye used in your procedure through urination.  You may resume a normal diet; however, try to avoid foods that would cause gas or constipation.    Sedative medication given to you during your catheterization may decrease your judgement and reaction time for up to 24-48 hours.  Therefore:  DO NOT drive or operate hazardous machinery (48 hours)  DO NOT consume alcoholic beverages  DO NOT make any important/legal decisions  Have someone stay with you for at least 24 hours    To allow proper healing and prevent bleeding, the following activities are to be strictly avoided for the next 24-48 hours:  Excessive bending at wound site  Straining (anything that would tense up muscles around the affected puncture site)  Lifting objects greater than 5 pounds, pushing, or pulling for 5 days  For Arm Cases:  No flexing at the puncture site, such as hammering, golfing, bowling, or swinging any objects  For Groin Cases:  Refrain from sexual activity  Refrain from running or vigorous walking  No prolonged sitting or standing  Limit stair climbing as much as possible    Keep the puncture site clean and dry.  You may remove the dressing tomorrow and replace it with a band-aid for at least one additional day.  Gently clean the site with mild soap and water.  No scrubbing/rubbing and lightly pat the area dry.  Showers are acceptable; however, avoid submerging in water (tub baths, hot tubs, swimming pools, dishwater, etc…) for at least one week.  The site should be completely healed before resuming these activities to reduce the risk of infection.  Check the site often.  Watch for signs and symptoms of infection and notify your physician if any of the following occur:  Bleeding or an increase in swelling at the puncture site  Fever  Increased soreness around puncture site  Foul odor or significant drainage from the puncture  site  Swelling, redness, or warmth at the puncture site    **A bruise or small “pea sized” lump under the skin at the puncture site is not unusual.  This should disappear within 3-4 weeks.**  CONTACT YOUR PHYSICIAN OR CALL 911 IF YOU EXPERIENCE ANY OF THE FOLLOWING:  Increased angina (chest pain) or frequent sensations of pressure, burning, pain, or other discomfort in the chest, arm, jaws, or stomach  Lightheadedness, dizziness, faint feeling, sweating, or difficulty breathing  Odd sensation changes like numbness, tingling, coldness, or pain in the arm or leg in which the catheter was inserted  Limb in which the catheter was inserted becomes pale/bluish in color    IMPORTANT:  Although this occurs very rarely, if you should develop bright red or excessive bleeding, feel a “pop” inside at the insertion site, or notice a sudden increase in swelling larger than a walnut, you should call 911.  Hold continuous firm pressure to the access site until emergency personnel arrive.  It is best if someone else can do this for you.     Wound infection

## 2024-03-28 NOTE — Clinical Note
Hemostasis started on the right radial artery. R-Band was used in achieving hemostasis. Radial compression device applied to vessel. Hemostasis achieved successfully. Closure device additional comment: 15cc air in band

## 2024-04-02 ENCOUNTER — HOSPITAL ENCOUNTER (OUTPATIENT)
Dept: CT IMAGING | Facility: HOSPITAL | Age: 89
Discharge: HOME | End: 2024-04-02
Admitting: INTERNAL MEDICINE
Payer: MEDICARE

## 2024-04-02 VITALS — HEART RATE: 85 BPM

## 2024-04-02 DIAGNOSIS — I35.0 NONRHEUMATIC AORTIC VALVE STENOSIS: ICD-10-CM

## 2024-04-02 LAB
CREAT BLDA-MCNC: 1.1 MG/DL (ref 0.6–1.3)
EGFRCR SERPLBLD CKD-EPI 2021: 47.8 ML/MIN/1.73

## 2024-04-02 PROCEDURE — 74174 CTA ABD&PLVS W/CONTRAST: CPT

## 2024-04-02 PROCEDURE — 82565 ASSAY OF CREATININE: CPT

## 2024-04-02 PROCEDURE — A9270 NON-COVERED ITEM OR SERVICE: HCPCS

## 2024-04-02 PROCEDURE — 25510000001 IOPAMIDOL PER 1 ML: Performed by: INTERNAL MEDICINE

## 2024-04-02 PROCEDURE — 71275 CT ANGIOGRAPHY CHEST: CPT

## 2024-04-02 PROCEDURE — 63710000001 METOPROLOL TARTRATE 25 MG TABLET

## 2024-04-02 RX ADMIN — IOPAMIDOL 150 ML: 755 INJECTION, SOLUTION INTRAVENOUS at 10:25

## 2024-04-02 RX ADMIN — Medication 25 MG: at 09:21

## 2024-04-02 RX ADMIN — METOPROLOL TARTRATE 25 MG: 25 TABLET, FILM COATED ORAL at 09:21

## 2024-04-02 NOTE — NURSING NOTE
0903 Arrived in IR  0905 HR 85  0909 #20 Angiocath placed in R AC  0910 25 mg Metoprolol  given po  0919 /72 (104) HR 73, Sats 96 %, RR14  1000 To CT  1020 Back from CT  1023 IV Dc'd, /68, RR 14, HR 70 Sats 96%  1030 Pt discharged home with instructions to drink plenty of water for the rest of the day and follow up with Dr. Callejas. Pt tolerated procedure without incidence.

## 2024-04-09 ENCOUNTER — TELEPHONE (OUTPATIENT)
Dept: CARDIOLOGY | Facility: CLINIC | Age: 89
End: 2024-04-09
Payer: MEDICARE

## 2024-04-09 NOTE — TELEPHONE ENCOUNTER
Unable to leave vm for patient. She has an appt with dr caballero on 4/18 to discuss next steps and treatment plan.

## 2024-04-09 NOTE — TELEPHONE ENCOUNTER
Caller: Cherie Hinton    Relationship: Self    Best call back number: [phone] (home)      What is the best time to reach you: ANYTIME     Who are you requesting to speak with (clinical staff, provider,  specific staff member): DR MONTANEZ     What was the call regarding: PT CALLED IN REGARDS TO HER HAVING A HEART CATH AND A CT SCAN, SHE HAS NOT HEARD MUCH BACK AFTER THOSE AND SHE JUST WANTS TO KNOW IF HER HEALTH IS OKAY OR NOT, SHE IS WONDERING THE OUTCOME OF THOSE AND WHAT TO DO FROM HERE. PT SAYS SHE HAS A COUPLE VACATIONS COMING UP IN MAY SO SHE JUST WANTED TO BE SURE SHE WAS GOOD BEFORE TRAVELING.     Is it okay if the provider responds through MyChart: CALL BACK

## 2024-04-18 ENCOUNTER — OFFICE VISIT (OUTPATIENT)
Dept: CARDIAC SURGERY | Facility: CLINIC | Age: 89
End: 2024-04-18
Payer: MEDICARE

## 2024-04-18 VITALS
WEIGHT: 165 LBS | OXYGEN SATURATION: 98 % | HEIGHT: 63 IN | TEMPERATURE: 96.4 F | RESPIRATION RATE: 20 BRPM | SYSTOLIC BLOOD PRESSURE: 164 MMHG | BODY MASS INDEX: 29.23 KG/M2 | HEART RATE: 73 BPM | DIASTOLIC BLOOD PRESSURE: 92 MMHG

## 2024-04-18 DIAGNOSIS — Z95.818 PRESENCE OF WATCHMAN LEFT ATRIAL APPENDAGE CLOSURE DEVICE: ICD-10-CM

## 2024-04-18 DIAGNOSIS — I48.21 PERMANENT ATRIAL FIBRILLATION: Primary | ICD-10-CM

## 2024-04-18 DIAGNOSIS — I35.0 NONRHEUMATIC AORTIC VALVE STENOSIS: ICD-10-CM

## 2024-04-18 DIAGNOSIS — I42.0 DILATED CARDIOMYOPATHY: ICD-10-CM

## 2024-04-18 DIAGNOSIS — N18.30 HYPERTENSIVE HEART AND CHRONIC KIDNEY DISEASE STAGE 3: ICD-10-CM

## 2024-04-18 DIAGNOSIS — R06.09 DYSPNEA ON EXERTION: ICD-10-CM

## 2024-04-18 DIAGNOSIS — I13.10 HYPERTENSIVE HEART AND CHRONIC KIDNEY DISEASE STAGE 3: ICD-10-CM

## 2024-04-18 DIAGNOSIS — I25.10 CORONARY ARTERY DISEASE INVOLVING NATIVE CORONARY ARTERY OF NATIVE HEART WITHOUT ANGINA PECTORIS: ICD-10-CM

## 2024-04-18 DIAGNOSIS — G47.33 OSA (OBSTRUCTIVE SLEEP APNEA): ICD-10-CM

## 2024-04-18 DIAGNOSIS — Z95.0 PACEMAKER: ICD-10-CM

## 2024-04-18 DIAGNOSIS — I35.0 AORTIC STENOSIS, MODERATE: ICD-10-CM

## 2024-04-18 DIAGNOSIS — R06.00 DYSPNEA, UNSPECIFIED TYPE: ICD-10-CM

## 2024-04-18 NOTE — LETTER
April 21, 2024     Andrew Antunez MD  [id number]  [place] [id number]    Patient: Cherie Hinton   YOB: 1934   Date of Visit: 4/18/2024     Dear Andrew Antunez MD:       Thank you for referring Cherie Hinton to me for evaluation. Below are the relevant portions of my assessment and plan of care.    If you have questions, please do not hesitate to call me. I look forward to following Cherie along with you.         Sincerely,        Yonatan Shanks MD        CC: MD Mich Vega MD Pagni, Sebastian, MD  04/21/24 1929  Sign when Signing Visit  4/21/2024    Seen on 4/18/2024    Chief Complaint     Dyspnea, severe aortic stenosis    History of Present Illness:       Dear Leandro and Colleagues,  It was nice to see Cherie Hinton in consultation at your request. She is a 89 y.o. female with a fibrillation, status post pacemaker implantation, coronary artery disease, hypertension, previous stroke, colon cancer, GERD, diabetes and CKD 3 who has developed progressive dyspnea of exertion and fatigue.  She had a syncopal episode leading to hospital admission in the past.  Symptoms have been rapidly progressive.  Patient had thoracic echocardiogram the joint ejection fraction of 50 to 60%, calcification of the aortic valve with an aortic valve area 1.3, mean gradient of 8 mmHg and peak velocity of 1.8 m/s.  A DHEERAJ follow that showed an aortic valve area of 1 cm squire, with a peak velocity of 2 m/s and an ejection fraction of 55%.  A cardiac cath showed no significant coronary artery disease with a peak to peak gradient of 20 to 30 mmHg.. She denies TIA, orthopnea, PND or lower extremity edema.  She is referred for surgical evaluation of aortic stenosis.      Patient Active Problem List   Diagnosis   • Pacemaker   • Atrial fibrillation   • Coronary artery disease involving native coronary artery of native heart without angina pectoris   • Hyperlipidemia, mixed   •  Essential hypertension   • Tachy-morgan syndrome   • Iron deficiency anemia due to chronic blood loss   • Antral erosion   • B12 deficiency   • Cerebrovascular accident (CVA)   • Colon cancer   • Depression   • Type 2 diabetes mellitus with stage 3a chronic kidney disease, without long-term current use of insulin   • GERD (gastroesophageal reflux disease)   • Hiatal hernia   • HALEIGH (iron deficiency anemia)   • LAD (lymphadenopathy)   • Left pontine CVA   • Mitral regurgitation   • STORMY (obstructive sleep apnea)   • Osteoarthritis   • Prinzmetal's angina   • Right kidney mass   • TIA (transient ischemic attack)   • Vestibular nerve disease   • Presence of Watchman left atrial appendage closure device   • Ataxia   • 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   • Dilated cardiomyopathy   • Unstable angina   • Aortic stenosis, moderate   • Other fatigue   • Dyspnea on exertion   • Dyspnea, unspecified type   • Hypomagnesemia   • Acute right ankle pain   • Allergic conjunctivitis   • Posterior vitreous detachment   • Exudative age-related macular degeneration   • Recurrent syncope   • Syncope   • Hypotension due to hypovolemia   • Renal insufficiency   • Nonrheumatic aortic valve stenosis       Past Medical History:   Diagnosis Date   • Antral erosion    • Aortic valve regurgitation 12/26/2018   • Atrial fibrillation    • Atrial fibrillation with controlled ventricular response    • B12 deficiency    • CAD (coronary artery disease)    • Cellulitis 04/2011    on face    • Cerebrovascular accident (CVA) 12/07/2016   • CHF (congestive heart failure)    • Colon cancer    • Coronary artery disease involving native coronary artery of native heart without angina pectoris 06/18/2019   • Depression    • Diabetes mellitus, type 2    • Essential hypertension 02/13/2012   • Fatigue    • GERD (gastroesophageal reflux disease)    • Hiatal hernia    • History of colonoscopy 04/2010    last done 4/10   • History of  esophagogastroduodenoscopy (EGD) 04/2010    last done 4/10   • Hyperlipidemia, mixed 02/13/2012   • Hypertension     white coat htn   • HALEIGH (iron deficiency anemia)    • LAD (lymphadenopathy)     40% lesion LAD    • Left pontine CVA    • Mitral regurgitation 05/21/2012   • STORMY (obstructive sleep apnea)     not treating   • Osteoarthritis    • Pacemaker 06/18/2019   • Permanent atrial fibrillation 06/18/2019   • Presence of Watchman left atrial appendage closure device 01/04/2020   • Prinzmetal's angina    • Right kidney mass     1.4 cm- following urology    • Sick sinus syndrome    • Stroke 11/2016   • TIA (transient ischemic attack)     left CVA, interrupted TPA; As (moderate-severe)    • Vestibular nerve disease 2017       Past Surgical History:   Procedure Laterality Date   • ATRIAL APPENDAGE EXCLUSION LEFT WITH TRANSESOPHAGEAL ECHOCARDIOGRAM N/A 10/10/2019    Procedure: Atrial Appendage Occlusion;  Surgeon: Richie Chapman MD;  Location: [facility] CATH INVASIVE LOCATION;  Service: Cardiovascular   • ATRIAL APPENDAGE EXCLUSION LEFT WITH TRANSESOPHAGEAL ECHOCARDIOGRAM N/A 10/10/2019    Procedure: Atrial Appendage Occlusion;  Surgeon: Je Rivera MD;  Location: [facility] CATH INVASIVE LOCATION;  Service: Cardiology   • BLADDER REPAIR     • CARDIAC CATHETERIZATION  05/2010   • CARDIAC CATHETERIZATION N/A 3/28/2024    Procedure: Left Heart Cath;  Surgeon: Mich Araujo MD;  Location: [facility] CATH INVASIVE LOCATION;  Service: Cardiology;  Laterality: N/A;   • CHOLECYSTECTOMY     • COLECTOMY PARTIAL / TOTAL     • COLONOSCOPY  04/16/2018    negative    • COLONOSCOPY N/A 7/1/2022    Procedure: COLONOSCOPY with polypectomy x1;  Surgeon: ADOLFO Boss MD;  Location: [facility] ENDOSCOPY;  Service: Gastroenterology;  Laterality: N/A;  post: diverticulosis, hemorrhoids, polyps   • ENDOSCOPY  04/16/2018    negative    • ENDOSCOPY N/A 6/29/2022    Procedure: ESOPHAGOGASTRODUODENOSCOPY with biopsy of  duodenum r/o celiac;  Surgeon: ADOLFO Boss MD;  Location: [facility] ENDOSCOPY;  Service: Gastroenterology;  Laterality: N/A;  hiatial hernia  Gastric Polyps       • HYSTERECTOMY     • INSERT / REPLACE / REMOVE PACEMAKER  09/2018    Pacemaker gen change 9/2018    • OTHER SURGICAL HISTORY  04/25/2015    Exercise myoview, normal    • PACEMAKER IMPLANTATION  01/22/2010    Dual Chamber - 1/22/2010; RA Lead Revision- 5/7/2012- BS    • TOOTH EXTRACTION     • TRANSESOPHAGEAL ECHOCARDIOGRAM (DHEERAJ)  07/2019       Allergies   Allergen Reactions   • Contrast Dye (Echo Or Unknown Ct/Mr) Anaphylaxis   • Adhesive Tape Itching   • Latex Hives         Current Outpatient Medications:   •  atorvastatin (LIPITOR) 10 MG tablet, Take 1 tablet by mouth Daily., Disp: , Rfl:   •  dilTIAZem CD (CARDIZEM CD) 240 MG 24 hr capsule, Take 1 capsule by mouth Daily., Disp: , Rfl:   •  ferrous sulfate 325 (65 FE) MG EC tablet, Take 1 tablet by mouth Daily With Breakfast., Disp: 90 tablet, Rfl: 1  •  furosemide (LASIX) 20 MG tablet, Take 1 tablet by mouth Daily., Disp: 30 tablet, Rfl: 11  •  meclizine (ANTIVERT) 25 MG tablet, Take 1 tablet by mouth 3 (Three) Times a Day As Needed for Dizziness., Disp: 30 tablet, Rfl: 0  •  metFORMIN (Glucophage) 500 MG tablet, Take 1 tablet by mouth Daily With Breakfast. For diabetes, Disp: 30 tablet, Rfl: 6  •  nebivolol (Bystolic) 20 MG tablet, Take 1 tablet by mouth Daily., Disp: 90 tablet, Rfl: 3  •  omeprazole (priLOSEC) 40 MG capsule, Take 1 capsule by mouth 2 (Two) Times a Day., Disp: , Rfl:   •  vitamin B-12 (CYANOCOBALAMIN) 1000 MCG tablet, Take 1 tablet by mouth Daily., Disp: , Rfl:   •  zolpidem (AMBIEN) 5 MG tablet, Take 1 tablet by mouth At Night As Needed for Sleep., Disp: , Rfl:     Social History     Socioeconomic History   • Marital status:    Tobacco Use   • Smoking status: Never     Passive exposure: Never   • Smokeless tobacco: Never   Vaping Use   • Vaping status: Never Used   Substance  and Sexual Activity   • Alcohol use: No   • Drug use: No   • Sexual activity: Defer       Family History   Problem Relation Age of Onset   • Hypertension Mother    • Diabetes Mother    • Coronary artery disease Mother    • Other Father         CVA   • No Known Problems Sister    • No Known Problems Brother    • No Known Problems Maternal Aunt    • No Known Problems Maternal Uncle    • No Known Problems Paternal Aunt    • No Known Problems Paternal Uncle    • No Known Problems Maternal Grandmother    • No Known Problems Maternal Grandfather    • No Known Problems Paternal Grandmother    • No Known Problems Paternal Grandfather    • Heart disease Other    • Stroke Other    • Hypertension Other    • Anemia Neg Hx    • Arrhythmia Neg Hx    • Asthma Neg Hx    • Clotting disorder Neg Hx    • Fainting Neg Hx    • Heart attack Neg Hx    • Heart failure Neg Hx    • Hyperlipidemia Neg Hx      Review of Systems:  As HPI, otherwise noncontributory    Physical Exam:    Vital Signs:  Weight: 74.8 kg (165 lb)   Body mass index is 29.23 kg/m².  Temp: 96.4 °F (35.8 °C)   Heart Rate: 73   BP: 164/92     Constitutional:       Appearance: Well-developed.   Eyes:      Conjunctiva/sclera: Conjunctivae normal.      Pupils: Pupils are equal, round, and reactive to light.   HENT:      Head: Normocephalic and atraumatic.      Nose: Nose normal.   Neck:      Thyroid: No thyromegaly.      Vascular: No JVD.      Lymphadenopathy: No cervical adenopathy.   Pulmonary:      Effort: Pulmonary effort is normal.      Breath sounds: Normal breath sounds. No rales.   Cardiovascular:      Normal rate. Regular rhythm.      Murmurs: There is a grade 3/6 harsh midsystolic murmur at the URSB, radiating to the neck.      No gallop.    Pulses:     Intact distal pulses. No decreased pulses.   Edema:     Peripheral edema absent.   Abdominal:      General: Bowel sounds are normal. There is no distension.      Palpations: Abdomen is soft. There is no abdominal  mass.      Tenderness: There is no abdominal tenderness.   Musculoskeletal: Normal range of motion.         General: No tenderness or deformity.      Cervical back: Normal range of motion and neck supple. Skin:     General: Skin is warm and dry.      Findings: No erythema or rash.   Neurological:      Mental Status: Alert and oriented to person, place, and time.      Deep Tendon Reflexes: Reflexes are normal and symmetric.   Psychiatric:         Behavior: Behavior normal.          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    Dilated cardiomyopathy    Aortic stenosis, moderate    Dyspnea on exertion    Nonrheumatic aortic valve stenosis       Sleep    STORMY (obstructive sleep apnea)       Symptoms and Signs    Dyspnea, unspecified type     Diagnoses         Codes Comments    Permanent atrial fibrillation    -  Primary ICD-10-CM: I48.21  ICD-9-CM: 427.31     Pacemaker     ICD-10-CM: Z95.0  ICD-9-CM: V45.01     Coronary artery disease involving native coronary artery of native heart without angina pectoris     ICD-10-CM: I25.10  ICD-9-CM: 414.01     Presence of Watchman left atrial appendage closure device     ICD-10-CM: Z95.818  ICD-9-CM: V45.09     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     ICD-10-CM: I13.10, N18.30  ICD-9-CM: 404.90, 585.3     Aortic stenosis, moderate     ICD-10-CM: I35.0  ICD-9-CM: 424.1     Dyspnea on exertion     ICD-10-CM: R06.09  ICD-9-CM: 786.09     Nonrheumatic aortic valve stenosis     ICD-10-CM: I35.0  ICD-9-CM: 424.1     Dilated cardiomyopathy     ICD-10-CM: I42.0  ICD-9-CM: 425.4     STORMY (obstructive sleep apnea)     ICD-10-CM: G47.33  ICD-9-CM: 327.23     Dyspnea, unspecified  type     ICD-10-CM: R06.00  ICD-9-CM: 786.09             Assessment/recommendation:     89-year-old female with multiple comorbidities and findings suggestive of clinically significant aortic stenosis.  She has a valve area 1 cm squire by DHEERAJ and significant sclerosis.  She had a syncopal episode.  The gradient has been low suggesting paradoxical AS with normal EF.  It is unclear whether other etiologies for her dyspnea but having syncope suggest that the aortic valve is a big component of her problem.  She has a TAVR CTA to suggest feasibility for implantation and vascular access.  I recommend TAVR over SAVR however we will discuss the case in our structural heart conference to further obtain consensus regarding her aortic stenosis and severity.  I discussed with the patient that regarding her age she is physiologically in good shape therefore she should be a rescue if need be.  His STS risk for surgery is high over 10% with high comorbidity burden.  I explained the procedure to the patient he agrees to proceed if the team believes that is the right thing to do      Thank you for allowing me to participate in her care.    Regards,    Yonatan Shanks M.D.    I spent over 45 minutes before, during after the office visit and reviewing the records, examining the patient, reviewing interpreting myself the echocardiogram, the DHEERAJ, the cardiac cath and TAVR CTA, discussing the findings and options, discussing my recommendation of TAVR if the team agrees the severity of the AS is real and I spent time in documenting in the electronic record

## 2024-04-21 NOTE — PROGRESS NOTES
4/21/2024    Seen on 4/18/2024    Chief Complaint     Dyspnea, severe aortic stenosis    History of Present Illness:       Dear Leandro and Colleagues,  It was nice to see Cherie AGNES Hinton in consultation at your request. She is a 89 y.o. female with a fibrillation, status post pacemaker implantation, coronary artery disease, hypertension, previous stroke, colon cancer, GERD, diabetes and CKD 3 who has developed progressive dyspnea of exertion and fatigue.  She had a syncopal episode leading to hospital admission in the past.  Symptoms have been rapidly progressive.  Patient had thoracic echocardiogram the joint ejection fraction of 50 to 60%, calcification of the aortic valve with an aortic valve area 1.3, mean gradient of 8 mmHg and peak velocity of 1.8 m/s.  A DHEERAJ follow that showed an aortic valve area of 1 cm squire, with a peak velocity of 2 m/s and an ejection fraction of 55%.  A cardiac cath showed no significant coronary artery disease with a peak to peak gradient of 20 to 30 mmHg.. She denies TIA, orthopnea, PND or lower extremity edema.  She is referred for surgical evaluation of aortic stenosis.      Patient Active Problem List   Diagnosis    Pacemaker    Atrial fibrillation    Coronary artery disease involving native coronary artery of native heart without angina pectoris    Hyperlipidemia, mixed    Essential hypertension    Tachy-morgan syndrome    Iron deficiency anemia due to chronic blood loss    Antral erosion    B12 deficiency    Cerebrovascular accident (CVA)    Colon cancer    Depression    Type 2 diabetes mellitus with stage 3a chronic kidney disease, without long-term current use of insulin    GERD (gastroesophageal reflux disease)    Hiatal hernia    HALEIGH (iron deficiency anemia)    LAD (lymphadenopathy)    Left pontine CVA    Mitral regurgitation    STORMY (obstructive sleep apnea)    Osteoarthritis    Prinzmetal's angina    Right kidney mass    TIA (transient ischemic attack)    Vestibular nerve  disease    Presence of Watchman left atrial appendage closure device    Ataxia    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    Dilated cardiomyopathy    Unstable angina    Aortic stenosis, moderate    Other fatigue    Dyspnea on exertion    Dyspnea, unspecified type    Hypomagnesemia    Acute right ankle pain    Allergic conjunctivitis    Posterior vitreous detachment    Exudative age-related macular degeneration    Recurrent syncope    Syncope    Hypotension due to hypovolemia    Renal insufficiency    Nonrheumatic aortic valve stenosis       Past Medical History:   Diagnosis Date    Antral erosion     Aortic valve regurgitation 12/26/2018    Atrial fibrillation     Atrial fibrillation with controlled ventricular response     B12 deficiency     CAD (coronary artery disease)     Cellulitis 04/2011    on face     Cerebrovascular accident (CVA) 12/07/2016    CHF (congestive heart failure)     Colon cancer     Coronary artery disease involving native coronary artery of native heart without angina pectoris 06/18/2019    Depression     Diabetes mellitus, type 2     Essential hypertension 02/13/2012    Fatigue     GERD (gastroesophageal reflux disease)     Hiatal hernia     History of colonoscopy 04/2010    last done 4/10    History of esophagogastroduodenoscopy (EGD) 04/2010    last done 4/10    Hyperlipidemia, mixed 02/13/2012    Hypertension     white coat htn    HALEIGH (iron deficiency anemia)     LAD (lymphadenopathy)     40% lesion LAD     Left pontine CVA     Mitral regurgitation 05/21/2012    STORMY (obstructive sleep apnea)     not treating    Osteoarthritis     Pacemaker 06/18/2019    Permanent atrial fibrillation 06/18/2019    Presence of Watchman left atrial appendage closure device 01/04/2020    Prinzmetal's angina     Right kidney mass     1.4 cm- following urology     Sick sinus syndrome     Stroke 11/2016    TIA (transient ischemic attack)     left CVA, interrupted TPA; As (moderate-severe)      Vestibular nerve disease 2017       Past Surgical History:   Procedure Laterality Date    ATRIAL APPENDAGE EXCLUSION LEFT WITH TRANSESOPHAGEAL ECHOCARDIOGRAM N/A 10/10/2019    Procedure: Atrial Appendage Occlusion;  Surgeon: Richie Chapman MD;  Location: [facility] CATH INVASIVE LOCATION;  Service: Cardiovascular    ATRIAL APPENDAGE EXCLUSION LEFT WITH TRANSESOPHAGEAL ECHOCARDIOGRAM N/A 10/10/2019    Procedure: Atrial Appendage Occlusion;  Surgeon: Je Rivera MD;  Location: [facility] CATH INVASIVE LOCATION;  Service: Cardiology    BLADDER REPAIR      CARDIAC CATHETERIZATION  05/2010    CARDIAC CATHETERIZATION N/A 3/28/2024    Procedure: Left Heart Cath;  Surgeon: Mich Araujo MD;  Location: [facility] CATH INVASIVE LOCATION;  Service: Cardiology;  Laterality: N/A;    CHOLECYSTECTOMY      COLECTOMY PARTIAL / TOTAL      COLONOSCOPY  04/16/2018    negative     COLONOSCOPY N/A 7/1/2022    Procedure: COLONOSCOPY with polypectomy x1;  Surgeon: ADOLFO Boss MD;  Location: [facility] ENDOSCOPY;  Service: Gastroenterology;  Laterality: N/A;  post: diverticulosis, hemorrhoids, polyps    ENDOSCOPY  04/16/2018    negative     ENDOSCOPY N/A 6/29/2022    Procedure: ESOPHAGOGASTRODUODENOSCOPY with biopsy of duodenum r/o celiac;  Surgeon: ADOLFO Boss MD;  Location: [facility] ENDOSCOPY;  Service: Gastroenterology;  Laterality: N/A;  hiatial hernia  Gastric Polyps        HYSTERECTOMY      INSERT / REPLACE / REMOVE PACEMAKER  09/2018    Pacemaker gen change 9/2018     OTHER SURGICAL HISTORY  04/25/2015    Exercise myoview, normal     PACEMAKER IMPLANTATION  01/22/2010    Dual Chamber - 1/22/2010; RA Lead Revision- 5/7/2012- BS     TOOTH EXTRACTION      TRANSESOPHAGEAL ECHOCARDIOGRAM (DHEERAJ)  07/2019       Allergies   Allergen Reactions    Contrast Dye (Echo Or Unknown Ct/Mr) Anaphylaxis    Adhesive Tape Itching    Latex Hives         Current Outpatient Medications:     atorvastatin (LIPITOR) 10 MG tablet, Take 1  tablet by mouth Daily., Disp: , Rfl:     dilTIAZem CD (CARDIZEM CD) 240 MG 24 hr capsule, Take 1 capsule by mouth Daily., Disp: , Rfl:     ferrous sulfate 325 (65 FE) MG EC tablet, Take 1 tablet by mouth Daily With Breakfast., Disp: 90 tablet, Rfl: 1    furosemide (LASIX) 20 MG tablet, Take 1 tablet by mouth Daily., Disp: 30 tablet, Rfl: 11    meclizine (ANTIVERT) 25 MG tablet, Take 1 tablet by mouth 3 (Three) Times a Day As Needed for Dizziness., Disp: 30 tablet, Rfl: 0    metFORMIN (Glucophage) 500 MG tablet, Take 1 tablet by mouth Daily With Breakfast. For diabetes, Disp: 30 tablet, Rfl: 6    nebivolol (Bystolic) 20 MG tablet, Take 1 tablet by mouth Daily., Disp: 90 tablet, Rfl: 3    omeprazole (priLOSEC) 40 MG capsule, Take 1 capsule by mouth 2 (Two) Times a Day., Disp: , Rfl:     vitamin B-12 (CYANOCOBALAMIN) 1000 MCG tablet, Take 1 tablet by mouth Daily., Disp: , Rfl:     zolpidem (AMBIEN) 5 MG tablet, Take 1 tablet by mouth At Night As Needed for Sleep., Disp: , Rfl:     Social History     Socioeconomic History    Marital status:    Tobacco Use    Smoking status: Never     Passive exposure: Never    Smokeless tobacco: Never   Vaping Use    Vaping status: Never Used   Substance and Sexual Activity    Alcohol use: No    Drug use: No    Sexual activity: Defer       Family History   Problem Relation Age of Onset    Hypertension Mother     Diabetes Mother     Coronary artery disease Mother     Other Father         CVA    No Known Problems Sister     No Known Problems Brother     No Known Problems Maternal Aunt     No Known Problems Maternal Uncle     No Known Problems Paternal Aunt     No Known Problems Paternal Uncle     No Known Problems Maternal Grandmother     No Known Problems Maternal Grandfather     No Known Problems Paternal Grandmother     No Known Problems Paternal Grandfather     Heart disease Other     Stroke Other     Hypertension Other     Anemia Neg Hx     Arrhythmia Neg Hx     Asthma Neg Hx      Clotting disorder Neg Hx     Fainting Neg Hx     Heart attack Neg Hx     Heart failure Neg Hx     Hyperlipidemia Neg Hx      Review of Systems:  As HPI, otherwise noncontributory    Physical Exam:    Vital Signs:  Weight: 74.8 kg (165 lb)   Body mass index is 29.23 kg/m².  Temp: 96.4 °F (35.8 °C)   Heart Rate: 73   BP: 164/92     Constitutional:       Appearance: Well-developed.   Eyes:      Conjunctiva/sclera: Conjunctivae normal.      Pupils: Pupils are equal, round, and reactive to light.   HENT:      Head: Normocephalic and atraumatic.      Nose: Nose normal.   Neck:      Thyroid: No thyromegaly.      Vascular: No JVD.      Lymphadenopathy: No cervical adenopathy.   Pulmonary:      Effort: Pulmonary effort is normal.      Breath sounds: Normal breath sounds. No rales.   Cardiovascular:      Normal rate. Regular rhythm.      Murmurs: There is a grade 3/6 harsh midsystolic murmur at the URSB, radiating to the neck.      No gallop.    Pulses:     Intact distal pulses. No decreased pulses.   Edema:     Peripheral edema absent.   Abdominal:      General: Bowel sounds are normal. There is no distension.      Palpations: Abdomen is soft. There is no abdominal mass.      Tenderness: There is no abdominal tenderness.   Musculoskeletal: Normal range of motion.         General: No tenderness or deformity.      Cervical back: Normal range of motion and neck supple. Skin:     General: Skin is warm and dry.      Findings: No erythema or rash.   Neurological:      Mental Status: Alert and oriented to person, place, and time.      Deep Tendon Reflexes: Reflexes are normal and symmetric.   Psychiatric:         Behavior: Behavior normal.          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    Dilated cardiomyopathy    Aortic stenosis, moderate    Dyspnea on exertion    Nonrheumatic aortic valve stenosis       Sleep    STORMY (obstructive sleep apnea)       Symptoms and Signs    Dyspnea, unspecified type     Diagnoses         Codes Comments    Permanent atrial fibrillation    -  Primary ICD-10-CM: I48.21  ICD-9-CM: 427.31     Pacemaker     ICD-10-CM: Z95.0  ICD-9-CM: V45.01     Coronary artery disease involving native coronary artery of native heart without angina pectoris     ICD-10-CM: I25.10  ICD-9-CM: 414.01     Presence of Watchman left atrial appendage closure device     ICD-10-CM: Z95.818  ICD-9-CM: V45.09     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     ICD-10-CM: I13.10, N18.30  ICD-9-CM: 404.90, 585.3     Aortic stenosis, moderate     ICD-10-CM: I35.0  ICD-9-CM: 424.1     Dyspnea on exertion     ICD-10-CM: R06.09  ICD-9-CM: 786.09     Nonrheumatic aortic valve stenosis     ICD-10-CM: I35.0  ICD-9-CM: 424.1     Dilated cardiomyopathy     ICD-10-CM: I42.0  ICD-9-CM: 425.4     STORMY (obstructive sleep apnea)     ICD-10-CM: G47.33  ICD-9-CM: 327.23     Dyspnea, unspecified type     ICD-10-CM: R06.00  ICD-9-CM: 786.09             Assessment/recommendation:     89-year-old female with multiple comorbidities and findings suggestive of clinically significant aortic stenosis.  She has a valve area 1 cm squire by DHEERAJ and significant sclerosis.  She had a syncopal episode.  The gradient has been low suggesting paradoxical AS with normal EF.  It is unclear whether other etiologies for her dyspnea but having syncope suggest that the aortic valve is a big component of her problem.  She has a TAVR CTA to suggest feasibility for implantation and vascular access.  I recommend TAVR over SAVR however we will discuss the case in our structural heart conference to further obtain consensus regarding her aortic stenosis and severity.  I discussed with  the patient that regarding her age she is physiologically in good shape therefore she should be a rescue if need be.  His STS risk for surgery is high over 10% with high comorbidity burden.  I explained the procedure to the patient he agrees to proceed if the team believes that is the right thing to do      Thank you for allowing me to participate in her care.    Regards,    Yonatan Shanks M.D.    I spent over 45 minutes before, during after the office visit and reviewing the records, examining the patient, reviewing interpreting myself the echocardiogram, the DHEERAJ, the cardiac cath and TAVR CTA, discussing the findings and options, discussing my recommendation of TAVR if the team agrees the severity of the AS is real and I spent time in documenting in the electronic record

## 2024-04-26 ENCOUNTER — PREP FOR SURGERY (OUTPATIENT)
Dept: OTHER | Facility: HOSPITAL | Age: 89
End: 2024-04-26
Payer: MEDICARE

## 2024-04-26 DIAGNOSIS — I10 ESSENTIAL HYPERTENSION: ICD-10-CM

## 2024-04-26 DIAGNOSIS — I35.0 NONRHEUMATIC AORTIC VALVE STENOSIS: Primary | ICD-10-CM

## 2024-04-26 DIAGNOSIS — I35.0 AORTIC STENOSIS, SEVERE: Primary | ICD-10-CM

## 2024-04-26 DIAGNOSIS — Z95.2 S/P TAVR (TRANSCATHETER AORTIC VALVE REPLACEMENT): ICD-10-CM

## 2024-04-26 DIAGNOSIS — I50.32 CONGESTIVE HEART FAILURE, NYHA CLASS 3, CHRONIC, DIASTOLIC: ICD-10-CM

## 2024-04-26 RX ORDER — SODIUM CHLORIDE 9 MG/ML
40 INJECTION, SOLUTION INTRAVENOUS AS NEEDED
OUTPATIENT
Start: 2024-04-26

## 2024-04-26 RX ORDER — SODIUM CHLORIDE 0.9 % (FLUSH) 0.9 %
10 SYRINGE (ML) INJECTION EVERY 12 HOURS SCHEDULED
OUTPATIENT
Start: 2024-04-26

## 2024-04-26 RX ORDER — FAMOTIDINE 20 MG/1
20 TABLET, FILM COATED ORAL 2 TIMES DAILY
Qty: 2 TABLET | Refills: 0 | Status: SHIPPED | OUTPATIENT
Start: 2024-04-26

## 2024-04-26 RX ORDER — SODIUM CHLORIDE 0.9 % (FLUSH) 0.9 %
10 SYRINGE (ML) INJECTION AS NEEDED
OUTPATIENT
Start: 2024-04-26

## 2024-04-26 RX ORDER — PREDNISONE 50 MG/1
50 TABLET ORAL EVERY 6 HOURS
Qty: 3 TABLET | Refills: 0 | Status: SHIPPED | OUTPATIENT
Start: 2024-04-26

## 2024-04-26 RX ORDER — CHLORHEXIDINE GLUCONATE 500 MG/1
CLOTH TOPICAL EVERY 12 HOURS PRN
OUTPATIENT
Start: 2024-04-26

## 2024-04-29 ENCOUNTER — TELEPHONE (OUTPATIENT)
Dept: CARDIAC REHAB | Facility: HOSPITAL | Age: 89
End: 2024-04-29
Payer: MEDICARE

## 2024-04-30 ENCOUNTER — TELEPHONE (OUTPATIENT)
Dept: CARDIOLOGY | Facility: HOSPITAL | Age: 89
End: 2024-04-30
Payer: MEDICARE

## 2024-04-30 NOTE — TELEPHONE ENCOUNTER
Called and left second message w/grand-daughter, number on file. Instructed that patient is scheduled for TAVR on 5/20/24 at 0800. Patient to arrive at the hospital at 0500. Instructed to call back for medication and pre-op instructions.

## 2024-05-17 ENCOUNTER — HOSPITAL ENCOUNTER (OUTPATIENT)
Dept: CARDIOLOGY | Facility: HOSPITAL | Age: 89
Discharge: HOME | End: 2024-05-17
Payer: MEDICARE

## 2024-05-17 ENCOUNTER — HOSPITAL ENCOUNTER (OUTPATIENT)
Dept: GENERAL RADIOLOGY | Facility: HOSPITAL | Age: 89
Discharge: HOME | End: 2024-05-17
Payer: MEDICARE

## 2024-05-17 ENCOUNTER — PRE-ADMISSION TESTING (OUTPATIENT)
Dept: PREADMISSION TESTING | Facility: HOSPITAL | Age: 89
DRG: 267 | End: 2024-05-17
Payer: MEDICARE

## 2024-05-17 ENCOUNTER — LAB (OUTPATIENT)
Dept: LAB | Facility: HOSPITAL | Age: 89
End: 2024-05-17
Payer: MEDICARE

## 2024-05-17 VITALS
TEMPERATURE: 96.8 F | WEIGHT: 165.8 LBS | BODY MASS INDEX: 28.31 KG/M2 | OXYGEN SATURATION: 96 % | HEIGHT: 64 IN | HEART RATE: 80 BPM | RESPIRATION RATE: 18 BRPM | SYSTOLIC BLOOD PRESSURE: 159 MMHG | DIASTOLIC BLOOD PRESSURE: 83 MMHG

## 2024-05-17 DIAGNOSIS — I35.0 NONRHEUMATIC AORTIC VALVE STENOSIS: ICD-10-CM

## 2024-05-17 DIAGNOSIS — I50.32 CONGESTIVE HEART FAILURE, NYHA CLASS 3, CHRONIC, DIASTOLIC: ICD-10-CM

## 2024-05-17 DIAGNOSIS — I10 ESSENTIAL HYPERTENSION: ICD-10-CM

## 2024-05-17 LAB
ABO GROUP BLD: NORMAL
ALBUMIN SERPL-MCNC: 4 G/DL (ref 3.5–5.2)
ALBUMIN/GLOB SERPL: 1.3 G/DL
ALP SERPL-CCNC: 112 U/L (ref 39–117)
ALT SERPL W P-5'-P-CCNC: 10 U/L (ref 1–33)
ANION GAP SERPL CALCULATED.3IONS-SCNC: 11 MMOL/L (ref 5–15)
AST SERPL-CCNC: 18 U/L (ref 1–32)
BASOPHILS # BLD AUTO: 0.06 10*3/MM3 (ref 0–0.2)
BASOPHILS NFR BLD AUTO: 1.2 % (ref 0–1.5)
BILIRUB SERPL-MCNC: 0.5 MG/DL (ref 0–1.2)
BLD GP AB SCN SERPL QL: NEGATIVE
BUN SERPL-MCNC: 19 MG/DL (ref 8–23)
BUN/CREAT SERPL: 18.3 (ref 7–25)
CALCIUM SPEC-SCNC: 9.6 MG/DL (ref 8.2–9.6)
CHLORIDE SERPL-SCNC: 104 MMOL/L (ref 98–107)
CO2 SERPL-SCNC: 26 MMOL/L (ref 22–29)
CREAT SERPL-MCNC: 1.04 MG/DL (ref 0.57–1)
DEPRECATED RDW RBC AUTO: 49.6 FL (ref 37–54)
EGFRCR SERPLBLD CKD-EPI 2021: 51.2 ML/MIN/1.73
EOSINOPHIL # BLD AUTO: 0.17 10*3/MM3 (ref 0–0.4)
EOSINOPHIL NFR BLD AUTO: 3.3 % (ref 0.3–6.2)
ERYTHROCYTE [DISTWIDTH] IN BLOOD BY AUTOMATED COUNT: 16.5 % (ref 12.3–15.4)
GLOBULIN UR ELPH-MCNC: 3.2 GM/DL
GLUCOSE SERPL-MCNC: 169 MG/DL (ref 65–99)
HCT VFR BLD AUTO: 33.4 % (ref 34–46.6)
HGB BLD-MCNC: 10 G/DL (ref 12–15.9)
IMM GRANULOCYTES # BLD AUTO: 0.01 10*3/MM3 (ref 0–0.05)
IMM GRANULOCYTES NFR BLD AUTO: 0.2 % (ref 0–0.5)
INR PPP: 0.98 (ref 0.93–1.1)
LYMPHOCYTES # BLD AUTO: 1.67 10*3/MM3 (ref 0.7–3.1)
LYMPHOCYTES NFR BLD AUTO: 32.7 % (ref 19.6–45.3)
MCH RBC QN AUTO: 24.6 PG (ref 26.6–33)
MCHC RBC AUTO-ENTMCNC: 29.9 G/DL (ref 31.5–35.7)
MCV RBC AUTO: 82.1 FL (ref 79–97)
MONOCYTES # BLD AUTO: 0.45 10*3/MM3 (ref 0.1–0.9)
MONOCYTES NFR BLD AUTO: 8.8 % (ref 5–12)
MRSA DNA SPEC QL NAA+PROBE: NORMAL
NEUTROPHILS NFR BLD AUTO: 2.75 10*3/MM3 (ref 1.7–7)
NEUTROPHILS NFR BLD AUTO: 53.8 % (ref 42.7–76)
NRBC BLD AUTO-RTO: 0 /100 WBC (ref 0–0.2)
NT-PROBNP SERPL-MCNC: 557.9 PG/ML (ref 0–1800)
PLATELET # BLD AUTO: 320 10*3/MM3 (ref 140–450)
PMV BLD AUTO: 10.1 FL (ref 6–12)
POTASSIUM SERPL-SCNC: 4.2 MMOL/L (ref 3.5–5.2)
PROT SERPL-MCNC: 7.2 G/DL (ref 6–8.5)
PROTHROMBIN TIME: 10.7 SECONDS (ref 9.6–11.7)
RBC # BLD AUTO: 4.07 10*6/MM3 (ref 3.77–5.28)
RH BLD: POSITIVE
SARS-COV-2 RNA RESP QL NAA+PROBE: NOT DETECTED
SODIUM SERPL-SCNC: 141 MMOL/L (ref 136–145)
T&S EXPIRATION DATE: NORMAL
WBC NRBC COR # BLD AUTO: 5.11 10*3/MM3 (ref 3.4–10.8)

## 2024-05-17 PROCEDURE — 86901 BLOOD TYPING SEROLOGIC RH(D): CPT

## 2024-05-17 PROCEDURE — 87635 SARS-COV-2 COVID-19 AMP PRB: CPT

## 2024-05-17 PROCEDURE — 85610 PROTHROMBIN TIME: CPT

## 2024-05-17 PROCEDURE — 86850 RBC ANTIBODY SCREEN: CPT

## 2024-05-17 PROCEDURE — 86900 BLOOD TYPING SEROLOGIC ABO: CPT

## 2024-05-17 PROCEDURE — 85025 COMPLETE CBC W/AUTO DIFF WBC: CPT

## 2024-05-17 PROCEDURE — 86923 COMPATIBILITY TEST ELECTRIC: CPT

## 2024-05-17 PROCEDURE — 36415 COLL VENOUS BLD VENIPUNCTURE: CPT

## 2024-05-17 PROCEDURE — 80053 COMPREHEN METABOLIC PANEL: CPT

## 2024-05-17 PROCEDURE — 93005 ELECTROCARDIOGRAM TRACING: CPT | Performed by: NURSE PRACTITIONER

## 2024-05-17 PROCEDURE — 71046 X-RAY EXAM CHEST 2 VIEWS: CPT

## 2024-05-17 PROCEDURE — 87641 MR-STAPH DNA AMP PROBE: CPT

## 2024-05-17 PROCEDURE — 83880 ASSAY OF NATRIURETIC PEPTIDE: CPT

## 2024-05-17 NOTE — PAT
Emergency Contact - Daughter- [phone] or [phone]                                      Granddaughter- Ofe- [phone]    Results of 5MW= 8.0, 7.06, 7.82 seconds

## 2024-05-19 ENCOUNTER — ANESTHESIA EVENT (OUTPATIENT)
Dept: PERIOP | Facility: HOSPITAL | Age: 89
End: 2024-05-19
Payer: MEDICARE

## 2024-05-19 NOTE — ANESTHESIA PREPROCEDURE EVALUATION
Anesthesia Evaluation     Patient summary reviewed and Nursing notes reviewed   NPO Solid Status: > 8 hours  NPO Liquid Status: > 8 hours           Airway   Mallampati: II  TM distance: >3 FB  Neck ROM: full  No difficulty expected  Dental - normal exam     Pulmonary - normal exam   (+) ,shortness of breath, sleep apnea  Cardiovascular - normal exam    ECG reviewed    (+) pacemaker pacemaker, hypertension, valvular problems/murmurs, CAD, dysrhythmias, angina, CHF , hyperlipidemia      Neuro/Psych  (+) TIA, CVA, syncope, numbness, psychiatric history  GI/Hepatic/Renal/Endo    (+) hiatal hernia, GERD, PUD, renal disease-, diabetes mellitus    Musculoskeletal     Abdominal  - normal exam    Bowel sounds: normal.   Substance History      OB/GYN          Other   arthritis,   history of cancer    ROS/Med Hx Other: AF      dominant right circulation, nonobstructive epicardial mild atherosclerotic disease 20 to 30% diffusely with nothing obstructive and KATHY-3 flow in all vessels.  Elevated transaortic valve gradient likely low gradient severe aortic valve stenosis by transesophageal echo, preserved EF, mildly elevated LVEDP at 15        ·  Left ventricular systolic function is low normal. Left ventricular ejection fraction appears to be 55%.  ·  Left ventricular wall thickness is consistent with mild concentric hypertrophy.  ·  The left atrial cavity is moderately dilated.  ·  Saline test results are negative.  ·  Moderate to severe aortic valve stenosis is present.  ·  Aortic valve area by planimetry method is 1.0 cm².  Peak aortic velocity is noted to be 2.0 m/s                  Anesthesia Plan    ASA 4     general and Marii     intravenous induction     Anesthetic plan, risks, benefits, and alternatives have been provided, discussed and informed consent has been obtained with: patient.    CODE STATUS:

## 2024-05-20 ENCOUNTER — APPOINTMENT (OUTPATIENT)
Dept: CARDIOLOGY | Facility: HOSPITAL | Age: 89
DRG: 267 | End: 2024-05-20
Payer: MEDICARE

## 2024-05-20 ENCOUNTER — ANESTHESIA (OUTPATIENT)
Dept: PERIOP | Facility: HOSPITAL | Age: 89
End: 2024-05-20
Payer: MEDICARE

## 2024-05-20 ENCOUNTER — HOSPITAL ENCOUNTER (INPATIENT)
Facility: HOSPITAL | Age: 89
LOS: 1 days | Discharge: HOME | DRG: 267 | End: 2024-05-21
Attending: INTERNAL MEDICINE | Admitting: INTERNAL MEDICINE
Payer: MEDICARE

## 2024-05-20 DIAGNOSIS — I35.0 NONRHEUMATIC AORTIC VALVE STENOSIS: ICD-10-CM

## 2024-05-20 DIAGNOSIS — I50.32 CONGESTIVE HEART FAILURE, NYHA CLASS 3, CHRONIC, DIASTOLIC: ICD-10-CM

## 2024-05-20 DIAGNOSIS — Z95.2 S/P TAVR (TRANSCATHETER AORTIC VALVE REPLACEMENT): Primary | ICD-10-CM

## 2024-05-20 LAB
ACT BLD: 116 SECONDS (ref 89–137)
ACT BLD: 134 SECONDS (ref 89–137)
ACT BLD: 256 SECONDS (ref 89–137)
ACT BLD: 305 SECONDS (ref 89–137)
AORTIC DIMENSIONLESS INDEX: 0.48 (DI)
BASE DEFICIT: ABNORMAL
BASE DEFICIT: ABNORMAL
BASE EXCESS BLDA CALC-SCNC: <0 MMOL/L (ref 0–3)
BASE EXCESS BLDA CALC-SCNC: <0 MMOL/L (ref 0–3)
BH BB BLOOD EXPIRATION DATE: NORMAL
BH BB BLOOD EXPIRATION DATE: NORMAL
BH BB BLOOD TYPE BARCODE: 6200
BH BB BLOOD TYPE BARCODE: 6200
BH BB DISPENSE STATUS: NORMAL
BH BB DISPENSE STATUS: NORMAL
BH BB PRODUCT CODE: NORMAL
BH BB PRODUCT CODE: NORMAL
BH BB UNIT NUMBER: NORMAL
BH BB UNIT NUMBER: NORMAL
BH CV ECHO MEAS - AO MAX PG: 9.1 MMHG
BH CV ECHO MEAS - AO MEAN PG: 6 MMHG
BH CV ECHO MEAS - AO V2 MAX: 151 CM/SEC
BH CV ECHO MEAS - AO V2 VTI: 45.8 CM
BH CV ECHO MEAS - AVA(I,D): 1.44 CM2
BH CV ECHO MEAS - LV MAX PG: 2.07 MMHG
BH CV ECHO MEAS - LV MEAN PG: 1 MMHG
BH CV ECHO MEAS - LV V1 MAX: 71.9 CM/SEC
BH CV ECHO MEAS - LV V1 VTI: 23.3 CM
BH CV ECHO MEAS - LVOT AREA: 2.8 CM2
BH CV ECHO MEAS - LVOT DIAM: 1.9 CM
BH CV ECHO MEAS - SV(LVOT): 66.1 ML
CA-I BLDA-SCNC: 1.25 MMOL/L (ref 1.12–1.32)
CA-I BLDA-SCNC: 1.3 MMOL/L (ref 1.12–1.32)
CO2 BLDA-SCNC: 25 MMOL/L (ref 23–27)
CO2 BLDA-SCNC: 25 MMOL/L (ref 23–27)
CROSSMATCH INTERPRETATION: NORMAL
CROSSMATCH INTERPRETATION: NORMAL
GLUCOSE BLDC GLUCOMTR-MCNC: 188 MG/DL (ref 70–105)
GLUCOSE BLDC GLUCOMTR-MCNC: 201 MG/DL (ref 70–105)
GLUCOSE BLDC GLUCOMTR-MCNC: 213 MG/DL (ref 70–105)
GLUCOSE BLDC GLUCOMTR-MCNC: 215 MG/DL (ref 70–105)
GLUCOSE BLDC GLUCOMTR-MCNC: 221 MG/DL (ref 70–105)
HCO3 BLDA-SCNC: 23.1 MMOL/L (ref 22–26)
HCO3 BLDA-SCNC: 23.2 MMOL/L (ref 22–26)
HCT VFR BLDA CALC: 28 % (ref 38–51)
HCT VFR BLDA CALC: 29 % (ref 38–51)
HGB BLDA-MCNC: 9.5 G/DL (ref 12–17)
HGB BLDA-MCNC: 9.9 G/DL (ref 12–17)
PCO2 BLDA: 43.8 MM HG (ref 35–45)
PCO2 BLDA: 46.4 MM HG (ref 35–45)
PH BLDA: 7.31 PH UNITS (ref 7.35–7.45)
PH BLDA: 7.33 PH UNITS (ref 7.35–7.45)
PO2 BLDA: 147 MM HG (ref 80–105)
PO2 BLDA: 74 MM HG (ref 80–105)
POTASSIUM BLDA-SCNC: 3.8 MMOL/L (ref 3.5–4.9)
POTASSIUM BLDA-SCNC: 3.9 MMOL/L (ref 3.5–4.9)
POTASSIUM SERPL-SCNC: 3.9 MMOL/L (ref 3.5–5.2)
POTASSIUM SERPL-SCNC: 4.4 MMOL/L (ref 3.5–5.2)
QT INTERVAL: 408 MS
QTC INTERVAL: 458 MS
SAO2 % BLDCOA: 93 % (ref 95–98)
SAO2 % BLDCOA: 99 % (ref 95–98)
SODIUM BLD-SCNC: 137 MMOL/L (ref 138–146)
SODIUM BLD-SCNC: 139 MMOL/L (ref 138–146)
UNIT  ABO: NORMAL
UNIT  ABO: NORMAL
UNIT  RH: NORMAL
UNIT  RH: NORMAL

## 2024-05-20 PROCEDURE — C1894 INTRO/SHEATH, NON-LASER: HCPCS | Performed by: INTERNAL MEDICINE

## 2024-05-20 PROCEDURE — 82948 REAGENT STRIP/BLOOD GLUCOSE: CPT

## 2024-05-20 PROCEDURE — 25010000002 ONDANSETRON PER 1 MG: Performed by: ANESTHESIOLOGY

## 2024-05-20 PROCEDURE — C1760 CLOSURE DEV, VASC: HCPCS | Performed by: INTERNAL MEDICINE

## 2024-05-20 PROCEDURE — C1769 GUIDE WIRE: HCPCS | Performed by: INTERNAL MEDICINE

## 2024-05-20 PROCEDURE — 82330 ASSAY OF CALCIUM: CPT

## 2024-05-20 PROCEDURE — 25010000002 LIDOCAINE 1 % SOLUTION: Performed by: INTERNAL MEDICINE

## 2024-05-20 PROCEDURE — 85014 HEMATOCRIT: CPT

## 2024-05-20 PROCEDURE — 93308 TTE F-UP OR LMTD: CPT | Performed by: INTERNAL MEDICINE

## 2024-05-20 PROCEDURE — 85347 COAGULATION TIME ACTIVATED: CPT

## 2024-05-20 PROCEDURE — 25510000001 IOPAMIDOL PER 1 ML: Performed by: INTERNAL MEDICINE

## 2024-05-20 PROCEDURE — 25010000002 NICARDIPINE 2.5 MG/ML SOLUTION: Performed by: ANESTHESIOLOGY

## 2024-05-20 PROCEDURE — B4101ZZ FLUOROSCOPY OF ABDOMINAL AORTA USING LOW OSMOLAR CONTRAST: ICD-10-PCS | Performed by: INTERNAL MEDICINE

## 2024-05-20 PROCEDURE — 25010000002 CEFAZOLIN PER 500 MG: Performed by: NURSE PRACTITIONER

## 2024-05-20 PROCEDURE — 33361 REPLACE AORTIC VALVE PERQ: CPT | Performed by: INTERNAL MEDICINE

## 2024-05-20 PROCEDURE — 84132 ASSAY OF SERUM POTASSIUM: CPT

## 2024-05-20 PROCEDURE — 93308 TTE F-UP OR LMTD: CPT

## 2024-05-20 PROCEDURE — 25010000002 PROPOFOL 1000 MG/100ML EMULSION: Performed by: ANESTHESIOLOGY

## 2024-05-20 PROCEDURE — 25810000003 SODIUM CHLORIDE 0.9 % SOLUTION: Performed by: ANESTHESIOLOGY

## 2024-05-20 PROCEDURE — 25010000002 PROTAMINE SULFATE PER 10 MG: Performed by: ANESTHESIOLOGY

## 2024-05-20 PROCEDURE — 82803 BLOOD GASES ANY COMBINATION: CPT

## 2024-05-20 PROCEDURE — 33361 REPLACE AORTIC VALVE PERQ: CPT | Performed by: THORACIC SURGERY (CARDIOTHORACIC VASCULAR SURGERY)

## 2024-05-20 PROCEDURE — 93325 DOPPLER ECHO COLOR FLOW MAPG: CPT

## 2024-05-20 PROCEDURE — 02RF38Z REPLACEMENT OF AORTIC VALVE WITH ZOOPLASTIC TISSUE, PERCUTANEOUS APPROACH: ICD-10-PCS | Performed by: INTERNAL MEDICINE

## 2024-05-20 PROCEDURE — C1751 CATH, INF, PER/CENT/MIDLINE: HCPCS | Performed by: INTERNAL MEDICINE

## 2024-05-20 PROCEDURE — 25010000002 MIDAZOLAM PER 1 MG: Performed by: ANESTHESIOLOGY

## 2024-05-20 PROCEDURE — 84132 ASSAY OF SERUM POTASSIUM: CPT | Performed by: INTERNAL MEDICINE

## 2024-05-20 PROCEDURE — C1889 IMPLANT/INSERT DEVICE, NOC: HCPCS | Performed by: INTERNAL MEDICINE

## 2024-05-20 PROCEDURE — 25010000002 FENTANYL CITRATE (PF) 100 MCG/2ML SOLUTION: Performed by: ANESTHESIOLOGY

## 2024-05-20 PROCEDURE — 93321 DOPPLER ECHO F-UP/LMTD STD: CPT

## 2024-05-20 PROCEDURE — 84295 ASSAY OF SERUM SODIUM: CPT

## 2024-05-20 PROCEDURE — 25010000002 HEPARIN (PORCINE) PER 1000 UNITS: Performed by: INTERNAL MEDICINE

## 2024-05-20 PROCEDURE — 93325 DOPPLER ECHO COLOR FLOW MAPG: CPT | Performed by: INTERNAL MEDICINE

## 2024-05-20 PROCEDURE — B41G1ZZ FLUOROSCOPY OF LEFT LOWER EXTREMITY ARTERIES USING LOW OSMOLAR CONTRAST: ICD-10-PCS | Performed by: INTERNAL MEDICINE

## 2024-05-20 PROCEDURE — C1887 CATHETER, GUIDING: HCPCS | Performed by: INTERNAL MEDICINE

## 2024-05-20 PROCEDURE — 25010000002 HEPARIN (PORCINE) PER 1000 UNITS: Performed by: ANESTHESIOLOGY

## 2024-05-20 PROCEDURE — 63710000001 INSULIN LISPRO (HUMAN) PER 5 UNITS: Performed by: NURSE PRACTITIONER

## 2024-05-20 PROCEDURE — 82947 ASSAY GLUCOSE BLOOD QUANT: CPT

## 2024-05-20 PROCEDURE — 93321 DOPPLER ECHO F-UP/LMTD STD: CPT | Performed by: INTERNAL MEDICINE

## 2024-05-20 PROCEDURE — 25810000003 SODIUM CHLORIDE 0.9 % SOLUTION: Performed by: INTERNAL MEDICINE

## 2024-05-20 PROCEDURE — B41F1ZZ FLUOROSCOPY OF RIGHT LOWER EXTREMITY ARTERIES USING LOW OSMOLAR CONTRAST: ICD-10-PCS | Performed by: INTERNAL MEDICINE

## 2024-05-20 PROCEDURE — S0260 H&P FOR SURGERY: HCPCS | Performed by: INTERNAL MEDICINE

## 2024-05-20 DEVICE — VLV EVOLUTFX-26 COMM US
Type: IMPLANTABLE DEVICE | Site: HEART | Status: FUNCTIONAL
Brand: EVOLUT™ FX

## 2024-05-20 RX ORDER — SODIUM CHLORIDE 9 MG/ML
INJECTION, SOLUTION INTRAVENOUS CONTINUOUS PRN
Status: DISCONTINUED | OUTPATIENT
Start: 2024-05-20 | End: 2024-05-20 | Stop reason: SURG

## 2024-05-20 RX ORDER — NEBIVOLOL 10 MG/1
20 TABLET ORAL DAILY
Status: DISCONTINUED | OUTPATIENT
Start: 2024-05-20 | End: 2024-05-21 | Stop reason: HOSPADM

## 2024-05-20 RX ORDER — DILTIAZEM HYDROCHLORIDE 240 MG/1
240 CAPSULE, COATED, EXTENDED RELEASE ORAL DAILY
Status: DISCONTINUED | OUTPATIENT
Start: 2024-05-20 | End: 2024-05-21 | Stop reason: HOSPADM

## 2024-05-20 RX ORDER — SODIUM CHLORIDE 9 MG/ML
30 INJECTION, SOLUTION INTRAVENOUS CONTINUOUS
Status: DISPENSED | OUTPATIENT
Start: 2024-05-20 | End: 2024-05-20

## 2024-05-20 RX ORDER — PROPOFOL 10 MG/ML
INJECTION, EMULSION INTRAVENOUS CONTINUOUS PRN
Status: DISCONTINUED | OUTPATIENT
Start: 2024-05-20 | End: 2024-05-20 | Stop reason: SURG

## 2024-05-20 RX ORDER — ONDANSETRON 2 MG/ML
INJECTION INTRAMUSCULAR; INTRAVENOUS AS NEEDED
Status: DISCONTINUED | OUTPATIENT
Start: 2024-05-20 | End: 2024-05-20 | Stop reason: SURG

## 2024-05-20 RX ORDER — SODIUM CHLORIDE 0.9 % (FLUSH) 0.9 %
10 SYRINGE (ML) INJECTION AS NEEDED
Status: DISCONTINUED | OUTPATIENT
Start: 2024-05-20 | End: 2024-05-20

## 2024-05-20 RX ORDER — DEXTROSE MONOHYDRATE 25 G/50ML
25 INJECTION, SOLUTION INTRAVENOUS
Status: DISCONTINUED | OUTPATIENT
Start: 2024-05-20 | End: 2024-05-21 | Stop reason: HOSPADM

## 2024-05-20 RX ORDER — SODIUM CHLORIDE 9 MG/ML
40 INJECTION, SOLUTION INTRAVENOUS AS NEEDED
Status: DISCONTINUED | OUTPATIENT
Start: 2024-05-20 | End: 2024-05-20

## 2024-05-20 RX ORDER — IBUPROFEN 600 MG/1
1 TABLET ORAL
Status: DISCONTINUED | OUTPATIENT
Start: 2024-05-20 | End: 2024-05-21 | Stop reason: HOSPADM

## 2024-05-20 RX ORDER — INSULIN LISPRO 100 [IU]/ML
2-9 INJECTION, SOLUTION INTRAVENOUS; SUBCUTANEOUS
Status: DISCONTINUED | OUTPATIENT
Start: 2024-05-20 | End: 2024-05-21 | Stop reason: HOSPADM

## 2024-05-20 RX ORDER — BISACODYL 10 MG
10 SUPPOSITORY, RECTAL RECTAL DAILY PRN
Status: DISCONTINUED | OUTPATIENT
Start: 2024-05-21 | End: 2024-05-21 | Stop reason: HOSPADM

## 2024-05-20 RX ORDER — SODIUM CHLORIDE 0.9 % (FLUSH) 0.9 %
10 SYRINGE (ML) INJECTION EVERY 12 HOURS SCHEDULED
Status: DISCONTINUED | OUTPATIENT
Start: 2024-05-20 | End: 2024-05-20

## 2024-05-20 RX ORDER — HEPARIN SODIUM 1000 [USP'U]/ML
INJECTION, SOLUTION INTRAVENOUS; SUBCUTANEOUS AS NEEDED
Status: DISCONTINUED | OUTPATIENT
Start: 2024-05-20 | End: 2024-05-20 | Stop reason: SURG

## 2024-05-20 RX ORDER — LIDOCAINE HYDROCHLORIDE 10 MG/ML
INJECTION, SOLUTION INFILTRATION; PERINEURAL AS NEEDED
Status: DISCONTINUED | OUTPATIENT
Start: 2024-05-20 | End: 2024-05-20 | Stop reason: HOSPADM

## 2024-05-20 RX ORDER — NICOTINE POLACRILEX 4 MG
15 LOZENGE BUCCAL
Status: DISCONTINUED | OUTPATIENT
Start: 2024-05-20 | End: 2024-05-21 | Stop reason: HOSPADM

## 2024-05-20 RX ORDER — NITROGLYCERIN 0.4 MG/1
0.4 TABLET SUBLINGUAL
Status: DISCONTINUED | OUTPATIENT
Start: 2024-05-20 | End: 2024-05-20

## 2024-05-20 RX ORDER — LANOLIN ALCOHOL/MO/W.PET/CERES
1000 CREAM (GRAM) TOPICAL DAILY
Status: DISCONTINUED | OUTPATIENT
Start: 2024-05-20 | End: 2024-05-21 | Stop reason: HOSPADM

## 2024-05-20 RX ORDER — FUROSEMIDE 20 MG/1
20 TABLET ORAL DAILY
Status: DISCONTINUED | OUTPATIENT
Start: 2024-05-20 | End: 2024-05-21 | Stop reason: HOSPADM

## 2024-05-20 RX ORDER — POTASSIUM CHLORIDE 20 MEQ/1
20 TABLET, EXTENDED RELEASE ORAL ONCE
Status: COMPLETED | OUTPATIENT
Start: 2024-05-20 | End: 2024-05-20

## 2024-05-20 RX ORDER — CHLORHEXIDINE GLUCONATE 500 MG/1
CLOTH TOPICAL EVERY 12 HOURS PRN
Status: DISCONTINUED | OUTPATIENT
Start: 2024-05-20 | End: 2024-05-21 | Stop reason: HOSPADM

## 2024-05-20 RX ORDER — KETAMINE HCL IN NACL, ISO-OSM 100MG/10ML
SYRINGE (ML) INJECTION AS NEEDED
Status: DISCONTINUED | OUTPATIENT
Start: 2024-05-20 | End: 2024-05-20 | Stop reason: SURG

## 2024-05-20 RX ORDER — PANTOPRAZOLE SODIUM 40 MG/1
40 TABLET, DELAYED RELEASE ORAL
Status: DISCONTINUED | OUTPATIENT
Start: 2024-05-20 | End: 2024-05-21 | Stop reason: HOSPADM

## 2024-05-20 RX ORDER — DOCUSATE SODIUM 100 MG/1
100 CAPSULE, LIQUID FILLED ORAL 2 TIMES DAILY PRN
Status: DISCONTINUED | OUTPATIENT
Start: 2024-05-20 | End: 2024-05-21 | Stop reason: HOSPADM

## 2024-05-20 RX ORDER — MECLIZINE HYDROCHLORIDE 25 MG/1
25 TABLET ORAL 3 TIMES DAILY PRN
Status: DISCONTINUED | OUTPATIENT
Start: 2024-05-20 | End: 2024-05-21 | Stop reason: HOSPADM

## 2024-05-20 RX ORDER — ATORVASTATIN CALCIUM 10 MG/1
10 TABLET, FILM COATED ORAL DAILY
Status: DISCONTINUED | OUTPATIENT
Start: 2024-05-20 | End: 2024-05-21 | Stop reason: HOSPADM

## 2024-05-20 RX ORDER — FENTANYL CITRATE 50 UG/ML
INJECTION, SOLUTION INTRAMUSCULAR; INTRAVENOUS AS NEEDED
Status: DISCONTINUED | OUTPATIENT
Start: 2024-05-20 | End: 2024-05-20 | Stop reason: SURG

## 2024-05-20 RX ORDER — PROTAMINE SULFATE 10 MG/ML
INJECTION, SOLUTION INTRAVENOUS AS NEEDED
Status: DISCONTINUED | OUTPATIENT
Start: 2024-05-20 | End: 2024-05-20 | Stop reason: SURG

## 2024-05-20 RX ORDER — FERROUS SULFATE 324(65)MG
324 TABLET, DELAYED RELEASE (ENTERIC COATED) ORAL
Status: DISCONTINUED | OUTPATIENT
Start: 2024-05-20 | End: 2024-05-21 | Stop reason: HOSPADM

## 2024-05-20 RX ORDER — ONDANSETRON 2 MG/ML
4 INJECTION INTRAMUSCULAR; INTRAVENOUS EVERY 6 HOURS PRN
Status: DISCONTINUED | OUTPATIENT
Start: 2024-05-20 | End: 2024-05-21 | Stop reason: HOSPADM

## 2024-05-20 RX ORDER — MIDAZOLAM HYDROCHLORIDE 1 MG/ML
INJECTION INTRAMUSCULAR; INTRAVENOUS AS NEEDED
Status: DISCONTINUED | OUTPATIENT
Start: 2024-05-20 | End: 2024-05-20 | Stop reason: SURG

## 2024-05-20 RX ORDER — NITROGLYCERIN 0.4 MG/1
0.4 TABLET SUBLINGUAL
Status: DISCONTINUED | OUTPATIENT
Start: 2024-05-20 | End: 2024-05-21 | Stop reason: HOSPADM

## 2024-05-20 RX ORDER — HYDROCODONE BITARTRATE AND ACETAMINOPHEN 5; 325 MG/1; MG/1
1 TABLET ORAL EVERY 6 HOURS PRN
Status: DISCONTINUED | OUTPATIENT
Start: 2024-05-20 | End: 2024-05-21 | Stop reason: HOSPADM

## 2024-05-20 RX ORDER — NICARDIPINE HYDROCHLORIDE 2.5 MG/ML
INJECTION INTRAVENOUS AS NEEDED
Status: DISCONTINUED | OUTPATIENT
Start: 2024-05-20 | End: 2024-05-20 | Stop reason: SURG

## 2024-05-20 RX ORDER — ACETAMINOPHEN 325 MG/1
650 TABLET ORAL EVERY 4 HOURS PRN
Status: DISCONTINUED | OUTPATIENT
Start: 2024-05-20 | End: 2024-05-21 | Stop reason: HOSPADM

## 2024-05-20 RX ORDER — ZOLPIDEM TARTRATE 5 MG/1
5 TABLET ORAL NIGHTLY PRN
Status: DISCONTINUED | OUTPATIENT
Start: 2024-05-20 | End: 2024-05-21 | Stop reason: HOSPADM

## 2024-05-20 RX ADMIN — ATORVASTATIN CALCIUM 10 MG: 10 TABLET, FILM COATED ORAL at 11:05

## 2024-05-20 RX ADMIN — ONDANSETRON 4 MG: 2 INJECTION INTRAMUSCULAR; INTRAVENOUS at 08:27

## 2024-05-20 RX ADMIN — NICARDIPINE HYDROCHLORIDE 0.5 MG: 25 INJECTION, SOLUTION INTRAVENOUS at 08:27

## 2024-05-20 RX ADMIN — POTASSIUM CHLORIDE 20 MEQ: 1500 TABLET, EXTENDED RELEASE ORAL at 15:35

## 2024-05-20 RX ADMIN — SODIUM CHLORIDE 2 G: 900 INJECTION INTRAVENOUS at 07:24

## 2024-05-20 RX ADMIN — INSULIN LISPRO 2 UNITS: 100 INJECTION, SOLUTION INTRAVENOUS; SUBCUTANEOUS at 17:14

## 2024-05-20 RX ADMIN — FENTANYL CITRATE 50 MCG: 50 INJECTION, SOLUTION INTRAMUSCULAR; INTRAVENOUS at 07:42

## 2024-05-20 RX ADMIN — INSULIN LISPRO 4 UNITS: 100 INJECTION, SOLUTION INTRAVENOUS; SUBCUTANEOUS at 20:05

## 2024-05-20 RX ADMIN — PROPOFOL INJECTABLE EMULSION 50 MCG/KG/MIN: 10 INJECTION, EMULSION INTRAVENOUS at 07:24

## 2024-05-20 RX ADMIN — SODIUM CHLORIDE: 9 INJECTION, SOLUTION INTRAVENOUS at 07:17

## 2024-05-20 RX ADMIN — MIDAZOLAM 2 MG: 1 INJECTION INTRAMUSCULAR; INTRAVENOUS at 07:24

## 2024-05-20 RX ADMIN — Medication 30 MG: at 07:24

## 2024-05-20 RX ADMIN — FERROUS SULFATE TAB EC 324 MG (65 MG FE EQUIVALENT) 324 MG: 324 (65 FE) TABLET DELAYED RESPONSE at 11:05

## 2024-05-20 RX ADMIN — HEPARIN SODIUM 3000 UNITS: 1000 INJECTION INTRAVENOUS; SUBCUTANEOUS at 08:18

## 2024-05-20 RX ADMIN — FUROSEMIDE 20 MG: 20 TABLET ORAL at 11:05

## 2024-05-20 RX ADMIN — NEBIVOLOL 20 MG: 10 TABLET ORAL at 11:17

## 2024-05-20 RX ADMIN — INSULIN LISPRO 4 UNITS: 100 INJECTION, SOLUTION INTRAVENOUS; SUBCUTANEOUS at 11:57

## 2024-05-20 RX ADMIN — PROTAMINE SULFATE 100 MG: 10 INJECTION, SOLUTION INTRAVENOUS at 08:31

## 2024-05-20 RX ADMIN — PANTOPRAZOLE SODIUM 40 MG: 40 TABLET, DELAYED RELEASE ORAL at 11:17

## 2024-05-20 RX ADMIN — NICARDIPINE HYDROCHLORIDE 0.5 MG: 25 INJECTION, SOLUTION INTRAVENOUS at 08:07

## 2024-05-20 RX ADMIN — HEPARIN SODIUM 12000 UNITS: 1000 INJECTION INTRAVENOUS; SUBCUTANEOUS at 08:09

## 2024-05-20 RX ADMIN — SODIUM CHLORIDE 30 ML/HR: 9 INJECTION, SOLUTION INTRAVENOUS at 09:20

## 2024-05-20 RX ADMIN — DILTIAZEM HYDROCHLORIDE 240 MG: 240 CAPSULE, EXTENDED RELEASE ORAL at 11:05

## 2024-05-20 RX ADMIN — FENTANYL CITRATE 50 MCG: 50 INJECTION, SOLUTION INTRAMUSCULAR; INTRAVENOUS at 07:24

## 2024-05-20 RX ADMIN — CYANOCOBALAMIN TAB 1000 MCG 1000 MCG: 1000 TAB at 11:17

## 2024-05-20 NOTE — ANESTHESIA PROCEDURE NOTES
Peripheral IV    Patient location during procedure: OR  End time: 5/20/2024 7:36 AM  Line placed for Fluids/Medication Admin.  Performed By   Anesthesiologist: Jacob Dejesus MD  Preanesthetic Checklist  Completed: patient identified, IV checked, site marked, risks and benefits discussed, surgical consent, monitors and equipment checked, pre-op evaluation and timeout performed  Peripheral IV Prep   Patient position: supine   Prep: ChloraPrep  Patient monitoring: heart rate, cardiac monitor and continuous pulse ox  Peripheral IV Procedure   Laterality:left  Location:  Hand  Catheter size: 18 G          Post Assessment   Dressing Type: transparent and tape.    IV Dressing/Site: clean, dry and intact

## 2024-05-20 NOTE — CASE MANAGEMENT/SOCIAL WORK
Continued Stay Note  WILBERT Youssef     Patient Name: Cherie Hinton  MRN: [id number]  Today's Date: 5/20/2024    Admit Date: 5/20/2024  Plan: DC plan: Return home.   Discharge Plan       Row Name 05/20/24 1600       Plan    Plan Comments DC barriers: POD 1 TAVR, Centeral line, A-line, pending ECHO.             Expected Discharge Date and Time       Expected Discharge Date Expected Discharge Time    May 21, 2024        Anabella Pastor RN      Office Phone [phone]

## 2024-05-20 NOTE — OP NOTE
Indication:  Severe symptomatic nonrheumatic aortic stenosis  Congestive heart failure, NYHA class III    Procedures performed  Ultrasound-guided vascular access  Common femoral angiography  Supravalvular aortogram  Temporary pacemaker placement  Transcatheter aortic valve replacement with 26 mm Evolut pro plus via right femoral arterial access  Perclose and angioseal deployment    Procedural Details  The procedure was performed under conscious sedation in the hybrid OR.    Under ultrasound guidance, a 6 F introducer was placed in the left femoral vein usinh modified Seldinger technique. Under ultrasound guidance, a 7 F introducer was placed in the left femoral artery using modified Seldinger technique. Under ultrasound guidance, a 7 F introducer was placed in the right femoral artery using modified Seldinger technique.    Two Perclose ProGlide devices were used to 'pre-close' the RFA access site. A 5F bipolar electrode temporary pacemaking wire was inserted via the left femoral venous sheath and positioned using fluoroscopy. Pacing threshold was tested. The temporary pacemaker wire was secured in place.    A 6F pigtail catheter was placed in the aortic root via the LFA sheath. The aortic valve was crossed retrograde with a 0.035 inch straight wire through a 6F AL1 catheter via the RFA 16Fr sheath. The AL1 catheter was advanced into the LV and a exchange length 0.035 inch lunderquist wire in the LV. The 16F sheath was removed and the Medtronic delivery sheath was inserted over the lunderquist wire.    The TAVR procedure was then performed using a 26mm Evolut pro + device. It was advanced over the lunderquist wire to the aortic valve position. After confirming correct position of the valve by fluoroscopy, the valve was implanted with ventricular pacing and dynamic aortography. After valve deployment the valve was well-seated and there was no significant paravalvular leak noted.     At the end of the procedure, the  Medtronic delivery system was removed over a guidewire. The Perclose devices were deployed to secure the arterial access on the TAVR side.     The 7F LFA sheath was removed and the arteriotomy was angiosealed. The 6F LFV sheath along with the temporary pacemaker wire were removed and manual pressure applied.   The patient was transferred to [facility]  in stable condition.    Pio Callejas and Dimitris performed the TAVR procedure. Dr. aWrd assisted.    Estimated blood loss  20ml    Complications  None    Recommendations  Dual antiplatelet therapy  Cardiac rehab  Repeat echocardiogram in morning.    Electronically signed by Leandro Callejas MD, 05/20/24, 8:58 AM EDT.

## 2024-05-20 NOTE — CASE MANAGEMENT/SOCIAL WORK
Discharge Planning Assessment   Mikael     Patient Name: Cherie Hinton  MRN: [id number]  Today's Date: 5/20/2024    Admit Date: 5/20/2024  Plan: MEERA plan: Return home.   Discharge Needs Assessment       Row Name 05/20/24 1157       Living Environment    People in Home alone    Current Living Arrangements home    Potentially Unsafe Housing Conditions none    In the past 12 months has the electric, gas, oil, or water company threatened to shut off services in your home? No    Primary Care Provided by self    Provides Primary Care For no one    Family Caregiver if Needed child(yelena), adult    Family Caregiver Names Harry Bone    Quality of Family Relationships helpful;involved;supportive    Able to Return to Prior Arrangements yes       Resource/Environmental Concerns    Resource/Environmental Concerns none    Transportation Concerns none       Transportation Needs    In the past 12 months, has lack of transportation kept you from medical appointments or from getting medications? no    In the past 12 months, has lack of transportation kept you from meetings, work, or from getting things needed for daily living? No       Food Insecurity    Within the past 12 months, you worried that your food would run out before you got the money to buy more. Never true    Within the past 12 months, the food you bought just didn't last and you didn't have money to get more. Never true       Transition Planning    Patient/Family Anticipates Transition to home    Patient/Family Anticipated Services at Transition none    Transportation Anticipated car, drives self;family or friend will provide       Discharge Needs Assessment    Readmission Within the Last 30 Days no previous admission in last 30 days    Equipment Currently Used at Home none    Concerns to be Addressed denies needs/concerns at this time    Anticipated Changes Related to Illness none    Equipment Needed After Discharge none    Provided Post Acute Provider List? N/A     Provided Post Acute Provider Quality & Resource List? N/A              Discharge Plan       Row Name 05/20/24 1158       Plan    Plan DC plan: Return home.    Patient/Family in Agreement with Plan yes    Provided Post Acute Provider List? N/A    Provided Post Acute Provider Quality & Resource List? N/A    Plan Comments CM met with patient at bedside. Patient states she lives home alone, but her daughter lives close by, Patient is independent with her ADLs, drives self, and denies current DME. Patient confirms PCP [facility] and utilizes [facility] for preferred [facility]. Patient agreeable to enrollment in [facility] program. Patient denies current HHC or therapy. Patient denies difficulty affording mediations, utilities, or food. Patient states at time of discharge her daughter Smitha will provide transportation.             Expected Discharge Date and Time       Expected Discharge Date Expected Discharge Time    May 21, 2024         Demographic Summary       Row Name 05/20/24 1157       General Information    Admission Type inpatient    Arrived From emergency department    Referral Source admission list    Reason for Consult discharge planning;care coordination/care conference    Preferred Language English       Contact Information    Permission Granted to Share Info With               Functional Status       Row Name 05/20/24 1157       Functional Status    Usual Activity Tolerance good    Current Activity Tolerance good       Functional Status, IADL    Medications independent    Meal Preparation independent    Housekeeping independent    Laundry independent    Shopping independent       Mental Status    General Appearance WDL WDL       Mental Status Summary    Recent Changes in Mental Status/Cognitive Functioning no changes              Patient Forms       Row Name 05/20/24 1009       Patient Forms    Important Message from Medicare (IMM) Delivered  IMM per registration 5/20/2024.                Anabella Pastor RN     Office Phone [phone]

## 2024-05-20 NOTE — ANESTHESIA PROCEDURE NOTES
Arterial Line      Patient reassessed immediately prior to procedure    Start time: 5/20/2024 7:24 AM  Stop Time:5/20/2024 7:28 AM       Line placed for hemodynamic monitoring and ABGs/Labs/ISTAT.  Performed By   Anesthesiologist: Jacob Dejesus MD   Preanesthetic Checklist  Completed: patient identified, IV checked, site marked, risks and benefits discussed, surgical consent, monitors and equipment checked, pre-op evaluation and timeout performed  Arterial Line Prep    Sterile Tech: cap, gloves, sterile barriers, gown and mask  Prep: ChloraPrep  Patient monitoring: EKG, continuous pulse oximetry and blood pressure monitoring  Arterial Line Procedure   Laterality:left  Location:  radial artery  Catheter size: 20 G   Guidance: palpation technique  Number of attempts: 1  Successful placement: yes   Post Assessment   Dressing Type: wrist guard applied, secured with tape and occlusive dressing applied.   Complications no  Circ/Move/Sens Assessment: normal and unchanged.   Patient Tolerance: patient tolerated the procedure well with no apparent complications  Additional Notes  Sterile seldinger technique, tolerated well

## 2024-05-20 NOTE — DISCHARGE INSTRUCTIONS
TAVR Discharge Instructions    Medications  Take all of the medications prescribed to you. When you go home, you may be prescribed different medications than what you were taking at home prior to your procedure.  Your medications may be adjusted further by the valve clinic or your primary cardiologist at your follow-up appointment.  If you need to stop taking you medications, please notify your physician.  Antiplatelet medications (blood thinners) are usually prescribed to prevent blood clots from forming on your new valve which could cause a stroke. These medications increase your chance of bleeding. Please notify your cardiologist of any of the following signs of bleeding:  black, tarry stools, red/pink urine, black or dark vomit, nose bleeds, increased bruising, or bleeding gums.     Diet  A cardiac (low-fat, low-salt) diet is recommended. Avoid a higher sodium (salt) diet; high levels of sodium can lead to fluid retention and may cause congestive heart failure.     TAVR site incision care  Keep incision sites clean and dry. You may remove your dressings and take a shower when you are home.   Clean your incisions with normal soap and water. Pat your incisions dry with a clean towel. Do not rub them. Do not soak or lay in water until all incisions are completely healed. Do not apply lotion, cream, powder, or ointment to the incision until the scab has fallen off.  If drainage occurs, cover the incision with dry gauze and change the gauze at least twice a day, or more often if needed.  Bruising is common around the incision site, and you may notice a pea-sized lump. This is normal and usually disappears within a few weeks.  Look at your incision every day. Call your doctor's office right away if you notice any of the following signs of infection:  pain, redness, swelling, drainage, bleeding, chills, or a fever of 100.4° or higher.    Discomfort  Mild amounts of discomfort after your procedure is expected and may  continue for a few weeks. Often, patients can obtain adequate pain relief from taking acetaminophen (Tylenol).   A sore, scratchy throat can occur from the insertion of your breathing tube and may last for several weeks. You may also notice some hoarseness in your voice. This should improve by your follow-up appointment.     Swelling  Elevate your legs 2-3 times daily for 30-60 minutes. Avoid sitting for prolonged periods of time.   If you have been prescribed a diuretic (water pill), you should weigh yourself every day and record it. Weigh yourself at the same time of day wearing same amount of clothing and call your doctor's office with any weight gain or loss of 3 pounds per day or 5 pounds in a week.     Activity & Cardiac Rehab  Walk 3-4 times per day. Pace your activities, increase gradually. Daily exercise and adequate rest are important.  Climb stairs slowly as tolerated.  Sexual activity can be resumed in 2-3 weeks after being discharged or when you can climb a flight of stairs without becoming short of breath.  No lifting, pushing, or pulling objects heavier than 10 pounds for 1 week after your procedure.  Driving can be resumed in one week after release from hospital.  You may return to work one week after surgery or as directed by your cardiologist, depending on your needs and type of work.     Notify your cardiologist if you develop  Fever and chills with temperature 100.4° or higher  Shortness of breath  Dizziness or fainting spells  Increased swelling in feet and ankles  Prolonged increase in fatigue, weakness, nausea or vomiting  Irregular or rapid heart rate  If you have questions regarding your cardiac medications     SEEK CARE IMMEDIATELY (CALL 911) IF YOU EXPERIENCE  Sudden decrease in strength and sensation (numbness and tingling)  A change in skin color or temperature (coolness to touch) of the legs or arms  Sudden swelling, bleeding, and/or acute pain in one of your groins  Sudden onset of  chest, back, or abdominal pain  Sudden shortness of breath    Follow up appointments  Follow-up appointments with your doctor help to make sure you are recovering well. You will be required to be seen in the office for a one month follow-up. At the one month follow-up, you will have an echocardiogram to ensure that your valve is functioning correctly.  You will be required to be seen in the doctor's office again at a one year appointment with another echocardiogram.   Please notify your cardiologist/TAVR heart team if you are hospitalized within the first year.     Smoking  It is highly recommended that you refrain from smoking.    Dental visits  After having a TAVR, try to avoid routine cleanings or non-emergent work on your teeth for 3 months following your procedure. An infection known as bacterial endocarditis is an ongoing potential complication after heart valve surgery. Bacterial endocarditis can damage your heart valve.  If you need to have a dental cleaning or any other dental work, you will need to take an antibiotic 1 hour prior to your procedure. Please call Dr. Callejas's office to get an antibiotic prescribed [phone].        **Please measure your blood pressure and heart rate daily. Keep a log and bring to your follow up appointment on 5/30/2024

## 2024-05-20 NOTE — H&P
HISTORY AND PHYSICAL       Patient Care Team:  Andrew Antunez MD as PCP - General (Family Medicine)  Cris Green APRN as Nurse Practitioner (Cardiology)    SUBJECTIVE    Chief complaint: Here for TAVR    History of present illness:    89 years old woman, patient of Dr. Araujo, with hypertension, hyperlipidemia, diabetes, stroke, paroxysmal atrial fibrillation, chronic kidney disease comes for TAVR  She has recently been complaining of worsening shortness of breath, exertional fatigue, syncopal episode leading to hospital admission.  A DHEERAJ has shown severely restricted aortic valve leaflet motion with aortic valve area of less than 1 cm² suggestive of severe symptomatic aortic stenosis.  Of note she has a permanent pacemaker in place, she also has a Micra and a Watchman device.  She is currently not on anticoagulation.    Personal History:    Past Medical History:   Diagnosis Date    Antral erosion     Aortic valve regurgitation 12/26/2018    Atrial fibrillation     Atrial fibrillation with controlled ventricular response     B12 deficiency     CAD (coronary artery disease)     Cellulitis 04/2011    on face     Cerebrovascular accident (CVA) 12/07/2016    CHF (congestive heart failure)     Colon cancer     Coronary artery disease involving native coronary artery of native heart without angina pectoris 06/18/2019    Depression     Diabetes mellitus, type 2     Essential hypertension 02/13/2012    Fatigue     GERD (gastroesophageal reflux disease)     Hiatal hernia     History of colonoscopy 04/2010    last done 4/10    History of esophagogastroduodenoscopy (EGD) 04/2010    last done 4/10    Hyperlipidemia, mixed 02/13/2012    Hypertension     white coat htn    HALEIGH (iron deficiency anemia)     LAD (lymphadenopathy)     40% lesion LAD     Left pontine CVA     Mitral regurgitation 05/21/2012    STORMY (obstructive sleep apnea)     not treating    Osteoarthritis     Pacemaker 06/18/2019    Permanent atrial fibrillation  06/18/2019    Presence of Watchman left atrial appendage closure device 01/04/2020    Prinzmetal's angina     Right kidney mass     1.4 cm- following urology     Sick sinus syndrome     Stroke 11/2016    TIA (transient ischemic attack)     left CVA, interrupted TPA; As (moderate-severe)     Vestibular nerve disease 2017     Past Surgical History:   Procedure Laterality Date    ATRIAL APPENDAGE EXCLUSION LEFT WITH TRANSESOPHAGEAL ECHOCARDIOGRAM N/A 10/10/2019    Procedure: Atrial Appendage Occlusion;  Surgeon: Richie Chapman MD;  Location: [facility] CATH INVASIVE LOCATION;  Service: Cardiovascular    ATRIAL APPENDAGE EXCLUSION LEFT WITH TRANSESOPHAGEAL ECHOCARDIOGRAM N/A 10/10/2019    Procedure: Atrial Appendage Occlusion;  Surgeon: Je Rivera MD;  Location: [facility] CATH INVASIVE LOCATION;  Service: Cardiology    BLADDER REPAIR      CARDIAC CATHETERIZATION  05/2010    CARDIAC CATHETERIZATION N/A 3/28/2024    Procedure: Left Heart Cath;  Surgeon: Mich Araujo MD;  Location: [facility] CATH INVASIVE LOCATION;  Service: Cardiology;  Laterality: N/A;    CHOLECYSTECTOMY      COLECTOMY PARTIAL / TOTAL      COLONOSCOPY  04/16/2018    negative     COLONOSCOPY N/A 7/1/2022    Procedure: COLONOSCOPY with polypectomy x1;  Surgeon: ADOLFO Boss MD;  Location: [facility] ENDOSCOPY;  Service: Gastroenterology;  Laterality: N/A;  post: diverticulosis, hemorrhoids, polyps    ENDOSCOPY  04/16/2018    negative     ENDOSCOPY N/A 6/29/2022    Procedure: ESOPHAGOGASTRODUODENOSCOPY with biopsy of duodenum r/o celiac;  Surgeon: ADOLFO Boss MD;  Location: [facility] ENDOSCOPY;  Service: Gastroenterology;  Laterality: N/A;  hiatial hernia  Gastric Polyps        HYSTERECTOMY      INSERT / REPLACE / REMOVE PACEMAKER  09/2018    Pacemaker gen change 9/2018     OTHER SURGICAL HISTORY  04/25/2015    Exercise myoview, normal     PACEMAKER IMPLANTATION  01/22/2010    Dual Chamber - 1/22/2010; RA Lead Revision- 5/7/2012-  BS     TOOTH EXTRACTION      TRANSESOPHAGEAL ECHOCARDIOGRAM (DHEERAJ)  07/2019     Family History   Problem Relation Age of Onset    Hypertension Mother     Diabetes Mother     Coronary artery disease Mother     Other Father         CVA    No Known Problems Sister     No Known Problems Brother     No Known Problems Maternal Aunt     No Known Problems Maternal Uncle     No Known Problems Paternal Aunt     No Known Problems Paternal Uncle     No Known Problems Maternal Grandmother     No Known Problems Maternal Grandfather     No Known Problems Paternal Grandmother     No Known Problems Paternal Grandfather     Heart disease Other     Stroke Other     Hypertension Other     Anemia Neg Hx     Arrhythmia Neg Hx     Asthma Neg Hx     Clotting disorder Neg Hx     Fainting Neg Hx     Heart attack Neg Hx     Heart failure Neg Hx     Hyperlipidemia Neg Hx      Social History     Tobacco Use    Smoking status: Never     Passive exposure: Never    Smokeless tobacco: Never   Vaping Use    Vaping status: Never Used   Substance Use Topics    Alcohol use: No    Drug use: No       Home meds:  Prior to Admission medications    Medication Sig Start Date End Date Taking? Authorizing Provider   atorvastatin (LIPITOR) 10 MG tablet Take 1 tablet by mouth Daily.    Provider, MD Juana   dilTIAZem CD (CARDIZEM CD) 240 MG 24 hr capsule Take 1 capsule by mouth Daily.    Provider, MD Juana   diphenhydrAMINE (BENADRYL) 50 MG tablet Take 1 tablet by mouth Take As Directed. To be taken the night before the procedure and on the day of procedure. 4/26/24   Karla Low APRN   famotidine (PEPCID) 20 MG tablet Take 1 tablet by mouth 2 (Two) Times a Day. Take 1 tablet at night before and 1 tablet on the day of procedure/testing. 4/26/24   Karla Low APRN   ferrous sulfate 325 (65 FE) MG EC tablet Take 1 tablet by mouth Daily With Breakfast. 7/1/22   Coni Fields MD   furosemide (LASIX) 20 MG tablet Take 1 tablet by mouth Daily. 2/2/24    "Mich Araujo MD   meclizine (ANTIVERT) 25 MG tablet Take 1 tablet by mouth 3 (Three) Times a Day As Needed for Dizziness. 5/6/22   Sonu Flor MD   metFORMIN (Glucophage) 500 MG tablet Take 1 tablet by mouth Daily With Breakfast. For diabetes 1/6/21   Denny Feild MD   nebivolol (Bystolic) 20 MG tablet Take 1 tablet by mouth Daily. 2/25/22   Richie Chapman MD   omeprazole (priLOSEC) 40 MG capsule Take 1 capsule by mouth 2 (Two) Times a Day.    ProviderJuana MD   predniSONE (DELTASONE) 50 MG tablet Take 1 tablet by mouth Every 6 (Six) Hours. 13, 7 and 1 hour prior to the procedure. 4/26/24   Karla Low APRN   vitamin B-12 (CYANOCOBALAMIN) 1000 MCG tablet Take 1 tablet by mouth Daily.    ProviderJuana MD   zolpidem (AMBIEN) 5 MG tablet Take 1 tablet by mouth At Night As Needed for Sleep.    ProviderJuana MD       Allergies:  Contrast dye (echo or unknown ct/mr), Adhesive tape, and Latex      Review of systems:  Please see the above history of present illness for pertinent positives and negatives.  The remainder of the patient's systems have been reviewed and are negative.   ROS      OBJECTIVE    Vital Signs       Flowsheet Rows      Flowsheet Row First Filed Value   Admission Height 162.6 cm (64\") Documented at 05/20/2024 0600   Admission Weight 74.8 kg (164 lb 14.5 oz) Documented at 05/20/2024 0600             Physical Exam:  Physical Exam   Constitutional: Patient appears well-developed and well-nourished and in no acute distress   HEENT:   Head: Normocephalic and atraumatic.   Eyes:  Pupils are equal, round, and reactive to light. EOM are intact. Sclerae are anicteric and noninjected.  Mouth and Throat: Patient has moist mucous membranes. Oropharynx is clear of any erythema or exudate.     Neck: Neck supple. No JVD present. No thyromegaly present. No lymphadenopathy present.  Cardiovascular: Regular rate, regular rhythm, S1 normal and S2 normal.  Exam reveals no " gallop and no friction rub.  No murmur heard.  Pulmonary/Chest: Lungs are clear to auscultation bilaterally. No respiratory distress. No wheezes. No rhonchi. No rales.   Abdominal: Soft. Bowel sounds are normal. No distension and no mass. There is no hepatosplenomegaly. There is no tenderness.   Musculoskeletal: Normal muscle tone  Extremities: No edema. Pulses are palpable in all 4 extremities.  Neurological: Patient is alert and oriented to person, place, and time. Cranial nerves II-XII are grossly intact with no focal deficits.  Skin: Skin is warm. No rash noted. Nails show no clubbing.  No cyanosis or erythema.       Results Review:   I have personally reviewed the results from the time of this admission to 5/20/2024 07:05 EDT and agree with these findings:  []  Laboratory  []  Microbiology  []  Radiology  []  EKG/Telemetry   []  Cardiology/Vascular   []  Pathology  []  Old records  []  Other:    Most notable findings include:       Lab Results (most recent)       None            Imaging Results (Most Recent)       None            ECG/EMG Results (most recent)       None                ASSESSMENT & PLAN:    Assessment & Plan     Congestive heart failure, NYHA class 3, chronic, diastolic    Nonrheumatic aortic valve stenosis      1. Nonrheumatic aortic valve stenosis (Primary)  Severe symptomatic aortic stenosis based on echocardiogram and transesophageal echocardiogram.  She is in NYHA class III  I have described the natural history and treatment options available to her.  I have described the TAVR procedure in details with the help of a heart model.  Risk and benefit of the procedure specially complications discussed with the patient.  Premedication for contrast allergy     STS  Operative Mortality 12.5%   Morbidity & Mortality 16.1%   Stroke 2.17%   Renal Failure 4.28%   Reoperation 2.9%   Prolonged Ventilation 12.5%   Deep Sternal Wound Infection 0.064%   Long Hospital Stay (>14 days) 16.5%   Short Hospital  Stay (<6 days)* 12.5%     Proceed with transfemoral TAVR     2. Essential hypertension  Well-controlled on Bystolic and Cardizem     3. Hyperlipidemia, mixed  Continue high intensity statin     4. Type 2 diabetes mellitus without complication, without long-term current use of insulin  She is on metformin     5.  Atrial fibrillation/sick sinus syndrome  Permanent pacemaker in place.  Micra device please  Watchman device in place.  Not on anticoagulation.  Currently on Cardizem for rate control     6. HFpEF  Secondary to hypertension, atrial fibrillation, valvular heart disease  Continue diuretics as needed        Leandro Callejas MD  05/20/24  07:05 EDT

## 2024-05-20 NOTE — ANESTHESIA POSTPROCEDURE EVALUATION
Patient: Cherie Hinton    Procedure Summary       Date: 05/20/24 Room / Location: [facility] OR [address] [place] [address] HYBRID OR    Anesthesia Start: 0717 Anesthesia Stop: 0854    Procedures:       Transfemoral Transcatheter Aortic Valve Replacement      TRANSFEMORAL TRANSCATHETER AORTIC VALVE REPLACEMENT (Chest) Diagnosis:       Nonrheumatic aortic valve stenosis      Congestive heart failure, NYHA class 3, chronic, diastolic      (Nonrheumatic aortic valve stenosis [I35.0])      (Congestive heart failure, NYHA class 3, chronic, diastolic [I50.32])    Surgeons: Leandro Callejas MD; Yonatan Shanks MD Provider: Jacob Dejesus MD    Anesthesia Type: general, Marii ASA Status: 4            Anesthesia Type: general, Perry    Vitals  No vitals data found for the desired time range.          Post Anesthesia Care and Evaluation    Patient location during evaluation: ICU  Patient participation: complete - patient participated  Level of consciousness: awake  Pain scale: See nurse's notes for pain score.  Pain management: adequate    Airway patency: patent  Anesthetic complications: No anesthetic complications  PONV Status: none  Cardiovascular status: acceptable  Respiratory status: acceptable and spontaneous ventilation  Hydration status: acceptable    Comments: Patient seen and examined postoperatively; vital signs stable; SpO2 greater than or equal to 90%; cardiopulmonary status stable; nausea/vomiting adequately controlled; pain adequately controlled; no apparent anesthesia complications;

## 2024-05-21 ENCOUNTER — APPOINTMENT (OUTPATIENT)
Dept: CARDIOLOGY | Facility: HOSPITAL | Age: 89
DRG: 267 | End: 2024-05-21
Payer: MEDICARE

## 2024-05-21 ENCOUNTER — APPOINTMENT (OUTPATIENT)
Dept: GENERAL RADIOLOGY | Facility: HOSPITAL | Age: 89
DRG: 267 | End: 2024-05-21
Payer: MEDICARE

## 2024-05-21 VITALS
SYSTOLIC BLOOD PRESSURE: 156 MMHG | OXYGEN SATURATION: 96 % | RESPIRATION RATE: 23 BRPM | HEART RATE: 62 BPM | WEIGHT: 164.9 LBS | TEMPERATURE: 97.4 F | DIASTOLIC BLOOD PRESSURE: 65 MMHG | HEIGHT: 64 IN | BODY MASS INDEX: 28.15 KG/M2

## 2024-05-21 LAB
ANION GAP SERPL CALCULATED.3IONS-SCNC: 10 MMOL/L (ref 5–15)
BH CV ECHO MEAS - AO MAX PG: 9.6 MMHG
BH CV ECHO MEAS - AO MEAN PG: 5 MMHG
BH CV ECHO MEAS - AO V2 MAX: 155 CM/SEC
BH CV ECHO MEAS - AO V2 VTI: 33.9 CM
BH CV ECHO MEAS - LV MAX PG: 7.1 MMHG
BH CV ECHO MEAS - LV MEAN PG: 4 MMHG
BH CV ECHO MEAS - LV V1 MAX: 133 CM/SEC
BH CV ECHO MEAS - LV V1 VTI: 29.3 CM
BH CV ECHO MEAS - RAP SYSTOLE: 3 MMHG
BH CV ECHO MEAS - RVSP: 27.4 MMHG
BH CV ECHO MEAS - TR MAX PG: 24.4 MMHG
BH CV ECHO MEAS - TR MAX VEL: 247 CM/SEC
BUN SERPL-MCNC: 22 MG/DL (ref 8–23)
BUN/CREAT SERPL: 21 (ref 7–25)
CALCIUM SPEC-SCNC: 9.1 MG/DL (ref 8.2–9.6)
CHLORIDE SERPL-SCNC: 108 MMOL/L (ref 98–107)
CO2 SERPL-SCNC: 22 MMOL/L (ref 22–29)
CREAT SERPL-MCNC: 1.05 MG/DL (ref 0.57–1)
DEPRECATED RDW RBC AUTO: 49.5 FL (ref 37–54)
EGFRCR SERPLBLD CKD-EPI 2021: 50.6 ML/MIN/1.73
ERYTHROCYTE [DISTWIDTH] IN BLOOD BY AUTOMATED COUNT: 16.5 % (ref 12.3–15.4)
GLUCOSE BLDC GLUCOMTR-MCNC: 169 MG/DL (ref 70–105)
GLUCOSE BLDC GLUCOMTR-MCNC: 170 MG/DL (ref 70–105)
GLUCOSE SERPL-MCNC: 151 MG/DL (ref 65–99)
HBA1C MFR BLD: 8.29 % (ref 4.8–5.6)
HCT VFR BLD AUTO: 26.9 % (ref 34–46.6)
HGB BLD-MCNC: 8.1 G/DL (ref 12–15.9)
MAGNESIUM SERPL-MCNC: 1.6 MG/DL (ref 1.6–2.4)
MCH RBC QN AUTO: 24.8 PG (ref 26.6–33)
MCHC RBC AUTO-ENTMCNC: 30.1 G/DL (ref 31.5–35.7)
MCV RBC AUTO: 82.3 FL (ref 79–97)
PLATELET # BLD AUTO: 248 10*3/MM3 (ref 140–450)
PMV BLD AUTO: 9.9 FL (ref 6–12)
POTASSIUM SERPL-SCNC: 4.2 MMOL/L (ref 3.5–5.2)
RBC # BLD AUTO: 3.27 10*6/MM3 (ref 3.77–5.28)
SODIUM SERPL-SCNC: 140 MMOL/L (ref 136–145)
WBC NRBC COR # BLD AUTO: 9.68 10*3/MM3 (ref 3.4–10.8)

## 2024-05-21 PROCEDURE — 97161 PT EVAL LOW COMPLEX 20 MIN: CPT

## 2024-05-21 PROCEDURE — 25010000002 MAGNESIUM SULFATE 4 GM/100ML SOLUTION: Performed by: INTERNAL MEDICINE

## 2024-05-21 PROCEDURE — 93325 DOPPLER ECHO COLOR FLOW MAPG: CPT

## 2024-05-21 PROCEDURE — 71045 X-RAY EXAM CHEST 1 VIEW: CPT

## 2024-05-21 PROCEDURE — 93321 DOPPLER ECHO F-UP/LMTD STD: CPT

## 2024-05-21 PROCEDURE — 83735 ASSAY OF MAGNESIUM: CPT | Performed by: INTERNAL MEDICINE

## 2024-05-21 PROCEDURE — 63710000001 INSULIN LISPRO (HUMAN) PER 5 UNITS: Performed by: NURSE PRACTITIONER

## 2024-05-21 PROCEDURE — 93308 TTE F-UP OR LMTD: CPT | Performed by: INTERNAL MEDICINE

## 2024-05-21 PROCEDURE — 93308 TTE F-UP OR LMTD: CPT

## 2024-05-21 PROCEDURE — 80048 BASIC METABOLIC PNL TOTAL CA: CPT | Performed by: INTERNAL MEDICINE

## 2024-05-21 PROCEDURE — 83036 HEMOGLOBIN GLYCOSYLATED A1C: CPT | Performed by: NURSE PRACTITIONER

## 2024-05-21 PROCEDURE — 93005 ELECTROCARDIOGRAM TRACING: CPT | Performed by: INTERNAL MEDICINE

## 2024-05-21 PROCEDURE — 93325 DOPPLER ECHO COLOR FLOW MAPG: CPT | Performed by: INTERNAL MEDICINE

## 2024-05-21 PROCEDURE — 82948 REAGENT STRIP/BLOOD GLUCOSE: CPT | Performed by: NURSE PRACTITIONER

## 2024-05-21 PROCEDURE — 99239 HOSP IP/OBS DSCHRG MGMT >30: CPT | Performed by: NURSE PRACTITIONER

## 2024-05-21 PROCEDURE — 85027 COMPLETE CBC AUTOMATED: CPT | Performed by: INTERNAL MEDICINE

## 2024-05-21 PROCEDURE — 99232 SBSQ HOSP IP/OBS MODERATE 35: CPT | Performed by: THORACIC SURGERY (CARDIOTHORACIC VASCULAR SURGERY)

## 2024-05-21 PROCEDURE — 93010 ELECTROCARDIOGRAM REPORT: CPT | Performed by: INTERNAL MEDICINE

## 2024-05-21 PROCEDURE — 93321 DOPPLER ECHO F-UP/LMTD STD: CPT | Performed by: INTERNAL MEDICINE

## 2024-05-21 RX ORDER — CLOPIDOGREL BISULFATE 75 MG/1
75 TABLET ORAL DAILY
Qty: 90 TABLET | Refills: 3 | Status: SHIPPED | OUTPATIENT
Start: 2024-05-22

## 2024-05-21 RX ORDER — MAGNESIUM SULFATE HEPTAHYDRATE 40 MG/ML
4 INJECTION, SOLUTION INTRAVENOUS ONCE
Status: COMPLETED | OUTPATIENT
Start: 2024-05-21 | End: 2024-05-21

## 2024-05-21 RX ORDER — PANTOPRAZOLE SODIUM 40 MG/1
40 TABLET, DELAYED RELEASE ORAL
Qty: 90 TABLET | Refills: 3 | Status: SHIPPED | OUTPATIENT
Start: 2024-05-22

## 2024-05-21 RX ORDER — CLOPIDOGREL BISULFATE 75 MG/1
75 TABLET ORAL DAILY
Status: DISCONTINUED | OUTPATIENT
Start: 2024-05-21 | End: 2024-05-21 | Stop reason: HOSPADM

## 2024-05-21 RX ORDER — DAPAGLIFLOZIN 10 MG/1
10 TABLET, FILM COATED ORAL DAILY
Qty: 30 TABLET | Refills: 11 | Status: SHIPPED | OUTPATIENT
Start: 2024-05-21

## 2024-05-21 RX ADMIN — PANTOPRAZOLE SODIUM 40 MG: 40 TABLET, DELAYED RELEASE ORAL at 05:58

## 2024-05-21 RX ADMIN — FUROSEMIDE 20 MG: 20 TABLET ORAL at 08:11

## 2024-05-21 RX ADMIN — EMPAGLIFLOZIN 10 MG: 10 TABLET, FILM COATED ORAL at 09:01

## 2024-05-21 RX ADMIN — MAGNESIUM SULFATE 4 G: 4 INJECTION INTRAVENOUS at 05:58

## 2024-05-21 RX ADMIN — ATORVASTATIN CALCIUM 10 MG: 10 TABLET, FILM COATED ORAL at 08:11

## 2024-05-21 RX ADMIN — DILTIAZEM HYDROCHLORIDE 240 MG: 240 CAPSULE, EXTENDED RELEASE ORAL at 08:12

## 2024-05-21 RX ADMIN — CLOPIDOGREL BISULFATE 75 MG: 75 TABLET ORAL at 11:53

## 2024-05-21 RX ADMIN — METFORMIN HYDROCHLORIDE 500 MG: 500 TABLET ORAL at 08:12

## 2024-05-21 RX ADMIN — FERROUS SULFATE TAB EC 324 MG (65 MG FE EQUIVALENT) 324 MG: 324 (65 FE) TABLET DELAYED RESPONSE at 08:11

## 2024-05-21 RX ADMIN — NEBIVOLOL 20 MG: 10 TABLET ORAL at 08:12

## 2024-05-21 RX ADMIN — CYANOCOBALAMIN TAB 1000 MCG 1000 MCG: 1000 TAB at 08:11

## 2024-05-21 NOTE — NURSING NOTE
Met with patient.  TAVR education and postop instructions given with handouts. Temporary TAVR valve ID card given.  Follow up appointments scheduled.  First appointment is scheduled on 5/30/24 at 0900 with DAYANARA Nunez.  Information on cardiac rehab given with pamphlet.

## 2024-05-21 NOTE — DISCHARGE SUMMARY
Date of Discharge:  5/21/2024    Discharge Diagnosis: severe aortic stenosis status post transcatheter aortic valve replacement     Presenting Problem(s)  Active Hospital Problems    Diagnosis  POA    **Congestive heart failure, NYHA class 3, chronic, diastolic [I50.32]  Yes    S/P TAVR (transcatheter aortic valve replacement) [Z95.2]  Not Applicable    Nonrheumatic aortic valve stenosis [I35.0]  Yes      Resolved Hospital Problems   No resolved problems to display.          Hospital Course  Cherie Hinton is a 89-year-old female with a history of hypertension, hyperlipidemia, Type 2 diabetes mellitus, stroke, paroxysmal atrial fibrillation, status post left atrial appendage occlusion with Watchman device, chronic kidney disease, and severe aortic stenosis with NYHA class 3 diastolic CHF who presented to [facility] on 5/20/2024 and underwent right transfemoral transcatheter aortic valve replacement. She was admitted post-procedure for close monitoring.     The patient's post-procedure echocardiogram shows well-seated 26 mm Medtronic TAVR valve with trace aortic insufficiency and mean gradient of 5 mmHg. She will be started on Plavix only due to a history of gastrointestinal hemorrhage. Her A1c is 8.29%. She was started on Farxiga for heart failure and better diabetes control. She will be discharged home today (5/21/2024).        Procedures Performed    Procedure(s):  Transfemoral Transcatheter Aortic Valve Replacement  TRANSFEMORAL TRANSCATHETER AORTIC VALVE REPLACEMENT  -------------------       Consults:   Consults       No orders found for last 30 day(s).            Pertinent Cardiac Test Results:     ECG:   ECG 12 Lead Rhythm Change   Preliminary Result   HEART RATE= 62  bpm   RR Interval= 974  ms   NJ Interval=   ms   P Horizontal Axis=   deg   P Front Axis=   deg   QRSD Interval= 144  ms   QT Interval= 486  ms   QTcB= 492  ms   QRS Axis= 96  deg   T Wave Axis= 5  deg   - ABNORMAL ECG -   Atrial  fibrillation   Nonspecific intraventricular conduction delay   Lateral infarct, age indeterminate   Probable anteroseptal infarct, recent   Electronically Signed By:    Date and Time of Study: 2024-05-21 05:50:25           Echocardiogram: Results for orders placed during the hospital encounter of 05/20/24    Adult Transthoracic Echo Limited with Contrast if Necessary Per Protocol POD #1    Interpretation Summary    Left ventricular ejection fraction appears to be 56 - 60%.    There is a TAVR valve present.    Moderate mitral valve regurgitation is present.    Estimated right ventricular systolic pressure from tricuspid regurgitation is normal (<35 mmHg).    26 mm Medtronic TAVR valve.  Trace AI.  Mean gradient is 5 mmHg.      Stress Test: Results for orders placed during the hospital encounter of 04/14/23    Stress Test With Myocardial Perfusion One Day    Interpretation Summary    Left ventricular ejection fraction is normal (Calculated EF = 58%).    Myocardial perfusion imaging indicates a normal myocardial perfusion study with no evidence of ischemia.    Impressions are consistent with a low risk study.         Vital Signs  Temp:  [96.6 °F (35.9 °C)-97.5 °F (36.4 °C)] 97.4 °F (36.3 °C)  Heart Rate:  [61-85] 62  Resp:  [14-23] 23  BP: (123-156)/(44-80) 156/65    Physical Exam:  The patient is alert, oriented and in no distress.  Vital signs as noted above.  Head and neck revealed no carotid bruits or jugular venous distention.  No thyromegaly or lymph adenopathy is present  Lungs clear.  No wheezing.  Breath sounds are normal bilaterally.  Heart normal first and second heart sounds.  No precordial rub is present.  No gallop is present.  Abdomen soft and nontender.  No organomegaly is present.  Extremities with good peripheral pulses without any pedal edema.  Skin warm and dry.  Musculoskeletal system is grossly normal  CNS grossly normal    Condition on Discharge:  stable      Discharge Disposition:  Home or Self  Care    Discharge Medications     Discharge Medications        New Medications        Instructions Start Date   clopidogrel 75 MG tablet  Commonly known as: PLAVIX   75 mg, Oral, Daily   Start Date: May 22, 2024     dapagliflozin Propanediol 10 MG tablet  Commonly known as: Farxiga   10 mg, Oral, Daily      pantoprazole 40 MG EC tablet  Commonly known as: PROTONIX  Replaces: omeprazole 40 MG capsule   40 mg, Oral, Every Early Morning   Start Date: May 22, 2024            Continue These Medications        Instructions Start Date   atorvastatin 10 MG tablet  Commonly known as: LIPITOR   10 mg, Oral, Daily      dilTIAZem  MG 24 hr capsule  Commonly known as: CARDIZEM CD   240 mg, Oral, Daily      ferrous sulfate 325 (65 FE) MG EC tablet   324 mg, Oral, Daily With Breakfast      furosemide 20 MG tablet  Commonly known as: LASIX   20 mg, Oral, Daily      meclizine 25 MG tablet  Commonly known as: ANTIVERT   25 mg, Oral, 3 Times Daily PRN      metFORMIN 500 MG tablet  Commonly known as: Glucophage   500 mg, Oral, Daily With Breakfast, For diabetes      nebivolol 20 MG tablet  Commonly known as: Bystolic   20 mg, Oral, Daily      zolpidem 5 MG tablet  Commonly known as: AMBIEN   5 mg, Oral, Nightly PRN             Stop These Medications      diphenhydrAMINE 50 MG tablet  Commonly known as: BENADRYL     famotidine 20 MG tablet  Commonly known as: PEPCID     omeprazole 40 MG capsule  Commonly known as: priLOSEC  Replaced by: pantoprazole 40 MG EC tablet     predniSONE 50 MG tablet  Commonly known as: DELTASONE     vitamin B-12 1000 MCG tablet  Commonly known as: CYANOCOBALAMIN                Discharge Diet:   Diet Instructions       Diet: Cardiac Diets, Diabetic Diets; Healthy Heart (2-3 Na+); Regular (IDDSI 7); Thin (IDDSI 0); Consistent Carbohydrate      Discharge Diet:  Cardiac Diets  Diabetic Diets       Cardiac Diet: Healthy Heart (2-3 Na+)    Texture: Regular (IDDSI 7)    Fluid Consistency: Thin (IDDSI 0)     Diabetic Diet: Consistent Carbohydrate            Activity at Discharge:   Activity Instructions       Activity as Tolerated      Bathing Restrictions      Type of Restriction: Bathing    Bathing Restrictions:  No Tub Bath  Other       Explain Bathing Restrictions: okay to shower tomorrow    Driving Restrictions      Type of Restriction: Driving    Driving Restrictions: No Driving (Time Limited)    Length: 1 Week    Lifting Restrictions      Type of Restriction: Lifting    Lifting Restrictions: Lifting Restriction (Indicate Limit)    Weight Limit (Pounds): 10    Length of Lifting Restriction: 1 week    Work Restrictions      Type of Restriction: Work    May Return to Work: In 1 Week    With / Without Restrictions: Without Restrictions            Follow-up Appointments  Future Appointments   Date Time Provider Department Center   5/30/2024  9:00 AM Karla Low APRN [facility]   6/12/2024  3:00 PM Mich Araujo MD [facility]   7/1/2024  1:45 PM [facility] 1/OUTPATIENT [facility]   7/1/2024  3:00 PM Mich Araujo MD [facility]   4/22/2025  1:00 PM [facility] ECHO 1/OUTPATIENT [facility]   4/22/2025  2:15 PM Mich Araujo MD [facility]     Additional Instructions for the Follow-ups that You Need to Schedule       Ambulatory Referral to Cardiac Rehab   As directed      Call MD With Problems / Concerns   As directed      Instructions: Call [phone] for problems or concerns.    Order Comments: Instructions: Call [phone] for problems or concerns.         Discharge Follow-up with PCP   As directed       Currently Documented PCP:    Adnrew Antunez MD    PCP Phone Number:    [phone]     Follow Up Details: as needed        Discharge Follow-up with Specified Provider: Structural Heart Nurse PracitionerKarla   As directed      To: Structural Heart Nurse Karla Heredia   Follow Up Details: 5/30/2024                 Test Results Pending at Discharge  n/a     Time: Discharge 45 min    Electronically signed by DAYANARA Nunez, 05/21/24, 4:15 PM EDT.

## 2024-05-21 NOTE — PROGRESS NOTES
" LOS: 1 day   Patient Care Team:  Andrew Antunez MD as PCP - General (Family Medicine)  Cris Green APRN as Nurse Practitioner (Cardiology)    Chief Complaint: s/p TAVR      Subjective  No complaints. States she hopes to go home today.     Objective     Vital Signs  Temp:  [96.6 °F (35.9 °C)-97.5 °F (36.4 °C)] 97.4 °F (36.3 °C)  Heart Rate:  [61-93] 62  Resp:  [14-22] 21  BP: (123-156)/(44-80) 125/44  Body mass index is 28.31 kg/m².    Intake/Output Summary (Last 24 hours) at 5/21/2024 0924  Last data filed at 5/20/2024 2200  Gross per 24 hour   Intake 850 ml   Output 900 ml   Net -50 ml     No intake/output data recorded.      Wt Readings from Last 3 Encounters:   05/20/24 74.8 kg (164 lb 14.5 oz)   05/17/24 75.2 kg (165 lb 12.8 oz)   04/18/24 74.8 kg (165 lb)       Flowsheet Rows      Flowsheet Row First Filed Value   Admission Height 162.6 cm (64\") Documented at 05/20/2024 0600   Admission Weight 74.8 kg (164 lb 14.5 oz) Documented at 05/20/2024 0600            Objective:  General Appearance:  Comfortable, well-appearing, in no acute distress and not in pain (Sitting up on edge of bed).    Vital signs: (most recent): Blood pressure 125/44, pulse 62, temperature 97.4 °F (36.3 °C), temperature source Oral, resp. rate 21, height 162.6 cm (64\"), weight 74.8 kg (164 lb 14.5 oz), SpO2 96%.  Vital signs are normal.    Output: Producing urine.    Lungs:  Normal effort and normal respiratory rate.  Breath sounds clear to auscultation.    Heart: Normal rate.  Irregular rhythm.  No murmur.   Abdomen: Abdomen is soft.    Extremities: Normal range of motion.  There is no dependent edema.    Neurological: Patient is alert and oriented to person, place and time.    Skin:  Warm and dry.  (Bilateral groins soft and without hematoma)              Results Review:        Results from last 7 days   Lab Units 05/21/24  0118 05/20/24  0839 05/20/24  0757 05/17/24  0813   WBC 10*3/mm3 9.68  --   --  5.11   HEMOGLOBIN g/dL 8.1*  --   " "--  10.0*   HEMOGLOBIN, POC g/dL  --  9.9* 9.5*  --    HEMATOCRIT % 26.9*  --   --  33.4*   HEMATOCRIT POC %  --  29* 28*  --    PLATELETS 10*3/mm3 248  --   --  320         PT/INR:  No results found for: \"PROTIME\"/No results found for: \"INR\"    Results from last 7 days   Lab Units 05/21/24  0118 05/20/24  1953 05/20/24  1327 05/17/24  0813   SODIUM mmol/L 140  --   --  141   POTASSIUM mmol/L 4.2 4.4 3.9 4.2   CHLORIDE mmol/L 108*  --   --  104   CO2 mmol/L 22.0  --   --  26.0   BUN mg/dL 22  --   --  19   CREATININE mg/dL 1.05*  --   --  1.04*   GLUCOSE mg/dL 151*  --   --  169*   CALCIUM mg/dL 9.1  --   --  9.6         Scheduled Meds:  atorvastatin, 10 mg, Oral, Daily  dilTIAZem CD, 240 mg, Oral, Daily  empagliflozin, 10 mg, Oral, Daily  ferrous sulfate, 324 mg, Oral, Daily With Breakfast  furosemide, 20 mg, Oral, Daily  insulin lispro, 2-9 Units, Subcutaneous, 4x Daily AC & at Bedtime  magnesium sulfate, 4 g, Intravenous, Once  metFORMIN, 500 mg, Oral, Daily With Breakfast  nebivolol, 20 mg, Oral, Daily  pantoprazole, 40 mg, Oral, Q AM  vitamin B-12, 1,000 mcg, Oral, Daily        Infusions:         Assessment & Plan    Severe aortic stenosis s/p TAVR (Dimitris/Júnior)  Atrial fibrillation s/p Watchman   Dilated cardiomyopathy  Tachy-morgan syndrome s/p PPM  CAD  HTN  HLD  STORMY  Hx of CVA  DM  CKD stage 3  GERD  Hx colon cancer   Iron deficiency anemia   Depression      POD #1 s/p TAVR. Doing well. CXR and TTE pending. If ok, can be discharged later today from Cardiac Surgery standpoint.       FRANSISCA Rhoades  05/21/24  09:24 EDT    Addendum  Patient was examined by me, agree with the findings above.  2D echocardiogram showed no significant paravalvular leaks or valve related problems.  Groin puncture sites are clean  Overall she is progressing well.  Okay for discharge  Yonatan Shanks  "

## 2024-05-21 NOTE — CONSULTS
"Diabetes Education  Assessment/Teaching    Patient Name:  Cherie Hinton  YOB: 1934  MRN: [id number]  Admit Date:  5/20/2024      Assessment Date:  5/21/2024  Flowsheet Row Most Recent Value   General Information     Referral From: MD order  [Consult received due to A1c >7%. A1c this adm 8.29%, Adm bs 221.]   Height 162.6 cm (64\")   Height Method Stated   Weight 74.8 kg (164 lb 14.5 oz)   Weight Method Standing scale   Pregnancy Assessment    Diabetes History    What type of diabetes do you have? Type 2   Length of Diabetes Diagnosis --  [Pt states not sure when she was first diagnosed with diabetes.]   Do you test your blood sugar at home? yes   Frequency of checks does not routinely check bs   Meter type unsure of name, meter is 3 years old   Who performs the test? self   Have you had low blood sugar? (<70mg/dl) no   Education Preferences    Nutrition Information    Assessment Topics    DM Goals             Flowsheet Row Most Recent Value   DM Education Needs    Meter Has own   Frequency of Testing Daily  [Discussed checking bs at least 4-5 times/week and discussed recording bs. Encouraged pt to notify PCP if bs running outside healthy range.]   Blood Glucose Target Range Discussed A1c result of 8.29%. Discussed healthy bs range and healthy A1c target. Discussed importance of bs control.   Medication Oral  [Pt taking Metfomin 500 mg in am. Discussed pt has been started on Jardiance in hospital. Pt states she does not want to take this medication at home.]   Reducing Risks A1C testing  [Gave A1c info sheet.]   Healthy Eating --  [Pt usually eating 2 meals/day.]   Motivation Engaged   Teaching Method Explanation, Discussion, Handouts   Patient Response Verbalized understanding              Other Comments:  Met with pt at bedside. Pt lives alone and is independent with care. Pt states she does not drink sugared beverages but she does like eating sweets. She states she would prefer to have a treat " as opposed to eating healthy. Discussed importance of trying to decrease consumption of high CHO snacks/sweets. Pt states she will try to decrease how many sweets she is eating. Pt does have PCP and sees routinely. Pt states she does not need additional education at this time.       Electronically signed by:  Kandi Plascencia RN  05/21/24 19:21 EDT

## 2024-05-21 NOTE — OP NOTE
TAVR OPERATIVE REPORT     CO-SURGEON: Yonatan Shanks MD  CO-SURGEON: Leandro Callejas MD     DIAGNOSIS: Severe symptomatic aortic stenosis.      POSTOP DIAGNOSIS: Severe symptomatic aortic stenosis.      PRESENT CO-MORBIDITIES: Severe nonrheumatic aortic stenosis: Congestive heart failure, chronic, diastolic New York Heart Association class III     PROCEDURE: Elective procedure in hybrid operating room      1. Transcatheter aortic valve replacement (TAVR) utilizing a 26 mm Medtronic Evolut Pro valve  2. Percutaneous right femoral arterial access   3. Percutaneous left femoral arterial and venous access  4. Abdominal aortography and bilateral lower extremity angiography  5. Transvenous pacing using a 5-Wallisian balloon-tip pacer  6. Supravalvular aortogram  7. Transthoracic echocardiogram  8. Arterial line placement    INDICATIONS FOR OPERATION: The patient is a 89-year-old with New York Heart Association Class 3 symptoms and STS score of 12.5%. The patient was determined to be best suited for TAVR by the multidisciplinary heart team due to elevated surgical risk.     FDA TAVR INDICATIONS: The patient was judged to be suitable for TAVR by the multidisciplinary team as reviewed by two cardiac surgeons, an interventional cardiologist, and the rest of the TAVR team.  The patient, family and team were in agreement that the case would convert to an open surgical AVR in the event of unsuccessful TAVR. The patient’s co-morbidities would not preclude the expected benefit from correction of the aortic stenosis by TAVR based on the team discussion. The patient will be enrolled in the STS/ACC TAVR TVT registry.      DESCRIPTION: Dr. Callejas (interventional cardiologist) and Dr. Shanks (cardiothoracic surgeon) performed the procedure together in all preoperative and operative aspects. The patient was taken to the hybrid OR. A radial art line and central venous access were inserted preoperatively. Anesthesia was administered without  "apparent complication. The patient was prepped and draped in the usual sterile fashion.       Using a micropuncture technique, access was obtained in the right femoral artery and a microsheath was inserted.  Angiography through the microsheath confirmed good position of the arterial access point.  Two Perclose sutures were deployed in offset manner in the right femoral artery using the \"Preclose\" technique.  An 8 Stateless sheath was inserted.       A similar technique was used to obtain access in the left common femoral artery.    A 6 Stateless sheath was inserted.  Access was obtained in the left femoral vein and a 6 Stateless sheath was inserted.   A 5 Stateless pacing catheter was directed to the RV apex and was tested.  A 5 Stateless pigtail was directed to the non-coronary cusp.  Angiography was performed using 20 cc of contrast.      Heparin was used for anticoagulation.  From the right femoral approach, a 6 Stateless AL-1 catheter was directed to the ascending aorta.  We crossed the aortic valve with a straight wire and exchanged for a pigtail catheter.  We advanced the pigtail to the LV apex and exchanged for a Confida wire.  TTE demonstrated good position for the wire, without entanglement in the mitral valve apparatus.     A 26-mm Medtronic Evolut flex valve was prepped and was inspected using fluoroscopy, then was loaded into the delivery system.  Sequential dilation of the right groin was performed, then the valve delivery system was easily inserted to the descending aorta.     The valve was advanced easily across the aortic arch and was placed near the non-coronary cusp.  The valve was carefully deployed until annular contact was made.  Ventricular pacing was performed to stabilize the position.  Position was confirmed using angiography.  The valve was then deployed until the valve leaftets began to function.     Proper valve placement, orientation and degree of regurgitation was then evaluated by transthoracic " echocardiogram and aortography. The co-operators agreed that no further intervention was necessary.     We then withdrew the delivery system to the descending aorta and withdrew the nosecone.  Over the wire, it was removed and hemostasis was obtained with the perclose sutures.  Angiography from the distal aorta.  All parties were in agreement that there was no flow-limiting vascular trauma or extravasation of dye, at which point closure of the arteriotomy was completed with our two Perclose devices.       Finally, our contralateral access was removed and closed using a 6 Lebanese Angioseal.  The femoral venous sheath was removed.  The patient left in the care of the anesthesia team for extubation and hemodynamic monitoring. The patient was transported to the Open Heart Recovery Unit for further monitoring and care.         1. Hemodynamics:  Post TAVR aortic gradient: 6 mmHg.  Post TAVR AI: None. Post NAVI: 1.4 cm2.      CONTRAST USED: 99 mL.      FLUROSCOPY TIME: 7.7 min     Air KERMA: 104.9  mGray    DAP: 1627 6 mgy     EBL: minimal     CONCLUSIONS:  Successful deployment of a 26 mm Medtronic Evolut Pro transcatheter aortic heart valve.

## 2024-05-21 NOTE — THERAPY EVALUATION
Patient Name: Cherie Hinton  : 1934    MRN: [id number]                              Today's Date: 2024       Admit Date: 2024    Visit Dx:     ICD-10-CM ICD-9-CM   1. S/P TAVR (transcatheter aortic valve replacement)  Z95.2 V43.3   2. Nonrheumatic aortic valve stenosis  I35.0 424.1   3. Congestive heart failure, NYHA class 3, chronic, diastolic  I50.32 428.32     Patient Active Problem List   Diagnosis    Pacemaker    Atrial fibrillation    Coronary artery disease involving native coronary artery of native heart without angina pectoris    Hyperlipidemia, mixed    Essential hypertension    Tachy-morgan syndrome    Iron deficiency anemia due to chronic blood loss    Antral erosion    B12 deficiency    Cerebrovascular accident (CVA)    Colon cancer    Depression    Type 2 diabetes mellitus with stage 3a chronic kidney disease, without long-term current use of insulin    GERD (gastroesophageal reflux disease)    Hiatal hernia    HALEIGH (iron deficiency anemia)    LAD (lymphadenopathy)    Left pontine CVA    Mitral regurgitation    STORMY (obstructive sleep apnea)    Osteoarthritis    Prinzmetal's angina    Right kidney mass    TIA (transient ischemic attack)    Vestibular nerve disease    Presence of Watchman left atrial appendage closure device    Ataxia    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    Dilated cardiomyopathy    Unstable angina    Aortic stenosis, moderate    Other fatigue    Dyspnea on exertion    Dyspnea, unspecified type    Hypomagnesemia    Acute right ankle pain    Allergic conjunctivitis    Posterior vitreous detachment    Exudative age-related macular degeneration    Recurrent syncope    Syncope    Hypotension due to hypovolemia    Renal insufficiency    Nonrheumatic aortic valve stenosis    Congestive heart failure, NYHA class 3, chronic, diastolic    S/P TAVR (transcatheter aortic valve replacement)     Past Medical History:   Diagnosis Date    Antral erosion      Aortic valve regurgitation 12/26/2018    Atrial fibrillation     Atrial fibrillation with controlled ventricular response     B12 deficiency     CAD (coronary artery disease)     Cellulitis 04/2011    on face     Cerebrovascular accident (CVA) 12/07/2016    CHF (congestive heart failure)     Colon cancer     Coronary artery disease involving native coronary artery of native heart without angina pectoris 06/18/2019    Depression     Diabetes mellitus, type 2     Essential hypertension 02/13/2012    Fatigue     GERD (gastroesophageal reflux disease)     Hiatal hernia     History of colonoscopy 04/2010    last done 4/10    History of esophagogastroduodenoscopy (EGD) 04/2010    last done 4/10    Hyperlipidemia, mixed 02/13/2012    Hypertension     white coat htn    HALEIGH (iron deficiency anemia)     LAD (lymphadenopathy)     40% lesion LAD     Left pontine CVA     Mitral regurgitation 05/21/2012    STORMY (obstructive sleep apnea)     not treating    Osteoarthritis     Pacemaker 06/18/2019    Permanent atrial fibrillation 06/18/2019    Presence of Watchman left atrial appendage closure device 01/04/2020    Prinzmetal's angina     Right kidney mass     1.4 cm- following urology     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    Sick sinus syndrome     Stroke 11/2016    TIA (transient ischemic attack)     left CVA, interrupted TPA; As (moderate-severe)     Vestibular nerve disease 2017     Past Surgical History:   Procedure Laterality Date    ATRIAL APPENDAGE EXCLUSION LEFT WITH TRANSESOPHAGEAL ECHOCARDIOGRAM N/A 10/10/2019    Procedure: Atrial Appendage Occlusion;  Surgeon: Richie Chapman MD;  Location: [facility] CATH INVASIVE LOCATION;  Service: Cardiovascular    ATRIAL APPENDAGE EXCLUSION LEFT WITH TRANSESOPHAGEAL ECHOCARDIOGRAM N/A 10/10/2019    Procedure: Atrial Appendage Occlusion;  Surgeon: Je Rivera MD;  Location: [facility] CATH INVASIVE LOCATION;  Service: Cardiology    BLADDER REPAIR       CARDIAC CATHETERIZATION  05/2010    CARDIAC CATHETERIZATION N/A 3/28/2024    Procedure: Left Heart Cath;  Surgeon: Mich Araujo MD;  Location: [facility] CATH INVASIVE LOCATION;  Service: Cardiology;  Laterality: N/A;    CHOLECYSTECTOMY      COLECTOMY PARTIAL / TOTAL      COLONOSCOPY  04/16/2018    negative     COLONOSCOPY N/A 7/1/2022    Procedure: COLONOSCOPY with polypectomy x1;  Surgeon: ADOLFO Boss MD;  Location: [facility] ENDOSCOPY;  Service: Gastroenterology;  Laterality: N/A;  post: diverticulosis, hemorrhoids, polyps    ENDOSCOPY  04/16/2018    negative     ENDOSCOPY N/A 6/29/2022    Procedure: ESOPHAGOGASTRODUODENOSCOPY with biopsy of duodenum r/o celiac;  Surgeon: ADOLFO Boss MD;  Location: [facility] ENDOSCOPY;  Service: Gastroenterology;  Laterality: N/A;  hiatial hernia  Gastric Polyps        HYSTERECTOMY      INSERT / REPLACE / REMOVE PACEMAKER  09/2018    Pacemaker gen change 9/2018     OTHER SURGICAL HISTORY  04/25/2015    Exercise myoview, normal     PACEMAKER IMPLANTATION  01/22/2010    Dual Chamber - 1/22/2010; RA Lead Revision- 5/7/2012- BS     TOOTH EXTRACTION      TRANSESOPHAGEAL ECHOCARDIOGRAM (DHEERAJ)  07/2019      General Information       Row Name 05/21/24 1301          Physical Therapy Time and Intention    Document Type evaluation  -     Mode of Treatment physical therapy  -       Row Name 05/21/24 1301          General Information    Patient Profile Reviewed yes  -     Prior Level of Function independent:;all household mobility;community mobility;ADL's;bathing;cooking;cleaning;shopping;driving  No AD use at baseline but patient has cane, WC, walker, SC,and BSC if needed.  -     Existing Precautions/Restrictions no known precautions/restrictions  -     Barriers to Rehab none identified  -       Row Name 05/21/24 1301          Living Environment    People in Home alone  -       Row Name 05/21/24 1301          Home Main Entrance    Number of Stairs, Main Entrance  none  -AH       Row Name 05/21/24 1301          Stairs Within Home, Primary    Number of Stairs, Within Home, Primary none  -AH       Row Name 05/21/24 1301          Cognition    Orientation Status (Cognition) oriented x 4  -AH       Row Name 05/21/24 1301          Safety Issues, Functional Mobility    Comment, Safety Issues/Impairments (Mobility) no safety issues noted.  -               User Key  (r) = Recorded By, (t) = Taken By, (c) = Cosigned By      Initials Name Provider Type     Mesha Dunbar PT Physical Therapist                   Mobility       [facility] Name 05/21/24 1302          Bed Mobility    Bed Mobility bed mobility (all) activities  -     All Activities, [place] (Bed Mobility) independent  -AH       Row Name 05/21/24 1302          Sit-Stand Transfer    Sit-Stand [place] (Transfers) standby assist  completed from hospital bed and standard height toilet  -     Comment, (Sit-Stand Transfer) no AD use.  -AH       Row Name 05/21/24 1302          Gait/Stairs (Locomotion)    [place] Level (Gait) standby assist  -     Patient was able to Ambulate yes  -     Distance in Feet (Gait) 200  -     Deviations/Abnormal Patterns (Gait) --  slight deviation from line of progression which pt is able to self correct.  -               User Key  (r) = Recorded By, (t) = Taken By, (c) = Cosigned By      Initials Name Provider Type    Mesha Ardon PT Physical Therapist                   Obj/Interventions       Row Name 05/21/24 1304          Range of Motion Comprehensive    General Range of Motion no range of motion deficits identified  -AH       Row Name 05/21/24 1304          Strength Comprehensive (MMT)    General Manual Muscle Testing (MMT) Assessment no strength deficits identified  -AH       Row Name 05/21/24 1304          Balance    Balance Assessment sitting static balance;sitting dynamic balance;standing static balance;standing dynamic balance  -     Static Sitting Balance  independent  -     Dynamic Sitting Balance independent  -     Position, Sitting Balance sitting edge of bed  -     Static Standing Balance standby assist  -     Dynamic Standing Balance standby assist  -     Position/Device Used, Standing Balance unsupported  -               User Key  (r) = Recorded By, (t) = Taken By, (c) = Cosigned By      Initials Name Provider Type     Mesha Dunbar, PT Physical Therapist                   Goals/Plan    No documentation.                  Clinical Impression       [facility] Name 05/21/24 1304          Pain    Pretreatment Pain Rating 0/10 - no pain  -     Posttreatment Pain Rating 0/10 - no pain  -[facility] Name 05/21/24 1304          Plan of Care Review    Outcome Evaluation Pt is a 89 y/o female who presented 5/20/24 for scheduled TAVR to address Severe symptomatic aortic stenosis. Pt also has congestive heart failure. She presents POD1 at/near her baseline function. She is independent/SBA with all mobility, including ambulation x200' without AD. Vitals are stable at rest and with activity. Pt c/o slight SEE after gait trial. Pt educated regarding energy conservation and gradual progression back to all life roles. Pt verbalizes understanding and agreement. Pt is safe to DC home. No DME needed. No f/u therapy needed.  -[facility] Name 05/21/24 1304          Therapy Assessment/Plan (PT)    Patient/Family Therapy Goals Statement (PT) go home today  -     Criteria for Skilled Interventions Met (PT) no;no problems identified which require skilled intervention  -     Therapy Frequency (PT) evaluation only  -       Row Name 05/21/24 1304          Positioning and Restraints    Post Treatment Position bed  -     In Bed notified nsg;fowlers;encouraged to call for assist;call light within reach  -               User Key  (r) = Recorded By, (t) = Taken By, (c) = Cosigned By      Initials Name Provider Type     Mesha Dunbar, PT Physical Therapist                    Outcome Measures       Row Name 05/21/24 1317          How much help from another person do you currently need...    Turning from your back to your side while in flat bed without using bedrails? 4  -AH     Moving from lying on back to sitting on the side of a flat bed without bedrails? 4  -AH     Moving to and from a bed to a chair (including a wheelchair)? 4  -AH     Standing up from a chair using your arms (e.g., wheelchair, bedside chair)? 4  -AH     Climbing 3-5 steps with a railing? 4  -AH     To walk in hospital room? 4  -     AM-PAC 6 Clicks Score (PT) 24  -     Highest Level of Mobility Goal 8 --> Walked 250 feet or more  -       Row Name 05/21/24 1317          Functional Assessment    Outcome Measure Options AM-PAC 6 Clicks Basic Mobility (PT)  -               User Key  (r) = Recorded By, (t) = Taken By, (c) = Cosigned By      Initials Name Provider Type     Mesha Dunbar, PT Physical Therapist                                 Physical Therapy Education       Title: PT OT SLP Therapies (Done)       Topic: Physical Therapy (Done)       Point: Mobility training (Done)       Learning Progress Summary             Patient Eager, E, VU by  at 5/21/2024 1317                         Point: Home exercise program (Done)       Learning Progress Summary             Patient Eager, E, VU by  at 5/21/2024 1317                         Point: Body mechanics (Done)       Learning Progress Summary             Patient Eager, E, VU by  at 5/21/2024 1317                         Point: Precautions (Done)       Learning Progress Summary             Patient Eags[place], E, VU by  at 5/21/2024 1317                                         User Key       Initials Effective Dates Name Provider Type [facility] 12/04/23 -  Mesha Dunbar, PT Physical Therapist PT                  PT Recommendation and Plan     Outcome Evaluation: Pt is a 89 y/o female who presented 5/20/24 for scheduled TAVR to address Severe  symptomatic aortic stenosis. Pt also has congestive heart failure. She presents POD1 at/near her baseline function. She is independent/SBA with all mobility, including ambulation x200' without AD. Vitals are stable at rest and with activity. Pt c/o slight SEE after gait trial. Pt educated regarding energy conservation and gradual progression back to all life roles. Pt verbalizes understanding and agreement. Pt is safe to DC home. No DME needed. No f/u therapy needed.     Time Calculation:         PT Charges       Row Name 05/21/24 1318             Time Calculation    Start Time 0938  -      Stop Time 1010  -      Time Calculation (min) 32 min  -      PT Non-Billable Time (min) 0 min  -      PT Received On 05/21/24  -         Time Calculation- PT    Total Timed Code Minutes- PT 0 minute(s)  -                User Key  (r) = Recorded By, (t) = Taken By, (c) = Cosigned By      Initials Name Provider Type     Mesha Dunbar, PT Physical Therapist                  Therapy Charges for Today       Code Description Service Date Service Provider Modifiers Qty    [id number]  PT EVAL LOW COMPLEXITY 4 5/21/2024 Mesha Dunbar, PT GP 1            PT G-Codes  Outcome Measure Options: AM-PAC 6 Clicks Basic Mobility (PT)  AM-PAC 6 Clicks Score (PT): 24  PT Discharge Summary  Anticipated Discharge Disposition (PT): home    Mesha Dunbar PT  5/21/2024

## 2024-05-21 NOTE — PLAN OF CARE
Goal Outcome Evaluation:              Outcome Evaluation: Pt is a 89 y/o female who presented 5/20/24 for scheduled TAVR to address Severe symptomatic aortic stenosis. Pt also has congestive heart failure. She presents POD1 at/near her baseline function. She is independent/SBA with all mobility, including ambulation x200' without AD. Vitals are stable at rest and with activity. Pt c/o slight SEE after gait trial. Pt educated regarding energy conservation and gradual progression back to all life roles. Pt verbalizes understanding and agreement. Pt is safe to DC home. No DME needed. No f/u therapy needed.      Anticipated Discharge Disposition (PT): home

## 2024-05-21 NOTE — PLAN OF CARE
Goal Outcome Evaluation:  Plan of Care Reviewed With: patient, sibling. Pt being d/c'd to home. She will get meds to bed before discharge.

## 2024-05-21 NOTE — CASE MANAGEMENT/SOCIAL WORK
Case Management Discharge Note      Final Note: Routine home.    Provided Post Acute Provider List?: N/A  Provided Post Acute Provider Quality & Resource List?: N/A    Selected Continued Care - Admitted Since 5/20/2024      Transportation Services  Private: Car    Final Discharge Disposition Code: 01 - home or self-care

## 2024-05-22 ENCOUNTER — READMISSION MANAGEMENT (OUTPATIENT)
Dept: CALL CENTER | Facility: HOSPITAL | Age: 89
End: 2024-05-22
Payer: MEDICARE

## 2024-05-22 NOTE — OUTREACH NOTE
Prep Survey      Flowsheet Row Responses   [facility]? [facility]   Is LACE score < 7 ? No   Eligibility Readm Mgmt   Discharge diagnosis Transfemoral Transcatheter Aortic Valve Replacement  TRANSFEMORAL TRANSCATHETER AORTIC VALVE REPLACEMENT   Does the patient have one of the following disease processes/diagnoses(primary or secondary)? Cardiothoracic surgery   Does the patient have Home health ordered? No   Is there a DME ordered? No   Prep survey completed? Yes            Gladys MARTINEZ - Registered Nurse

## 2024-05-24 LAB
QT INTERVAL: 486 MS
QTC INTERVAL: 492 MS

## 2024-05-29 ENCOUNTER — READMISSION MANAGEMENT (OUTPATIENT)
Dept: CALL CENTER | Facility: HOSPITAL | Age: 89
End: 2024-05-29
Payer: MEDICARE

## 2024-05-29 PROBLEM — I50.32 CHRONIC HEART FAILURE WITH PRESERVED EJECTION FRACTION (HFPEF): Chronic | Status: ACTIVE | Noted: 2024-04-26

## 2024-05-29 PROBLEM — I49.5 TACHY-BRADY SYNDROME: Status: RESOLVED | Noted: 2019-09-30 | Resolved: 2024-05-29

## 2024-05-29 NOTE — PROGRESS NOTES
"    Subjective:     Encounter Date:05/30/2024        Patient ID: Cherie Hinton  1934    Chief Complaint:  hospital discharge follow-up    History of Present Illness    Cherie Hinton is a 89 y.o. female with a history of severe aortic stenosis who underwent transcatheter aortic valve replacement by Ed Nayak, and Dimitris on 5/20/2024 at [facility]. The patient presents to the office today for follow-up EKG and wound check. The patient states she \"feels great\" since her procedure was done. She states she is no longer short of air. She reports some fatigue, but states it is mild. She denies any other complaints. She has questions about her activity and restrictions. She is also requesting to have her primary cardiology care switched to Dr. Callejas.       Review of systems:  Review of Systems   Constitutional: Positive for malaise/fatigue.   Cardiovascular:  Negative for chest pain, dyspnea on exertion, leg swelling and palpitations.   Respiratory:  Negative for cough and shortness of breath.    Gastrointestinal:  Negative for abdominal pain, nausea and vomiting.   Neurological:  Negative for dizziness, focal weakness, headaches, light-headedness and numbness.   All other systems reviewed and are negative.        The following portions of the patient's history were reviewed and updated as appropriate: allergies, current medications, past family history, past medical history, past social history, past surgical history, and problem list.    Past Medical History:  Past Medical History:   Diagnosis Date    Antral erosion     Aortic valve regurgitation 12/26/2018    Atrial fibrillation     Atrial fibrillation with controlled ventricular response     B12 deficiency     CAD (coronary artery disease)     Cellulitis 04/2011    on face     Cerebrovascular accident (CVA) 12/07/2016    CHF (congestive heart failure)     Colon cancer     Coronary artery disease involving native coronary artery of native " heart without angina pectoris 06/18/2019    Depression     Diabetes mellitus, type 2     Essential hypertension 02/13/2012    Fatigue     GERD (gastroesophageal reflux disease)     Hiatal hernia     History of colonoscopy 04/2010    last done 4/10    History of esophagogastroduodenoscopy (EGD) 04/2010    last done 4/10    Hyperlipidemia, mixed 02/13/2012    Hypertension     white coat htn    HALEIGH (iron deficiency anemia)     LAD (lymphadenopathy)     40% lesion LAD     Left pontine CVA     Mitral regurgitation 05/21/2012    STORMY (obstructive sleep apnea)     not treating    Osteoarthritis     Pacemaker 06/18/2019    Permanent atrial fibrillation 06/18/2019    Presence of Watchman left atrial appendage closure device 01/04/2020    Prinzmetal's angina     Right kidney mass     1.4 cm- following urology     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    Sick sinus syndrome     Stroke 11/2016    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    AORTIC VALVE REPAIR/REPLACEMENT N/A 5/20/2024    Procedure: TRANSFEMORAL TRANSCATHETER AORTIC VALVE REPLACEMENT;  Surgeon: Yonatan Shanks MD;  Location: [facility] HYBRID OR;  Service: Cardiothoracic;  Laterality: N/A;    ATRIAL APPENDAGE EXCLUSION LEFT WITH TRANSESOPHAGEAL ECHOCARDIOGRAM N/A 10/10/2019    Procedure: Atrial Appendage Occlusion;  Surgeon: Richie Chapman MD;  Location: [facility] CATH INVASIVE LOCATION;  Service: Cardiovascular    ATRIAL APPENDAGE EXCLUSION LEFT WITH TRANSESOPHAGEAL ECHOCARDIOGRAM N/A 10/10/2019     Procedure: Atrial Appendage Occlusion;  Surgeon: Je Rivera MD;  Location: [facility] CATH INVASIVE LOCATION;  Service: Cardiology    BLADDER REPAIR      CARDIAC CATHETERIZATION  05/2010    CARDIAC CATHETERIZATION N/A 3/28/2024    Procedure: Left Heart Cath;  Surgeon: Mich Araujo MD;  Location: [facility] CATH INVASIVE LOCATION;  Service: Cardiology;  Laterality: N/A;    CHOLECYSTECTOMY      COLECTOMY PARTIAL / TOTAL      COLONOSCOPY  04/16/2018    negative     COLONOSCOPY N/A 7/1/2022    Procedure: COLONOSCOPY with polypectomy x1;  Surgeon: ADOLFO Boss MD;  Location: [facility] ENDOSCOPY;  Service: Gastroenterology;  Laterality: N/A;  post: diverticulosis, hemorrhoids, polyps    ENDOSCOPY  04/16/2018    negative     ENDOSCOPY N/A 6/29/2022    Procedure: ESOPHAGOGASTRODUODENOSCOPY with biopsy of duodenum r/o celiac;  Surgeon: ADOLFO Boss MD;  Location: [facility] ENDOSCOPY;  Service: Gastroenterology;  Laterality: N/A;  hiatial hernia  Gastric Polyps        HYSTERECTOMY      INSERT / REPLACE / REMOVE PACEMAKER  09/2018    Pacemaker gen change 9/2018     OTHER SURGICAL HISTORY  04/25/2015    Exercise myoview, normal     PACEMAKER IMPLANTATION  01/22/2010    Dual Chamber - 1/22/2010; RA Lead Revision- 5/7/2012- BS     TOOTH EXTRACTION      TRANSESOPHAGEAL ECHOCARDIOGRAM (DHEERAJ)  07/2019        Allergies:  Allergies   Allergen Reactions    Contrast Dye (Echo Or Unknown Ct/Mr) Anaphylaxis    Adhesive Tape Itching    Latex Hives       Current Meds:     Current Outpatient Medications:     atorvastatin (LIPITOR) 10 MG tablet, Take 1 tablet by mouth Daily., Disp: , Rfl:     clopidogrel (PLAVIX) 75 MG tablet, Take 1 tablet by mouth Daily., Disp: 90 tablet, Rfl: 3    dapagliflozin Propanediol (Farxiga) 10 MG tablet, Take 10 mg by mouth Daily., Disp: 30 tablet, Rfl: 11    dilTIAZem CD (CARDIZEM CD) 240 MG 24 hr capsule, Take 1 capsule by mouth Daily., Disp: , Rfl:     ferrous sulfate 325 (65 FE) MG EC  tablet, Take 1 tablet by mouth Daily With Breakfast., Disp: 90 tablet, Rfl: 1    furosemide (LASIX) 20 MG tablet, Take 1 tablet by mouth Daily., Disp: 30 tablet, Rfl: 11    meclizine (ANTIVERT) 25 MG tablet, Take 1 tablet by mouth 3 (Three) Times a Day As Needed for Dizziness., Disp: 30 tablet, Rfl: 0    metFORMIN (Glucophage) 500 MG tablet, Take 1 tablet by mouth Daily With Breakfast. For diabetes, Disp: 30 tablet, Rfl: 6    nebivolol (Bystolic) 20 MG tablet, Take 1 tablet by mouth Daily., Disp: 90 tablet, Rfl: 3    pantoprazole (PROTONIX) 40 MG EC tablet, Take 1 tablet by mouth Every Morning., Disp: 90 tablet, Rfl: 3    zolpidem (AMBIEN) 5 MG tablet, Take 1 tablet by mouth At Night As Needed for Sleep., Disp: , Rfl:     Social History:   Social History     Tobacco Use    Smoking status: Never     Passive exposure: Never    Smokeless tobacco: Never   Substance Use Topics    Alcohol use: No        Family History:  Family History   Problem Relation Age of Onset    Hypertension Mother     Diabetes Mother     Coronary artery disease Mother     Other Father         CVA    No Known Problems Sister     No Known Problems Brother     No Known Problems Maternal Aunt     No Known Problems Maternal Uncle     No Known Problems Paternal Aunt     No Known Problems Paternal Uncle     No Known Problems Maternal Grandmother     No Known Problems Maternal Grandfather     No Known Problems Paternal Grandmother     No Known Problems Paternal Grandfather     Heart disease Other     Stroke Other     Hypertension Other     Anemia Neg Hx     Arrhythmia Neg Hx     Asthma Neg Hx     Clotting disorder Neg Hx     Fainting Neg Hx     Heart attack Neg Hx     Heart failure Neg Hx     Hyperlipidemia Neg Hx                  Objective:         Vitals reviewed.   Constitutional:       Appearance: Normal and healthy appearance. Well-developed and well-groomed.   Eyes:      Conjunctiva/sclera: Conjunctivae normal.      Pupils: Pupils are equal, round,  "and reactive to light.   HENT:      Head: Normocephalic and atraumatic.    Mouth/Throat:      Mouth: Mucous membranes are moist.      Pharynx: Oropharynx is clear.   Pulmonary:      Effort: Pulmonary effort is normal.      Breath sounds: Normal breath sounds.   Cardiovascular:      PMI at left midclavicular line. Normal rate. Irregularly irregular rhythm.   Pulses:     Intact distal pulses.   Edema:     Peripheral edema absent.   Abdominal:      General: Bowel sounds are normal.      Palpations: Abdomen is soft.   Musculoskeletal: Normal range of motion.      Cervical back: Normal range of motion and neck supple. Skin:     General: Skin is warm and dry.      Comments: Bilateral groin sites soft and dry without erythema, drainage, or swelling.   Neurological:      Mental Status: Alert and oriented to person, place, and time.   Psychiatric:         Mood and Affect: Mood normal.         Behavior: Behavior normal.         /73 (BP Location: Right arm, Patient Position: Sitting, Cuff Size: Adult)   Pulse 75   Ht 160 cm (63\")   Wt 72.8 kg (160 lb 6.4 oz)   SpO2 98%   BMI 28.41 kg/m²       Lab Reviewed:     CBC        BMP        CMP       BNP        TROPONIN        CoAg        Creatinine Clearance  Estimated Creatinine Clearance: 34.1 mL/min (A) (by C-G formula based on SCr of 1.05 mg/dL (H)).    ABG        Radiology  No radiology results for the last day    Cardiology    ECG 12 Lead    Date/Time: 5/30/2024 9:51 AM  Performed by: Karla Low APRN    Authorized by: Karla Low APRN  Comparison: compared with previous ECG from 5/21/2024  Similar to previous ECG  Rhythm: atrial fibrillation  Ectopy: unifocal PVCs  Rate: normal  Conduction: incomplete right bundle branch block    Clinical impression: abnormal EKG  Comments: HR 82 bpm,  ms, QTc 449 ms                        Assessment:         Diagnoses and all orders for this visit:    1. Nonrheumatic aortic valve stenosis (Primary)  -     CBC (No Diff); " Future  -     Basic Metabolic Panel; Future  -     CBC (No Diff); Future  -     Basic Metabolic Panel; Future  -     Adult Transthoracic Echo Complete W/ Cont if Necessary Per Protocol; Future  -     Adult Transthoracic Echo Complete W/ Cont if Necessary Per Protocol; Future    2. S/P TAVR (transcatheter aortic valve replacement)  -     CBC (No Diff); Future  -     Basic Metabolic Panel; Future  -     CBC (No Diff); Future  -     Basic Metabolic Panel; Future  -     Adult Transthoracic Echo Complete W/ Cont if Necessary Per Protocol; Future  -     Adult Transthoracic Echo Complete W/ Cont if Necessary Per Protocol; Future    3. Chronic heart failure with preserved ejection fraction (HFpEF)  -     CBC (No Diff); Future  -     Basic Metabolic Panel; Future  -     CBC (No Diff); Future  -     Basic Metabolic Panel; Future  -     Adult Transthoracic Echo Complete W/ Cont if Necessary Per Protocol; Future  -     Adult Transthoracic Echo Complete W/ Cont if Necessary Per Protocol; Future    4. Permanent atrial fibrillation  Overview:  S/p pacemaker placement and Watchman left atrial appendage closure device.  Rate controlled with diltiazem 240 mg twice daily and Bystolic 20 mg daily.  Patient is not on anticoagulation therapy.  Followed by cardiology, Dr. Wilson.       5. Presence of Watchman left atrial appendage closure device    6. Pacemaker    7. Coronary artery disease involving native coronary artery of native heart without angina pectoris    8. Hyperlipidemia, mixed    9. Essential hypertension    10. Type 2 diabetes mellitus with stage 3a chronic kidney disease, without long-term current use of insulin  Overview:  HbA1c not at goal, <8%. Plan to monitor A1c regularly.  Monitoring renal function.  Continue Metformin 500 mg daily as tolerated by renal function.  Eye & foot exam due.      11. Hospital discharge follow-up    Other orders  -     ECG 12 Lead                Plan:       Severe aortic stenosis  Status  post TAVR (transcatheter aortic valve replacement)  26 mm Medtronic valve in place  12-lead EKG reviewed  Wound sites checked  Continue Plavix only as patient has a history of HALEIGH due to chronic blood loss  Will obtain 30 day and 1 year post-TAVR CBC, BMP, EKG, and TTE  30 day and 1 year post-TAVR appointments scheduled  Patient advised to resume normal activities as tolerated   Cardiac rehab referral pending    Chronic heart failure with preserved ejection fraction (HFpEF)  Patient appears euvolemic  Continue Lasix and Farxiga     Permanent atrial fibrillation  Presence of Watchman left atrial appendage closure device  Currently in atrial fibrillation with controlled ventricular rate   Continue beta blocker and calcium channel blocker for rate control  [id number] score 9  No longer on anticoagulation due to history of bleeding  Watchman device in place    Pacemaker  History of tachy-morgan syndrome  Patient requests to have her device checks switched to our office  Device check scheduled for 7/31/24    Coronary artery disease involving native coronary artery of native heart without angina pectoris  Continue Plavix and high intensity statin     Hyperlipidemia, mixed  Continue atorvastatin     Essential hypertension, chronic  Blood pressure elevated in the office today, but the patient states she forgot to take her medicine this morning before she left for her appointment  Patient reports her blood pressure has been well controlled at home  Continue Cardizem, Lasix, and Bystolic     Type 2 diabetes mellitus  Recent A1c 8.29%  Continue Farxiga and metformin    Hospital discharge follow-up  Patient encouraged to participate in cardiac rehab  Patient would like to switch her primary cardiology care to Dr. Callejas    Electronically signed by DAYANARA Nunez, 05/31/24, 1:05 PM EDT.

## 2024-05-29 NOTE — OUTREACH NOTE
CT Surgery Week 1 Survey      Flowsheet Row Responses   [facility] patient discharged from? [facility]   Does the patient have one of the following disease processes/diagnoses(primary or secondary)? Cardiothoracic surgery   Week 1 attempt successful? Yes   Call start time 1219   Call end time 1224   Discharge diagnosis Transfemoral Transcatheter Aortic Valve Replacement  TRANSFEMORAL TRANSCATHETER AORTIC VALVE REPLACEMENT   Meds reviewed with patient/caregiver? Yes   Is the patient having any side effects they believe may be caused by any medication additions or changes? No   Does the patient have all medications related to this admission filled (includes all antibiotics, pain medications, cardiac medications, etc.) Yes   Is the patient taking all medications as directed (includes completed medication regime)? Yes   Does the patient have a primary care provider?  Yes   Does the patient have an appointment scheduled with their C/T surgeon? Yes   Has the patient kept scheduled appointments due by today? N/A   Has home health visited the patient within 72 hours of discharge? N/A   Psychosocial issues? No   Did the patient receive a copy of their discharge instructions? Yes   Nursing interventions Reviewed instructions with patient   What is the patient's perception of their health status since discharge? Improving   Nursing interventions Nurse provided patient education   Is the patient/caregiver able to teach back normal signs of recovery? Pain or discomfort at incisional site   Nursing interventions Reassured on normal signs of recovery   Is the patient /caregiver able to teach back basic post-op care? Continue use of incentive spirometry at least 1 week post discharge, Practice cough and deep breath every 4 hours while awake, Hold pillow to support chest when coughing, Drive as instructed by MD in discharge instructions, Shower daily, No tub bath, swimming, or hot tub until instructed by MD, Use a clean wash cloth  and antibacterial bar or liquid soap to clean incisions, If the steri-strips are falling off, it is okay to remove them. (If applicable), Lifting as instructed by MD in discharge instructions   Is the patient/caregiver able to teach back signs and symptoms of incisional infection? Increased redness, swelling or pain at the incisonal site, Increased drainage or bleeding, Incisional warmth, Pus or odor from incision, Fever   Is the patient/caregiver able to teach back steps to recovery at home? Set small, achievable goals for return to baseline health, Rest and rebuild strength, gradually increase activity, Eat a well-balance diet   Is the patient /caregiver able to teach back the importance of cardiac rehab? Yes   Is the patient/caregiver able to teach back the hierarchy of who to call/visit for symptoms/problems? PCP, Specialist, Home health nurse, Urgent Care, ED, 911 Yes   Additional teach back comments pt states feels great, no SOB, even on exertion   Week 1 call completed? Yes   Call end time 1224              Danette MARTINEZ - Registered Nurse

## 2024-05-30 ENCOUNTER — TELEPHONE (OUTPATIENT)
Dept: CARDIAC REHAB | Facility: HOSPITAL | Age: 89
End: 2024-05-30
Payer: MEDICARE

## 2024-05-30 ENCOUNTER — TELEPHONE (OUTPATIENT)
Dept: CARDIOLOGY | Facility: CLINIC | Age: 89
End: 2024-05-30

## 2024-05-30 ENCOUNTER — OFFICE VISIT (OUTPATIENT)
Dept: CARDIOLOGY | Facility: CLINIC | Age: 89
End: 2024-05-30
Payer: MEDICARE

## 2024-05-30 VITALS
HEIGHT: 63 IN | SYSTOLIC BLOOD PRESSURE: 145 MMHG | BODY MASS INDEX: 28.42 KG/M2 | OXYGEN SATURATION: 98 % | HEART RATE: 75 BPM | DIASTOLIC BLOOD PRESSURE: 73 MMHG | WEIGHT: 160.4 LBS

## 2024-05-30 DIAGNOSIS — I48.21 PERMANENT ATRIAL FIBRILLATION: ICD-10-CM

## 2024-05-30 DIAGNOSIS — N18.31 TYPE 2 DIABETES MELLITUS WITH STAGE 3A CHRONIC KIDNEY DISEASE, WITHOUT LONG-TERM CURRENT USE OF INSULIN: ICD-10-CM

## 2024-05-30 DIAGNOSIS — E78.2 HYPERLIPIDEMIA, MIXED: ICD-10-CM

## 2024-05-30 DIAGNOSIS — Z95.818 PRESENCE OF WATCHMAN LEFT ATRIAL APPENDAGE CLOSURE DEVICE: ICD-10-CM

## 2024-05-30 DIAGNOSIS — I35.0 NONRHEUMATIC AORTIC VALVE STENOSIS: Primary | ICD-10-CM

## 2024-05-30 DIAGNOSIS — I10 ESSENTIAL HYPERTENSION: ICD-10-CM

## 2024-05-30 DIAGNOSIS — I25.10 CORONARY ARTERY DISEASE INVOLVING NATIVE CORONARY ARTERY OF NATIVE HEART WITHOUT ANGINA PECTORIS: ICD-10-CM

## 2024-05-30 DIAGNOSIS — Z95.2 S/P TAVR (TRANSCATHETER AORTIC VALVE REPLACEMENT): ICD-10-CM

## 2024-05-30 DIAGNOSIS — E11.22 TYPE 2 DIABETES MELLITUS WITH STAGE 3A CHRONIC KIDNEY DISEASE, WITHOUT LONG-TERM CURRENT USE OF INSULIN: ICD-10-CM

## 2024-05-30 DIAGNOSIS — Z09 HOSPITAL DISCHARGE FOLLOW-UP: ICD-10-CM

## 2024-05-30 DIAGNOSIS — I50.32 CHRONIC HEART FAILURE WITH PRESERVED EJECTION FRACTION (HFPEF): Chronic | ICD-10-CM

## 2024-05-30 DIAGNOSIS — Z95.0 PACEMAKER: ICD-10-CM

## 2024-05-30 NOTE — TELEPHONE ENCOUNTER
I saw Mrs. Hinton in the office today. She would like to permanently switch to Dr. Callejas. My office , [facility], has scheduled new appointments with Dr. Callejas for her 30 day and 1 year post-TAVR follow ups, including echocardiograms. She is also wanting to switch her pacemaker device checks to Dr. Callejas's office.     Please cancel her follow ups with Dr. Araujo and echocardiograms scheduled on 7/1/24 and 4/22/25.

## 2024-05-31 ENCOUNTER — TELEPHONE (OUTPATIENT)
Dept: CARDIAC REHAB | Facility: HOSPITAL | Age: 89
End: 2024-05-31
Payer: MEDICARE

## 2024-06-18 ENCOUNTER — APPOINTMENT (OUTPATIENT)
Dept: GENERAL RADIOLOGY | Facility: HOSPITAL | Age: 89
DRG: 309 | End: 2024-06-18
Payer: MEDICARE

## 2024-06-18 ENCOUNTER — HOSPITAL ENCOUNTER (INPATIENT)
Facility: HOSPITAL | Age: 89
LOS: 2 days | Discharge: NURSING, REHAB OR LONG-TERM CARE | DRG: 309 | End: 2024-06-20
Attending: EMERGENCY MEDICINE | Admitting: INTERNAL MEDICINE
Payer: MEDICARE

## 2024-06-18 DIAGNOSIS — I48.91 ATRIAL FIBRILLATION WITH RVR: Primary | ICD-10-CM

## 2024-06-18 LAB
ALBUMIN SERPL-MCNC: 4.3 G/DL (ref 3.5–5.2)
ALBUMIN/GLOB SERPL: 1.3 G/DL
ALP SERPL-CCNC: 251 U/L (ref 39–117)
ALT SERPL W P-5'-P-CCNC: 50 U/L (ref 1–33)
ANION GAP SERPL CALCULATED.3IONS-SCNC: 14.7 MMOL/L (ref 5–15)
AST SERPL-CCNC: 123 U/L (ref 1–32)
BASOPHILS # BLD AUTO: 0.04 10*3/MM3 (ref 0–0.2)
BASOPHILS # BLD AUTO: 0.06 10*3/MM3 (ref 0–0.2)
BASOPHILS NFR BLD AUTO: 0.3 % (ref 0–1.5)
BASOPHILS NFR BLD AUTO: 0.6 % (ref 0–1.5)
BILIRUB SERPL-MCNC: 1.5 MG/DL (ref 0–1.2)
BUN SERPL-MCNC: 27 MG/DL (ref 8–23)
BUN/CREAT SERPL: 20.9 (ref 7–25)
CALCIUM SPEC-SCNC: 10.4 MG/DL (ref 8.2–9.6)
CHLORIDE SERPL-SCNC: 98 MMOL/L (ref 98–107)
CHOLEST SERPL-MCNC: 186 MG/DL (ref 0–200)
CO2 SERPL-SCNC: 23.3 MMOL/L (ref 22–29)
CREAT SERPL-MCNC: 1.29 MG/DL (ref 0.57–1)
DEPRECATED RDW RBC AUTO: 51.4 FL (ref 37–54)
DEPRECATED RDW RBC AUTO: 55.8 FL (ref 37–54)
EGFRCR SERPLBLD CKD-EPI 2021: 39.5 ML/MIN/1.73
EOSINOPHIL # BLD AUTO: 0.1 10*3/MM3 (ref 0–0.4)
EOSINOPHIL # BLD AUTO: 0.11 10*3/MM3 (ref 0–0.4)
EOSINOPHIL NFR BLD AUTO: 0.9 % (ref 0.3–6.2)
EOSINOPHIL NFR BLD AUTO: 1.1 % (ref 0.3–6.2)
ERYTHROCYTE [DISTWIDTH] IN BLOOD BY AUTOMATED COUNT: 17.2 % (ref 12.3–15.4)
ERYTHROCYTE [DISTWIDTH] IN BLOOD BY AUTOMATED COUNT: 17.7 % (ref 12.3–15.4)
GLOBULIN UR ELPH-MCNC: 3.3 GM/DL
GLUCOSE SERPL-MCNC: 157 MG/DL (ref 65–99)
HBA1C MFR BLD: 8.28 % (ref 4.8–5.6)
HCT VFR BLD AUTO: 38.9 % (ref 34–46.6)
HCT VFR BLD AUTO: 42.7 % (ref 34–46.6)
HDLC SERPL-MCNC: 80 MG/DL (ref 40–60)
HGB BLD-MCNC: 11.6 G/DL (ref 12–15.9)
HGB BLD-MCNC: 12 G/DL (ref 12–15.9)
HOLD SPECIMEN: NORMAL
HOLD SPECIMEN: NORMAL
IMM GRANULOCYTES # BLD AUTO: 0.01 10*3/MM3 (ref 0–0.05)
IMM GRANULOCYTES # BLD AUTO: 0.02 10*3/MM3 (ref 0–0.05)
IMM GRANULOCYTES NFR BLD AUTO: 0.1 % (ref 0–0.5)
IMM GRANULOCYTES NFR BLD AUTO: 0.2 % (ref 0–0.5)
LDLC SERPL CALC-MCNC: 87 MG/DL (ref 0–100)
LDLC/HDLC SERPL: 1.06 {RATIO}
LYMPHOCYTES # BLD AUTO: 1.12 10*3/MM3 (ref 0.7–3.1)
LYMPHOCYTES # BLD AUTO: 1.35 10*3/MM3 (ref 0.7–3.1)
LYMPHOCYTES NFR BLD AUTO: 11.1 % (ref 19.6–45.3)
LYMPHOCYTES NFR BLD AUTO: 11.9 % (ref 19.6–45.3)
MCH RBC QN AUTO: 24.6 PG (ref 26.6–33)
MCH RBC QN AUTO: 24.7 PG (ref 26.6–33)
MCHC RBC AUTO-ENTMCNC: 28.1 G/DL (ref 31.5–35.7)
MCHC RBC AUTO-ENTMCNC: 29.8 G/DL (ref 31.5–35.7)
MCV RBC AUTO: 82.4 FL (ref 79–97)
MCV RBC AUTO: 88 FL (ref 79–97)
MONOCYTES # BLD AUTO: 0.94 10*3/MM3 (ref 0.1–0.9)
MONOCYTES # BLD AUTO: 1.17 10*3/MM3 (ref 0.1–0.9)
MONOCYTES NFR BLD AUTO: 10 % (ref 5–12)
MONOCYTES NFR BLD AUTO: 9.6 % (ref 5–12)
NEUTROPHILS NFR BLD AUTO: 7.18 10*3/MM3 (ref 1.7–7)
NEUTROPHILS NFR BLD AUTO: 76.2 % (ref 42.7–76)
NEUTROPHILS NFR BLD AUTO: 78 % (ref 42.7–76)
NEUTROPHILS NFR BLD AUTO: 9.52 10*3/MM3 (ref 1.7–7)
NRBC BLD AUTO-RTO: 0 /100 WBC (ref 0–0.2)
NRBC BLD AUTO-RTO: 0 /100 WBC (ref 0–0.2)
NT-PROBNP SERPL-MCNC: 1596 PG/ML (ref 0–1800)
PLATELET # BLD AUTO: 287 10*3/MM3 (ref 140–450)
PLATELET # BLD AUTO: 324 10*3/MM3 (ref 140–450)
PMV BLD AUTO: 10.6 FL (ref 6–12)
PMV BLD AUTO: 10.7 FL (ref 6–12)
POTASSIUM SERPL-SCNC: 4.5 MMOL/L (ref 3.5–5.2)
PROT SERPL-MCNC: 7.6 G/DL (ref 6–8.5)
RBC # BLD AUTO: 4.72 10*6/MM3 (ref 3.77–5.28)
RBC # BLD AUTO: 4.85 10*6/MM3 (ref 3.77–5.28)
SODIUM SERPL-SCNC: 136 MMOL/L (ref 136–145)
T4 FREE SERPL-MCNC: 1.53 NG/DL (ref 0.93–1.7)
TRIGL SERPL-MCNC: 108 MG/DL (ref 0–150)
TROPONIN T SERPL HS-MCNC: 51 NG/L
TSH SERPL DL<=0.05 MIU/L-ACNC: 3.37 UIU/ML (ref 0.27–4.2)
VLDLC SERPL-MCNC: 19 MG/DL (ref 5–40)
WBC NRBC COR # BLD AUTO: 12.2 10*3/MM3 (ref 3.4–10.8)
WBC NRBC COR # BLD AUTO: 9.42 10*3/MM3 (ref 3.4–10.8)
WHOLE BLOOD HOLD COAG: NORMAL
WHOLE BLOOD HOLD SPECIMEN: NORMAL

## 2024-06-18 PROCEDURE — 80053 COMPREHEN METABOLIC PANEL: CPT | Performed by: EMERGENCY MEDICINE

## 2024-06-18 PROCEDURE — 83036 HEMOGLOBIN GLYCOSYLATED A1C: CPT | Performed by: NURSE PRACTITIONER

## 2024-06-18 PROCEDURE — 93005 ELECTROCARDIOGRAM TRACING: CPT | Performed by: EMERGENCY MEDICINE

## 2024-06-18 PROCEDURE — 84439 ASSAY OF FREE THYROXINE: CPT | Performed by: EMERGENCY MEDICINE

## 2024-06-18 PROCEDURE — 80053 COMPREHEN METABOLIC PANEL: CPT | Performed by: NURSE PRACTITIONER

## 2024-06-18 PROCEDURE — 85025 COMPLETE CBC W/AUTO DIFF WBC: CPT | Performed by: NURSE PRACTITIONER

## 2024-06-18 PROCEDURE — 80061 LIPID PANEL: CPT | Performed by: NURSE PRACTITIONER

## 2024-06-18 PROCEDURE — 85025 COMPLETE CBC W/AUTO DIFF WBC: CPT | Performed by: EMERGENCY MEDICINE

## 2024-06-18 PROCEDURE — 84443 ASSAY THYROID STIM HORMONE: CPT | Performed by: EMERGENCY MEDICINE

## 2024-06-18 PROCEDURE — 84484 ASSAY OF TROPONIN QUANT: CPT | Performed by: EMERGENCY MEDICINE

## 2024-06-18 PROCEDURE — 84443 ASSAY THYROID STIM HORMONE: CPT | Performed by: NURSE PRACTITIONER

## 2024-06-18 PROCEDURE — 83735 ASSAY OF MAGNESIUM: CPT | Performed by: NURSE PRACTITIONER

## 2024-06-18 PROCEDURE — 83880 ASSAY OF NATRIURETIC PEPTIDE: CPT | Performed by: EMERGENCY MEDICINE

## 2024-06-18 PROCEDURE — 99285 EMERGENCY DEPT VISIT HI MDM: CPT

## 2024-06-18 PROCEDURE — 71045 X-RAY EXAM CHEST 1 VIEW: CPT

## 2024-06-18 PROCEDURE — 36415 COLL VENOUS BLD VENIPUNCTURE: CPT

## 2024-06-18 RX ORDER — DILTIAZEM HYDROCHLORIDE 5 MG/ML
10 INJECTION INTRAVENOUS ONCE
Status: COMPLETED | OUTPATIENT
Start: 2024-06-18 | End: 2024-06-18

## 2024-06-18 RX ORDER — SODIUM CHLORIDE 0.9 % (FLUSH) 0.9 %
10 SYRINGE (ML) INJECTION AS NEEDED
Status: DISCONTINUED | OUTPATIENT
Start: 2024-06-18 | End: 2024-06-20 | Stop reason: HOSPADM

## 2024-06-18 RX ORDER — SODIUM CHLORIDE 9 MG/ML
40 INJECTION, SOLUTION INTRAVENOUS AS NEEDED
Status: DISCONTINUED | OUTPATIENT
Start: 2024-06-18 | End: 2024-06-20 | Stop reason: HOSPADM

## 2024-06-18 RX ORDER — NEBIVOLOL 10 MG/1
20 TABLET ORAL DAILY
Status: DISCONTINUED | OUTPATIENT
Start: 2024-06-19 | End: 2024-06-19

## 2024-06-18 RX ORDER — PANTOPRAZOLE SODIUM 40 MG/1
40 TABLET, DELAYED RELEASE ORAL
Status: DISCONTINUED | OUTPATIENT
Start: 2024-06-19 | End: 2024-06-20 | Stop reason: HOSPADM

## 2024-06-18 RX ORDER — ATORVASTATIN CALCIUM 10 MG/1
10 TABLET, FILM COATED ORAL DAILY
Status: DISCONTINUED | OUTPATIENT
Start: 2024-06-19 | End: 2024-06-20 | Stop reason: HOSPADM

## 2024-06-18 RX ORDER — ZOLPIDEM TARTRATE 5 MG/1
5 TABLET ORAL NIGHTLY PRN
Status: DISCONTINUED | OUTPATIENT
Start: 2024-06-18 | End: 2024-06-20 | Stop reason: HOSPADM

## 2024-06-18 RX ORDER — BISACODYL 5 MG/1
5 TABLET, DELAYED RELEASE ORAL DAILY PRN
Status: DISCONTINUED | OUTPATIENT
Start: 2024-06-18 | End: 2024-06-20 | Stop reason: HOSPADM

## 2024-06-18 RX ORDER — ONDANSETRON 2 MG/ML
4 INJECTION INTRAMUSCULAR; INTRAVENOUS EVERY 6 HOURS PRN
Status: DISCONTINUED | OUTPATIENT
Start: 2024-06-18 | End: 2024-06-20 | Stop reason: HOSPADM

## 2024-06-18 RX ORDER — POLYETHYLENE GLYCOL 3350 17 G/17G
17 POWDER, FOR SOLUTION ORAL DAILY PRN
Status: DISCONTINUED | OUTPATIENT
Start: 2024-06-18 | End: 2024-06-20 | Stop reason: HOSPADM

## 2024-06-18 RX ORDER — MECLIZINE HYDROCHLORIDE 25 MG/1
25 TABLET ORAL 3 TIMES DAILY PRN
Status: DISCONTINUED | OUTPATIENT
Start: 2024-06-18 | End: 2024-06-20 | Stop reason: HOSPADM

## 2024-06-18 RX ORDER — ACETAMINOPHEN 160 MG/5ML
650 SOLUTION ORAL EVERY 4 HOURS PRN
Status: DISCONTINUED | OUTPATIENT
Start: 2024-06-18 | End: 2024-06-20 | Stop reason: HOSPADM

## 2024-06-18 RX ORDER — AMOXICILLIN 250 MG
2 CAPSULE ORAL 2 TIMES DAILY PRN
Status: DISCONTINUED | OUTPATIENT
Start: 2024-06-18 | End: 2024-06-20 | Stop reason: HOSPADM

## 2024-06-18 RX ORDER — BISACODYL 10 MG
10 SUPPOSITORY, RECTAL RECTAL DAILY PRN
Status: DISCONTINUED | OUTPATIENT
Start: 2024-06-18 | End: 2024-06-20 | Stop reason: HOSPADM

## 2024-06-18 RX ORDER — ACETAMINOPHEN 325 MG/1
650 TABLET ORAL EVERY 4 HOURS PRN
Status: DISCONTINUED | OUTPATIENT
Start: 2024-06-18 | End: 2024-06-20 | Stop reason: HOSPADM

## 2024-06-18 RX ORDER — SODIUM CHLORIDE 0.9 % (FLUSH) 0.9 %
10 SYRINGE (ML) INJECTION EVERY 12 HOURS SCHEDULED
Status: DISCONTINUED | OUTPATIENT
Start: 2024-06-18 | End: 2024-06-20 | Stop reason: HOSPADM

## 2024-06-18 RX ORDER — DILTIAZEM HCL/D5W 125 MG/125
5-15 PLASTIC BAG, INJECTION (ML) INTRAVENOUS
Status: DISCONTINUED | OUTPATIENT
Start: 2024-06-18 | End: 2024-06-19

## 2024-06-18 RX ORDER — NITROGLYCERIN 0.4 MG/1
0.4 TABLET SUBLINGUAL
Status: DISCONTINUED | OUTPATIENT
Start: 2024-06-18 | End: 2024-06-20 | Stop reason: HOSPADM

## 2024-06-18 RX ORDER — ENOXAPARIN SODIUM 100 MG/ML
30 INJECTION SUBCUTANEOUS DAILY
Status: DISCONTINUED | OUTPATIENT
Start: 2024-06-19 | End: 2024-06-20 | Stop reason: HOSPADM

## 2024-06-18 RX ORDER — FUROSEMIDE 40 MG/1
20 TABLET ORAL DAILY
Status: DISCONTINUED | OUTPATIENT
Start: 2024-06-19 | End: 2024-06-20 | Stop reason: HOSPADM

## 2024-06-18 RX ORDER — FERROUS SULFATE 324(65)MG
324 TABLET, DELAYED RELEASE (ENTERIC COATED) ORAL
Status: DISCONTINUED | OUTPATIENT
Start: 2024-06-19 | End: 2024-06-20 | Stop reason: HOSPADM

## 2024-06-18 RX ORDER — ACETAMINOPHEN 650 MG/1
650 SUPPOSITORY RECTAL EVERY 4 HOURS PRN
Status: DISCONTINUED | OUTPATIENT
Start: 2024-06-18 | End: 2024-06-20 | Stop reason: HOSPADM

## 2024-06-18 RX ORDER — CLOPIDOGREL BISULFATE 75 MG/1
75 TABLET ORAL DAILY
Status: DISCONTINUED | OUTPATIENT
Start: 2024-06-19 | End: 2024-06-20 | Stop reason: HOSPADM

## 2024-06-18 RX ADMIN — Medication 5 MG/HR: at 18:17

## 2024-06-18 RX ADMIN — DILTIAZEM HYDROCHLORIDE 10 MG: 5 INJECTION, SOLUTION INTRAVENOUS at 18:16

## 2024-06-18 RX ADMIN — Medication 10 ML: at 23:23

## 2024-06-18 NOTE — ED PROVIDER NOTES
Subjective   History of Present Illness  89-year-old female with history of A-fib, recent aortic valve replacement presents for feeling weak, lightheaded when she goes to stand up, vomiting as well as some diarrhea.  Been going on a few days.  Has had some intermittent chest pain as well but not having right now.  Also had some short of breath.  Review of Systems  See HPI.  Past Medical History:   Diagnosis Date    Antral erosion     Aortic valve regurgitation 12/26/2018    Atrial fibrillation     Atrial fibrillation with controlled ventricular response     B12 deficiency     CAD (coronary artery disease)     Cellulitis 04/2011    on face     Cerebrovascular accident (CVA) 12/07/2016    CHF (congestive heart failure)     Colon cancer     Coronary artery disease involving native coronary artery of native heart without angina pectoris 06/18/2019    Depression     Diabetes mellitus, type 2     Essential hypertension 02/13/2012    Fatigue     GERD (gastroesophageal reflux disease)     Hiatal hernia     History of colonoscopy 04/2010    last done 4/10    History of esophagogastroduodenoscopy (EGD) 04/2010    last done 4/10    Hyperlipidemia, mixed 02/13/2012    Hypertension     white coat htn    HALEIGH (iron deficiency anemia)     LAD (lymphadenopathy)     40% lesion LAD     Left pontine CVA     Mitral regurgitation 05/21/2012    STORMY (obstructive sleep apnea)     not treating    Osteoarthritis     Pacemaker 06/18/2019    Permanent atrial fibrillation 06/18/2019    Presence of Watchman left atrial appendage closure device 01/04/2020    Prinzmetal's angina     Right kidney mass     1.4 cm- following urology     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    Sick sinus syndrome     Stroke 11/2016    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    AORTIC VALVE REPAIR/REPLACEMENT N/A 5/20/2024    Procedure: TRANSFEMORAL TRANSCATHETER AORTIC VALVE REPLACEMENT;  Surgeon: Yonatan Shanks MD;  Location: [facility] HYBRID OR;  Service: Cardiothoracic;  Laterality: N/A;    ATRIAL APPENDAGE EXCLUSION LEFT WITH TRANSESOPHAGEAL ECHOCARDIOGRAM N/A 10/10/2019    Procedure: Atrial Appendage Occlusion;  Surgeon: Richie Chapman MD;  Location: [facility] CATH INVASIVE LOCATION;  Service: Cardiovascular    ATRIAL APPENDAGE EXCLUSION LEFT WITH TRANSESOPHAGEAL ECHOCARDIOGRAM N/A 10/10/2019    Procedure: Atrial Appendage Occlusion;  Surgeon: Je Rivera MD;  Location: [facility] CATH INVASIVE LOCATION;  Service: Cardiology    BLADDER REPAIR      CARDIAC CATHETERIZATION  05/2010    CARDIAC CATHETERIZATION N/A 3/28/2024    Procedure: Left Heart Cath;  Surgeon: Mich Araujo MD;  Location: [facility] CATH INVASIVE LOCATION;  Service: Cardiology;  Laterality: N/A;    CHOLECYSTECTOMY      COLECTOMY PARTIAL / TOTAL      COLONOSCOPY  04/16/2018    negative     COLONOSCOPY N/A 7/1/2022    Procedure: COLONOSCOPY with polypectomy x1;  Surgeon: ADOLFO Boss MD;  Location: [facility] ENDOSCOPY;  Service: Gastroenterology;  Laterality: N/A;  post: diverticulosis, hemorrhoids, polyps    ENDOSCOPY  04/16/2018    negative     ENDOSCOPY N/A 6/29/2022    Procedure: ESOPHAGOGASTRODUODENOSCOPY with biopsy of duodenum r/o celiac;  Surgeon: ADOLFO Boss MD;  Location: [facility] ENDOSCOPY;  Service: Gastroenterology;  Laterality: N/A;  hiatial hernia  Gastric Polyps        HYSTERECTOMY      INSERT /  "REPLACE / REMOVE PACEMAKER  09/2018    Pacemaker gen change 9/2018     OTHER SURGICAL HISTORY  04/25/2015    Exercise myoview, normal     PACEMAKER IMPLANTATION  01/22/2010    Dual Chamber - 1/22/2010; RA Lead Revision- 5/7/2012- BS     TOOTH EXTRACTION      TRANSESOPHAGEAL ECHOCARDIOGRAM (DHEERAJ)  07/2019       Family History   Problem Relation Age of Onset    Hypertension Mother     Diabetes Mother     Coronary artery disease Mother     Other Father         CVA    No Known Problems Sister     No Known Problems Brother     No Known Problems Maternal Aunt     No Known Problems Maternal Uncle     No Known Problems Paternal Aunt     No Known Problems Paternal Uncle     No Known Problems Maternal Grandmother     No Known Problems Maternal Grandfather     No Known Problems Paternal Grandmother     No Known Problems Paternal Grandfather     Heart disease Other     Stroke Other     Hypertension Other     Anemia Neg Hx     Arrhythmia Neg Hx     Asthma Neg Hx     Clotting disorder Neg Hx     Fainting Neg Hx     Heart attack Neg Hx     Heart failure Neg Hx     Hyperlipidemia Neg Hx        Social History     Socioeconomic History    Marital status:    Tobacco Use    Smoking status: Never     Passive exposure: Never    Smokeless tobacco: Never   Vaping Use    Vaping status: Never Used   Substance and Sexual Activity    Alcohol use: No    Drug use: Never    Sexual activity: Defer           Objective   Physical Exam  No acute distress, alert, tachycardic rate and irregularly irregular rhythm, abdomen soft and nontender, moist oral mucosa, no tachypnea or increased work of breathing, clear to station bilaterally, no scleral icterus, alert and oriented.  Moving all extremities.  Procedures           ED Course      /77 (BP Location: Right arm, Patient Position: Lying)   Pulse 108   Temp 98 °F (36.7 °C) (Oral)   Resp 18   Ht 160 cm (62.99\")   Wt 73.1 kg (161 lb 2.5 oz)   SpO2 95%   BMI 28.55 kg/m²   Labs Reviewed "   COMPREHENSIVE METABOLIC PANEL - Abnormal; Notable for the following components:       Result Value    Glucose 157 (*)     BUN 27 (*)     Creatinine 1.29 (*)     Calcium 10.4 (*)     ALT (SGPT) 50 (*)     AST (SGOT) 123 (*)     Alkaline Phosphatase 251 (*)     Total Bilirubin 1.5 (*)     eGFR 39.5 (*)     All other components within normal limits    Narrative:     GFR Normal >60  Chronic Kidney Disease <60  Kidney Failure <15    The GFR formula is only valid for adults with stable renal function between ages 18 and 70.   SINGLE HS TROPONIN T - Abnormal; Notable for the following components:    HS Troponin T 51 (*)     All other components within normal limits    Narrative:     High Sensitive Troponin T Reference Range:  <14.0 ng/L- Negative Female for AMI  <22.0 ng/L- Negative Male for AMI  >=14 - Abnormal Female indicating possible myocardial injury.  >=22 - Abnormal Male indicating possible myocardial injury.   Clinicians would have to utilize clinical acumen, EKG, Troponin, and serial changes to determine if it is an Acute Myocardial Infarction or myocardial injury due to an underlying chronic condition.        CBC WITH AUTO DIFFERENTIAL - Abnormal; Notable for the following components:    WBC 12.20 (*)     Hemoglobin 11.6 (*)     MCH 24.6 (*)     MCHC 29.8 (*)     RDW 17.2 (*)     Neutrophil % 78.0 (*)     Lymphocyte % 11.1 (*)     Neutrophils, Absolute 9.52 (*)     Monocytes, Absolute 1.17 (*)     All other components within normal limits   HEMOGLOBIN A1C - Abnormal; Notable for the following components:    Hemoglobin A1C 8.28 (*)     All other components within normal limits   LIPID PANEL - Abnormal; Notable for the following components:    HDL Cholesterol 80 (*)     All other components within normal limits    Narrative:     Cholesterol Reference Ranges  (U.S. Department of Health and Human Services ATP III Classifications)    Desirable          <200 mg/dL  Borderline High    200-239 mg/dL  High Risk           >240 mg/dL      Triglyceride Reference Ranges  (U.S. Department of Health and Human Services ATP III Classifications)    Normal           <150 mg/dL  Borderline High  150-199 mg/dL  High             200-499 mg/dL  Very High        >500 mg/dL    HDL Reference Ranges  (U.S. Department of Health and Human Services ATP III Classifications)    Low     <40 mg/dl (major risk factor for CHD)  High    >60 mg/dl ('negative' risk factor for CHD)        LDL Reference Ranges  (U.S. Department of Health and Human Services ATP III Classifications)    Optimal          <100 mg/dL  Near Optimal     100-129 mg/dL  Borderline High  130-159 mg/dL  High             160-189 mg/dL  Very High        >189 mg/dL   CBC WITH AUTO DIFFERENTIAL - Abnormal; Notable for the following components:    MCH 24.7 (*)     MCHC 28.1 (*)     RDW 17.7 (*)     RDW-SD 55.8 (*)     Neutrophil % 76.2 (*)     Lymphocyte % 11.9 (*)     Neutrophils, Absolute 7.18 (*)     Monocytes, Absolute 0.94 (*)     All other components within normal limits   BNP (IN-HOUSE) - Normal    Narrative:     This assay is used as an aid in the diagnosis of individuals suspected of having heart failure. It can be used as an aid in the diagnosis of acute decompensated heart failure (ADHF) in patients presenting with signs and symptoms of ADHF to the emergency department (ED). In addition, NT-proBNP of <300 pg/mL indicates ADHF is not likely.    Age Range Result Interpretation  NT-proBNP Concentration (pg/mL:      <50             Positive            >450                   Gray                 300-450                    Negative             <300    50-75           Positive            >900                  Gray                300-900                  Negative            <300      >75             Positive            >1800                  Gray                300-1800                  Negative            <300   TSH - Normal   T4, FREE - Normal   RAINBOW DRAW    Narrative:     The following  orders were created for panel order [facility] Draw.  Procedure                               Abnormality         Status                     ---------                               -----------         ------                     Green Top (Gel)[[id number]]                                  Final result               Lavender Top[[id number]]                                     Final result               Gold Top - SST[[id number]]                                   Final result               Light Blue Top[[id number]]                                   Final result                 Please view results for these tests on the individual orders.   COMPREHENSIVE METABOLIC PANEL   MAGNESIUM   MAGNESIUM   TSH   GREEN TOP   LAVENDER TOP   GOLD TOP - SST   LIGHT BLUE TOP   CBC AND DIFFERENTIAL    Narrative:     The following orders were created for panel order CBC & Differential.  Procedure                               Abnormality         Status                     ---------                               -----------         ------                     CBC Auto Differential[[id number]]        Abnormal            Final result                 Please view results for these tests on the individual orders.   CBC AND DIFFERENTIAL    Narrative:     The following orders were created for panel order CBC & Differential.  Procedure                               Abnormality         Status                     ---------                               -----------         ------                     CBC Auto Differential[[id number]]        Abnormal            Final result                 Please view results for these tests on the individual orders.     Medications   sodium chloride 0.9 % flush 10 mL (has no administration in time range)   dilTIAZem (CARDIZEM) 125 mg in 125 mL D5W infusion (10 mg/hr Intravenous Currently Infusing 6/18/24 2206)   atorvastatin (LIPITOR) tablet 10 mg (has no administration in time range)   clopidogrel (PLAVIX) tablet 75 mg (has  no administration in time range)   empagliflozin (JARDIANCE) tablet 25 mg (has no administration in time range)   ferrous sulfate EC tablet 324 mg (has no administration in time range)   furosemide (LASIX) tablet 20 mg (has no administration in time range)   meclizine (ANTIVERT) tablet 25 mg (has no administration in time range)   nebivolol (BYSTOLIC) tablet 20 mg (has no administration in time range)   pantoprazole (PROTONIX) EC tablet 40 mg (has no administration in time range)   zolpidem (AMBIEN) tablet 5 mg (has no administration in time range)   sodium chloride 0.9 % flush 10 mL (10 mL Intravenous Given 6/18/24 7523)   sodium chloride 0.9 % flush 10 mL (has no administration in time range)   sodium chloride 0.9 % infusion 40 mL (has no administration in time range)   Pharmacy to Dose enoxaparin (LOVENOX) (has no administration in time range)   nitroglycerin (NITROSTAT) SL tablet 0.4 mg (has no administration in time range)   Potassium Replacement - Follow Nurse / BPA Driven Protocol (has no administration in time range)   Magnesium Standard Dose Replacement - Follow Nurse / BPA Driven Protocol (has no administration in time range)   Phosphorus Replacement - Follow Nurse / BPA Driven Protocol (has no administration in time range)   Calcium Replacement - Follow Nurse / BPA Driven Protocol (has no administration in time range)   acetaminophen (TYLENOL) tablet 650 mg (has no administration in time range)     Or   acetaminophen (TYLENOL) 160 MG/5ML oral solution 650 mg (has no administration in time range)     Or   acetaminophen (TYLENOL) suppository 650 mg (has no administration in time range)   sennosides-docusate (PERICOLACE) 8.6-50 MG per tablet 2 tablet (has no administration in time range)     And   polyethylene glycol (MIRALAX) packet 17 g (has no administration in time range)     And   bisacodyl (DULCOLAX) EC tablet 5 mg (has no administration in time range)     And   bisacodyl (DULCOLAX) suppository 10 mg  (has no administration in time range)   ondansetron (ZOFRAN) injection 4 mg (has no administration in time range)   Enoxaparin Sodium (LOVENOX) syringe 30 mg (has no administration in time range)   dilTIAZem (CARDIZEM) injection 10 mg (10 mg Intravenous Given 6/18/24 1816)     XR Chest 1 View    Result Date: 6/18/2024  Impression: No acute abnormality. Chronic findings as characterized above Electronically Signed: Wilson Sebastian DO  6/18/2024 6:50 PM EDT  Workstation ID: [id number]                                          Medical Decision Making  Problems Addressed:  Atrial fibrillation with RVR: complicated acute illness or injury    Amount and/or Complexity of Data Reviewed  Labs: ordered.  Radiology: ordered.  ECG/medicine tests: ordered.    Risk  Prescription drug management.  Decision regarding hospitalization.    Critical Care Time     The high probability of sudden, clinically significant deterioration in the patient's condition required the highest level of my preparedness to intervene urgently.  The services I provided to this patient were to treat and/or prevent clinically significant deterioration that could result in: Cardiovascular collapse and death. Services included the following: chart data review, reviewing nurses notes and/or old charts, documentation time, consultant collaboration regarding findings and treatment options, vital sign assessments and ordering, interpreting and reviewing diagnostic studies/lab tests.  Aggregate critical care time was 37 minutes, which includes only time during which I was engaged in work directly related to the patient's care, as described above, whether at the bedside or elsewhere in the Emergency Department. It did not include time spent performing other reported procedures or the services of residents, students, nurses or physician assistants.    EKG interpretation: 1736, rate 133, atrial fibrillation with rapid ventricular response, nonspecific likely rate  related ST changes.    My interpretation of chest x-ray is no focal infiltrate pneumothorax.  See system for radiology interpretation.    Patient in A-fib with RVR.  Starting diltiazem drip.  Does have elevated liver enzymes.  Unclear etiology for these.  Not having abdominal pain on my exam.  Excepted to [facility] service.    Final diagnoses:   Atrial fibrillation with RVR       ED Disposition  ED Disposition       ED Disposition   Decision to Admit    Condition   --    Comment   Level of Care: Progressive Care [20]   Diagnosis: Atrial fibrillation with rapid ventricular response [[id number]]   Admitting Physician: JEFFREY BROOKS [[id number]]   Attending Physician: JEFFREY BROOKS [[id number]]   Certification: I Certify That Inpatient Hospital Services Are Medically Necessary For Greater Than 2 Midnights                 No follow-up provider specified.       Medication List      No changes were made to your prescriptions during this visit.            Kale Alonso MD  06/19/24 0004

## 2024-06-18 NOTE — Clinical Note
Level of Care: Progressive Care [20]   Admitting Physician: JEFFREY BROOKS [[id number]]   Attending Physician: JEFFREY BROOKS [[id number]]

## 2024-06-18 NOTE — ED NOTES
"Pt states she has \"just not been feeling good\" since last Thursday. Pt passed out last thurs and has had diarrhea/vomiting since Sunday. Had aortic valve replaced a few weeks ago.   "

## 2024-06-19 ENCOUNTER — APPOINTMENT (OUTPATIENT)
Dept: CARDIOLOGY | Facility: HOSPITAL | Age: 89
DRG: 309 | End: 2024-06-19
Payer: MEDICARE

## 2024-06-19 PROBLEM — I35.0 AORTIC STENOSIS, MODERATE: Status: RESOLVED | Noted: 2021-10-11 | Resolved: 2024-06-19

## 2024-06-19 PROBLEM — I20.0 UNSTABLE ANGINA: Status: RESOLVED | Noted: 2021-10-11 | Resolved: 2024-06-19

## 2024-06-19 PROBLEM — R19.7 ACUTE DIARRHEA: Status: ACTIVE | Noted: 2024-06-19

## 2024-06-19 PROBLEM — R79.89 ELEVATED LFTS: Status: ACTIVE | Noted: 2024-06-19

## 2024-06-19 PROBLEM — R11.0 NAUSEA: Status: ACTIVE | Noted: 2024-06-19

## 2024-06-19 PROBLEM — Z95.818 PRESENCE OF WATCHMAN LEFT ATRIAL APPENDAGE CLOSURE DEVICE: Chronic | Status: ACTIVE | Noted: 2020-01-04

## 2024-06-19 PROBLEM — Z95.0 PACEMAKER: Chronic | Status: ACTIVE | Noted: 2019-06-18

## 2024-06-19 PROBLEM — R55 SYNCOPE AND COLLAPSE: Status: ACTIVE | Noted: 2024-06-19

## 2024-06-19 PROBLEM — I25.10 CORONARY ARTERY DISEASE INVOLVING NATIVE CORONARY ARTERY OF NATIVE HEART WITHOUT ANGINA PECTORIS: Chronic | Status: ACTIVE | Noted: 2019-06-18

## 2024-06-19 LAB
ALBUMIN SERPL-MCNC: 4.2 G/DL (ref 3.5–5.2)
ALBUMIN/GLOB SERPL: 1.2 G/DL
ALP SERPL-CCNC: 310 U/L (ref 39–117)
ALT SERPL W P-5'-P-CCNC: 132 U/L (ref 1–33)
ANION GAP SERPL CALCULATED.3IONS-SCNC: 14.9 MMOL/L (ref 5–15)
AST SERPL-CCNC: 262 U/L (ref 1–32)
BH CV ECHO MEAS - AO MAX PG: 6.3 MMHG
BH CV ECHO MEAS - AO MEAN PG: 4 MMHG
BH CV ECHO MEAS - AO V2 MAX: 125 CM/SEC
BH CV ECHO MEAS - AO V2 VTI: 23.1 CM
BH CV ECHO MEAS - AVA(I,D): 1.99 CM2
BH CV ECHO MEAS - EDV(CUBED): 35.9 ML
BH CV ECHO MEAS - EDV(MOD-SP4): 58.5 ML
BH CV ECHO MEAS - EF(MOD-SP4): 44.1 %
BH CV ECHO MEAS - ESV(CUBED): 27 ML
BH CV ECHO MEAS - ESV(MOD-SP4): 32.7 ML
BH CV ECHO MEAS - FS: 9.1 %
BH CV ECHO MEAS - IVS/LVPW: 1.08 CM
BH CV ECHO MEAS - IVSD: 1.3 CM
BH CV ECHO MEAS - LA DIMENSION: 3.3 CM
BH CV ECHO MEAS - LV MASS(C)D: 133 GRAMS
BH CV ECHO MEAS - LV MAX PG: 4.2 MMHG
BH CV ECHO MEAS - LV MEAN PG: 2 MMHG
BH CV ECHO MEAS - LV V1 MAX: 103 CM/SEC
BH CV ECHO MEAS - LV V1 VTI: 18.1 CM
BH CV ECHO MEAS - LVIDD: 3.3 CM
BH CV ECHO MEAS - LVIDS: 3 CM
BH CV ECHO MEAS - LVOT AREA: 2.5 CM2
BH CV ECHO MEAS - LVOT DIAM: 1.8 CM
BH CV ECHO MEAS - LVPWD: 1.2 CM
BH CV ECHO MEAS - MV DEC SLOPE: 349 CM/SEC2
BH CV ECHO MEAS - MV DEC TIME: 0.34 SEC
BH CV ECHO MEAS - MV E MAX VEL: 96.9 CM/SEC
BH CV ECHO MEAS - MV MAX PG: 5.2 MMHG
BH CV ECHO MEAS - MV MEAN PG: 2 MMHG
BH CV ECHO MEAS - MV P1/2T: 99 MSEC
BH CV ECHO MEAS - MV V2 VTI: 22 CM
BH CV ECHO MEAS - MVA(P1/2T): 2.22 CM2
BH CV ECHO MEAS - MVA(VTI): 2.09 CM2
BH CV ECHO MEAS - PA V2 MAX: 62.7 CM/SEC
BH CV ECHO MEAS - RAP SYSTOLE: 3 MMHG
BH CV ECHO MEAS - RV MAX PG: 1.86 MMHG
BH CV ECHO MEAS - RV V1 MAX: 68 CM/SEC
BH CV ECHO MEAS - RV V1 VTI: 11.4 CM
BH CV ECHO MEAS - RVDD: 2.9 CM
BH CV ECHO MEAS - RVSP: 25.5 MMHG
BH CV ECHO MEAS - SV(LVOT): 46.1 ML
BH CV ECHO MEAS - SV(MOD-SP4): 25.8 ML
BH CV ECHO MEAS - TAPSE (>1.6): 1.35 CM
BH CV ECHO MEAS - TR MAX PG: 22.5 MMHG
BH CV ECHO MEAS - TR MAX VEL: 237 CM/SEC
BH CV XLRA - TDI S': 12.2 CM/SEC
BILIRUB SERPL-MCNC: 1.5 MG/DL (ref 0–1.2)
BUN SERPL-MCNC: 28 MG/DL (ref 8–23)
BUN/CREAT SERPL: 21.5 (ref 7–25)
CALCIUM SPEC-SCNC: 10.2 MG/DL (ref 8.2–9.6)
CHLORIDE SERPL-SCNC: 99 MMOL/L (ref 98–107)
CO2 SERPL-SCNC: 23.1 MMOL/L (ref 22–29)
CREAT SERPL-MCNC: 1.3 MG/DL (ref 0.57–1)
EGFRCR SERPLBLD CKD-EPI 2021: 39.1 ML/MIN/1.73
GEN 5 2HR TROPONIN T REFLEX: 55 NG/L
GLOBULIN UR ELPH-MCNC: 3.4 GM/DL
GLUCOSE BLDC GLUCOMTR-MCNC: 134 MG/DL (ref 70–105)
GLUCOSE BLDC GLUCOMTR-MCNC: 152 MG/DL (ref 70–105)
GLUCOSE BLDC GLUCOMTR-MCNC: 171 MG/DL (ref 70–105)
GLUCOSE SERPL-MCNC: 154 MG/DL (ref 65–99)
MAGNESIUM SERPL-MCNC: 2.2 MG/DL (ref 1.6–2.4)
MAGNESIUM SERPL-MCNC: 2.2 MG/DL (ref 1.6–2.4)
POTASSIUM SERPL-SCNC: 4.2 MMOL/L (ref 3.5–5.2)
PROT SERPL-MCNC: 7.6 G/DL (ref 6–8.5)
QT INTERVAL: 320 MS
QTC INTERVAL: 476 MS
SINUS: 1.6 CM
SODIUM SERPL-SCNC: 137 MMOL/L (ref 136–145)
TROPONIN T DELTA: -2 NG/L
TROPONIN T SERPL HS-MCNC: 57 NG/L
TSH SERPL DL<=0.05 MIU/L-ACNC: 3.47 UIU/ML (ref 0.27–4.2)

## 2024-06-19 PROCEDURE — 25010000002 ENOXAPARIN PER 10 MG: Performed by: NURSE PRACTITIONER

## 2024-06-19 PROCEDURE — 97166 OT EVAL MOD COMPLEX 45 MIN: CPT

## 2024-06-19 PROCEDURE — 82948 REAGENT STRIP/BLOOD GLUCOSE: CPT

## 2024-06-19 PROCEDURE — 97162 PT EVAL MOD COMPLEX 30 MIN: CPT

## 2024-06-19 PROCEDURE — 99223 1ST HOSP IP/OBS HIGH 75: CPT | Performed by: INTERNAL MEDICINE

## 2024-06-19 PROCEDURE — 93306 TTE W/DOPPLER COMPLETE: CPT | Performed by: INTERNAL MEDICINE

## 2024-06-19 PROCEDURE — 84484 ASSAY OF TROPONIN QUANT: CPT | Performed by: NURSE PRACTITIONER

## 2024-06-19 PROCEDURE — 93306 TTE W/DOPPLER COMPLETE: CPT

## 2024-06-19 PROCEDURE — 63710000001 INSULIN LISPRO (HUMAN) PER 5 UNITS: Performed by: NURSE PRACTITIONER

## 2024-06-19 PROCEDURE — 82948 REAGENT STRIP/BLOOD GLUCOSE: CPT | Performed by: NURSE PRACTITIONER

## 2024-06-19 PROCEDURE — 83735 ASSAY OF MAGNESIUM: CPT | Performed by: NURSE PRACTITIONER

## 2024-06-19 RX ORDER — SODIUM CHLORIDE 9 MG/ML
100 INJECTION, SOLUTION INTRAVENOUS CONTINUOUS
Status: DISPENSED | OUTPATIENT
Start: 2024-06-19 | End: 2024-06-19

## 2024-06-19 RX ORDER — IBUPROFEN 600 MG/1
1 TABLET ORAL
Status: DISCONTINUED | OUTPATIENT
Start: 2024-06-19 | End: 2024-06-20 | Stop reason: HOSPADM

## 2024-06-19 RX ORDER — INSULIN LISPRO 100 [IU]/ML
2-7 INJECTION, SOLUTION INTRAVENOUS; SUBCUTANEOUS
Status: DISCONTINUED | OUTPATIENT
Start: 2024-06-19 | End: 2024-06-20 | Stop reason: HOSPADM

## 2024-06-19 RX ORDER — NICOTINE POLACRILEX 4 MG
15 LOZENGE BUCCAL
Status: DISCONTINUED | OUTPATIENT
Start: 2024-06-19 | End: 2024-06-20 | Stop reason: HOSPADM

## 2024-06-19 RX ORDER — LOPERAMIDE HYDROCHLORIDE 2 MG/1
2 CAPSULE ORAL 4 TIMES DAILY PRN
Status: DISCONTINUED | OUTPATIENT
Start: 2024-06-19 | End: 2024-06-20 | Stop reason: HOSPADM

## 2024-06-19 RX ORDER — DILTIAZEM HYDROCHLORIDE 240 MG/1
240 CAPSULE, COATED, EXTENDED RELEASE ORAL
Status: DISCONTINUED | OUTPATIENT
Start: 2024-06-19 | End: 2024-06-20 | Stop reason: HOSPADM

## 2024-06-19 RX ORDER — DEXTROSE MONOHYDRATE 25 G/50ML
25 INJECTION, SOLUTION INTRAVENOUS
Status: DISCONTINUED | OUTPATIENT
Start: 2024-06-19 | End: 2024-06-20 | Stop reason: HOSPADM

## 2024-06-19 RX ORDER — METOPROLOL SUCCINATE 25 MG/1
25 TABLET, EXTENDED RELEASE ORAL
Status: DISCONTINUED | OUTPATIENT
Start: 2024-06-19 | End: 2024-06-20 | Stop reason: HOSPADM

## 2024-06-19 RX ADMIN — DILTIAZEM HYDROCHLORIDE 240 MG: 240 CAPSULE, EXTENDED RELEASE ORAL at 10:54

## 2024-06-19 RX ADMIN — Medication 10 ML: at 21:56

## 2024-06-19 RX ADMIN — EMPAGLIFLOZIN 25 MG: 10 TABLET, FILM COATED ORAL at 08:52

## 2024-06-19 RX ADMIN — FUROSEMIDE 20 MG: 40 TABLET ORAL at 08:52

## 2024-06-19 RX ADMIN — FERROUS SULFATE TAB EC 324 MG (65 MG FE EQUIVALENT) 324 MG: 324 (65 FE) TABLET DELAYED RESPONSE at 08:53

## 2024-06-19 RX ADMIN — INSULIN LISPRO 2 UNITS: 100 INJECTION, SOLUTION INTRAVENOUS; SUBCUTANEOUS at 17:38

## 2024-06-19 RX ADMIN — ZOLPIDEM TARTRATE 5 MG: 5 TABLET ORAL at 01:43

## 2024-06-19 RX ADMIN — ENOXAPARIN SODIUM 30 MG: 100 INJECTION SUBCUTANEOUS at 17:38

## 2024-06-19 RX ADMIN — NEBIVOLOL 20 MG: 10 TABLET ORAL at 08:53

## 2024-06-19 RX ADMIN — PANTOPRAZOLE SODIUM 40 MG: 40 TABLET, DELAYED RELEASE ORAL at 05:54

## 2024-06-19 RX ADMIN — ACETAMINOPHEN 650 MG: 325 TABLET, FILM COATED ORAL at 22:51

## 2024-06-19 RX ADMIN — ATORVASTATIN CALCIUM 10 MG: 10 TABLET, FILM COATED ORAL at 08:52

## 2024-06-19 RX ADMIN — LOPERAMIDE HYDROCHLORIDE 2 MG: 2 CAPSULE ORAL at 22:51

## 2024-06-19 RX ADMIN — METOPROLOL SUCCINATE 25 MG: 25 TABLET, EXTENDED RELEASE ORAL at 10:54

## 2024-06-19 RX ADMIN — ENOXAPARIN SODIUM 30 MG: 100 INJECTION SUBCUTANEOUS at 01:00

## 2024-06-19 RX ADMIN — CLOPIDOGREL BISULFATE 75 MG: 75 TABLET ORAL at 08:52

## 2024-06-19 NOTE — PROGRESS NOTES
LOS: 1 day   Patient Care Team:  Andrew Antunez MD as PCP - General (Family Medicine)  Leandro Callejas MD as Cardiologist (Cardiology)    Subjective     Interval History: Heart rate improved overnight    Patient Complaints: Fatigued, nauseous, generally malaised    History taken from: patient    Review of Systems   Constitutional:  Positive for activity change, appetite change and fatigue. Negative for chills, diaphoresis and fever.   HENT:  Negative for facial swelling.    Eyes:  Negative for visual disturbance.   Respiratory:  Negative for cough, shortness of breath, wheezing and stridor.    Cardiovascular:  Negative for chest pain, palpitations and leg swelling.   Gastrointestinal:  Positive for diarrhea and nausea. Negative for abdominal pain, constipation and vomiting.   Genitourinary:  Negative for dysuria.   Musculoskeletal:  Negative for arthralgias, back pain and gait problem.   Neurological:  Negative for dizziness and headaches.   Psychiatric/Behavioral:  Negative for confusion.            Objective     Vital Signs  Temp:  [97.5 °F (36.4 °C)-98.5 °F (36.9 °C)] 98.4 °F (36.9 °C)  Heart Rate:  [] 91  Resp:  [13-20] 15  BP: (113-158)/(52-97) 130/62    Physical Exam:     General Appearance:    Alert, cooperative, in no acute distress,   Head:    Normocephalic, without obvious abnormality, atraumatic   Eyes:            Lids and lashes normal, conjunctivae and sclerae normal, no   icterus, no pallor, corneas clear, PERRLA   Ears:    Ears appear intact with no abnormalities noted   Throat:   No oral lesions, no thrush, oral mucosa moist   Neck:   No adenopathy, supple, trachea midline, no thyromegaly, no   carotid bruit, no JVD   Lungs:     Clear to auscultation,respirations regular, even and                  unlabored    Heart:  Irregularly irregular   Chest Wall:    No abnormalities observed   Abdomen:     Normal bowel sounds, no masses, no organomegaly, soft        Non-tender non-distended, no  guarding,   Extremities:   Moves all extremities well, no edema, no cyanosis, no             Redness   Pulses:   Pulses palpable and equal bilaterally   Skin:   No bleeding, bruising or rash   Lymph nodes:   No palpable adenopathy   Neurologic:   Cranial nerves 2 - 12 grossly intact, sensation intact, DTR       present and equal bilaterally        Results Review:    Lab Results (last 24 hours)       Procedure Component Value Units Date/Time    High Sensitivity Troponin T 2Hr [[id number]]  (Abnormal) Collected: 06/19/24 1052    Specimen: Blood Updated: 06/19/24 1154     HS Troponin T 55 ng/L      Troponin T Delta -2 ng/L     Narrative:      High Sensitive Troponin T Reference Range:  <14.0 ng/L- Negative Female for AMI  <22.0 ng/L- Negative Male for AMI  >=14 - Abnormal Female indicating possible myocardial injury.  >=22 - Abnormal Male indicating possible myocardial injury.   Clinicians would have to utilize clinical acumen, EKG, Troponin, and serial changes to determine if it is an Acute Myocardial Infarction or myocardial injury due to an underlying chronic condition.         POC Glucose Once [[id number]]  (Abnormal) Collected: 06/19/24 1129    Specimen: Blood Updated: 06/19/24 1131     Glucose 152 mg/dL      Comment: Serial Number: [id number]:  [id number]       High Sensitivity Troponin T [[id number]]  (Abnormal) Collected: 06/19/24 0601    Specimen: Blood from Arm, Right Updated: 06/19/24 0937     HS Troponin T 57 ng/L     Narrative:      High Sensitive Troponin T Reference Range:  <14.0 ng/L- Negative Female for AMI  <22.0 ng/L- Negative Male for AMI  >=14 - Abnormal Female indicating possible myocardial injury.  >=22 - Abnormal Male indicating possible myocardial injury.   Clinicians would have to utilize clinical acumen, EKG, Troponin, and serial changes to determine if it is an Acute Myocardial Infarction or myocardial injury due to an underlying chronic condition.         Magnesium [[id number]]  (Normal)  Collected: 06/19/24 0601    Specimen: Blood from Arm, Right Updated: 06/19/24 0636     Magnesium 2.2 mg/dL     Comprehensive Metabolic Panel [[id number]]  (Abnormal) Collected: 06/18/24 2321    Specimen: Blood from Arm, Right Updated: 06/19/24 0006     Glucose 154 mg/dL      BUN 28 mg/dL      Creatinine 1.30 mg/dL      Sodium 137 mmol/L      Potassium 4.2 mmol/L      Chloride 99 mmol/L      CO2 23.1 mmol/L      Calcium 10.2 mg/dL      Total Protein 7.6 g/dL      Albumin 4.2 g/dL      ALT (SGPT) 132 U/L      AST (SGOT) 262 U/L      Alkaline Phosphatase 310 U/L      Total Bilirubin 1.5 mg/dL      Globulin 3.4 gm/dL      A/G Ratio 1.2 g/dL      BUN/Creatinine Ratio 21.5     Anion Gap 14.9 mmol/L      eGFR 39.1 mL/min/1.73     Narrative:      GFR Normal >60  Chronic Kidney Disease <60  Kidney Failure <15    The GFR formula is only valid for adults with stable renal function between ages 18 and 70.    Magnesium [[id number]]  (Normal) Collected: 06/18/24 2321    Specimen: Blood from Arm, Right Updated: 06/19/24 0006     Magnesium 2.2 mg/dL     TSH [[id number]]  (Normal) Collected: 06/18/24 2321    Specimen: Blood from Arm, Right Updated: 06/19/24 0006     TSH 3.470 uIU/mL     CBC & Differential [[id number]]  (Abnormal) Collected: 06/18/24 2321    Specimen: Blood from Arm, Right Updated: 06/18/24 2348    Narrative:      The following orders were created for panel order CBC & Differential.  Procedure                               Abnormality         Status                     ---------                               -----------         ------                     CBC Auto Differential[[id number]]        Abnormal            Final result                 Please view results for these tests on the individual orders.    CBC Auto Differential [[id number]]  (Abnormal) Collected: 06/18/24 2321    Specimen: Blood from Arm, Right Updated: 06/18/24 2348     WBC 9.42 10*3/mm3      RBC 4.85 10*6/mm3      Hemoglobin 12.0 g/dL      Hematocrit  42.7 %      MCV 88.0 fL      MCH 24.7 pg      MCHC 28.1 g/dL      RDW 17.7 %      RDW-SD 55.8 fl      MPV 10.7 fL      Platelets 287 10*3/mm3      Neutrophil % 76.2 %      Lymphocyte % 11.9 %      Monocyte % 10.0 %      Eosinophil % 1.1 %      Basophil % 0.6 %      Immature Grans % 0.2 %      Neutrophils, Absolute 7.18 10*3/mm3      Lymphocytes, Absolute 1.12 10*3/mm3      Monocytes, Absolute 0.94 10*3/mm3      Eosinophils, Absolute 0.10 10*3/mm3      Basophils, Absolute 0.06 10*3/mm3      Immature Grans, Absolute 0.02 10*3/mm3      nRBC 0.0 /100 WBC     Lipid Panel [[id number]]  (Abnormal) Collected: 06/18/24 1759    Specimen: Blood Updated: 06/18/24 2312     Total Cholesterol 186 mg/dL      Triglycerides 108 mg/dL      HDL Cholesterol 80 mg/dL      LDL Cholesterol  87 mg/dL      VLDL Cholesterol 19 mg/dL      LDL/HDL Ratio 1.06    Narrative:      Cholesterol Reference Ranges  (U.S. Department of Health and Human Services ATP III Classifications)    Desirable          <200 mg/dL  Borderline High    200-239 mg/dL  High Risk          >240 mg/dL      Triglyceride Reference Ranges  (U.S. Department of Health and Human Services ATP III Classifications)    Normal           <150 mg/dL  Borderline High  150-199 mg/dL  High             200-499 mg/dL  Very High        >500 mg/dL    HDL Reference Ranges  (U.S. Department of Health and Human Services ATP III Classifications)    Low     <40 mg/dl (major risk factor for CHD)  High    >60 mg/dl ('negative' risk factor for CHD)        LDL Reference Ranges  (U.S. Department of Health and Human Services ATP III Classifications)    Optimal          <100 mg/dL  Near Optimal     100-129 mg/dL  Borderline High  130-159 mg/dL  High             160-189 mg/dL  Very High        >189 mg/dL    Hemoglobin A1c [[id number]]  (Abnormal) Collected: 06/18/24 1759    Specimen: Blood Updated: 06/18/24 2310     Hemoglobin A1C 8.28 %     CBC & Differential [[id number]]  (Abnormal) Collected: 06/18/24 1759     Specimen: Blood Updated: 06/18/24 1835    Narrative:      The following orders were created for panel order CBC & Differential.  Procedure                               Abnormality         Status                     ---------                               -----------         ------                     CBC Auto Differential[[id number]]        Abnormal            Final result                 Please view results for these tests on the individual orders.    CBC Auto Differential [[id number]]  (Abnormal) Collected: 06/18/24 1759    Specimen: Blood Updated: 06/18/24 1835     WBC 12.20 10*3/mm3      RBC 4.72 10*6/mm3      Hemoglobin 11.6 g/dL      Hematocrit 38.9 %      MCV 82.4 fL      MCH 24.6 pg      MCHC 29.8 g/dL      RDW 17.2 %      RDW-SD 51.4 fl      MPV 10.6 fL      Platelets 324 10*3/mm3      Neutrophil % 78.0 %      Lymphocyte % 11.1 %      Monocyte % 9.6 %      Eosinophil % 0.9 %      Basophil % 0.3 %      Immature Grans % 0.1 %      Neutrophils, Absolute 9.52 10*3/mm3      Lymphocytes, Absolute 1.35 10*3/mm3      Monocytes, Absolute 1.17 10*3/mm3      Eosinophils, Absolute 0.11 10*3/mm3      Basophils, Absolute 0.04 10*3/mm3      Immature Grans, Absolute 0.01 10*3/mm3      nRBC 0.0 /100 WBC     Comprehensive Metabolic Panel [[id number]]  (Abnormal) Collected: 06/18/24 1759    Specimen: Blood Updated: 06/18/24 1833     Glucose 157 mg/dL      BUN 27 mg/dL      Creatinine 1.29 mg/dL      Sodium 136 mmol/L      Potassium 4.5 mmol/L      Chloride 98 mmol/L      CO2 23.3 mmol/L      Calcium 10.4 mg/dL      Total Protein 7.6 g/dL      Albumin 4.3 g/dL      ALT (SGPT) 50 U/L      AST (SGOT) 123 U/L      Alkaline Phosphatase 251 U/L      Total Bilirubin 1.5 mg/dL      Globulin 3.3 gm/dL      A/G Ratio 1.3 g/dL      BUN/Creatinine Ratio 20.9     Anion Gap 14.7 mmol/L      eGFR 39.5 mL/min/1.73     Narrative:      GFR Normal >60  Chronic Kidney Disease <60  Kidney Failure <15    The GFR formula is only valid for adults  with stable renal function between ages 18 and 70.    BNP [[id number]]  (Normal) Collected: 06/18/24 1759    Specimen: Blood Updated: 06/18/24 1833     proBNP 1,596.0 pg/mL     Narrative:      This assay is used as an aid in the diagnosis of individuals suspected of having heart failure. It can be used as an aid in the diagnosis of acute decompensated heart failure (ADHF) in patients presenting with signs and symptoms of ADHF to the emergency department (ED). In addition, NT-proBNP of <300 pg/mL indicates ADHF is not likely.    Age Range Result Interpretation  NT-proBNP Concentration (pg/mL:      <50             Positive            >450                   Gray                 300-450                    Negative             <300    50-75           Positive            >900                  Gray                300-900                  Negative            <300      >75             Positive            >1800                  Gray                300-1800                  Negative            <300    Single High Sensitivity Troponin T [[id number]]  (Abnormal) Collected: 06/18/24 1759    Specimen: Blood Updated: 06/18/24 1833     HS Troponin T 51 ng/L     Narrative:      High Sensitive Troponin T Reference Range:  <14.0 ng/L- Negative Female for AMI  <22.0 ng/L- Negative Male for AMI  >=14 - Abnormal Female indicating possible myocardial injury.  >=22 - Abnormal Male indicating possible myocardial injury.   Clinicians would have to utilize clinical acumen, EKG, Troponin, and serial changes to determine if it is an Acute Myocardial Infarction or myocardial injury due to an underlying chronic condition.         TSH [[id number]]  (Normal) Collected: 06/18/24 1759    Specimen: Blood Updated: 06/18/24 1833     TSH 3.370 uIU/mL     T4, Free [[id number]]  (Normal) Collected: 06/18/24 1759    Specimen: Blood Updated: 06/18/24 1833     Free T4 1.53 ng/dL     [place] Draw [[id number]] Collected: 06/18/24 1759    Specimen: Blood Updated:  06/18/24 1815    Narrative:      The following orders were created for panel order [place] Draw.  Procedure                               Abnormality         Status                     ---------                               -----------         ------                     Green Top (Gel)[[id number]]                                  Final result               Lavender Top[[id number]]                                     Final result               Gold Top - SST[[id number]]                                   Final result               Light Blue Top[[id number]]                                   Final result                 Please view results for these tests on the individual orders.    Green Top (Gel) [[id number]] Collected: 06/18/24 1759    Specimen: Blood Updated: 06/18/24 1815     Extra Tube Hold for add-ons.     Comment: Auto resulted.       Lavender Top [[id number]] Collected: 06/18/24 1759    Specimen: Blood Updated: 06/18/24 1815     Extra Tube hold for add-on     Comment: Auto resulted       Gold Top - SST [[id number]] Collected: 06/18/24 1759    Specimen: Blood Updated: 06/18/24 1815     Extra Tube Hold for add-ons.     Comment: Auto resulted.       Light Blue Top [[id number]] Collected: 06/18/24 1759    Specimen: Blood Updated: 06/18/24 1815     Extra Tube Hold for add-ons.     Comment: Auto resulted                Imaging Results (Last 24 Hours)       Procedure Component Value Units Date/Time    XR Chest 1 View [[id number]] Collected: 06/18/24 1848     Updated: 06/18/24 1852    Narrative:      XR CHEST 1 VW    Date of Exam: 6/18/2024 6:38 PM EDT    Indication: sob    Comparison: 5/21/2024    Findings:  Lines: Stable appearance of left subclavian triple lead pacemaker. 2 of the pacemaker leads appear to be fractured/abandoned, unchanged    Lungs: Poor respiratory effort accentuates the pulmonary vasculature and cardiomediastinal silhouette. No definite consolidation.  Pleura: No pleural effusion or  pneumothorax.    Cardiomediastinum: Moderate cardiomegaly. Patient is status post TAVR    Soft Tissues: Unremarkable.    Bones: No acute osseous abnormality.      Impression:      Impression:  No acute abnormality. Chronic findings as characterized above      Electronically Signed: Wilsonelizabet Sebastian DO    6/18/2024 6:50 PM EDT    Workstation ID: [id number]                 I reviewed the patient's new clinical results.    Medication Review:   Scheduled Meds:[Held by provider] atorvastatin, 10 mg, Oral, Daily  clopidogrel, 75 mg, Oral, Daily  dilTIAZem CD, 240 mg, Oral, Q24H  [Held by provider] empagliflozin, 25 mg, Oral, Daily  enoxaparin, 30 mg, Subcutaneous, Daily  ferrous sulfate, 324 mg, Oral, Daily With Breakfast  [Held by provider] furosemide, 20 mg, Oral, Daily  insulin lispro, 2-7 Units, Subcutaneous, 4x Daily AC & at Bedtime  metoprolol succinate XL, 25 mg, Oral, Q24H  pantoprazole, 40 mg, Oral, Q AM  sodium chloride, 10 mL, Intravenous, Q12H      Continuous Infusions:Pharmacy to Dose enoxaparin (LOVENOX),   sodium chloride, 100 mL/hr      PRN Meds:.  acetaminophen **OR** acetaminophen **OR** acetaminophen    senna-docusate sodium **AND** polyethylene glycol **AND** bisacodyl **AND** bisacodyl    Calcium Replacement - Follow Nurse / BPA Driven Protocol    dextrose    dextrose    glucagon (human recombinant)    Magnesium Standard Dose Replacement - Follow Nurse / BPA Driven Protocol    meclizine    nitroglycerin    ondansetron    Pharmacy to Dose enoxaparin (LOVENOX)    Phosphorus Replacement - Follow Nurse / BPA Driven Protocol    Potassium Replacement - Follow Nurse / BPA Driven Protocol    [COMPLETED] Insert Peripheral IV **AND** sodium chloride    sodium chloride    sodium chloride    zolpidem     Assessment & Plan       Atrial fibrillation with rapid ventricular response    Pacemaker    Coronary artery disease involving native coronary artery of native heart without angina pectoris    Hyperlipidemia,  mixed    Essential hypertension    Type 2 diabetes mellitus with stage 3a chronic kidney disease, without long-term current use of insulin    Presence of Watchman left atrial appendage closure device    Chronic heart failure with preserved ejection fraction (HFpEF)    S/P TAVR (transcatheter aortic valve replacement)    Elevated LFTs    Nausea    Acute diarrhea    Syncope and collapse    Improved symptomatically overnight with hydration and diltiazem drip.  She has been weaned off diltiazem drip as heart rate is now in the 80s.  Creatinine remains above her baseline and she appears clinically somewhat dehydrated.  Will continue gentle IV fluids.  Holding diuretics and SGLT2 inhibitor.  Use sliding scale insulin while oral intake is poor.  Cardiology to evaluate given recent TAVR.    Plan for disposition: To be determined.    Coni Fields MD  06/19/24  12:16 EDT

## 2024-06-19 NOTE — PLAN OF CARE
Problem: Adult Inpatient Plan of Care  Goal: Plan of Care Review  Outcome: Ongoing, Progressing     Problem: Adult Inpatient Plan of Care  Goal: Patient-Specific Goal (Individualized)  Outcome: Ongoing, Progressing   Goal Outcome Evaluation:                      Pt very focused on maintaining independence. Currently lying in bed with no complaints. Plan to go to rehab at discharge.

## 2024-06-19 NOTE — CONSULTS
"Diabetes Education  Assessment/Teaching    Patient Name:  Cherie Hinton  YOB: 1934  MRN: [id number]  Admit Date:  6/18/2024      Assessment Date:  6/19/2024  Flowsheet Row Most Recent Value   General Information     Referral From: A1c, Database  [MD consult per database and on 6/18/2024 A!c was 8.28%.]   Height 160 cm (63\")   Height Method Stated   Weight 72.6 kg (160 lb)   Weight Method Bed scale   Pregnancy Assessment    Diabetes History    What type of diabetes do you have? Type 2   Length of Diabetes Diagnosis 10 + years  [Patient stated she was diagnosed about 30 years ago.]   Current DM knowledge fair   Have you had diabetes education/teaching in the past? yes   When and where was your diabetes education? Inpatient hospitalzation at [facility] on 5/[facility]/2024   Do you test your blood sugar at home? no   Have you had high blood sugar? (>140mg/dl) yes   How often do you have high blood sugar? unknown   When was your last high blood sugar? Admission blood sugar 157   Education Preferences    What areas of diabetes would you like to learn about? avoiding high blood sugar, diabetes complications, testing my blood sugar at home   Nutrition Information    Assessment Topics    Problem Solving - Assessment Needs education   Reducing Risk - Assessment Needs education   Monitoring - Assessment Needs education   DM Goals    Problem Solving - Goal Today   Reducing Risk - Goal Today   Monitoring - Goal Today            Flowsheet Row Most Recent Value   DM Education Needs    Meter Has own  [Patient stated her glucometer is abou 3 years old but she has never used it.]   Medication Oral  [At home patient stated she takes Metformin 500 mg every morning.]   Problem Solving Hyperglycemia, Signs, Symptoms, Treatment   Reducing Risks A1C testing  [On 6/18/2024 A!c was 8.28%.]   Discharge Plan Home   Motivation Moderate   Teaching Method Discussion, Handouts   Patient Response Verbalized understanding              Other " Comments:  A1c info sheet given with discussion on A1c target and healthy blood sugar range. Instructed patient to check her test strips for expiration dates and if  to get new test strips. Instructed patient to check blood sugar at least when she doesn't feel right. Patient stated she also has a blood pressure cuff and never checks her blood pressure. Explained to patient that by checking blood sugar and blood pressure she can see if she is staying in normal range. Discussed with patient that doctor may want to increase her Metformin to twice a day before breakfast and supper. Patient is agreeable to this and stated she hasn't had any side effects from the Metformin. Patient has no further questions or concerns related to diabetes at this time.        Electronically signed by:  Daya Ga RN  24 13:19 EDT

## 2024-06-19 NOTE — H&P
Patient Care Team:  Andrew Antunez MD as PCP - General (Family Medicine)  Leandro Callejas MD as Cardiologist (Cardiology)    Chief complaint weakness, lightheaded    Subjective     Patient is a 89 y.o. female with history of atrial fibrillation, CAD, CHF, diabetes type 2, hypertension, GERD who presented to the ER with reports of feeling weak and lightheaded that is worse when standing up. She reports some nausea, vomiting and diarrhea that has been going on for a few days. She complains of some chest pain and shortness of breath that has been intermittent but none currently. Denies any fever, chills, diaphoresis, numbness, tingling, syncope.  In the ER troponin 51, bnp 1596, BUN 27, creatinine 1.29(slight increase from baseline), CXR with no acute findings. EKG afib with RVR rate 133. Patient was started on cardizem gtt with improvement. Liver enzymes elevated with unclear etiology at this time.       Onset of symptoms was 3-4 day     Review of Systems   Constitutional:  Positive for fatigue.   HENT: Negative.     Eyes: Negative.    Respiratory:  Positive for shortness of breath.    Cardiovascular:  Positive for chest pain.   Gastrointestinal:  Positive for diarrhea, nausea and vomiting.   Endocrine: Negative.    Genitourinary: Negative.    Musculoskeletal: Negative.    Skin: Negative.    Neurological:  Positive for weakness and light-headedness.          History  Past Medical History:   Diagnosis Date    Antral erosion     Aortic valve regurgitation 12/26/2018    Atrial fibrillation     Atrial fibrillation with controlled ventricular response     B12 deficiency     CAD (coronary artery disease)     Cellulitis 04/2011    on face     Cerebrovascular accident (CVA) 12/07/2016    CHF (congestive heart failure)     Colon cancer     Coronary artery disease involving native coronary artery of native heart without angina pectoris 06/18/2019    Depression     Diabetes mellitus, type 2     Essential hypertension  02/13/2012    Fatigue     GERD (gastroesophageal reflux disease)     Hiatal hernia     History of colonoscopy 04/2010    last done 4/10    History of esophagogastroduodenoscopy (EGD) 04/2010    last done 4/10    Hyperlipidemia, mixed 02/13/2012    Hypertension     white coat htn    HALEIGH (iron deficiency anemia)     LAD (lymphadenopathy)     40% lesion LAD     Left pontine CVA     Mitral regurgitation 05/21/2012    STORMY (obstructive sleep apnea)     not treating    Osteoarthritis     Pacemaker 06/18/2019    Permanent atrial fibrillation 06/18/2019    Presence of Watchman left atrial appendage closure device 01/04/2020    Prinzmetal's angina     Right kidney mass     1.4 cm- following urology     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    AORTIC VALVE REPAIR/REPLACEMENT N/A 5/20/2024    Procedure: TRANSFEMORAL TRANSCATHETER AORTIC VALVE REPLACEMENT;  Surgeon: Yonatan Shanks MD;  Location: [facility] HYBRID OR;  Service: Cardiothoracic;  Laterality: N/A;    ATRIAL APPENDAGE EXCLUSION LEFT WITH TRANSESOPHAGEAL ECHOCARDIOGRAM N/A 10/10/2019    Procedure: Atrial Appendage Occlusion;  Surgeon: Richie Chapman MD;  Location: [facility] CATH INVASIVE LOCATION;  Service: Cardiovascular    ATRIAL APPENDAGE EXCLUSION LEFT WITH TRANSESOPHAGEAL ECHOCARDIOGRAM N/A 10/10/2019    Procedure: Atrial Appendage Occlusion;  Surgeon: Je Rivera MD;  Location: [facility] CATH INVASIVE LOCATION;  Service:  Cardiology    BLADDER REPAIR      CARDIAC CATHETERIZATION  05/2010    CARDIAC CATHETERIZATION N/A 3/28/2024    Procedure: Left Heart Cath;  Surgeon: Mich Araujo MD;  [facility];  Service: Cardiology;  Laterality: N/A;    CHOLECYSTECTOMY      COLECTOMY PARTIAL / TOTAL      COLONOSCOPY  04/16/2018    negative     COLONOSCOPY N/A 7/1/2022    Procedure: COLONOSCOPY with polypectomy x1;  Surgeon: ADOLFO Boss MD;  [facility];  Service: Gastroenterology;  Laterality: N/A;  post: diverticulosis, hemorrhoids, polyps    ENDOSCOPY  04/16/2018    negative     ENDOSCOPY N/A 6/29/2022    Procedure: ESOPHAGOGASTRODUODENOSCOPY with biopsy of duodenum r/o celiac;  Surgeon: ADOLFO Boss MD;  [facility];  Service: Gastroenterology;  Laterality: N/A;  hiatial hernia  Gastric Polyps        HYSTERECTOMY      INSERT / REPLACE / REMOVE PACEMAKER  09/2018    Pacemaker gen change 9/2018     OTHER SURGICAL HISTORY  04/25/2015    Exercise myoview, normal     PACEMAKER IMPLANTATION  01/22/2010    Dual Chamber - 1/22/2010; RA Lead Revision- 5/7/2012- BS     TOOTH EXTRACTION      TRANSESOPHAGEAL ECHOCARDIOGRAM (DHEERAJ)  07/2019     Family History   Problem Relation Age of Onset    Hypertension Mother     Diabetes Mother     Coronary artery disease Mother     Other Father         CVA    No Known Problems Sister     No Known Problems Brother     No Known Problems Maternal Aunt     No Known Problems Maternal Uncle     No Known Problems Paternal Aunt     No Known Problems Paternal Uncle     No Known Problems Maternal Grandmother     No Known Problems Maternal Grandfather     No Known Problems Paternal Grandmother     No Known Problems Paternal Grandfather     Heart disease Other     Stroke Other     Hypertension Other     Anemia Neg Hx     Arrhythmia Neg Hx     Asthma Neg Hx     Clotting disorder Neg Hx     Fainting Neg Hx     Heart attack Neg Hx     Heart failure Neg  Hx     Hyperlipidemia Neg Hx      Social History     Tobacco Use    Smoking status: Never     Passive exposure: Never    Smokeless tobacco: Never   Vaping Use    Vaping status: Never Used   Substance Use Topics    Alcohol use: No    Drug use: Never     (Not in a hospital admission)    Allergies:  Contrast dye (echo or unknown ct/mr), Adhesive tape, and Latex    Objective     Vital Signs  Temp:  [98 °F (36.7 °C)-98.5 °F (36.9 °C)] 98 °F (36.7 °C)  Heart Rate:  [] 97  Resp:  [13-19] 13  BP: (113-148)/(55-94) 124/55     Physical Exam:      General Appearance:    Alert, cooperative, in no acute distress   Head:    Normocephalic, without obvious abnormality, atraumatic   Eyes:            Lids and lashes normal, conjunctivae and sclerae normal, no   icterus, no pallor, corneas clear, PERRLA   Ears:    Ears appear intact with no abnormalities noted   Throat:   No oral lesions, no thrush, oral mucosa moist   Neck:   No adenopathy, supple, trachea midline, no thyromegaly, no   carotid bruit, no JVD   Lungs:     Clear to auscultation,respirations regular, even and                  unlabored    Heart:    Irregular rhythm and normal rate, normal S1 and S2, no            murmur, no gallop, no rub, no click   Chest Wall:    No abnormalities observed   Abdomen:     Normal bowel sounds, no masses, no organomegaly, soft        non-tender, non-distended, no guarding, no rebound                tenderness   Extremities:   Moves all extremities well, no edema, no cyanosis, no             redness   Pulses:   Pulses palpable and equal bilaterally   Skin:   No bleeding, bruising or rash   Lymph nodes:   No palpable adenopathy   Neurologic:   No focal deficits noted       Results Review:     Imaging Results (Last 24 Hours)       Procedure Component Value Units Date/Time    XR Chest 1 View [[id number]] Collected: 06/18/24 1848     Updated: 06/18/24 1852    Narrative:      XR CHEST 1 VW    Date of Exam: 6/18/2024 6:38 PM  EDT    Indication: sob    Comparison: 5/21/2024    Findings:  Lines: Stable appearance of left subclavian triple lead pacemaker. 2 of the pacemaker leads appear to be fractured/abandoned, unchanged    Lungs: Poor respiratory effort accentuates the pulmonary vasculature and cardiomediastinal silhouette. No definite consolidation.  Pleura: No pleural effusion or pneumothorax.    Cardiomediastinum: Moderate cardiomegaly. Patient is status post TAVR    Soft Tissues: Unremarkable.    Bones: No acute osseous abnormality.      Impression:      Impression:  No acute abnormality. Chronic findings as characterized above      Electronically Signed: Wilson Sebastian DO    6/18/2024 6:50 PM EDT    Workstation ID: [id number]             Lab Results (last 24 hours)       Procedure Component Value Units Date/Time    CBC & Differential [[id number]]  (Abnormal) Collected: 06/18/24 1759    Specimen: Blood Updated: 06/18/24 1835    Narrative:      The following orders were created for panel order CBC & Differential.  Procedure                               Abnormality         Status                     ---------                               -----------         ------                     CBC Auto Differential[[id number]]        Abnormal            Final result                 Please view results for these tests on the individual orders.    CBC Auto Differential [[id number]]  (Abnormal) Collected: 06/18/24 1759    Specimen: Blood Updated: 06/18/24 1835     WBC 12.20 10*3/mm3      RBC 4.72 10*6/mm3      Hemoglobin 11.6 g/dL      Hematocrit 38.9 %      MCV 82.4 fL      MCH 24.6 pg      MCHC 29.8 g/dL      RDW 17.2 %      RDW-SD 51.4 fl      MPV 10.6 fL      Platelets 324 10*3/mm3      Neutrophil % 78.0 %      Lymphocyte % 11.1 %      Monocyte % 9.6 %      Eosinophil % 0.9 %      Basophil % 0.3 %      Immature Grans % 0.1 %      Neutrophils, Absolute 9.52 10*3/mm3      Lymphocytes, Absolute 1.35 10*3/mm3      Monocytes, Absolute 1.17 10*3/mm3       Eosinophils, Absolute 0.11 10*3/mm3      Basophils, Absolute 0.04 10*3/mm3      Immature Grans, Absolute 0.01 10*3/mm3      nRBC 0.0 /100 WBC     Comprehensive Metabolic Panel [[id number]]  (Abnormal) Collected: 06/18/24 1759    Specimen: Blood Updated: 06/18/24 1833     Glucose 157 mg/dL      BUN 27 mg/dL      Creatinine 1.29 mg/dL      Sodium 136 mmol/L      Potassium 4.5 mmol/L      Chloride 98 mmol/L      CO2 23.3 mmol/L      Calcium 10.4 mg/dL      Total Protein 7.6 g/dL      Albumin 4.3 g/dL      ALT (SGPT) 50 U/L      AST (SGOT) 123 U/L      Alkaline Phosphatase 251 U/L      Total Bilirubin 1.5 mg/dL      Globulin 3.3 gm/dL      A/G Ratio 1.3 g/dL      BUN/Creatinine Ratio 20.9     Anion Gap 14.7 mmol/L      eGFR 39.5 mL/min/1.73     Narrative:      GFR Normal >60  Chronic Kidney Disease <60  Kidney Failure <15    The GFR formula is only valid for adults with stable renal function between ages 18 and 70.    BNP [[id number]]  (Normal) Collected: 06/18/24 1759    Specimen: Blood Updated: 06/18/24 1833     proBNP 1,596.0 pg/mL     Narrative:      This assay is used as an aid in the diagnosis of individuals suspected of having heart failure. It can be used as an aid in the diagnosis of acute decompensated heart failure (ADHF) in patients presenting with signs and symptoms of ADHF to the emergency department (ED). In addition, NT-proBNP of <300 pg/mL indicates ADHF is not likely.    Age Range Result Interpretation  NT-proBNP Concentration (pg/mL:      <50             Positive            >450                   Gray                 300-450                    Negative             <300    50-75           Positive            >900                  Gray                300-900                  Negative            <300      >75             Positive            >1800                  Gray                300-1800                  Negative            <300    Single High Sensitivity Troponin T [[id number]]  (Abnormal)  Collected: 06/18/24 1759    Specimen: Blood Updated: 06/18/24 1833     HS Troponin T 51 ng/L     Narrative:      High Sensitive Troponin T Reference Range:  <14.0 ng/L- Negative Female for AMI  <22.0 ng/L- Negative Male for AMI  >=14 - Abnormal Female indicating possible myocardial injury.  >=22 - Abnormal Male indicating possible myocardial injury.   Clinicians would have to utilize clinical acumen, EKG, Troponin, and serial changes to determine if it is an Acute Myocardial Infarction or myocardial injury due to an underlying chronic condition.         TSH [[id number]]  (Normal) Collected: 06/18/24 1759    Specimen: Blood Updated: 06/18/24 1833     TSH 3.370 uIU/mL     T4, Free [[id number]]  (Normal) Collected: 06/18/24 1759    Specimen: Blood Updated: 06/18/24 1833     Free T4 1.53 ng/dL     [place] Draw [[id number]] Collected: 06/18/24 1759    Specimen: Blood Updated: 06/18/24 1815    Narrative:      The following orders were created for panel order [place] Draw.  Procedure                               Abnormality         Status                     ---------                               -----------         ------                     Green Top (Gel)[[id number]]                                  Final result               Lavender Top[[id number]]                                     Final result               Gold Top - SST[[id number]]                                   Final result               Light Blue Top[[id number]]                                   Final result                 Please view results for these tests on the individual orders.    Green Top (Gel) [[id number]] Collected: 06/18/24 1759    Specimen: Blood Updated: 06/18/24 1815     Extra Tube Hold for add-ons.     Comment: Auto resulted.       Lavender Top [[id number]] Collected: 06/18/24 1759    Specimen: Blood Updated: 06/18/24 1815     Extra Tube hold for add-on     Comment: Auto resulted       Gold Top - SST [[id number]] Collected: 06/18/24 1759     Specimen: Blood Updated: 06/18/24 1815     Extra Tube Hold for add-ons.     Comment: Auto resulted.       Light Blue Top [[id number]] Collected: 06/18/24 1759    Specimen: Blood Updated: 06/18/24 1815     Extra Tube Hold for add-ons.     Comment: Auto resulted                I reviewed the patient's new clinical results.    Assessment & Plan       Atrial fibrillation with rapid ventricular response  -cardizem gtt  -pt on home plavix and cardizem  -echo pending  -cardiology consulted      DVT prophylaxis- SCD's  GI prophylaxis- ppi    I discussed the patient's findings and my recommendations with patient.     Lynnette Wayne, APRN  06/18/24  22:05 EDT

## 2024-06-19 NOTE — CASE MANAGEMENT/SOCIAL WORK
Continued Stay Note  [facility]     Patient Name: Cherie Hinton  MRN: [id number]  Today's Date: 6/19/2024    Admit Date: 6/18/2024    Plan: D/C Plan: [facility]; No pre-cert or PASRR needed. Transport TBD   Discharge Plan       Row Name 06/19/24 3547       Plan    Plan D/C Plan: [facility]; No pre-cert or PASRR needed. Transport TBD      Row Name 06/19/24 7661       Plan    Plan D/C Plan: [facility] pending acceptance.  Therapy recommended and Pt. choice.  No pre-cert or PASRR needed. Transport TBD    Plan Comments Met with pt at bedside. Reviewed and completed re-admission assessment. Confirmed PCP and Pharm. No difficulties paying for medications. Lives at home at an ind.level. Drives, and says she is always busy.  Her gdtr does put her medications together, but otherwise she doesn't need assistance. No DME used. Therapy is recommending inpt rehab.  Referral  sent to [facility] per pt request. Barrier to D/C: Cardizem gtt for A-fib with RVR; Pacemaker for interrogation due to syncope and collapse.                       Evette Ga RN

## 2024-06-19 NOTE — PLAN OF CARE
Goal Outcome Evaluation:  Plan of Care Reviewed With: patient     Problem: Adult Inpatient Plan of Care  Goal: Plan of Care Review  Outcome: Ongoing, Progressing  Flowsheets (Taken 6/19/2024 6269)  Progress: improving  Plan of Care Reviewed With: patient        Progress: improving

## 2024-06-19 NOTE — PLAN OF CARE
Goal Outcome Evaluation:  Plan of Care Reviewed With: patient        Progress: no change  Outcome Evaluation: Pt is a 89 y/o F admitted to [facility] 6/18/24 with c/o syncope, N/D. Pt noted to have TAVR 5/20. Hospital dx A.fib with RVR. CXR (-) acute. At baseline pt resides alone in [facility] with 0 AUDREY. Pt typically (I) with ADLs/IADLs, no AD for mobility, is an active  and is an active member within community. This date pt A&Ox4 on RA and sitting up in bed upon arrival. Pt requires supervision for bed mobility, min A to come to standing with RW and min A RW to transfer from bed to chair. Pt c/o nausea with standing, noted to have drop in BP and HR increase. Pt is at high risk for falls and is not safe to dc home alone. Pt is a great candidate for acute IP rehab likely to tolerate high intensity, pt agreeable. OT recommend acute IP rehab when medically appropriate for dc, continued use of RW within acute setting. OT will follow while admitted.      Anticipated Discharge Disposition (OT): inpatient rehabilitation facility

## 2024-06-19 NOTE — CASE MANAGEMENT/SOCIAL WORK
Case Management Readmission Assessment Note    Case Management Readmission Assessment (all recorded)       Readmission Interview       Row Name 06/19/24 1520             Readmission Indications    Is the patient and/or family able to complete the readmission assessment questions? Yes      Is this hospitalization related to the prior hospital diagnosis? No        Row Name 06/19/24 1520             Recommendation for rehospitalization    Did you speak with your physician prior to coming to the hospital No        Row Name 06/19/24 1520             Follow-up Appointments    Do you have a PCP? Yes      Did you have an appointment with PCP after your hospitalization? Yes  Cant remember exact date, but it was before readmission.      When was your appointment scheduled? --  Cant remember date, but PCP office changed to later this month.      Did you go to appointment? No      Did you have an appointment with a Specialist? No      Are you current with the Pulmonary Clinic? No      Are you current with the CHF Clinic? No        Row Name 06/19/24 1520             Medications    Did you have newly prescribed medications at discharge? Yes      Did you understand the reasons for your medications at discharge and how to take them? Yes      Did you understand the side effects of your medications? No      Are you taking all of you prescribed medications? Yes      What pharmacy was used to fill prescription(s)? [facility]      Were medications picked up? Yes        Row Name 06/19/24 1520             Discharge Instructions    Did you understand your discharge instructions? Yes      Did your family/caregiver hear your instructions? No      Were you told to eat a special diet? Yes      Did you adhere to the diet? Yes      Were you given a number of someone to call if you had questions or concerns? Yes        Row Name 06/19/24 1520             Index discharge location/services    Where did you go upon discharge? Home      Do you have  supportive family or friends in the home? No      What services were arranged at discharge? --  None        Row Name 24 1520             Discharge Readiness    On a scale of 1-5 (5 being well prepared), how ready were you for discharge 2      Recommendation based on interview Education on diagnosis/self management        Row Name 24 1520             Palliative Care/Hospice    Are you current with Palliative Care? No      Are you current with Hospice Care? No        Row Name 24 2243             Advance Directives (For Healthcare)    Pre-existing AND/MOST/POLST Order No      Advance Directive Status Patient does not have advance directive      Have you reviewed your Advance Directive and is it valid for this stay? Not applicable      Literature Provided on Advance Directives No      Patient Requests Assistance on Advance Directives Patient Declined        Row Name 24 1520             Readmission Assessment Final Comments    Final Comments First admission -24 for scheduled TAVR. Was going to start outpt Cardiac Rehab at [facility] on 24.  No other services needed or recommended.  Pt active, Ind and drives.  G-dtr puts meds together for her.    24 admission. Feeling weak and lightheaded over last 7-10 days. Passed out at a  home a few days ago. Encouraged by family to come to hosp.  In ER found to be in A-fib with RVR and placed on Cardizem gtt.  Dr also ordered interrogation of pacemaker due to recent syncope and collapse.  Will d/c to acute rehab.  Therapy recommendations.

## 2024-06-19 NOTE — THERAPY EVALUATION
Patient Name: Cherie Hinton  : 1934    MRN: [id number]                              Today's Date: 2024       Admit Date: 2024    Visit Dx:     ICD-10-CM ICD-9-CM   1. Atrial fibrillation with RVR  I48.91 427.31     Patient Active Problem List   Diagnosis    Pacemaker    Atrial fibrillation    Coronary artery disease involving native coronary artery of native heart without angina pectoris    Hyperlipidemia, mixed    Essential hypertension    Iron deficiency anemia due to chronic blood loss    Antral erosion    B12 deficiency    Cerebrovascular accident (CVA)    Colon cancer    Depression    Type 2 diabetes mellitus with stage 3a chronic kidney disease, without long-term current use of insulin    GERD (gastroesophageal reflux disease)    Hiatal hernia    HALEIGH (iron deficiency anemia)    LAD (lymphadenopathy)    Left pontine CVA    Mitral regurgitation    STORMY (obstructive sleep apnea)    Osteoarthritis    Prinzmetal's angina    Right kidney mass    TIA (transient ischemic attack)    Vestibular nerve disease    Presence of Watchman left atrial appendage closure device    Ataxia    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    Dilated cardiomyopathy    Other fatigue    Dyspnea on exertion    Dyspnea, unspecified type    Hypomagnesemia    Acute right ankle pain    Allergic conjunctivitis    Posterior vitreous detachment    Exudative age-related macular degeneration    Recurrent syncope    Syncope    Hypotension due to hypovolemia    Renal insufficiency    Nonrheumatic aortic valve stenosis    Chronic heart failure with preserved ejection fraction (HFpEF)    S/P TAVR (transcatheter aortic valve replacement)    Atrial fibrillation with rapid ventricular response    Elevated LFTs    Nausea    Acute diarrhea    Syncope and collapse     Past Medical History:   Diagnosis Date    Antral erosion     Aortic valve regurgitation 2018    Atrial fibrillation     Atrial fibrillation with controlled  ventricular response     B12 deficiency     CAD (coronary artery disease)     Cellulitis 04/2011    on face     Cerebrovascular accident (CVA) 12/07/2016    CHF (congestive heart failure)     Colon cancer     Coronary artery disease involving native coronary artery of native heart without angina pectoris 06/18/2019    Depression     Diabetes mellitus, type 2     Essential hypertension 02/13/2012    Fatigue     GERD (gastroesophageal reflux disease)     Hiatal hernia     History of colonoscopy 04/2010    last done 4/10    History of esophagogastroduodenoscopy (EGD) 04/2010    last done 4/10    Hyperlipidemia, mixed 02/13/2012    Hypertension     white coat htn    HALEIGH (iron deficiency anemia)     LAD (lymphadenopathy)     40% lesion LAD     Left pontine CVA     Mitral regurgitation 05/21/2012    STORMY (obstructive sleep apnea)     not treating    Osteoarthritis     Pacemaker 06/18/2019    Permanent atrial fibrillation 06/18/2019    Presence of Watchman left atrial appendage closure device 01/04/2020    Prinzmetal's angina     Right kidney mass     1.4 cm- following urology     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    Sick sinus syndrome     Stroke 11/2016    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    AORTIC VALVE REPAIR/REPLACEMENT N/A 5/20/2024    Procedure: TRANSFEMORAL TRANSCATHETER AORTIC VALVE REPLACEMENT;  Surgeon: Yonatan Shanks MD;  Location: [facility] HYBRID OR;  Service: Cardiothoracic;  Laterality: N/A;    ATRIAL APPENDAGE EXCLUSION LEFT WITH TRANSESOPHAGEAL  ECHOCARDIOGRAM N/A 10/10/2019    Procedure: Atrial Appendage Occlusion;  Surgeon: Richie Chapman MD;  Location: [facility] CATH INVASIVE LOCATION;  Service: Cardiovascular    ATRIAL APPENDAGE EXCLUSION LEFT WITH TRANSESOPHAGEAL ECHOCARDIOGRAM N/A 10/10/2019    Procedure: Atrial Appendage Occlusion;  Surgeon: Je Rivera MD;  Location: [facility] CATH INVASIVE LOCATION;  Service: Cardiology    BLADDER REPAIR      CARDIAC CATHETERIZATION  05/2010    CARDIAC CATHETERIZATION N/A 3/28/2024    Procedure: Left Heart Cath;  Surgeon: Mich Araujo MD;  Location: [facility] CATH INVASIVE LOCATION;  Service: Cardiology;  Laterality: N/A;    CHOLECYSTECTOMY      COLECTOMY PARTIAL / TOTAL      COLONOSCOPY  04/16/2018    negative     COLONOSCOPY N/A 7/1/2022    Procedure: COLONOSCOPY with polypectomy x1;  Surgeon: ADOLFO Boss MD;  Location: [facility] ENDOSCOPY;  Service: Gastroenterology;  Laterality: N/A;  post: diverticulosis, hemorrhoids, polyps    ENDOSCOPY  04/16/2018    negative     ENDOSCOPY N/A 6/29/2022    Procedure: ESOPHAGOGASTRODUODENOSCOPY with biopsy of duodenum r/o celiac;  Surgeon: ADOLFO Boss MD;  Location: [facility] ENDOSCOPY;  Service: Gastroenterology;  Laterality: N/A;  hiatial hernia  Gastric Polyps        HYSTERECTOMY      INSERT / REPLACE / REMOVE PACEMAKER  09/2018    Pacemaker gen change 9/2018     OTHER SURGICAL HISTORY  04/25/2015    Exercise myoview, normal     PACEMAKER IMPLANTATION  01/22/2010    Dual Chamber - 1/22/2010; RA Lead Revision- 5/7/2012- BS     TOOTH EXTRACTION      TRANSESOPHAGEAL ECHOCARDIOGRAM (DHEERAJ)  07/2019      General Information       Row Name 06/19/24 1448          OT Time and Intention    Document Type evaluation  -MS     Mode of Treatment occupational therapy  -MS       Row Name 06/19/24 1448          General Information    Patient Profile Reviewed yes  -MS     Prior Level of Function independent:;ADL's;driving;all household mobility;community mobility   -MS     Existing Precautions/Restrictions cardiac  -MS       Row Name 06/19/24 1448          Occupational Profile    Reason for Services/Referral (Occupational Profile) Pt is a 89 y/o F admitted to [facility] 6/18/24 with c/o syncope, N/D. Pt noted to have TAVR 5/20. Hospital dx A.fib with RVR. CXR (-) acute. PMHx significant for pacemaker, A.fib, CAD, HTN, hx L pontine CVA, hx colon cancer, depression, DMII, hx TAVR, hx recurrent syncope. At baseline pt resides alone in [facility] with 0 AUDREY. Pt typically (I) with ADLs/IADLs, no AD for mobility, is an active  and is an active member within community.  -MS     Environmental Supports and Barriers (Occupational Profile) daughter and sister live closely, able to assist  -MS       Row Name 06/19/24 1448          Living Environment    People in Home alone  -MS       Row Name 06/19/24 1448          Home Main Entrance    Number of Stairs, Main Entrance none  -MS       Row Name 06/19/24 1448          Stairs Within Home, Primary    Number of Stairs, Within Home, Primary one  -MS     Stairs Comment, Within Home, Primary one step into kitchen  -MS       Row Name 06/19/24 1448          Cognition    Orientation Status (Cognition) oriented x 4  -MS       Row Name 06/19/24 1448          Safety Issues, Functional Mobility    Impairments Affecting Function (Mobility) balance;endurance/activity tolerance;strength  -MS               User Key  (r) = Recorded By, (t) = Taken By, (c) = Cosigned By      Initials Name Provider Type    Erika Mcmahon OT Occupational Therapist                     Mobility/ADL's       Row Name 06/19/24 1450          Bed Mobility    Bed Mobility supine-sit  -MS     Supine-Sit [place] (Bed Mobility) supervision  -MS     Assistive Device (Bed Mobility) head of bed elevated  -MS       Row Name 06/19/24 1450          Transfers    Transfers sit-stand transfer  -MS       Row Name 06/19/24 1450          Sit-Stand Transfer    Sit-Stand [place] (Transfers)  minimum assist (75% patient effort)  -MS     Assistive Device (Sit-Stand Transfers) walker, front-wheeled  -MS               User Key  (r) = Recorded By, (t) = Taken By, (c) = Cosigned By      Initials Name Provider Type    Erika Mcmahon OT Occupational Therapist                   Obj/Interventions       Row Name 06/19/24 1450          Sensory Assessment (Somatosensory)    Sensory Assessment (Somatosensory) UE sensation intact  -MS       Row Name 06/19/24 1450          Vision Assessment/Intervention    Visual Impairment/Limitations WNL  -MS       Row Name 06/19/24 1450          Range of Motion Comprehensive    Comment, General Range of Motion BUE WNL  -MS       Row Name 06/19/24 1450          Strength Comprehensive (MMT)    Comment, General Manual Muscle Testing (MMT) Assessment BUE grossly 3+/5  -MS       Row Name 06/19/24 1450          Balance    Balance Assessment sitting static balance;sitting dynamic balance;standing static balance;standing dynamic balance  -MS     Static Sitting Balance supervision  -MS     Dynamic Sitting Balance supervision  -MS     Position, Sitting Balance unsupported;sitting edge of bed  -MS     Static Standing Balance minimal assist  -MS     Dynamic Standing Balance minimal assist  -MS     Position/Device Used, Standing Balance supported;walker, front-wheeled  -MS               User Key  (r) = Recorded By, (t) = Taken By, (c) = Cosigned By      Initials Name Provider Type    Erika Mcmahon OT Occupational Therapist                   Goals/Plan       Row Name 06/19/24 145          Transfer Goal 1 (OT)    Activity/Assistive Device (Transfer Goal 1, OT) transfers, all  -MS     [place] Level/Cues Needed (Transfer Goal 1, OT) modified independence  -MS     Time Frame (Transfer Goal 1, OT) long term goal (LTG);2 weeks  -MS     Progress/Outcome (Transfer Goal 1, OT) new goal  -MS       Row Name 06/19/24 1451          Dressing Goal 1 (OT)    Activity/Device (Dressing Goal 1, OT)  dressing skills, all  -MS     [place]/Cues Needed (Dressing Goal 1, OT) modified independence  -MS     Time Frame (Dressing Goal 1, OT) long term goal (LTG);2 weeks  -MS     Progress/Outcome (Dressing Goal 1, OT) new goal  -MS       Row Name 06/19/24 1456          Toileting Goal 1 (OT)    Activity/Device (Toileting Goal 1, OT) toileting skills, all  -MS     [place] Level/Cues Needed (Toileting Goal 1, OT) modified independence  -MS     Time Frame (Toileting Goal 1, OT) long term goal (LTG);2 weeks  -MS     Progress/Outcome (Toileting Goal 1, OT) new goal  -MS       Row Name 06/19/24 1456          Problem Specific Goal 1 (OT)    Problem Specific Goal 1 (OT) increase activity tolerance needed for ADL routine >5 minutes without rest breaks  -MS     Time Frame (Problem Specific Goal 1, OT) long term goal (LTG);2 weeks  -MS     Progress/Outcome (Problem Specific Goal 1, OT) new goal  -MS       Row Name 06/19/24 1456          Therapy Assessment/Plan (OT)    Planned Therapy Interventions (OT) activity tolerance training;adaptive equipment training;BADL retraining;functional balance retraining;IADL retraining;occupation/activity based interventions;passive ROM/stretching;patient/caregiver education/training;transfer/mobility retraining;strengthening exercise;ROM/therapeutic exercise  -MS               User Key  (r) = Recorded By, (t) = Taken By, (c) = Cosigned By      Initials Name Provider Type    Erika Mcmahon, PADMINI Occupational Therapist                   Clinical Impression       Row Name 06/19/24 1451          Pain Assessment    Pretreatment Pain Rating 0/10 - no pain  -MS     Posttreatment Pain Rating 0/10 - no pain  -MS       Row Name 06/19/24 1451          Plan of Care Review    Plan of Care Reviewed With patient  -MS     Progress no change  -MS     Outcome Evaluation Pt is a 89 y/o F admitted to [facility] 6/18/24 with c/o syncope, N/D. Pt noted to have TAVR 5/20. Hospital dx A.fib with RVR. CXR (-) acute. At  baseline pt resides alone in [facility] with 0 AUDREY. Pt typically (I) with ADLs/IADLs, no AD for mobility, is an active  and is an active member within community. This date pt A&Ox4 on RA and sitting up in bed upon arrival. Pt requires supervision for bed mobility, min A to come to standing with RW and min A RW to transfer from bed to chair. Pt c/o nausea with standing, noted to have drop in BP and HR increase. Pt is at high risk for falls and is not safe to dc home alone. Pt is a great candidate for acute IP rehab likely to tolerate high intensity, pt agreeable. OT recommend acute IP rehab when medically appropriate for dc, continued use of RW within acute setting. OT will follow while admitted.  -MS       Row Name 06/19/24 1451          Therapy Assessment/Plan (OT)    Rehab Potential (OT) good, to achieve stated therapy goals  -MS     Criteria for Skilled Therapeutic Interventions Met (OT) yes;meets criteria;skilled treatment is necessary  -MS     Therapy Frequency (OT) 5 times/wk  -MS     Predicted Duration of Therapy Intervention (OT) until d/c  -MS       Row Name 06/19/24 1451          Therapy Plan Review/Discharge Plan (OT)    Anticipated Discharge Disposition (OT) inpatient rehabilitation facility  -MS       Row Name 06/19/24 1451          Vital Signs    Pre Systolic BP Rehab 139  -MS     Pre Treatment Diastolic BP 68  -MS     Intra Systolic BP Rehab 112  -MS     Intra Treatment Diastolic BP 64  -MS     Post Systolic BP Rehab 136  -MS     Post Treatment Diastolic BP 62  -MS     Pretreatment Heart Rate (beats/min) 107  -MS     Intratreatment Heart Rate (beats/min) 149  -MS     Posttreatment Heart Rate (beats/min) 107  -MS     Pretreatment Resp Rate (breaths/min) 13  -MS     Posttreatment Resp Rate (breaths/min) 20  -MS     Pre SpO2 (%) 97  -MS     O2 Delivery Pre Treatment room air  -MS     Intra SpO2 (%) 98  -MS     O2 Delivery Intra Treatment room air  -MS     Post SpO2 (%) 96  -MS     O2 Delivery Post  Treatment room air  -MS     Pre Patient Position Supine  -MS     Intra Patient Position Standing  -MS     Post Patient Position Sitting  -MS       Row Name 06/19/24 1451          Positioning and Restraints    Pre-Treatment Position in bed  -MS     Post Treatment Position chair  -MS     In Chair notified nsg;reclined;call light within reach;encouraged to call for assist;exit alarm on;legs elevated  -MS               User Key  (r) = Recorded By, (t) = Taken By, (c) = Cosigned By      Initials Name Provider Type    Erika Mcmahon, PADMINI Occupational Therapist                   Outcome Measures       Row Name 06/19/24 1456          How much help from another is currently needed...    Putting on and taking off regular lower body clothing? 2  -MS     Bathing (including washing, rinsing, and drying) 2  -MS     Toileting (which includes using toilet bed pan or urinal) 2  -MS     Putting on and taking off regular upper body clothing 3  -MS     Taking care of personal grooming (such as brushing teeth) 4  -MS     Eating meals 4  -MS     AM-PAC 6 Clicks Score (OT) 17  -MS       Row Name 06/19/24 1329 06/19/24 0800       How much help from another person do you currently need...    Turning from your back to your side while in flat bed without using bedrails? 4  -BR 4  -EW    Moving from lying on back to sitting on the side of a flat bed without bedrails? 3  -BR 3  -EW    Moving to and from a bed to a chair (including a wheelchair)? 3  -BR 3  -EW    Standing up from a chair using your arms (e.g., wheelchair, bedside chair)? 3  -BR 3  -EW    Climbing 3-5 steps with a railing? 2  -BR 2  -EW    To walk in hospital room? 2  -BR 3  -EW    AM-PAC 6 Clicks Score (PT) 17  -BR 18  -EW    Highest Level of Mobility Goal 5 --> Static standing  -BR 6 --> Walk 10 steps or more  -EW      Row Name 06/19/24 1456 06/19/24 1329       Functional Assessment    Outcome Measure Options AM-PAC 6 Clicks Daily Activity (OT)  -MS AM-PAC 6 Clicks Basic  Mobility (PT)  -BR      Row Name 06/19/24 1321          Functional Assessment    Outcome Measure Options [place]  -BR               User Key  (r) = Recorded By, (t) = Taken By, (c) = Cosigned By      Initials Name Provider Type    Margarita Sorto, RN Registered Nurse    MS Erika Falcon, OT Occupational Therapist    Vianey Coker, PT Physical Therapist                    Occupational Therapy Education       Title: PT OT SLP Therapies (Done)       Topic: Occupational Therapy (Done)       Point: ADL training (Done)       Description:   Instruct learner(s) on proper safety adaptation and remediation techniques during self care or transfers.   Instruct in proper use of assistive devices.                  Learning Progress Summary             Patient Acceptance, E,TB, VU by MS at 6/19/2024 1457                         Point: Precautions (Done)       Description:   Instruct learner(s) on prescribed precautions during self-care and functional transfers.                  Learning Progress Summary             Patient Acceptance, E,TB, VU by MS at 6/19/2024 1457                         Point: Body mechanics (Done)       Description:   Instruct learner(s) on proper positioning and spine alignment during self-care, functional mobility activities and/or exercises.                  Learning Progress Summary             Patient Acceptance, E,TB, VU by MS at 6/19/2024 1457                                         User Key       Initials Effective Dates Name Provider Type Discipline    MS 07/13/22 -  Erika Falcon OT Occupational Therapist OT                  OT Recommendation and Plan  Planned Therapy Interventions (OT): activity tolerance training, adaptive equipment training, BADL retraining, functional balance retraining, IADL retraining, occupation/activity based interventions, passive ROM/stretching, patient/caregiver education/training, transfer/mobility retraining, strengthening exercise, ROM/therapeutic  exercise  Therapy Frequency (OT): 5 times/wk  Plan of Care Review  Plan of Care Reviewed With: patient  Progress: no change  Outcome Evaluation: Pt is a 89 y/o F admitted to [facility] 6/18/24 with c/o syncope, N/D. Pt noted to have TAVR 5/20. Hospital dx A.fib with RVR. CXR (-) acute. At baseline pt resides alone in [facility] with 0 AUDREY. Pt typically (I) with ADLs/IADLs, no AD for mobility, is an active  and is an active member within community. This date pt A&Ox4 on RA and sitting up in bed upon arrival. Pt requires supervision for bed mobility, min A to come to standing with RW and min A RW to transfer from bed to chair. Pt c/o nausea with standing, noted to have drop in BP and HR increase. Pt is at high risk for falls and is not safe to dc home alone. Pt is a great candidate for acute IP rehab likely to tolerate high intensity, pt agreeable. OT recommend acute IP rehab when medically appropriate for dc, continued use of RW within acute setting. OT will follow while admitted.     Time Calculation:                   Erika Falcon OT  6/19/2024

## 2024-06-19 NOTE — CONSULTS
Referring Provider: Coni Fields MD    Reason for Consultation: Atrial fibrillation with rapid ventricular rate    Patient Care Team:  Andrew Antunez MD as PCP - General (Family Medicine)  Leandro Callejas MD as Cardiologist (Cardiology)      SUBJECTIVE     Chief Complaint: Weakness, nausea, dry heaves and diarrhea    History of present illness:  Cherie Hinton is a 89 y.o. female with severe symptomatic aortic stenosis status post TAVR on 2024, chronic HFpEF, permanent atrial fibrillation, presence of Watchman, sick sinus syndrome status post permanent pacemaker placement, coronary artery disease, hypertension, hyperlipidemia and diabetes who presented to the hospital with complaints of weakness, nausea, dry heaves and diarrhea.   The patient states she hasn't felt well for the past 7-10 days. She states last Thursday she became lightheaded and passed out while at a  for her youngest sister. She states she did not seek medical attention and just rested. She states a few days ago she developed nausea, dry heaves and diarrhea. She states she feels weak and fatigued. She states yesterday she developed palpitations, intermittent chest pain, and shortness of breath. She denies any exacerbating or alleviating factors. She denies any recent ill contacts.     Workup in the ER revealed atrial fibrillation with rapid ventricular response. The patient was given a Cardizem bolus and started on a drip. Her high sensitivity troponin was slightly elevated at 51. Her proBNP was within normal limits. Her LFTs were elevated. Her WBCs are elevated at 12.20. She was admitted for further evaluation. Cardiology was consulted for further management of rapid a-fib.       Review of systems:    Constitutional: + weakness, fatigue.  No fever, rigors, chills   Eyes: No vision changes, eye pain   ENT/oropharynx: No difficulty swallowing, sore throat, epistaxis, changes in hearing   Cardiovascular: + chest pain, palpitations,  lightheadedness, syncope.  No paroxysmal nocturnal dyspnea, orthopnea, diaphoresis   Respiratory: + shortness of breath.  No cough, wheezing, hemoptysis   Gastrointestinal: + nausea, dry heaves, diarrhea.  No bloody stools   Genitourinary: No hematuria, dysuria   Neurological: No headache, tremors, numbness, one-sided weakness    Musculoskeletal: No cramps, myalgias, joint pain, joint swelling   Integument: No rash, edema        Personal History:      Past Medical History:   Diagnosis Date    Antral erosion     Aortic valve regurgitation 12/26/2018    Atrial fibrillation     Atrial fibrillation with controlled ventricular response     B12 deficiency     CAD (coronary artery disease)     Cellulitis 04/2011    on face     Cerebrovascular accident (CVA) 12/07/2016    CHF (congestive heart failure)     Colon cancer     Coronary artery disease involving native coronary artery of native heart without angina pectoris 06/18/2019    Depression     Diabetes mellitus, type 2     Essential hypertension 02/13/2012    Fatigue     GERD (gastroesophageal reflux disease)     Hiatal hernia     History of colonoscopy 04/2010    last done 4/10    History of esophagogastroduodenoscopy (EGD) 04/2010    last done 4/10    Hyperlipidemia, mixed 02/13/2012    Hypertension     white coat htn    HALEIGH (iron deficiency anemia)     LAD (lymphadenopathy)     40% lesion LAD     Left pontine CVA     Mitral regurgitation 05/21/2012    STORMY (obstructive sleep apnea)     not treating    Osteoarthritis     Pacemaker 06/18/2019    Permanent atrial fibrillation 06/18/2019    Presence of Watchman left atrial appendage closure device 01/04/2020    Prinzmetal's angina     Right kidney mass     1.4 cm- following urology     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    Sick sinus syndrome     Stroke 11/2016    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    AORTIC VALVE REPAIR/REPLACEMENT N/A 5/20/2024    Procedure: TRANSFEMORAL TRANSCATHETER AORTIC VALVE REPLACEMENT;  Surgeon: Yonatan Shanks MD;  Location: [facility] HYBRID OR;  Service: Cardiothoracic;  Laterality: N/A;    ATRIAL APPENDAGE EXCLUSION LEFT WITH TRANSESOPHAGEAL ECHOCARDIOGRAM N/A 10/10/2019    Procedure: Atrial Appendage Occlusion;  Surgeon: Richie Chapman MD;  Location: [facility] CATH INVASIVE LOCATION;  Service: Cardiovascular    ATRIAL APPENDAGE EXCLUSION LEFT WITH TRANSESOPHAGEAL ECHOCARDIOGRAM N/A 10/10/2019    Procedure: Atrial Appendage Occlusion;  Surgeon: Je Rivera MD;  Location: [facility] CATH INVASIVE LOCATION;  Service: Cardiology    BLADDER REPAIR      CARDIAC CATHETERIZATION  05/2010    CARDIAC CATHETERIZATION N/A 3/28/2024    Procedure: Left Heart Cath;  Surgeon: Mich Araujo MD;  Location: [facility] CATH INVASIVE LOCATION;  Service: Cardiology;  Laterality: N/A;    CHOLECYSTECTOMY      COLECTOMY PARTIAL / TOTAL      COLONOSCOPY  04/16/2018    negative     COLONOSCOPY N/A 7/1/2022    Procedure: COLONOSCOPY with polypectomy x1;  Surgeon: ADOLFO Boss MD;  Location: [facility] ENDOSCOPY;  Service: Gastroenterology;  Laterality: N/A;  post: diverticulosis, hemorrhoids, polyps    ENDOSCOPY  04/16/2018    negative     ENDOSCOPY N/A 6/29/2022    Procedure: ESOPHAGOGASTRODUODENOSCOPY with biopsy of duodenum r/o celiac;  Surgeon: ADOLFO Boss MD;  Location: [facility] ENDOSCOPY;  Service: Gastroenterology;  Laterality: N/A;  hiatial hernia  Gastric Polyps        HYSTERECTOMY      INSERT / REPLACE / REMOVE PACEMAKER  09/2018    Pacemaker gen  change 9/2018     OTHER SURGICAL HISTORY  04/25/2015    Exercise myoview, normal     PACEMAKER IMPLANTATION  01/22/2010    Dual Chamber - 1/22/2010; RA Lead Revision- 5/7/2012- BS     TOOTH EXTRACTION      TRANSESOPHAGEAL ECHOCARDIOGRAM (DHEERAJ)  07/2019       Family History   Problem Relation Age of Onset    Hypertension Mother     Diabetes Mother     Coronary artery disease Mother     Other Father         CVA    No Known Problems Sister     No Known Problems Brother     No Known Problems Maternal Aunt     No Known Problems Maternal Uncle     No Known Problems Paternal Aunt     No Known Problems Paternal Uncle     No Known Problems Maternal Grandmother     No Known Problems Maternal Grandfather     No Known Problems Paternal Grandmother     No Known Problems Paternal Grandfather     Heart disease Other     Stroke Other     Hypertension Other     Anemia Neg Hx     Arrhythmia Neg Hx     Asthma Neg Hx     Clotting disorder Neg Hx     Fainting Neg Hx     Heart attack Neg Hx     Heart failure Neg Hx     Hyperlipidemia Neg Hx        Social History     Tobacco Use    Smoking status: Never     Passive exposure: Never    Smokeless tobacco: Never   Vaping Use    Vaping status: Never Used   Substance Use Topics    Alcohol use: No    Drug use: Never        Home meds:  Prior to Admission medications    Medication Sig Start Date End Date Taking? Authorizing Provider   atorvastatin (LIPITOR) 10 MG tablet Take 1 tablet by mouth Daily.    ProviderJuana MD   clopidogrel (PLAVIX) 75 MG tablet Take 1 tablet by mouth Daily. 5/22/24   Karla Low APRN   dapagliflozin Propanediol (Farxiga) 10 MG tablet Take 10 mg by mouth Daily. 5/21/24   Karla Low APRN   dilTIAZem CD (CARDIZEM CD) 240 MG 24 hr capsule Take 1 capsule by mouth Daily.    ProviderJuana MD   ferrous sulfate 325 (65 FE) MG EC tablet Take 1 tablet by mouth Daily With Breakfast. 7/1/22   Coni Fields MD   furosemide (LASIX) 20 MG tablet Take 1 tablet by  mouth Daily. 2/2/24   Mich Araujo MD   meclizine (ANTIVERT) 25 MG tablet Take 1 tablet by mouth 3 (Three) Times a Day As Needed for Dizziness. 5/6/22   Sonu Flor MD   metFORMIN (Glucophage) 500 MG tablet Take 1 tablet by mouth Daily With Breakfast. For diabetes 1/6/21   Denny Field MD   nebivolol (Bystolic) 20 MG tablet Take 1 tablet by mouth Daily. 2/25/22   Richie Chapman MD   pantoprazole (PROTONIX) 40 MG EC tablet Take 1 tablet by mouth Every Morning. 5/22/24   Karla Low APRN   zolpidem (AMBIEN) 5 MG tablet Take 1 tablet by mouth At Night As Needed for Sleep.    Provider, MD Juana       Allergies:     Contrast dye (echo or unknown ct/mr), Adhesive tape, and Latex    Scheduled Meds:[Held by provider] atorvastatin, 10 mg, Oral, Daily  clopidogrel, 75 mg, Oral, Daily  dilTIAZem CD, 240 mg, Oral, Q24H  empagliflozin, 25 mg, Oral, Daily  enoxaparin, 30 mg, Subcutaneous, Daily  ferrous sulfate, 324 mg, Oral, Daily With Breakfast  furosemide, 20 mg, Oral, Daily  metoprolol succinate XL, 25 mg, Oral, Q24H  pantoprazole, 40 mg, Oral, Q AM  sodium chloride, 10 mL, Intravenous, Q12H      Continuous Infusions:Pharmacy to Dose enoxaparin (LOVENOX),       PRN Meds:  acetaminophen **OR** acetaminophen **OR** acetaminophen    senna-docusate sodium **AND** polyethylene glycol **AND** bisacodyl **AND** bisacodyl    Calcium Replacement - Follow Nurse / BPA Driven Protocol    Magnesium Standard Dose Replacement - Follow Nurse / BPA Driven Protocol    meclizine    nitroglycerin    ondansetron    Pharmacy to Dose enoxaparin (LOVENOX)    Phosphorus Replacement - Follow Nurse / BPA Driven Protocol    Potassium Replacement - Follow Nurse / BPA Driven Protocol    [COMPLETED] Insert Peripheral IV **AND** sodium chloride    sodium chloride    sodium chloride    zolpidem      OBJECTIVE    Vital Signs  Vitals:    06/19/24 0630 06/19/24 0646 06/19/24 0700 06/19/24 0800   BP: 123/59  132/58 123/52   BP  "Location:    Right arm   Patient Position:    Lying   Pulse: 80 82 82 88   Resp:    16   Temp:    98.3 °F (36.8 °C)   TempSrc:    Oral   SpO2: 97%  96% 96%   Weight:       Height:           Flowsheet Rows      Flowsheet Row First Filed Value   Admission Height 160 cm (62.99\") Documented at 06/18/2024 1739   Admission Weight 72.8 kg (160 lb 7.9 oz) Documented at 06/18/2024 1739              Intake/Output Summary (Last 24 hours) at 6/19/2024 1050  Last data filed at 6/19/2024 1000  Gross per 24 hour   Intake 270 ml   Output --   Net 270 ml        Telemetry: Atrial fibrillation with heart rate in the 80s    Physical Exam:  The patient is alert, oriented and in no distress.  Vital signs as noted above.  Head and neck revealed no carotid bruits or jugular venous distention.  No thyromegaly or lymph adenopathy is present  Lungs clear.  No wheezing.  Breath sounds are normal bilaterally.  Heart normal first and second heart sounds.irregularly irregular no murmur.  No precordial rub is present.  No gallop is present.  Abdomen soft with right upper quadrant tenderness  Extremities with good peripheral pulses without any pedal edema.  Skin warm and dry.  Musculoskeletal system is grossly normal  CNS grossly normal      Results Review:  I have personally reviewed the results from the time of this admission to 6/19/2024 10:50 EDT and agree with these findings:  [x]  Laboratory  []  Microbiology  [x]  Radiology  [x]  EKG/Telemetry   [x]  Cardiology/Vascular   []  Pathology  [x]  Old records  []  Other:    Most notable findings include:     Lab Results (last 24 hours)       Procedure Component Value Units Date/Time    High Sensitivity Troponin T [[id number]]  (Abnormal) Collected: 06/19/24 0601    Specimen: Blood from Arm, Right Updated: 06/19/24 0937     HS Troponin T 57 ng/L     Narrative:      High Sensitive Troponin T Reference Range:  <14.0 ng/L- Negative Female for AMI  <22.0 ng/L- Negative Male for AMI  >=14 - Abnormal " Female indicating possible myocardial injury.  >=22 - Abnormal Male indicating possible myocardial injury.   Clinicians would have to utilize clinical acumen, EKG, Troponin, and serial changes to determine if it is an Acute Myocardial Infarction or myocardial injury due to an underlying chronic condition.         Magnesium [[id number]]  (Normal) Collected: 06/19/24 0601    Specimen: Blood from Arm, Right Updated: 06/19/24 0636     Magnesium 2.2 mg/dL     Comprehensive Metabolic Panel [[id number]]  (Abnormal) Collected: 06/18/24 2321    Specimen: Blood from Arm, Right Updated: 06/19/24 0006     Glucose 154 mg/dL      BUN 28 mg/dL      Creatinine 1.30 mg/dL      Sodium 137 mmol/L      Potassium 4.2 mmol/L      Chloride 99 mmol/L      CO2 23.1 mmol/L      Calcium 10.2 mg/dL      Total Protein 7.6 g/dL      Albumin 4.2 g/dL      ALT (SGPT) 132 U/L      AST (SGOT) 262 U/L      Alkaline Phosphatase 310 U/L      Total Bilirubin 1.5 mg/dL      Globulin 3.4 gm/dL      A/G Ratio 1.2 g/dL      BUN/Creatinine Ratio 21.5     Anion Gap 14.9 mmol/L      eGFR 39.1 mL/min/1.73     Narrative:      GFR Normal >60  Chronic Kidney Disease <60  Kidney Failure <15    The GFR formula is only valid for adults with stable renal function between ages 18 and 70.    Magnesium [[id number]]  (Normal) Collected: 06/18/24 2321    Specimen: Blood from Arm, Right Updated: 06/19/24 0006     Magnesium 2.2 mg/dL     TSH [[id number]]  (Normal) Collected: 06/18/24 2321    Specimen: Blood from Arm, Right Updated: 06/19/24 0006     TSH 3.470 uIU/mL     CBC & Differential [[id number]]  (Abnormal) Collected: 06/18/24 2321    Specimen: Blood from Arm, Right Updated: 06/18/24 2348    Narrative:      The following orders were created for panel order CBC & Differential.  Procedure                               Abnormality         Status                     ---------                               -----------         ------                     CBC Auto  Differential[[id number]]        Abnormal            Final result                 Please view results for these tests on the individual orders.    CBC Auto Differential [[id number]]  (Abnormal) Collected: 06/18/24 2321    Specimen: Blood from Arm, Right Updated: 06/18/24 2348     WBC 9.42 10*3/mm3      RBC 4.85 10*6/mm3      Hemoglobin 12.0 g/dL      Hematocrit 42.7 %      MCV 88.0 fL      MCH 24.7 pg      MCHC 28.1 g/dL      RDW 17.7 %      RDW-SD 55.8 fl      MPV 10.7 fL      Platelets 287 10*3/mm3      Neutrophil % 76.2 %      Lymphocyte % 11.9 %      Monocyte % 10.0 %      Eosinophil % 1.1 %      Basophil % 0.6 %      Immature Grans % 0.2 %      Neutrophils, Absolute 7.18 10*3/mm3      Lymphocytes, Absolute 1.12 10*3/mm3      Monocytes, Absolute 0.94 10*3/mm3      Eosinophils, Absolute 0.10 10*3/mm3      Basophils, Absolute 0.06 10*3/mm3      Immature Grans, Absolute 0.02 10*3/mm3      nRBC 0.0 /100 WBC     Lipid Panel [[id number]]  (Abnormal) Collected: 06/18/24 1759    Specimen: Blood Updated: 06/18/24 2312     Total Cholesterol 186 mg/dL      Triglycerides 108 mg/dL      HDL Cholesterol 80 mg/dL      LDL Cholesterol  87 mg/dL      VLDL Cholesterol 19 mg/dL      LDL/HDL Ratio 1.06    Narrative:      Cholesterol Reference Ranges  (U.S. Department of Health and Human Services ATP III Classifications)    Desirable          <200 mg/dL  Borderline High    200-239 mg/dL  High Risk          >240 mg/dL      Triglyceride Reference Ranges  (U.S. Department of Health and Human Services ATP III Classifications)    Normal           <150 mg/dL  Borderline High  150-199 mg/dL  High             200-499 mg/dL  Very High        >500 mg/dL    HDL Reference Ranges  (U.S. Department of Health and Human Services ATP III Classifications)    Low     <40 mg/dl (major risk factor for CHD)  High    >60 mg/dl ('negative' risk factor for CHD)        LDL Reference Ranges  (U.S. Department of Health and Human Services ATP III  Classifications)    Optimal          <100 mg/dL  Near Optimal     100-129 mg/dL  Borderline High  130-159 mg/dL  High             160-189 mg/dL  Very High        >189 mg/dL    Hemoglobin A1c [[id number]]  (Abnormal) Collected: 06/18/24 1759    Specimen: Blood Updated: 06/18/24 2310     Hemoglobin A1C 8.28 %     CBC & Differential [[id number]]  (Abnormal) Collected: 06/18/24 1759    Specimen: Blood Updated: 06/18/24 1835    Narrative:      The following orders were created for panel order CBC & Differential.  Procedure                               Abnormality         Status                     ---------                               -----------         ------                     CBC Auto Differential[[id number]]        Abnormal            Final result                 Please view results for these tests on the individual orders.    CBC Auto Differential [[id number]]  (Abnormal) Collected: 06/18/24 1759    Specimen: Blood Updated: 06/18/24 1835     WBC 12.20 10*3/mm3      RBC 4.72 10*6/mm3      Hemoglobin 11.6 g/dL      Hematocrit 38.9 %      MCV 82.4 fL      MCH 24.6 pg      MCHC 29.8 g/dL      RDW 17.2 %      RDW-SD 51.4 fl      MPV 10.6 fL      Platelets 324 10*3/mm3      Neutrophil % 78.0 %      Lymphocyte % 11.1 %      Monocyte % 9.6 %      Eosinophil % 0.9 %      Basophil % 0.3 %      Immature Grans % 0.1 %      Neutrophils, Absolute 9.52 10*3/mm3      Lymphocytes, Absolute 1.35 10*3/mm3      Monocytes, Absolute 1.17 10*3/mm3      Eosinophils, Absolute 0.11 10*3/mm3      Basophils, Absolute 0.04 10*3/mm3      Immature Grans, Absolute 0.01 10*3/mm3      nRBC 0.0 /100 WBC     Comprehensive Metabolic Panel [[id number]]  (Abnormal) Collected: 06/18/24 1759    Specimen: Blood Updated: 06/18/24 1833     Glucose 157 mg/dL      BUN 27 mg/dL      Creatinine 1.29 mg/dL      Sodium 136 mmol/L      Potassium 4.5 mmol/L      Chloride 98 mmol/L      CO2 23.3 mmol/L      Calcium 10.4 mg/dL      Total Protein 7.6 g/dL      Albumin  4.3 g/dL      ALT (SGPT) 50 U/L      AST (SGOT) 123 U/L      Alkaline Phosphatase 251 U/L      Total Bilirubin 1.5 mg/dL      Globulin 3.3 gm/dL      A/G Ratio 1.3 g/dL      BUN/Creatinine Ratio 20.9     Anion Gap 14.7 mmol/L      eGFR 39.5 mL/min/1.73     Narrative:      GFR Normal >60  Chronic Kidney Disease <60  Kidney Failure <15    The GFR formula is only valid for adults with stable renal function between ages 18 and 70.    BNP [[id number]]  (Normal) Collected: 06/18/24 1759    Specimen: Blood Updated: 06/18/24 1833     proBNP 1,596.0 pg/mL     Narrative:      This assay is used as an aid in the diagnosis of individuals suspected of having heart failure. It can be used as an aid in the diagnosis of acute decompensated heart failure (ADHF) in patients presenting with signs and symptoms of ADHF to the emergency department (ED). In addition, NT-proBNP of <300 pg/mL indicates ADHF is not likely.    Age Range Result Interpretation  NT-proBNP Concentration (pg/mL:      <50             Positive            >450                   Gray                 300-450                    Negative             <300    50-75           Positive            >900                  Gray                300-900                  Negative            <300      >75             Positive            >1800                  Gray                300-1800                  Negative            <300    Single High Sensitivity Troponin T [[id number]]  (Abnormal) Collected: 06/18/24 1759    Specimen: Blood Updated: 06/18/24 1833     HS Troponin T 51 ng/L     Narrative:      High Sensitive Troponin T Reference Range:  <14.0 ng/L- Negative Female for AMI  <22.0 ng/L- Negative Male for AMI  >=14 - Abnormal Female indicating possible myocardial injury.  >=22 - Abnormal Male indicating possible myocardial injury.   Clinicians would have to utilize clinical acumen, EKG, Troponin, and serial changes to determine if it is an Acute Myocardial Infarction or myocardial  injury due to an underlying chronic condition.         TSH [[id number]]  (Normal) Collected: 06/18/24 1759    Specimen: Blood Updated: 06/18/24 1833     TSH 3.370 uIU/mL     T4, Free [[id number]]  (Normal) Collected: 06/18/24 1759    Specimen: Blood Updated: 06/18/24 1833     Free T4 1.53 ng/dL     [place] Draw [[id number]] Collected: 06/18/24 1759    Specimen: Blood Updated: 06/18/24 1815    Narrative:      The following orders were created for panel order [place] Draw.  Procedure                               Abnormality         Status                     ---------                               -----------         ------                     Green Top (Gel)[[id number]]                                  Final result               Lavender Top[[id number]]                                     Final result               Gold Top - SST[[id number]]                                   Final result               Light Blue Top[[id number]]                                   Final result                 Please view results for these tests on the individual orders.    Green Top (Gel) [[id number]] Collected: 06/18/24 1759    Specimen: Blood Updated: 06/18/24 1815     Extra Tube Hold for add-ons.     Comment: Auto resulted.       Lavender Top [[id number]] Collected: 06/18/24 1759    Specimen: Blood Updated: 06/18/24 1815     Extra Tube hold for add-on     Comment: Auto resulted       Gold Top - SST [[id number]] Collected: 06/18/24 1759    Specimen: Blood Updated: 06/18/24 1815     Extra Tube Hold for add-ons.     Comment: Auto resulted.       Light Blue Top [[id number]] Collected: 06/18/24 1759    Specimen: Blood Updated: 06/18/24 1815     Extra Tube Hold for add-ons.     Comment: Auto resulted               Imaging Results (Last 24 Hours)       Procedure Component Value Units Date/Time    XR Chest 1 View [[id number]] Collected: 06/18/24 1848     Updated: 06/18/24 1852    Narrative:      XR CHEST 1 VW    Date of Exam: 6/18/2024 6:38 PM  EDT    Indication: sob    Comparison: 5/21/2024    Findings:  Lines: Stable appearance of left subclavian triple lead pacemaker. 2 of the pacemaker leads appear to be fractured/abandoned, unchanged    Lungs: Poor respiratory effort accentuates the pulmonary vasculature and cardiomediastinal silhouette. No definite consolidation.  Pleura: No pleural effusion or pneumothorax.    Cardiomediastinum: Moderate cardiomegaly. Patient is status post TAVR    Soft Tissues: Unremarkable.    Bones: No acute osseous abnormality.      Impression:      Impression:  No acute abnormality. Chronic findings as characterized above      Electronically Signed: Wilson Sebastian DO    6/18/2024 6:50 PM EDT    Workstation ID: [id number]            LAB RESULTS (LAST 7 DAYS)    CBC  Results from last 7 days   Lab Units 06/18/24 2321 06/18/24  1759   WBC 10*3/mm3 9.42 12.20*   RBC 10*6/mm3 4.85 4.72   HEMOGLOBIN g/dL 12.0 11.6*   HEMATOCRIT % 42.7 38.9   MCV fL 88.0 82.4   PLATELETS 10*3/mm3 287 324       BMP  Results from last 7 days   Lab Units 06/19/24  0601 06/18/24 2321 06/18/24  1759   SODIUM mmol/L  --  137 136   POTASSIUM mmol/L  --  4.2 4.5   CHLORIDE mmol/L  --  99 98   CO2 mmol/L  --  23.1 23.3   BUN mg/dL  --  28* 27*   CREATININE mg/dL  --  1.30* 1.29*   GLUCOSE mg/dL  --  154* 157*   MAGNESIUM mg/dL 2.2 2.2  --        CMP   Results from last 7 days   Lab Units 06/18/24 2321 06/18/24  1759   SODIUM mmol/L 137 136   POTASSIUM mmol/L 4.2 4.5   CHLORIDE mmol/L 99 98   CO2 mmol/L 23.1 23.3   BUN mg/dL 28* 27*   CREATININE mg/dL 1.30* 1.29*   GLUCOSE mg/dL 154* 157*   ALBUMIN g/dL 4.2 4.3   BILIRUBIN mg/dL 1.5* 1.5*   ALK PHOS U/L 310* 251*   AST (SGOT) U/L 262* 123*   ALT (SGPT) U/L 132* 50*       BNP        TROPONIN  Results from last 7 days   Lab Units 06/19/24 0601   HSTROP T ng/L 57*       CoAg        Creatinine Clearance  Estimated Creatinine Clearance: 27.5 mL/min (A) (by C-G formula based on SCr of 1.3 mg/dL (H)).    ABG           Radiology  XR Chest 1 View    Result Date: 6/18/2024  Impression: No acute abnormality. Chronic findings as characterized above Electronically Signed: Wilson SebastianDO  6/18/2024 6:50 PM EDT  Workstation ID: [id number]       EKG  I personally viewed and interpreted the patient's EKG/Telemetry data:  ECG 12 Lead Chest Pain   Final Result   HEART RATE= 133  bpm   RR Interval= 452  ms   OK Interval= 136  ms   P Horizontal Axis= 6  deg   P Front Axis= 246  deg   QRSD Interval= 111  ms   QT Interval= 320  ms   QTcB= 476  ms   QRS Axis= 24  deg   T Wave Axis= -85  deg   - ABNORMAL ECG -   Sinus tachycardia with irregular rate   Repolarization abnormality, prob rate related   When compared with ECG of 21-May-2024 5:50:25,   Significant change in rhythm: previously atrial fibrillation   Significant repolarization change   Electronically Signed By: Kale Alonso (BASIL) 19-Jun-2024 07:12:01   Date and Time of Study: 2024-06-18 17:36:01      Telemetry Scan   Final Result      Telemetry Scan   Final Result            Echocardiogram:    Results for orders placed during the hospital encounter of 05/20/24    Adult Transthoracic Echo Limited with Contrast if Necessary Per Protocol POD #1    Interpretation Summary    Left ventricular ejection fraction appears to be 56 - 60%.    There is a TAVR valve present.    Moderate mitral valve regurgitation is present.    Estimated right ventricular systolic pressure from tricuspid regurgitation is normal (<35 mmHg).    26 mm Medtronic TAVR valve.  Trace AI.  Mean gradient is 5 mmHg.        Stress Test:  Results for orders placed during the hospital encounter of 04/14/23    Stress Test With Myocardial Perfusion One Day    Interpretation Summary    Left ventricular ejection fraction is normal (Calculated EF = 58%).    Myocardial perfusion imaging indicates a normal myocardial perfusion study with no evidence of ischemia.    Impressions are consistent with a low risk  study.        Cardiac Catheterization:  Results for orders placed during the hospital encounter of 05/20/24    Cardiac Catheterization/Vascular Study    Conclusion  Indication:  Severe symptomatic nonrheumatic aortic stenosis  Congestive heart failure, NYHA class III    Procedures performed  Ultrasound-guided vascular access  Common femoral angiography  Supravalvular aortogram  Temporary pacemaker placement  Transcatheter aortic valve replacement with 26 mm Evolut pro plus via right femoral arterial access  Perclose and angioseal deployment    Procedural Details  The procedure was performed under conscious sedation in the [facility] OR.    Under ultrasound guidance, a 6 F introducer was placed in the left femoral vein usinh modified Seldinger technique. Under ultrasound guidance, a 7 F introducer was placed in the left femoral artery using modified Seldinger technique. Under ultrasound guidance, a 7 F introducer was placed in the right femoral artery using modified Seldinger technique.    Two Perclose ProGlide devices were used to 'pre-close' the RFA access site. A 5F bipolar electrode temporary pacemaking wire was inserted via the left femoral venous sheath and positioned using fluoroscopy. Pacing threshold was tested. The temporary pacemaker wire was secured in place.    A 6F pigtail catheter was placed in the aortic root via the LFA sheath. The aortic valve was crossed retrograde with a 0.035 inch straight wire through a 6F AL1 catheter via the RFA 16Fr sheath. The AL1 catheter was advanced into the LV and a exchange length 0.035 inch lunderquist wire in the LV. The 16F sheath was removed and the Atmosferiq delivery sheath was inserted over the lunderquist wire.    The TAVR procedure was then performed using a 26mm Evolut pro + device. It was advanced over the lunderquist wire to the aortic valve position. After confirming correct position of the valve by fluoroscopy, the valve was implanted with ventricular pacing  and dynamic aortography. After valve deployment the valve was well-seated and there was no significant paravalvular leak noted.    At the end of the procedure, the Medtronic delivery system was removed over a guidewire. The Perclose devices were deployed to secure the arterial access on the TAVR side.    The 7F LFA sheath was removed and the arteriotomy was angiosealed. The 6F LFV sheath along with the temporary pacemaker wire were removed and manual pressure applied.  The patient was transferred to [facility]  in stable condition.    Pio Callejas and Dimitris performed the TAVR procedure. Dr. Ward assisted.    Estimated blood loss  20ml    Complications  None    Recommendations  Dual antiplatelet therapy  Cardiac rehab  Repeat echocardiogram in morning.    Electronically signed by Leandro Callejas MD, 05/20/24, 8:58 AM EDT.        Other:      ASSESSMENT & PLAN:    Principal Problem:    Atrial fibrillation with rapid ventricular response  Active Problems:    Pacemaker    Coronary artery disease involving native coronary artery of native heart without angina pectoris    Hyperlipidemia, mixed    Essential hypertension    Type 2 diabetes mellitus with stage 3a chronic kidney disease, without long-term current use of insulin    Presence of Watchman left atrial appendage closure device    Chronic heart failure with preserved ejection fraction (HFpEF)    S/P TAVR (transcatheter aortic valve replacement)    Elevated LFTs    Nausea    Acute diarrhea    Syncope and collapse      Atrial fibrillation with rapid ventricular response  Discontinue Cardizem drip  Heart rate is improving  Resume oral Cardizem to 40 mg p.o. daily  Start Toprol-XL 25 mg p.o. daily  Discontinue Bystolic  [id number] score is 9  Unable to tolerate anticoagulation  Left atrial appendage closure with Watchman device in place for stroke prevention  Continue Plavix    Elevated LFTs  Alk phos 310, AST//132.  Bilirubin 1.5  Patient with complaints of right upper  quadrant tenderness, nausea and diarrhea  History of cholecystectomy  Statin has been on hold  Further workup per primary    Recent Syncope and collapse  Sick sinus syndrome.  Permanent pacemaker in place.  Medtronic.  Interrogate pacemaker    S/P TAVR (transcatheter aortic valve replacement)  Status post TAVR with 26 mm Medtronic valve on 5/20/2024  Postprocedure echocardiogram with well-seated valve no AI and mean gradient of 5 mmHg  Continue Plavix    Chronic heart failure with preserved ejection fraction (HFpEF)  Patient appears to be dry secondary to diarrhea  Lasix and Farxiga will be held  IV hydration  Encourage oral hydration  Monitor I&Os and daily weight    Coronary artery disease involving native coronary artery of native heart without angina pectoris  Continue Plavix and beta-blocker  Holding statin due to transaminitis    Hyperlipidemia, mixed  Statins will be held due to transaminitis    Essential hypertension, chronic  Well-controlled on Cardizem  Discontinue Bystolic  Starting Toprol-XL    Type 2 diabetes mellitus with stage 3a chronic kidney disease, without long-term current use of insulin  A1c is 8.3  Farxiga on hold due to dehydration  Metformin is also on hold  Sliding scale insulin during inpatient stay

## 2024-06-19 NOTE — THERAPY EVALUATION
Patient Name: Cherie Hinton  : 1934    MRN: [id number]                              Today's Date: 2024       Admit Date: 2024    Visit Dx:     ICD-10-CM ICD-9-CM   1. Atrial fibrillation with RVR  I48.91 427.31     Patient Active Problem List   Diagnosis    Pacemaker    Atrial fibrillation    Coronary artery disease involving native coronary artery of native heart without angina pectoris    Hyperlipidemia, mixed    Essential hypertension    Iron deficiency anemia due to chronic blood loss    Antral erosion    B12 deficiency    Cerebrovascular accident (CVA)    Colon cancer    Depression    Type 2 diabetes mellitus with stage 3a chronic kidney disease, without long-term current use of insulin    GERD (gastroesophageal reflux disease)    Hiatal hernia    HALEIGH (iron deficiency anemia)    LAD (lymphadenopathy)    Left pontine CVA    Mitral regurgitation    STORMY (obstructive sleep apnea)    Osteoarthritis    Prinzmetal's angina    Right kidney mass    TIA (transient ischemic attack)    Vestibular nerve disease    Presence of Watchman left atrial appendage closure device    Ataxia    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    Dilated cardiomyopathy    Other fatigue    Dyspnea on exertion    Dyspnea, unspecified type    Hypomagnesemia    Acute right ankle pain    Allergic conjunctivitis    Posterior vitreous detachment    Exudative age-related macular degeneration    Recurrent syncope    Syncope    Hypotension due to hypovolemia    Renal insufficiency    Nonrheumatic aortic valve stenosis    Chronic heart failure with preserved ejection fraction (HFpEF)    S/P TAVR (transcatheter aortic valve replacement)    Atrial fibrillation with rapid ventricular response    Elevated LFTs    Nausea    Acute diarrhea    Syncope and collapse     Past Medical History:   Diagnosis Date    Antral erosion     Aortic valve regurgitation 2018    Atrial fibrillation     Atrial fibrillation with controlled  ventricular response     B12 deficiency     CAD (coronary artery disease)     Cellulitis 04/2011    on face     Cerebrovascular accident (CVA) 12/07/2016    CHF (congestive heart failure)     Colon cancer     Coronary artery disease involving native coronary artery of native heart without angina pectoris 06/18/2019    Depression     Diabetes mellitus, type 2     Essential hypertension 02/13/2012    Fatigue     GERD (gastroesophageal reflux disease)     Hiatal hernia     History of colonoscopy 04/2010    last done 4/10    History of esophagogastroduodenoscopy (EGD) 04/2010    last done 4/10    Hyperlipidemia, mixed 02/13/2012    Hypertension     white coat htn    HALEIGH (iron deficiency anemia)     LAD (lymphadenopathy)     40% lesion LAD     Left pontine CVA     Mitral regurgitation 05/21/2012    STORMY (obstructive sleep apnea)     not treating    Osteoarthritis     Pacemaker 06/18/2019    Permanent atrial fibrillation 06/18/2019    Presence of Watchman left atrial appendage closure device 01/04/2020    Prinzmetal's angina     Right kidney mass     1.4 cm- following urology     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    Sick sinus syndrome     Stroke 11/2016    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    AORTIC VALVE REPAIR/REPLACEMENT N/A 5/20/2024    Procedure: TRANSFEMORAL TRANSCATHETER AORTIC VALVE REPLACEMENT;  Surgeon: Yonatan Shanks MD;  Location: [facility] HYBRID OR;  Service: Cardiothoracic;  Laterality: N/A;    ATRIAL APPENDAGE EXCLUSION LEFT WITH TRANSESOPHAGEAL  ECHOCARDIOGRAM N/A 10/10/2019    Procedure: Atrial Appendage Occlusion;  Surgeon: Richie Chapman MD;  Location: [facility] CATH INVASIVE LOCATION;  Service: Cardiovascular    ATRIAL APPENDAGE EXCLUSION LEFT WITH TRANSESOPHAGEAL ECHOCARDIOGRAM N/A 10/10/2019    Procedure: Atrial Appendage Occlusion;  Surgeon: Je Rivera MD;  Location: [facility] CATH INVASIVE LOCATION;  Service: Cardiology    BLADDER REPAIR      CARDIAC CATHETERIZATION  05/2010    CARDIAC CATHETERIZATION N/A 3/28/2024    Procedure: Left Heart Cath;  Surgeon: Mich Araujo MD;  Location: [facility] CATH INVASIVE LOCATION;  Service: Cardiology;  Laterality: N/A;    CHOLECYSTECTOMY      COLECTOMY PARTIAL / TOTAL      COLONOSCOPY  04/16/2018    negative     COLONOSCOPY N/A 7/1/2022    Procedure: COLONOSCOPY with polypectomy x1;  Surgeon: ADOLFO Boss MD;  Location: [facility] ENDOSCOPY;  Service: Gastroenterology;  Laterality: N/A;  post: diverticulosis, hemorrhoids, polyps    ENDOSCOPY  04/16/2018    negative     ENDOSCOPY N/A 6/29/2022    Procedure: ESOPHAGOGASTRODUODENOSCOPY with biopsy of duodenum r/o celiac;  Surgeon: ADOLFO Boss MD;  Location: [facility] ENDOSCOPY;  Service: Gastroenterology;  Laterality: N/A;  hiatial hernia  Gastric Polyps        HYSTERECTOMY      INSERT / REPLACE / REMOVE PACEMAKER  09/2018    Pacemaker gen change 9/2018     OTHER SURGICAL HISTORY  04/25/2015    Exercise myoview, normal     PACEMAKER IMPLANTATION  01/22/2010    Dual Chamber - 1/22/2010; RA Lead Revision- 5/7/2012- BS     TOOTH EXTRACTION      TRANSESOPHAGEAL ECHOCARDIOGRAM (DHEERAJ)  07/2019      General Information       Row Name 06/19/24 1258          Physical Therapy Time and Intention    Document Type evaluation  -BR     Mode of Treatment physical therapy  -BR       Row Name 06/19/24 1258          General Information    Patient Profile Reviewed yes  -BR     Prior Level of Function independent:;all household mobility;community  mobility;gait;transfer;shopping;driving;cleaning;home management;cooking  -BR     Existing Precautions/Restrictions cardiac  -BR       Row Name 06/19/24 1258          Living Environment    People in Home alone  -BR       Row Name 06/19/24 1258          Home Main Entrance    Number of Stairs, Main Entrance none  -BR       Row Name 06/19/24 1258          Stairs Within Home, Primary    Number of Stairs, Within Home, Primary one  -BR       Row Name 06/19/24 1258          Cognition    Orientation Status (Cognition) oriented x 4  -BR       Row Name 06/19/24 1258          Safety Issues, Functional Mobility    Impairments Affecting Function (Mobility) balance;endurance/activity tolerance;strength  -BR               User Key  (r) = Recorded By, (t) = Taken By, (c) = Cosigned By      Initials Name Provider Type    Vianey Coker PT Physical Therapist                   Mobility       Row Name 06/19/24 1259          Bed Mobility    Bed Mobility supine-sit  -BR     Supine-Sit [place] (Bed Mobility) supervision  -BR     Assistive Device (Bed Mobility) head of bed elevated  -BR       Row Name 06/19/24 1259          Sit-Stand Transfer    Sit-Stand [place] (Transfers) minimum assist (75% patient effort);1 person assist  -BR       Row Name 06/19/24 1259          Gait/Stairs (Locomotion)    [place] Level (Gait) contact guard;1 person assist  -BR     Assistive Device (Gait) walker, front-wheeled  -BR     Patient was able to Ambulate yes  -BR     Distance in Feet (Gait) 15  -BR     Deviations/Abnormal Patterns (Gait) evelyn decreased;stride length decreased  -BR     Bilateral Gait Deviations forward flexed posture;heel strike decreased  -BR               User Key  (r) = Recorded By, (t) = Taken By, (c) = Cosigned By      Initials Name Provider Type    Vianey Coker PT Physical Therapist                   Obj/Interventions       Row Name 06/19/24 1300          Range of Motion Comprehensive    General Range  of Motion bilateral lower extremity ROM WFL  -BR       Row Name 06/19/24 1300          Strength Comprehensive (MMT)    Comment, General Manual Muscle Testing (MMT) Assessment BLE strength grossly 3+/5  -BR       Row Name 06/19/24 1313 06/19/24 1300       Balance    Balance Assessment sitting dynamic balance;standing static balance;sitting static balance  -BR sitting static balance  -BR    Static Sitting Balance supervision  -BR --    Dynamic Sitting Balance supervision  -BR --    Position, Sitting Balance unsupported  -BR --    Static Standing Balance minimal assist  -BR --    Position/Device Used, Standing Balance walker, rolling;supported  -BR --      Row Name 06/19/24 1313          Sensory Assessment (Somatosensory)    Sensory Assessment (Somatosensory) sensation intact  -BR               User Key  (r) = Recorded By, (t) = Taken By, (c) = Cosigned By      Initials Name Provider Type    BR Vianey Marquez, PT Physical Therapist                   Goals/Plan       Row Name 06/19/24 1328          Bed Mobility Goal 1 (PT)    Activity/Assistive Device (Bed Mobility Goal 1, PT) bed mobility activities, all  -BR     [place] Level/Cues Needed (Bed Mobility Goal 1, PT) independent  -BR     Time Frame (Bed Mobility Goal 1, PT) long term goal (LTG);2 weeks  -BR       Row Name 06/19/24 1328          Transfer Goal 1 (PT)    Activity/Assistive Device (Transfer Goal 1, PT) transfers, all  -BR     [place] Level/Cues Needed (Transfer Goal 1, PT) modified independence  -BR     Time Frame (Transfer Goal 1, PT) long term goal (LTG);2 weeks  -BR       Row Name 06/19/24 1328          Gait Training Goal 1 (PT)    Activity/Assistive Device (Gait Training Goal 1, PT) gait (walking locomotion);assistive device use  -BR     [place] Level (Gait Training Goal 1, PT) modified independence  -BR     Distance (Gait Training Goal 1, PT) 100  -BR     Time Frame (Gait Training Goal 1, PT) long term goal (LTG);2 weeks  -BR       Row  Name 06/19/24 1328          Therapy Assessment/Plan (PT)    Planned Therapy Interventions (PT) balance training;bed mobility training;gait training;neuromuscular re-education;strengthening;patient/family education;transfer training;stair training;vestibular therapy  -BR               User Key  (r) = Recorded By, (t) = Taken By, (c) = Cosigned By      Initials Name Provider Type    BR Vianey Marquez, PT Physical Therapist                   Clinical Impression       Row Name 06/19/24 1314          Pain    Pretreatment Pain Rating 0/10 - no pain  -BR     Posttreatment Pain Rating 0/10 - no pain  -BR       Row Name 06/19/24 1328 06/19/24 1314       Plan of Care Review    Plan of Care Reviewed With -- patient  -BR    Outcome Evaluation Pt presents as a 89 y/o F admitted to [facility] on 6/18/24 with weakness and lightheadedness. CXR with no acute findings. Pt had TAVR on 5/20/24 and was originally scheduled to begin cardiac rehab today. Pt is being treated for atrial fib. Other medical hx: Watchman, Pacemaker, CAD, HTN, DM, heart failure, hx stroke, anemia, CKD. Pt A and O x 4 and on room air. At baseline, pt lives alone in [facility] with 0 AUDREY and one interior step. Pt drives and is completely independent with all mobility without AD. Pt is very active in the community. This date, pt c/o weakness and nausea. Pt is requiring min assist for transfers and for 15 feet of gait tolerance with rolling walker. HR jumps up to 149 at highest during PT session. Tinetti Combined Gait and Balance Score=12/28 indicating pt is at high risk for falls. Pt is far below her baseline for mobility with decreased standing balance, low functional activity tolerance and generalized weakness. PT is recommending In-patient Rehabilitation Facility and pt is agreeable.  -BR --      Row Name 06/19/24 1314          Therapy Assessment/Plan (PT)    Rehab Potential (PT) good, to achieve stated therapy goals  -BR     Criteria for Skilled Interventions Met (PT)  meets criteria;skilled treatment is necessary  -BR     Therapy Frequency (PT) 5 times/wk  -BR     Predicted Duration of Therapy Intervention (PT) until D/C  -BR       Row Name 06/19/24 1314          Vital Signs    Pre Systolic BP Rehab 139  -BR     Pre Treatment Diastolic BP 68  -BR     Intra Systolic BP Rehab 112  standing  -BR     Intra Treatment Diastolic BP 64  -BR     Post Systolic BP Rehab 136  post tx  -BR     Post Treatment Diastolic BP 62  -BR     Pretreatment Heart Rate (beats/min) 107  -BR     Intratreatment Heart Rate (beats/min) 149  -BR     Posttreatment Heart Rate (beats/min) 107  -BR     Pretreatment Resp Rate (breaths/min) 13  -BR     Pre SpO2 (%) 97  -BR     O2 Delivery Pre Treatment room air  -BR     Pre Patient Position Supine  -BR     Intra Patient Position Standing  -BR     Post Patient Position Sitting  -BR       Row Name 06/19/24 1314          Positioning and Restraints    Pre-Treatment Position in bed  -BR     Post Treatment Position chair  -BR     In Chair notified nsg;reclined;call light within reach;encouraged to call for assist;exit alarm on;with other staff;legs elevated  -BR               User Key  (r) = Recorded By, (t) = Taken By, (c) = Cosigned By      Initials Name Provider Type    BR Vianey Marquez, PT Physical Therapist                   Outcome Measures       Row Name 06/19/24 1329 06/19/24 0800       How much help from another person do you currently need...    Turning from your back to your side while in flat bed without using bedrails? 4  -BR 4  -EW    Moving from lying on back to sitting on the side of a flat bed without bedrails? 3  -BR 3  -EW    Moving to and from a bed to a chair (including a wheelchair)? 3  -BR 3  -EW    Standing up from a chair using your arms (e.g., wheelchair, bedside chair)? 3  -BR 3  -EW    Climbing 3-5 steps with a railing? 2  -BR 2  -EW    To walk in hospital room? 2  -BR 3  -EW    AM-PAC 6 Clicks Score (PT) 17  -BR 18  -EW    Highest Level of  "Mobility Goal 5 --> Static standing  -BR 6 --> Walk 10 steps or more  -EW      Row Name 06/19/24 1325          Tinetti Assessment    Tinetti Assessment yes  -BR     Sitting Balance 1  -BR     Arises 1  -BR     Attempts to Rise 1  -BR     Immediate Standing Balance (first 5 sec) 1  -BR     Standing Balance 1  -BR     Sternal Nudge (feet close together) 1  -BR     Eyes Closed (feet close together) 0  -BR     Turning 360 Degrees- Steps 0  -BR     Turning 360 Degrees- Steadiness 0  -BR     Sitting Down 1  -BR     Tinetti Balance Score 7  -BR     Gait Initiation (immediate after told \"go\") 0  -BR     Step Length- Right Swing 1  -BR     Step Length- Left Swing 1  -BR     Foot Clearance- Right Foot 1  -BR     Foot Clearance- Left Foot 1  -BR     Step Symmetry 0  -BR     Step Continuity 0  -BR     Path (excursion) 1  -BR     Trunk 0  -BR     Base of Support 0  -BR     Gait Score 5  -BR     Tinetti Total Score 12  -BR     Tinetti Assistive Device rolling walker  -BR       Row Name 06/19/24 1329 06/19/24 1325       Functional Assessment    Outcome Measure Options AM-PAC 6 Clicks Basic Mobility (PT)  -BR Tinetti  -BR              User Key  (r) = Recorded By, (t) = Taken By, (c) = Cosigned By      Initials Name Provider Type    Margarita Sorto RN Registered Nurse    Vianey Coker, PT Physical Therapist                                 Physical Therapy Education       Title: PT OT SLP Therapies (Done)       Topic: Physical Therapy (Done)       Point: Mobility training (Done)       Learning Progress Summary             Patient Acceptance, E,D, VU,DU by HYUN at 6/19/2024 1329                         Point: Body mechanics (Done)       Learning Progress Summary             Patient Acceptance, E,D, VU,DU by HYUN at 6/19/2024 1329                         Point: Precautions (Done)       Learning Progress Summary             Patient Acceptance, E,D, VU,DU by HYUN at 6/19/2024 1329                                         User Key  "      Initials Effective Dates Name Provider Type Discipline     02/01/22 -  Vianey Marquez, PT Physical Therapist PT                  PT Recommendation and Plan  Planned Therapy Interventions (PT): balance training, bed mobility training, gait training, neuromuscular re-education, strengthening, patient/family education, transfer training, stair training, vestibular therapy  Plan of Care Reviewed With: patient  Outcome Evaluation: Pt presents as a 89 y/o F admitted to [facility] on 6/18/24 with weakness and lightheadedness. CXR with no acute findings. Pt had TAVR on 5/20/24 and was originally scheduled to begin cardiac rehab today. Pt is being treated for atrial fib. Other medical hx: Watchman, Pacemaker, CAD, HTN, DM, heart failure, hx stroke, anemia, CKD. Pt A and O x 4 and on room air. At baseline, pt lives alone in [facility] with 0 AUDREY and one interior step. Pt drives and is completely independent with all mobility without AD. Pt is very active in the community. This date, pt c/o weakness and nausea. Pt is requiring min assist for transfers and for 15 feet of gait tolerance with rolling walker. HR jumps up to 149 at highest during PT session. Tinetti Combined Gait and Balance Score=12/28 indicating pt is at high risk for falls. Pt is far below her baseline for mobility with decreased standing balance, low functional activity tolerance and generalized weakness. PT is recommending In-patient Rehabilitation Facility and pt is agreeable.     Time Calculation:         PT Charges       Row Name 06/19/24 1330             Time Calculation    Start Time 0919  -BR      Stop Time 0940  -BR      Time Calculation (min) 21 min  -BR      PT Received On 06/19/24  -BR      PT - Next Appointment 06/20/24  -BR      PT Goal Re-Cert Due Date 07/03/24  -BR         Time Calculation- PT    Total Timed Code Minutes- PT 0 minute(s)  -BR                User Key  (r) = Recorded By, (t) = Taken By, (c) = Cosigned By      Initials Name Provider Type     Vianey Coker, PT Physical Therapist                  Therapy Charges for Today       Code Description Service Date Service Provider Modifiers Qty    84285581222 HC PT EVAL MOD COMPLEXITY 4 6/19/2024 Vianey Marquez, PT GP 1            PT G-Codes  Outcome Measure Options: AM-PAC 6 Clicks Basic Mobility (PT)  AM-PAC 6 Clicks Score (PT): 17  Tinetti Total Score: 12  PT Discharge Summary  Anticipated Discharge Disposition (PT): inpatient rehabilitation facility    Vianey Marquez, BRY  6/19/2024

## 2024-06-19 NOTE — PLAN OF CARE
Goal Outcome Evaluation:  Plan of Care Reviewed With: patient   Pt presents as a 89 y/o F admitted to [facility] on 6/18/24 with weakness and lightheadedness. CXR with no acute findings. Pt had TAVR on 5/20/24 and was originally scheduled to begin cardiac rehab today. Pt is being treated for atrial fib. Other medical hx: Watchman, Pacemaker, CAD, HTN, DM, heart failure, hx stroke, anemia, CKD. Pt A and O x 4 and on room air. At baseline, pt lives alone in [facility] with 0 AUDREY and one interior step. Pt drives and is completely independent with all mobility without AD. Pt is very active in the community. This date, pt c/o weakness and nausea. Pt is requiring min assist for transfers and for 15 feet of gait tolerance with rolling walker. HR jumps up to 149 at highest during PT session. Tinetti Combined Gait and Balance Score=12/28 indicating pt is at high risk for falls. Pt is far below her baseline for mobility with decreased standing balance, low functional activity tolerance and generalized weakness. PT is recommending In-patient Rehabilitation Facility and pt is agreeable.                Anticipated Discharge Disposition (PT): inpatient rehabilitation facility

## 2024-06-19 NOTE — CASE MANAGEMENT/SOCIAL WORK
Discharge Planning Assessment   Mikael     Patient Name: Cherie Hinton  MRN: [id number]  Today's Date: 6/19/2024    Admit Date: 6/18/2024    Plan: D/C Plan: [facility] pending acceptance.  Therapy recommended and Pt. choice.  No pre-cert or PASRR needed. Transport TBD   Discharge Needs Assessment       Row Name 06/19/24 1540       Living Environment    People in Home alone    Current Living Arrangements home    Potentially Unsafe Housing Conditions none    In the past 12 months has the electric, gas, oil, or water company threatened to shut off services in your home? No    Primary Care Provided by self    Provides Primary Care For no one    Family Caregiver if Needed child(yelena), adult    Family Caregiver Names Smitha-Dtr    Quality of Family Relationships unable to assess    Able to Return to Prior Arrangements yes       Resource/Environmental Concerns    Resource/Environmental Concerns none    Transportation Concerns none       Transportation Needs    In the past 12 months, has lack of transportation kept you from medical appointments or from getting medications? no    In the past 12 months, has lack of transportation kept you from meetings, work, or from getting things needed for daily living? No       Food Insecurity    Within the past 12 months, you worried that your food would run out before you got the money to buy more. Never true    Within the past 12 months, the food you bought just didn't last and you didn't have money to get more. Never true       Transition Planning    Patient/Family Anticipates Transition to inpatient rehabilitation facility    Patient/Family Anticipated Services at Transition rehabilitation services    Transportation Anticipated family or friend will provide       Discharge Needs Assessment    Readmission Within the Last 30 Days current reason for admission unrelated to previous admission    Equipment Currently Used at Home bp cuff    Concerns to be Addressed discharge planning     Anticipated Changes Related to Illness none    Equipment Needed After Discharge other (see comments)  TBD at rehab    Outpatient/Agency/Support Group Needs inpatient rehabilitation facility    Discharge Facility/Level of Care Needs rehabilitation facility    Provided Post Acute Provider List? Yes    Post Acute Provider List Inpatient Rehab    Delivered To Patient    Method of Delivery In person    Patient's Choice of Community Agency(s) [facility]                   Discharge Plan       Row Name 06/19/24 2408       Plan    Plan D/C Plan: [facility] pending acceptance.  Therapy recommended and Pt. choice.  No pre-cert or PASRR needed. Transport TBD    Plan Comments Met with pt at bedside. Reviewed and completed re-admission assessment. Confirmed PCP and Pharm. No difficulties paying for medications. Lives at home at an ind.level. Drives, and says she is always busy.  Her gdtr does put her medications together, but otherwise she doesn't need assistance. No DME used. Therapy is recommending inpt rehab.  Referral  sent to [facility] per pt request. Barrier to D/C: Cardizem gtt for A-fib with RVR; Pacemaker for interrogation due to syncope and collapse.                  Continued Care and Services - Admitted Since 6/18/2024       Destination       Service Provider Request Status Selected Services Address Phone Fax Patient Preferred    [facility] Accepted N/A [address] [facility] [place] [address] [phone] --                     Demographic Summary       Row Name 06/19/24 1536       General Information    Admission Type inpatient    Arrived From emergency department    Required Notices Provided Important Message from Medicare    Referral Source admission list    Reason for Consult discharge planning    Preferred Language English                   Functional Status       Row Name 06/19/24 1536       Functional Status    Usual Activity Tolerance good    Current Activity Tolerance moderate        Functional Status, IADL    Medications independent    Meal Preparation independent    Housekeeping independent    Laundry independent    Shopping independent       Mental Status    General Appearance WDL WDL       Mental Status Summary    Recent Changes in Mental Status/Cognitive Functioning no changes       Employment/    Employment Status retired    Current or Previous Occupation not applicable                     Evette Ga RN

## 2024-06-20 VITALS
BODY MASS INDEX: 26.6 KG/M2 | DIASTOLIC BLOOD PRESSURE: 57 MMHG | TEMPERATURE: 97.4 F | OXYGEN SATURATION: 95 % | HEART RATE: 63 BPM | RESPIRATION RATE: 16 BRPM | SYSTOLIC BLOOD PRESSURE: 125 MMHG | WEIGHT: 150.13 LBS | HEIGHT: 63 IN

## 2024-06-20 LAB
ALBUMIN SERPL-MCNC: 3.8 G/DL (ref 3.5–5.2)
ALBUMIN/GLOB SERPL: 1.2 G/DL
ALP SERPL-CCNC: 234 U/L (ref 39–117)
ALT SERPL W P-5'-P-CCNC: 87 U/L (ref 1–33)
ANION GAP SERPL CALCULATED.3IONS-SCNC: 14.5 MMOL/L (ref 5–15)
AST SERPL-CCNC: 68 U/L (ref 1–32)
BASOPHILS # BLD AUTO: 0.06 10*3/MM3 (ref 0–0.2)
BASOPHILS NFR BLD AUTO: 0.7 % (ref 0–1.5)
BILIRUB SERPL-MCNC: 0.6 MG/DL (ref 0–1.2)
BUN SERPL-MCNC: 32 MG/DL (ref 8–23)
BUN/CREAT SERPL: 23.4 (ref 7–25)
CALCIUM SPEC-SCNC: 9.8 MG/DL (ref 8.2–9.6)
CHLORIDE SERPL-SCNC: 100 MMOL/L (ref 98–107)
CO2 SERPL-SCNC: 21.5 MMOL/L (ref 22–29)
CREAT SERPL-MCNC: 1.37 MG/DL (ref 0.57–1)
DEPRECATED RDW RBC AUTO: 49.9 FL (ref 37–54)
EGFRCR SERPLBLD CKD-EPI 2021: 36.8 ML/MIN/1.73
EOSINOPHIL # BLD AUTO: 0.27 10*3/MM3 (ref 0–0.4)
EOSINOPHIL NFR BLD AUTO: 3.1 % (ref 0.3–6.2)
ERYTHROCYTE [DISTWIDTH] IN BLOOD BY AUTOMATED COUNT: 17.1 % (ref 12.3–15.4)
GLOBULIN UR ELPH-MCNC: 3.1 GM/DL
GLUCOSE BLDC GLUCOMTR-MCNC: 135 MG/DL (ref 70–105)
GLUCOSE BLDC GLUCOMTR-MCNC: 137 MG/DL (ref 70–105)
GLUCOSE BLDC GLUCOMTR-MCNC: 211 MG/DL (ref 70–105)
GLUCOSE SERPL-MCNC: 130 MG/DL (ref 65–99)
HCT VFR BLD AUTO: 36.2 % (ref 34–46.6)
HGB BLD-MCNC: 10.7 G/DL (ref 12–15.9)
IMM GRANULOCYTES # BLD AUTO: 0.03 10*3/MM3 (ref 0–0.05)
IMM GRANULOCYTES NFR BLD AUTO: 0.3 % (ref 0–0.5)
LYMPHOCYTES # BLD AUTO: 1.65 10*3/MM3 (ref 0.7–3.1)
LYMPHOCYTES NFR BLD AUTO: 19.1 % (ref 19.6–45.3)
MAGNESIUM SERPL-MCNC: 2.2 MG/DL (ref 1.6–2.4)
MCH RBC QN AUTO: 24.3 PG (ref 26.6–33)
MCHC RBC AUTO-ENTMCNC: 29.6 G/DL (ref 31.5–35.7)
MCV RBC AUTO: 82.3 FL (ref 79–97)
MONOCYTES # BLD AUTO: 0.84 10*3/MM3 (ref 0.1–0.9)
MONOCYTES NFR BLD AUTO: 9.7 % (ref 5–12)
NEUTROPHILS NFR BLD AUTO: 5.79 10*3/MM3 (ref 1.7–7)
NEUTROPHILS NFR BLD AUTO: 67.1 % (ref 42.7–76)
NRBC BLD AUTO-RTO: 0 /100 WBC (ref 0–0.2)
PLATELET # BLD AUTO: 276 10*3/MM3 (ref 140–450)
PMV BLD AUTO: 10.9 FL (ref 6–12)
POTASSIUM SERPL-SCNC: 4 MMOL/L (ref 3.5–5.2)
PROT SERPL-MCNC: 6.9 G/DL (ref 6–8.5)
RBC # BLD AUTO: 4.4 10*6/MM3 (ref 3.77–5.28)
SODIUM SERPL-SCNC: 136 MMOL/L (ref 136–145)
WBC NRBC COR # BLD AUTO: 8.64 10*3/MM3 (ref 3.4–10.8)

## 2024-06-20 PROCEDURE — 63710000001 INSULIN LISPRO (HUMAN) PER 5 UNITS: Performed by: NURSE PRACTITIONER

## 2024-06-20 PROCEDURE — 85025 COMPLETE CBC W/AUTO DIFF WBC: CPT | Performed by: NURSE PRACTITIONER

## 2024-06-20 PROCEDURE — 83735 ASSAY OF MAGNESIUM: CPT | Performed by: NURSE PRACTITIONER

## 2024-06-20 PROCEDURE — 80053 COMPREHEN METABOLIC PANEL: CPT | Performed by: NURSE PRACTITIONER

## 2024-06-20 PROCEDURE — 82948 REAGENT STRIP/BLOOD GLUCOSE: CPT | Performed by: NURSE PRACTITIONER

## 2024-06-20 PROCEDURE — 25010000002 ONDANSETRON PER 1 MG: Performed by: NURSE PRACTITIONER

## 2024-06-20 PROCEDURE — 99232 SBSQ HOSP IP/OBS MODERATE 35: CPT | Performed by: INTERNAL MEDICINE

## 2024-06-20 RX ORDER — METOPROLOL SUCCINATE 25 MG/1
25 TABLET, EXTENDED RELEASE ORAL
Qty: 30 TABLET | Refills: 1 | Status: SHIPPED | OUTPATIENT
Start: 2024-06-21

## 2024-06-20 RX ORDER — INSULIN LISPRO 100 [IU]/ML
2-7 INJECTION, SOLUTION INTRAVENOUS; SUBCUTANEOUS
Qty: 3 ML | Refills: 12 | Status: SHIPPED | OUTPATIENT
Start: 2024-06-20

## 2024-06-20 RX ORDER — FUROSEMIDE 20 MG/1
20 TABLET ORAL DAILY
Qty: 30 TABLET | Refills: 11 | Status: SHIPPED | OUTPATIENT
Start: 2024-06-20

## 2024-06-20 RX ORDER — DAPAGLIFLOZIN 10 MG/1
10 TABLET, FILM COATED ORAL DAILY
Qty: 30 TABLET | Refills: 11 | Status: SHIPPED | OUTPATIENT
Start: 2024-06-20

## 2024-06-20 RX ORDER — ATORVASTATIN CALCIUM 10 MG/1
10 TABLET, FILM COATED ORAL DAILY
Qty: 90 TABLET | Refills: 1 | Status: SHIPPED | OUTPATIENT
Start: 2024-06-20

## 2024-06-20 RX ADMIN — ONDANSETRON 4 MG: 2 INJECTION INTRAMUSCULAR; INTRAVENOUS at 09:46

## 2024-06-20 RX ADMIN — DILTIAZEM HYDROCHLORIDE 240 MG: 240 CAPSULE, EXTENDED RELEASE ORAL at 09:30

## 2024-06-20 RX ADMIN — PANTOPRAZOLE SODIUM 40 MG: 40 TABLET, DELAYED RELEASE ORAL at 05:51

## 2024-06-20 RX ADMIN — INSULIN LISPRO 3 UNITS: 100 INJECTION, SOLUTION INTRAVENOUS; SUBCUTANEOUS at 12:27

## 2024-06-20 RX ADMIN — METOPROLOL SUCCINATE 25 MG: 25 TABLET, EXTENDED RELEASE ORAL at 09:30

## 2024-06-20 RX ADMIN — CLOPIDOGREL BISULFATE 75 MG: 75 TABLET ORAL at 09:30

## 2024-06-20 RX ADMIN — FERROUS SULFATE TAB EC 324 MG (65 MG FE EQUIVALENT) 324 MG: 324 (65 FE) TABLET DELAYED RESPONSE at 09:30

## 2024-06-20 RX ADMIN — Medication 10 ML: at 09:30

## 2024-06-20 NOTE — DISCHARGE SUMMARY
Date of Discharge:  6/20/2024    Discharge Diagnosis:   Atrial fibrillation with rapid ventricular response  Pacemaker  Coronary artery disease involving native coronary artery of native heart without angina pectoris  Hyperlipidemia, mixed  Essential hypertension  Type 2 diabetes mellitus with stage 3a chronic kidney disease, without long-term current use of insulin  Presence of Watchman left atrial appendage closure device  Chronic heart failure with preserved ejection fraction (HFpEF)  S/P TAVR (transcatheter aortic valve replacement)  Elevated LFTs  Nausea  Acute diarrhea  Syncope and collapse    Presenting Problem/History of Present Illness  Active Hospital Problems    Diagnosis  POA    **Atrial fibrillation with rapid ventricular response [I48.91]  Yes    Elevated LFTs [R79.89]  Yes    Nausea [R11.0]  Yes    Acute diarrhea [R19.7]  Yes    Syncope and collapse [R55]  Yes    S/P TAVR (transcatheter aortic valve replacement) [Z95.2]  Not Applicable    Chronic heart failure with preserved ejection fraction (HFpEF) [I50.32]  Yes    Presence of Watchman left atrial appendage closure device [Z95.818]  Yes    Type 2 diabetes mellitus with stage 3a chronic kidney disease, without long-term current use of insulin [E11.22, N18.31]  Yes    Coronary artery disease involving native coronary artery of native heart without angina pectoris [I25.10]  Yes    Pacemaker [Z95.0]  Yes    Essential hypertension [I10]  Yes    Hyperlipidemia, mixed [E78.2]  Yes      Resolved Hospital Problems   No resolved problems to display.          Hospital Course    Patient is a 89 y.o. female presented with history of TAVR, chroninc HFpEF, permanent atrial fibrillation, presence of watchman, SSS with pacemaker placement, CAD, HTN, HDL, and diabetes who presented to ER on 6/18/24 with complaints of weakness, nausea, dry heaves and diarrhea. She was also found to be in AFIB with RVR and rate has since been controlled. Patient most likely had GI  virus and is now tolerating food and increasing intake with no further diarrhea and improved nausea. She has been more volume depleted and should continue to hold lasix and farxiga until intake is improve and fluid status changes. Her lft were also elevated and I would continue to hold statin for another week. She will follow up with PCP after rehab       Procedures Performed         Consults:   Consults       Date and Time Order Name Status Description    6/18/2024 10:14 PM Inpatient Cardiology Consult Completed             Pertinent Test Results:    Lab Results (most recent)       Procedure Component Value Units Date/Time    POC Glucose 4x Daily Before Meals & at Bedtime [[id number]]  (Abnormal) Collected: 06/20/24 1202    Specimen: Blood Updated: 06/20/24 1203     Glucose 211 mg/dL      Comment: Serial Number: [id number]:  [id number]       POC Glucose 4x Daily Before Meals & at Bedtime [[id number]]  (Abnormal) Collected: 06/20/24 0739    Specimen: Blood Updated: 06/20/24 0740     Glucose 135 mg/dL      Comment: Serial Number: [id number]:  [id number]       Magnesium [[id number]]  (Normal) Collected: 06/20/24 0421    Specimen: Blood Updated: 06/20/24 0539     Magnesium 2.2 mg/dL     Comprehensive Metabolic Panel [[id number]]  (Abnormal) Collected: 06/20/24 0421    Specimen: Blood Updated: 06/20/24 0539     Glucose 130 mg/dL      BUN 32 mg/dL      Creatinine 1.37 mg/dL      Sodium 136 mmol/L      Potassium 4.0 mmol/L      Comment: Slight hemolysis detected by analyzer. Result may be falsely elevated.        Chloride 100 mmol/L      CO2 21.5 mmol/L      Calcium 9.8 mg/dL      Total Protein 6.9 g/dL      Albumin 3.8 g/dL      ALT (SGPT) 87 U/L      AST (SGOT) 68 U/L      Alkaline Phosphatase 234 U/L      Total Bilirubin 0.6 mg/dL      Globulin 3.1 gm/dL      A/G Ratio 1.2 g/dL      BUN/Creatinine Ratio 23.4     Anion Gap 14.5 mmol/L      eGFR 36.8 mL/min/1.73     Narrative:      GFR Normal >60  Chronic Kidney  Disease <60  Kidney Failure <15    The GFR formula is only valid for adults with stable renal function between ages 18 and 70.    CBC & Differential [[id number]]  (Abnormal) Collected: 06/20/24 0421    Specimen: Blood Updated: 06/20/24 0520    Narrative:      The following orders were created for panel order CBC & Differential.  Procedure                               Abnormality         Status                     ---------                               -----------         ------                     CBC Auto Differential[[id number]]        Abnormal            Final result                 Please view results for these tests on the individual orders.    CBC Auto Differential [[id number]]  (Abnormal) Collected: 06/20/24 0421    Specimen: Blood Updated: 06/20/24 0520     WBC 8.64 10*3/mm3      RBC 4.40 10*6/mm3      Hemoglobin 10.7 g/dL      Hematocrit 36.2 %      MCV 82.3 fL      MCH 24.3 pg      MCHC 29.6 g/dL      RDW 17.1 %      RDW-SD 49.9 fl      MPV 10.9 fL      Platelets 276 10*3/mm3      Neutrophil % 67.1 %      Lymphocyte % 19.1 %      Monocyte % 9.7 %      Eosinophil % 3.1 %      Basophil % 0.7 %      Immature Grans % 0.3 %      Neutrophils, Absolute 5.79 10*3/mm3      Lymphocytes, Absolute 1.65 10*3/mm3      Monocytes, Absolute 0.84 10*3/mm3      Eosinophils, Absolute 0.27 10*3/mm3      Basophils, Absolute 0.06 10*3/mm3      Immature Grans, Absolute 0.03 10*3/mm3      nRBC 0.0 /100 WBC     High Sensitivity Troponin T 2Hr [[id number]]  (Abnormal) Collected: 06/19/24 1052    Specimen: Blood Updated: 06/19/24 1154     HS Troponin T 55 ng/L      Troponin T Delta -2 ng/L     Narrative:      High Sensitive Troponin T Reference Range:  <14.0 ng/L- Negative Female for AMI  <22.0 ng/L- Negative Male for AMI  >=14 - Abnormal Female indicating possible myocardial injury.  >=22 - Abnormal Male indicating possible myocardial injury.   Clinicians would have to utilize clinical acumen, EKG, Troponin, and serial changes to  determine if it is an Acute Myocardial Infarction or myocardial injury due to an underlying chronic condition.         High Sensitivity Troponin T [[id number]]  (Abnormal) Collected: 06/19/24 0601    Specimen: Blood from Arm, Right Updated: 06/19/24 0937     HS Troponin T 57 ng/L     Narrative:      High Sensitive Troponin T Reference Range:  <14.0 ng/L- Negative Female for AMI  <22.0 ng/L- Negative Male for AMI  >=14 - Abnormal Female indicating possible myocardial injury.  >=22 - Abnormal Male indicating possible myocardial injury.   Clinicians would have to utilize clinical acumen, EKG, Troponin, and serial changes to determine if it is an Acute Myocardial Infarction or myocardial injury due to an underlying chronic condition.         Magnesium [[id number]]  (Normal) Collected: 06/19/24 0601    Specimen: Blood from Arm, Right Updated: 06/19/24 0636     Magnesium 2.2 mg/dL     Comprehensive Metabolic Panel [[id number]]  (Abnormal) Collected: 06/18/24 2321    Specimen: Blood from Arm, Right Updated: 06/19/24 0006     Glucose 154 mg/dL      BUN 28 mg/dL      Creatinine 1.30 mg/dL      Sodium 137 mmol/L      Potassium 4.2 mmol/L      Chloride 99 mmol/L      CO2 23.1 mmol/L      Calcium 10.2 mg/dL      Total Protein 7.6 g/dL      Albumin 4.2 g/dL      ALT (SGPT) 132 U/L      AST (SGOT) 262 U/L      Alkaline Phosphatase 310 U/L      Total Bilirubin 1.5 mg/dL      Globulin 3.4 gm/dL      A/G Ratio 1.2 g/dL      BUN/Creatinine Ratio 21.5     Anion Gap 14.9 mmol/L      eGFR 39.1 mL/min/1.73     Narrative:      GFR Normal >60  Chronic Kidney Disease <60  Kidney Failure <15    The GFR formula is only valid for adults with stable renal function between ages 18 and 70.    TSH [[id number]]  (Normal) Collected: 06/18/24 2321    Specimen: Blood from Arm, Right Updated: 06/19/24 0006     TSH 3.470 uIU/mL     CBC & Differential [[id number]]  (Abnormal) Collected: 06/18/24 2321    Specimen: Blood from Arm, Right Updated: 06/18/24 7930     Narrative:      The following orders were created for panel order CBC & Differential.  Procedure                               Abnormality         Status                     ---------                               -----------         ------                     CBC Auto Differential[[id number]]        Abnormal            Final result                 Please view results for these tests on the individual orders.    CBC Auto Differential [[id number]]  (Abnormal) Collected: 06/18/24 2321    Specimen: Blood from Arm, Right Updated: 06/18/24 2348     WBC 9.42 10*3/mm3      RBC 4.85 10*6/mm3      Hemoglobin 12.0 g/dL      Hematocrit 42.7 %      MCV 88.0 fL      MCH 24.7 pg      MCHC 28.1 g/dL      RDW 17.7 %      RDW-SD 55.8 fl      MPV 10.7 fL      Platelets 287 10*3/mm3      Neutrophil % 76.2 %      Lymphocyte % 11.9 %      Monocyte % 10.0 %      Eosinophil % 1.1 %      Basophil % 0.6 %      Immature Grans % 0.2 %      Neutrophils, Absolute 7.18 10*3/mm3      Lymphocytes, Absolute 1.12 10*3/mm3      Monocytes, Absolute 0.94 10*3/mm3      Eosinophils, Absolute 0.10 10*3/mm3      Basophils, Absolute 0.06 10*3/mm3      Immature Grans, Absolute 0.02 10*3/mm3      nRBC 0.0 /100 WBC     Lipid Panel [[id number]]  (Abnormal) Collected: 06/18/24 1759    Specimen: Blood Updated: 06/18/24 2312     Total Cholesterol 186 mg/dL      Triglycerides 108 mg/dL      HDL Cholesterol 80 mg/dL      LDL Cholesterol  87 mg/dL      VLDL Cholesterol 19 mg/dL      LDL/HDL Ratio 1.06    Narrative:      Cholesterol Reference Ranges  (U.[place]. Department of Health and Human Services ATP III Classifications)    Desirable          <200 mg/dL  Borderline High    200-239 mg/dL  High Risk          >240 mg/dL      Triglyceride Reference Ranges  (U.S. Department of Health and Human Services ATP III Classifications)    Normal           <150 mg/dL  Borderline High  150-199 mg/dL  High             200-499 mg/dL  Very High        >500 mg/dL    HDL Reference  Ranges  (U.S. Department of Health and Human Services ATP III Classifications)    Low     <40 mg/dl (major risk factor for CHD)  High    >60 mg/dl ('negative' risk factor for CHD)        LDL Reference Ranges  (U.S. Department of Health and Human Services ATP III Classifications)    Optimal          <100 mg/dL  Near Optimal     100-129 mg/dL  Borderline High  130-159 mg/dL  High             160-189 mg/dL  Very High        >189 mg/dL    Hemoglobin A1c [[id number]]  (Abnormal) Collected: 06/18/24 1759    Specimen: Blood Updated: 06/18/24 2310     Hemoglobin A1C 8.28 %     BNP [[id number]]  (Normal) Collected: 06/18/24 1759    Specimen: Blood Updated: 06/18/24 1833     proBNP 1,596.0 pg/mL     Narrative:      This assay is used as an aid in the diagnosis of individuals suspected of having heart failure. It can be used as an aid in the diagnosis of acute decompensated heart failure (ADHF) in patients presenting with signs and symptoms of ADHF to the emergency department (ED). In addition, NT-proBNP of <300 pg/mL indicates ADHF is not likely.    Age Range Result Interpretation  NT-proBNP Concentration (pg/mL:      <50             Positive            >450                   Gray                 300-450                    Negative             <300    50-75           Positive            >900                  Gray                300-900                  Negative            <300      >75             Positive            >1800                  Gray                300-1800                  Negative            <300    Single High Sensitivity Troponin T [[id number]]  (Abnormal) Collected: 06/18/24 1759    Specimen: Blood Updated: 06/18/24 1833     HS Troponin T 51 ng/L     Narrative:      High Sensitive Troponin T Reference Range:  <14.0 ng/L- Negative Female for AMI  <22.0 ng/L- Negative Male for AMI  >=14 - Abnormal Female indicating possible myocardial injury.  >=22 - Abnormal Male indicating possible myocardial injury.   Clinicians  would have to utilize clinical acumen, EKG, Troponin, and serial changes to determine if it is an Acute Myocardial Infarction or myocardial injury due to an underlying chronic condition.         TSH [[id number]]  (Normal) Collected: 06/18/24 1759    Specimen: Blood Updated: 06/18/24 1833     TSH 3.370 uIU/mL     T4, Free [[id number]]  (Normal) Collected: 06/18/24 1759    Specimen: Blood Updated: 06/18/24 1833     Free T4 1.53 ng/dL     Isabella Draw [[id number]] Collected: 06/18/24 1759    Specimen: Blood Updated: 06/18/24 1815    Narrative:      The following orders were created for panel order Isabella Draw.  Procedure                               Abnormality         Status                     ---------                               -----------         ------                     Green Top (Gel)[[id number]]                                  Final result               Lavender Top[[id number]]                                     Final result               Gold Top - SST[[id number]]                                   Final result               Light Blue Top[[id number]]                                   Final result                 Please view results for these tests on the individual orders.    Green Top (Gel) [[id number]] Collected: 06/18/24 1759    Specimen: Blood Updated: 06/18/24 1815     Extra Tube Hold for add-ons.     Comment: Auto resulted.       Lavender Top [[id number]] Collected: 06/18/24 1759    Specimen: Blood Updated: 06/18/24 1815     Extra Tube hold for add-on     Comment: Auto resulted       Gold Top - SST [[id number]] Collected: 06/18/24 1759    Specimen: Blood Updated: 06/18/24 1815     Extra Tube Hold for add-ons.     Comment: Auto resulted.       Light Blue Top [[id number]] Collected: 06/18/24 1759    Specimen: Blood Updated: 06/18/24 1815     Extra Tube Hold for add-ons.     Comment: Auto resulted                Results for orders placed during the hospital encounter of 06/18/24    Adult Transthoracic Echo  Complete w/ Color, Spectral and Contrast if necessary per protocol    Interpretation Summary    Left ventricular ejection fraction appears to be 51 - 55%.    There is a TAVR valve present.    Estimated right ventricular systolic pressure from tricuspid regurgitation is normal (<35 mmHg).    26 mm [employer] valve is well-seated.  There is no aortic insufficiency.  Mean gradient is 4 mmHg.       Condition on Discharge:  Stable    Vital Signs  Temp:  [97.6 °F (36.4 °C)-98.4 °F (36.9 °C)] 97.6 °F (36.4 °C)  Heart Rate:  [60-81] 60  Resp:  [9-20] 16  BP: ()/(46-61) 96/46      Physical Exam  Vitals reviewed.   Constitutional:       Appearance: She is not ill-appearing.   HENT:      Head: Normocephalic and atraumatic.      Right Ear: External ear normal.      Left Ear: External ear normal.      Nose: Nose normal.      Mouth/Throat:      Mouth: Mucous membranes are moist.   Eyes:      General:         Right eye: No discharge.         Left eye: No discharge.   Cardiovascular:      Rate and Rhythm: Normal rate. Rhythm irregular.      Pulses: Normal pulses.      Heart sounds: Normal heart sounds.   Pulmonary:      Effort: Pulmonary effort is normal.      Breath sounds: Normal breath sounds.   Abdominal:      General: Bowel sounds are normal.      Palpations: Abdomen is soft.   Musculoskeletal:         General: Normal range of motion.      Cervical back: Normal range of motion.   Skin:     General: Skin is warm and dry.   Neurological:      Mental Status: She is alert and oriented to person, place, and time.   Psychiatric:         Behavior: Behavior normal.              Discharge Disposition  Rehab Facility or Unit (DC - External)    Discharge Medications     Discharge Medications        New Medications        Instructions Start Date   insulin lispro 100 UNIT/ML injection  Commonly known as: HUMALOG/ADMELOG   2-7 Units, Subcutaneous, 4 Times Daily Before Meals & Nightly      metoprolol succinate XL 25 MG 24 hr  tablet  Commonly known as: TOPROL-XL   25 mg, Oral, Every 24 Hours Scheduled   Start Date: June 21, 2024     phenylephrine-mineral oil-petrolatum 0.25-14-74.9 % ointment hemorrhoidal ointment  Commonly known as: PREPARATION H   1 Application, Rectal, 3 Times Daily PRN             Continue These Medications        Instructions Start Date   atorvastatin 10 MG tablet  Commonly known as: LIPITOR   10 mg, Oral, Daily      clopidogrel 75 MG tablet  Commonly known as: PLAVIX   75 mg, Oral, Daily      dapagliflozin Propanediol 10 MG tablet  Commonly known as: Farxiga   10 mg, Oral, Daily      dilTIAZem  MG 24 hr capsule  Commonly known as: CARDIZEM CD   240 mg, Oral, Daily      ferrous sulfate 325 (65 FE) MG EC tablet   324 mg, Oral, Daily With Breakfast      furosemide 20 MG tablet  Commonly known as: LASIX   20 mg, Oral, Daily      meclizine 25 MG tablet  Commonly known as: ANTIVERT   25 mg, Oral, 3 Times Daily PRN      metFORMIN 500 MG tablet  Commonly known as: Glucophage   500 mg, Oral, Daily With Breakfast, For diabetes      pantoprazole 40 MG EC tablet  Commonly known as: PROTONIX   40 mg, Oral, Every Early Morning      zolpidem 5 MG tablet  Commonly known as: AMBIEN   5 mg, Oral, Nightly PRN             Stop These Medications      nebivolol 20 MG tablet  Commonly known as: Bystolic              Discharge Diet:   Diet Instructions       Diet: Cardiac Diets, Diabetic Diets; Healthy Heart (2-3 Na+); Regular (IDDSI 7); Thin (IDDSI 0); Consistent Carbohydrate      Discharge Diet:  Cardiac Diets  Diabetic Diets       Cardiac Diet: Healthy Heart (2-3 Na+)    Texture: Regular (IDDSI 7)    Fluid Consistency: Thin (IDDSI 0)    Diabetic Diet: Consistent Carbohydrate            Activity at Discharge:   Activity Instructions       Gradually Increase Activity Until at Pre-Hospitalization Level              Follow-up Appointments  Future Appointments   Date Time Provider Department Center   7/17/2024  1:00 PM PHASE II - CR  INITIAL ASSESS [facility] None   8/12/2024 11:30 AM [facility] DEVICE CHECK [facility]   8/12/2024 12:45 PM Leandro Callejas MD [facility]   5/30/2025  2:00 PM [facility] NA   5/30/2025  2:45 PM Leandro Callejas MD [facility]     Additional Instructions for the Follow-ups that You Need to Schedule       Discharge Follow-up with PCP   As directed       Currently Documented PCP:    Andrew Antunez MD    PCP Phone Number:    [phone]     Follow Up Details: after rehab                Test Results Pending at Discharge       DAYANARA Miller  06/20/24  14:43 EDT    Time: Discharge 25 min

## 2024-06-20 NOTE — CASE MANAGEMENT/SOCIAL WORK
Continued Stay Note   Mikael     Patient Name: Cherie Hinton  MRN: [id number]  Today's Date: 6/20/2024    Admit Date: 6/18/2024    Plan: DC PLAN: [facility]. No precert, No PASRR needed. [facility] to transport at 1700       Discharge Plan       Row Name 06/20/24 1448       Plan    Plan DC PLAN: [facility]. No precert, No PASRR needed. [facility] to transport at 1700    Patient/Family in Agreement with Plan yes    Plan Comments Okay to DC to [facility] today. MD and bedside nurse updated. [facility] to transport at 1700.                      Expected Discharge Date and Time       Expected Discharge Date Expected Discharge Time    Jun 20, 2024             Shana Carvajal RN

## 2024-06-20 NOTE — PROGRESS NOTES
LOS: 2 days   Patient Care Team:  Andrew Antunez MD as PCP - General (Family Medicine)  Leandro Callejas MD as Cardiologist (Cardiology)    Subjective     Patient states nausea and abdomen cramping have greatly improved    Review of Systems   Constitutional:  Positive for activity change, appetite change and fatigue.   HENT: Negative.     Respiratory: Negative.     Cardiovascular: Negative.    Gastrointestinal:  Positive for nausea.   Genitourinary: Negative.    Musculoskeletal: Negative.    Neurological:  Positive for weakness.   Psychiatric/Behavioral: Negative.             Objective     Vital Signs  Temp:  [97.6 °F (36.4 °C)-98.4 °F (36.9 °C)] 97.6 °F (36.4 °C)  Heart Rate:  [70-81] 70  Resp:  [9-20] 16  BP: ()/(46-62) 96/46      Physical Exam  Vitals reviewed.   Constitutional:       Appearance: She is not ill-appearing.   HENT:      Head: Normocephalic and atraumatic.      Right Ear: External ear normal.      Left Ear: External ear normal.      Nose: Nose normal.      Mouth/Throat:      Mouth: Mucous membranes are moist.   Eyes:      General:         Right eye: No discharge.         Left eye: No discharge.   Cardiovascular:      Rate and Rhythm: Normal rate. Rhythm irregular.      Pulses: Normal pulses.      Heart sounds: Normal heart sounds.   Pulmonary:      Effort: Pulmonary effort is normal.      Breath sounds: Normal breath sounds.   Abdominal:      General: Bowel sounds are normal.      Palpations: Abdomen is soft.   Musculoskeletal:         General: Normal range of motion.      Cervical back: Normal range of motion.   Skin:     General: Skin is warm and dry.   Neurological:      Mental Status: She is alert and oriented to person, place, and time.   Psychiatric:         Behavior: Behavior normal.              Results Review:    Lab Results (last 24 hours)       Procedure Component Value Units Date/Time    POC Glucose 4x Daily Before Meals & at Bedtime [[id number]]  (Abnormal) Collected: 06/20/24  0739    Specimen: Blood Updated: 06/20/24 0740     Glucose 135 mg/dL      Comment: Serial Number: [id number]:  [id number]       Magnesium [[id number]]  (Normal) Collected: 06/20/24 0421    Specimen: Blood Updated: 06/20/24 0539     Magnesium 2.2 mg/dL     Comprehensive Metabolic Panel [[id number]]  (Abnormal) Collected: 06/20/24 0421    Specimen: Blood Updated: 06/20/24 0539     Glucose 130 mg/dL      BUN 32 mg/dL      Creatinine 1.37 mg/dL      Sodium 136 mmol/L      Potassium 4.0 mmol/L      Comment: Slight hemolysis detected by analyzer. Result may be falsely elevated.        Chloride 100 mmol/L      CO2 21.5 mmol/L      Calcium 9.8 mg/dL      Total Protein 6.9 g/dL      Albumin 3.8 g/dL      ALT (SGPT) 87 U/L      AST (SGOT) 68 U/L      Alkaline Phosphatase 234 U/L      Total Bilirubin 0.6 mg/dL      Globulin 3.1 gm/dL      A/G Ratio 1.2 g/dL      BUN/Creatinine Ratio 23.4     Anion Gap 14.5 mmol/L      eGFR 36.8 mL/min/1.73     Narrative:      GFR Normal >60  Chronic Kidney Disease <60  Kidney Failure <15    The GFR formula is only valid for adults with stable renal function between ages 18 and 70.    CBC & Differential [[id number]]  (Abnormal) Collected: 06/20/24 0421    Specimen: Blood Updated: 06/20/24 0520    Narrative:      The following orders were created for panel order CBC & Differential.  Procedure                               Abnormality         Status                     ---------                               -----------         ------                     CBC Auto Differential[[id number]]        Abnormal            Final result                 Please view results for these tests on the individual orders.    CBC Auto Differential [[id number]]  (Abnormal) Collected: 06/20/24 0421    Specimen: Blood Updated: 06/20/24 0520     WBC 8.64 10*3/mm3      RBC 4.40 10*6/mm3      Hemoglobin 10.7 g/dL      Hematocrit 36.2 %      MCV 82.3 fL      MCH 24.3 pg      MCHC 29.6 g/dL      RDW 17.1 %      RDW-SD 49.9  fl      MPV 10.9 fL      Platelets 276 10*3/mm3      Neutrophil % 67.1 %      Lymphocyte % 19.1 %      Monocyte % 9.7 %      Eosinophil % 3.1 %      Basophil % 0.7 %      Immature Grans % 0.3 %      Neutrophils, Absolute 5.79 10*3/mm3      Lymphocytes, Absolute 1.65 10*3/mm3      Monocytes, Absolute 0.84 10*3/mm3      Eosinophils, Absolute 0.27 10*3/mm3      Basophils, Absolute 0.06 10*3/mm3      Immature Grans, Absolute 0.03 10*3/mm3      nRBC 0.0 /100 WBC     POC Glucose Once [[id number]]  (Abnormal) Collected: 06/19/24 2042    Specimen: Blood Updated: 06/19/24 2044     Glucose 134 mg/dL      Comment: Serial Number: [id number]:  [id number]       POC Glucose 4x Daily Before Meals & at Bedtime [[id number]]  (Abnormal) Collected: 06/19/24 1705    Specimen: Blood Updated: 06/19/24 1707     Glucose 171 mg/dL      Comment: Serial Number: [id number]:  [id number]       High Sensitivity Troponin T 2Hr [[id number]]  (Abnormal) Collected: 06/19/24 1052    Specimen: Blood Updated: 06/19/24 1154     HS Troponin T 55 ng/L      Troponin T Delta -2 ng/L     Narrative:      High Sensitive Troponin T Reference Range:  <14.0 ng/L- Negative Female for AMI  <22.0 ng/L- Negative Male for AMI  >=14 - Abnormal Female indicating possible myocardial injury.  >=22 - Abnormal Male indicating possible myocardial injury.   Clinicians would have to utilize clinical acumen, EKG, Troponin, and serial changes to determine if it is an Acute Myocardial Infarction or myocardial injury due to an underlying chronic condition.         POC Glucose Once [[id number]]  (Abnormal) Collected: 06/19/24 1129    Specimen: Blood Updated: 06/19/24 1131     Glucose 152 mg/dL      Comment: Serial Number: [id number]:  [id number]                Imaging Results (Last 24 Hours)       ** No results found for the last 24 hours. **                 I reviewed the patient's new clinical results.    Medication Review:   Scheduled Meds:[Held by provider]  atorvastatin, 10 mg, Oral, Daily  clopidogrel, 75 mg, Oral, Daily  dilTIAZem CD, 240 mg, Oral, Q24H  [Held by provider] empagliflozin, 25 mg, Oral, Daily  enoxaparin, 30 mg, Subcutaneous, Daily  ferrous sulfate, 324 mg, Oral, Daily With Breakfast  [Held by provider] furosemide, 20 mg, Oral, Daily  insulin lispro, 2-7 Units, Subcutaneous, 4x Daily AC & at Bedtime  metoprolol succinate XL, 25 mg, Oral, Q24H  pantoprazole, 40 mg, Oral, Q AM  sodium chloride, 10 mL, Intravenous, Q12H      Continuous Infusions:Pharmacy to Dose enoxaparin (LOVENOX),       PRN Meds:.  acetaminophen **OR** acetaminophen **OR** acetaminophen    senna-docusate sodium **AND** polyethylene glycol **AND** bisacodyl **AND** bisacodyl    Calcium Replacement - Follow Nurse / BPA Driven Protocol    dextrose    dextrose    glucagon (human recombinant)    loperamide    Magnesium Standard Dose Replacement - Follow Nurse / BPA Driven Protocol    meclizine    nitroglycerin    ondansetron    Pharmacy to Dose enoxaparin (LOVENOX)    phenylephrine-mineral oil-petrolatum    Phosphorus Replacement - Follow Nurse / BPA Driven Protocol    Potassium Replacement - Follow Nurse / BPA Driven Protocol    [COMPLETED] Insert Peripheral IV **AND** sodium chloride    sodium chloride    sodium chloride    zolpidem     Interval History:    Assessment & Plan      Atrial fibrillation with rapid ventricular response    Pacemaker    Coronary artery disease involving native coronary artery of native heart without angina pectoris    Hyperlipidemia, mixed    Essential hypertension    Type 2 diabetes mellitus with stage 3a chronic kidney disease, without long-term current use of insulin    Presence of Watchman left atrial appendage closure device    Chronic heart failure with preserved ejection fraction (HFpEF)    S/P TAVR (transcatheter aortic valve replacement)    Elevated LFTs    Nausea    Acute diarrhea    Syncope and collapse    Plan of care  Stable and improvement in  control of rate; hydrating/ nausea and abdomen pain have significantly improved    Plan for disposition:Inpt rehab    DAYANARA Miller  06/20/24  11:24 EDT

## 2024-06-20 NOTE — SIGNIFICANT NOTE
06/20/24 1452   OTHER   Discipline occupational therapist   Rehab Time/Intention   Session Not Performed other (see comments)  (pt has current dc orders, OT will follow up if pt remains admitted)   Therapy Assessment/Plan (PT)   Criteria for Skilled Interventions Met (PT) yes;meets criteria;skilled treatment is necessary   Recommendation   OT - Next Appointment 06/21/24

## 2024-06-20 NOTE — PROGRESS NOTES
Referring Provider: Coni Fields MD    Reason for follow-up: Atrial fibrillation     Patient Care Team:  Andrew Antunez MD as PCP - General (Family Medicine)  Leandro Callejas MD as Cardiologist (Cardiology)      SUBJECTIVE  Resting comfortably on the recliner but complains of nausea.     ROS  Review of all systems negative except as indicated.    Since I have last seen, the patient has been without any chest discomfort, shortness of breath, palpitations, dizziness or syncope.  Denies having any headache, abdominal pain, nausea, vomiting, diarrhea, constipation, loss of weight or loss of appetite.  Denies having any excessive bruising, hematuria or blood in the stool.        Personal History:    Past Medical History:   Diagnosis Date    Antral erosion     Aortic valve regurgitation 12/26/2018    Atrial fibrillation     Atrial fibrillation with controlled ventricular response     B12 deficiency     CAD (coronary artery disease)     Cellulitis 04/2011    on face     Cerebrovascular accident (CVA) 12/07/2016    CHF (congestive heart failure)     Colon cancer     Coronary artery disease involving native coronary artery of native heart without angina pectoris 06/18/2019    Depression     Diabetes mellitus, type 2     Essential hypertension 02/13/2012    Fatigue     GERD (gastroesophageal reflux disease)     Hiatal hernia     History of colonoscopy 04/2010    last done 4/10    History of esophagogastroduodenoscopy (EGD) 04/2010    last done 4/10    Hyperlipidemia, mixed 02/13/2012    Hypertension     white coat htn    HALEIGH (iron deficiency anemia)     LAD (lymphadenopathy)     40% lesion LAD     Left pontine CVA     Mitral regurgitation 05/21/2012    STORMY (obstructive sleep apnea)     not treating    Osteoarthritis     Pacemaker 06/18/2019    Permanent atrial fibrillation 06/18/2019    Presence of Watchman left atrial appendage closure device 01/04/2020    Prinzmetal's angina     Right kidney mass     1.4 cm- following urology      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    AORTIC VALVE REPAIR/REPLACEMENT N/A 5/20/2024    Procedure: TRANSFEMORAL TRANSCATHETER AORTIC VALVE REPLACEMENT;  Surgeon: Yonatan Shanks MD;  Location: [facility] HYBRID OR;  Service: Cardiothoracic;  Laterality: N/A;    ATRIAL APPENDAGE EXCLUSION LEFT WITH TRANSESOPHAGEAL ECHOCARDIOGRAM N/A 10/10/2019    Procedure: Atrial Appendage Occlusion;  Surgeon: Richie Chapman MD;  Location: [facility] CATH INVASIVE LOCATION;  Service: Cardiovascular    ATRIAL APPENDAGE EXCLUSION LEFT WITH TRANSESOPHAGEAL ECHOCARDIOGRAM N/A 10/10/2019    Procedure: Atrial Appendage Occlusion;  Surgeon: Je Rivera MD;  Location: [facility] CATH INVASIVE LOCATION;  Service: Cardiology    BLADDER REPAIR      CARDIAC CATHETERIZATION  05/2010    CARDIAC CATHETERIZATION N/A 3/28/2024    Procedure: Left Heart Cath;  Surgeon: Mich Araujo MD;  Location: [facility] CATH INVASIVE LOCATION;  Service: Cardiology;  Laterality: N/A;    CHOLECYSTECTOMY      COLECTOMY PARTIAL / TOTAL      COLONOSCOPY  04/16/2018    negative     COLONOSCOPY N/A 7/1/2022    Procedure: COLONOSCOPY with polypectomy x1;  Surgeon: ADOLFO Boss MD;  Location: [facility] ENDOSCOPY;  Service: Gastroenterology;  Laterality: N/A;  post: diverticulosis, hemorrhoids, polyps    ENDOSCOPY  04/16/2018    negative     ENDOSCOPY N/A 6/29/2022    Procedure: ESOPHAGOGASTRODUODENOSCOPY with biopsy of  duodenum r/o celiac;  Surgeon: ADOLFO Boss MD;  Location: [facility] ENDOSCOPY;  Service: Gastroenterology;  Laterality: N/A;  hiatial hernia  Gastric Polyps        HYSTERECTOMY      INSERT / REPLACE / REMOVE PACEMAKER  09/2018    Pacemaker gen change 9/2018     OTHER SURGICAL HISTORY  04/25/2015    Exercise myoview, normal     PACEMAKER IMPLANTATION  01/22/2010    Dual Chamber - 1/22/2010; RA Lead Revision- 5/7/2012- BS     TOOTH EXTRACTION      TRANSESOPHAGEAL ECHOCARDIOGRAM (DHEERAJ)  07/2019       Family History   Problem Relation Age of Onset    Hypertension Mother     Diabetes Mother     Coronary artery disease Mother     Other Father         CVA    No Known Problems Sister     No Known Problems Brother     No Known Problems Maternal Aunt     No Known Problems Maternal Uncle     No Known Problems Paternal Aunt     No Known Problems Paternal Uncle     No Known Problems Maternal Grandmother     No Known Problems Maternal Grandfather     No Known Problems Paternal Grandmother     No Known Problems Paternal Grandfather     Heart disease Other     Stroke Other     Hypertension Other     Anemia Neg Hx     Arrhythmia Neg Hx     Asthma Neg Hx     Clotting disorder Neg Hx     Fainting Neg Hx     Heart attack Neg Hx     Heart failure Neg Hx     Hyperlipidemia Neg Hx        Social History     Tobacco Use    Smoking status: Never     Passive exposure: Never    Smokeless tobacco: Never   Vaping Use    Vaping status: Never Used   Substance Use Topics    Alcohol use: No    Drug use: Never        Home meds:  Prior to Admission medications    Medication Sig Start Date End Date Taking? Authorizing Provider   atorvastatin (LIPITOR) 10 MG tablet Take 1 tablet by mouth Daily.    Provider, MD Juana   clopidogrel (PLAVIX) 75 MG tablet Take 1 tablet by mouth Daily. 5/22/24   Karla Low APRN   dapagliflozin Propanediol (Farxiga) 10 MG tablet Take 10 mg by mouth Daily. 5/21/24   Karla Low APRN   dilTIAZem CD (CARDIZEM CD)  240 MG 24 hr capsule Take 1 capsule by mouth Daily.    Provider, MD Juana   ferrous sulfate 325 (65 FE) MG EC tablet Take 1 tablet by mouth Daily With Breakfast. 7/1/22   Coni Fields MD   furosemide (LASIX) 20 MG tablet Take 1 tablet by mouth Daily. 2/2/24   Mich Araujo MD   meclizine (ANTIVERT) 25 MG tablet Take 1 tablet by mouth 3 (Three) Times a Day As Needed for Dizziness. 5/6/22   Sonu Flor MD   metFORMIN (Glucophage) 500 MG tablet Take 1 tablet by mouth Daily With Breakfast. For diabetes 1/6/21   Denny Field MD   nebivolol (Bystolic) 20 MG tablet Take 1 tablet by mouth Daily. 2/25/22   Richie Chapman MD   pantoprazole (PROTONIX) 40 MG EC tablet Take 1 tablet by mouth Every Morning. 5/22/24   Karla Low APRN   zolpidem (AMBIEN) 5 MG tablet Take 1 tablet by mouth At Night As Needed for Sleep.    Provider, MD Juana       Allergies:  Contrast dye (echo or unknown ct/mr), Adhesive tape, and Latex    Scheduled Meds:[Held by provider] atorvastatin, 10 mg, Oral, Daily  clopidogrel, 75 mg, Oral, Daily  dilTIAZem CD, 240 mg, Oral, Q24H  [Held by provider] empagliflozin, 25 mg, Oral, Daily  enoxaparin, 30 mg, Subcutaneous, Daily  ferrous sulfate, 324 mg, Oral, Daily With Breakfast  [Held by provider] furosemide, 20 mg, Oral, Daily  insulin lispro, 2-7 Units, Subcutaneous, 4x Daily AC & at Bedtime  metoprolol succinate XL, 25 mg, Oral, Q24H  pantoprazole, 40 mg, Oral, Q AM  sodium chloride, 10 mL, Intravenous, Q12H      Continuous Infusions:Pharmacy to Dose enoxaparin (LOVENOX),       PRN Meds:.  acetaminophen **OR** acetaminophen **OR** acetaminophen    senna-docusate sodium **AND** polyethylene glycol **AND** bisacodyl **AND** bisacodyl    Calcium Replacement - Follow Nurse / BPA Driven Protocol    dextrose    dextrose    glucagon (human recombinant)    loperamide    Magnesium Standard Dose Replacement - Follow Nurse / BPA Driven Protocol    meclizine    nitroglycerin     "ondansetron    Pharmacy to Dose enoxaparin (LOVENOX)    phenylephrine-mineral oil-petrolatum    Phosphorus Replacement - Follow Nurse / BPA Driven Protocol    Potassium Replacement - Follow Nurse / BPA Driven Protocol    [COMPLETED] Insert Peripheral IV **AND** sodium chloride    sodium chloride    sodium chloride    zolpidem      OBJECTIVE    Vital Signs  Vitals:    06/19/24 2012 06/19/24 2325 06/20/24 0425 06/20/24 0700   BP: 119/51 111/51 115/61    BP Location: Right arm Right arm Right arm    Patient Position: Lying Lying Lying    Pulse: 81 75 76    Resp: 20 9 19    Temp: 98.4 °F (36.9 °C) 98.3 °F (36.8 °C) 97.9 °F (36.6 °C)    TempSrc: Oral Oral Oral    SpO2: 92% 92% 93%    Weight:    68.1 kg (150 lb 2.1 oz)   Height:           Flowsheet Rows      Flowsheet Row First Filed Value   Admission Height 160 cm (62.99\") Documented at 06/18/2024 1739   Admission Weight 72.8 kg (160 lb 7.9 oz) Documented at 06/18/2024 1739              Intake/Output Summary (Last 24 hours) at 6/20/2024 0749  Last data filed at 6/19/2024 1446  Gross per 24 hour   Intake 340 ml   Output --   Net 340 ml          Telemetry: Atrial fibrillation with heart rate in the 60s.    Physical Exam:  The patient is alert, oriented and in no distress.  Vital signs as noted above.  Head and neck revealed no carotid bruits or jugular venous distention.  No thyromegaly or lymphadenopathy is present  Lungs clear.  No wheezing.  Breath sounds are normal bilaterally.  Heart normal first and second heart sounds.  No murmur. No precordial rub is present.  No gallop is present.  Abdomen soft and nontender.  No organomegaly is present.  Extremities with good peripheral pulses without any pedal edema.  Skin warm and dry.  Musculoskeletal system is grossly normal.  CNS grossly normal.       Results Review:  I have personally reviewed the results from the time of this admission to 6/20/2024 07:49 EDT and agree with these findings:  []  Laboratory  []  " Microbiology  []  Radiology  []  EKG/Telemetry   []  Cardiology/Vascular   []  Pathology  []  Old records  []  Other:    Most notable findings include:    Lab Results (last 24 hours)       Procedure Component Value Units Date/Time    POC Glucose 4x Daily Before Meals & at Bedtime [[id number]]  (Abnormal) Collected: 06/20/24 0739    Specimen: Blood Updated: 06/20/24 0740     Glucose 135 mg/dL      Comment: Serial Number: [id number]:  [id number]       Magnesium [[id number]]  (Normal) Collected: 06/20/24 0421    Specimen: Blood Updated: 06/20/24 0539     Magnesium 2.2 mg/dL     Comprehensive Metabolic Panel [[id number]]  (Abnormal) Collected: 06/20/24 0421    Specimen: Blood Updated: 06/20/24 0539     Glucose 130 mg/dL      BUN 32 mg/dL      Creatinine 1.37 mg/dL      Sodium 136 mmol/L      Potassium 4.0 mmol/L      Comment: Slight hemolysis detected by analyzer. Result may be falsely elevated.        Chloride 100 mmol/L      CO2 21.5 mmol/L      Calcium 9.8 mg/dL      Total Protein 6.9 g/dL      Albumin 3.8 g/dL      ALT (SGPT) 87 U/L      AST (SGOT) 68 U/L      Alkaline Phosphatase 234 U/L      Total Bilirubin 0.6 mg/dL      Globulin 3.1 gm/dL      A/G Ratio 1.2 g/dL      BUN/Creatinine Ratio 23.4     Anion Gap 14.5 mmol/L      eGFR 36.8 mL/min/1.73     Narrative:      GFR Normal >60  Chronic Kidney Disease <60  Kidney Failure <15    The GFR formula is only valid for adults with stable renal function between ages 18 and 70.    CBC & Differential [[id number]]  (Abnormal) Collected: 06/20/24 0421    Specimen: Blood Updated: 06/20/24 0520    Narrative:      The following orders were created for panel order CBC & Differential.  Procedure                               Abnormality         Status                     ---------                               -----------         ------                     CBC Auto Differential[[id number]]        Abnormal            Final result                 Please view results for these  tests on the individual orders.    CBC Auto Differential [[id number]]  (Abnormal) Collected: 06/20/24 0421    Specimen: Blood Updated: 06/20/24 0520     WBC 8.64 10*3/mm3      RBC 4.40 10*6/mm3      Hemoglobin 10.7 g/dL      Hematocrit 36.2 %      MCV 82.3 fL      MCH 24.3 pg      MCHC 29.6 g/dL      RDW 17.1 %      RDW-SD 49.9 fl      MPV 10.9 fL      Platelets 276 10*3/mm3      Neutrophil % 67.1 %      Lymphocyte % 19.1 %      Monocyte % 9.7 %      Eosinophil % 3.1 %      Basophil % 0.7 %      Immature Grans % 0.3 %      Neutrophils, Absolute 5.79 10*3/mm3      Lymphocytes, Absolute 1.65 10*3/mm3      Monocytes, Absolute 0.84 10*3/mm3      Eosinophils, Absolute 0.27 10*3/mm3      Basophils, Absolute 0.06 10*3/mm3      Immature Grans, Absolute 0.03 10*3/mm3      nRBC 0.0 /100 WBC     POC Glucose Once [[id number]]  (Abnormal) Collected: 06/19/24 2042    Specimen: Blood Updated: 06/19/24 2044     Glucose 134 mg/dL      Comment: Serial Number: [id number]:  [id number]       POC Glucose 4x Daily Before Meals & at Bedtime [[id number]]  (Abnormal) Collected: 06/19/24 1705    Specimen: Blood Updated: 06/19/24 1707     Glucose 171 mg/dL      Comment: Serial Number: [id number]:  [id number]       High Sensitivity Troponin T 2Hr [[id number]]  (Abnormal) Collected: 06/19/24 1052    Specimen: Blood Updated: 06/19/24 1154     HS Troponin T 55 ng/L      Troponin T Delta -2 ng/L     Narrative:      High Sensitive Troponin T Reference Range:  <14.0 ng/L- Negative Female for AMI  <22.0 ng/L- Negative Male for AMI  >=14 - Abnormal Female indicating possible myocardial injury.  >=22 - Abnormal Male indicating possible myocardial injury.   Clinicians would have to utilize clinical acumen, EKG, Troponin, and serial changes to determine if it is an Acute Myocardial Infarction or myocardial injury due to an underlying chronic condition.         POC Glucose Once [[id number]]  (Abnormal) Collected: 06/19/24 1129    Specimen: Blood  Updated: 06/19/24 1131     Glucose 152 mg/dL      Comment: Serial Number: [id number]:  [id number]       High Sensitivity Troponin T [[id number]]  (Abnormal) Collected: 06/19/24 0601    Specimen: Blood from Arm, Right Updated: 06/19/24 0937     HS Troponin T 57 ng/L     Narrative:      High Sensitive Troponin T Reference Range:  <14.0 ng/L- Negative Female for AMI  <22.0 ng/L- Negative Male for AMI  >=14 - Abnormal Female indicating possible myocardial injury.  >=22 - Abnormal Male indicating possible myocardial injury.   Clinicians would have to utilize clinical acumen, EKG, Troponin, and serial changes to determine if it is an Acute Myocardial Infarction or myocardial injury due to an underlying chronic condition.                 Imaging Results (Last 24 Hours)       ** No results found for the last 24 hours. **            LAB RESULTS (LAST 7 DAYS)    CBC  Results from last 7 days   Lab Units 06/20/24 0421 06/18/24 2321 06/18/24 1759   WBC 10*3/mm3 8.64 9.42 12.20*   RBC 10*6/mm3 4.40 4.85 4.72   HEMOGLOBIN g/dL 10.7* 12.0 11.6*   HEMATOCRIT % 36.2 42.7 38.9   MCV fL 82.3 88.0 82.4   PLATELETS 10*3/mm3 276 287 324       BMP  Results from last 7 days   Lab Units 06/20/24 0421 06/19/24 0601 06/18/24 2321 06/18/24 1759   SODIUM mmol/L 136  --  137 136   POTASSIUM mmol/L 4.0  --  4.2 4.5   CHLORIDE mmol/L 100  --  99 98   CO2 mmol/L 21.5*  --  23.1 23.3   BUN mg/dL 32*  --  28* 27*   CREATININE mg/dL 1.37*  --  1.30* 1.29*   GLUCOSE mg/dL 130*  --  154* 157*   MAGNESIUM mg/dL 2.2 2.2 2.2  --        CMP   Results from last 7 days   Lab Units 06/20/24 0421 06/18/24 2321 06/18/24 1759   SODIUM mmol/L 136 137 136   POTASSIUM mmol/L 4.0 4.2 4.5   CHLORIDE mmol/L 100 99 98   CO2 mmol/L 21.5* 23.1 23.3   BUN mg/dL 32* 28* 27*   CREATININE mg/dL 1.37* 1.30* 1.29*   GLUCOSE mg/dL 130* 154* 157*   ALBUMIN g/dL 3.8 4.2 4.3   BILIRUBIN mg/dL 0.6 1.5* 1.5*   ALK PHOS U/L 234* 310* 251*   AST (SGOT) U/L 68* 262* 123*    ALT (SGPT) U/L 87* 132* 50*       BNP        TROPONIN  Results from last 7 days   Lab Units 06/19/24  1052   HSTROP T ng/L 55*       CoAg        Creatinine Clearance  Estimated Creatinine Clearance: 25.3 mL/min (A) (by C-G formula based on SCr of 1.37 mg/dL (H)).    ABG        Radiology  XR Chest 1 View    Result Date: 6/18/2024  Impression: No acute abnormality. Chronic findings as characterized above Electronically Signed: Wilson Sebastian DO  6/18/2024 6:50 PM EDT  Workstation ID: [id number]       EKG  I personally viewed and interpreted the patient's EKG/Telemetry data:  ECG 12 Lead Chest Pain   Final Result   HEART RATE= 133  bpm   RR Interval= 452  ms   MS Interval= 136  ms   P Horizontal Axis= 6  deg   P Front Axis= 246  deg   QRSD Interval= 111  ms   QT Interval= 320  ms   QTcB= 476  ms   QRS Axis= 24  deg   T Wave Axis= -85  deg   - ABNORMAL ECG -   Sinus tachycardia with irregular rate   Repolarization abnormality, prob rate related   When compared with ECG of 21-May-2024 5:50:25,   Significant change in rhythm: previously atrial fibrillation   Significant repolarization change   Electronically Signed By: Kale Alonso (BASIL) 19-Jun-2024 07:12:01   Date and Time of Study: 2024-06-18 17:36:01      Telemetry Scan   Final Result      Telemetry Scan   Final Result      Telemetry Scan   Final Result      Telemetry Scan   Final Result            Echocardiogram:    Results for orders placed during the hospital encounter of 06/18/24    Adult Transthoracic Echo Complete w/ Color, Spectral and Contrast if necessary per protocol    Interpretation Summary    Left ventricular ejection fraction appears to be 51 - 55%.    There is a TAVR valve present.    Estimated right ventricular systolic pressure from tricuspid regurgitation is normal (<35 mmHg).    26 mm Medtronic valve is well-seated.  There is no aortic insufficiency.  Mean gradient is 4 mmHg.        Stress Test:  Results for orders placed during the hospital  encounter of 04/14/23    Stress Test With Myocardial Perfusion One Day    Interpretation Summary    Left ventricular ejection fraction is normal (Calculated EF = 58%).    Myocardial perfusion imaging indicates a normal myocardial perfusion study with no evidence of ischemia.    Impressions are consistent with a low risk study.         Cardiac Catheterization:  Results for orders placed during the hospital encounter of 05/20/24    Cardiac Catheterization/Vascular Study    Conclusion  Indication:  Severe symptomatic nonrheumatic aortic stenosis  Congestive heart failure, NYHA class III    Procedures performed  Ultrasound-guided vascular access  Common femoral angiography  Supravalvular aortogram  Temporary pacemaker placement  Transcatheter aortic valve replacement with 26 mm Evolut pro plus via right femoral arterial access  Perclose and angioseal deployment    Procedural Details  The procedure was performed under conscious sedation in the hybrid OR.    Under ultrasound guidance, a 6 F introducer was placed in the left femoral vein usinh modified Seldinger technique. Under ultrasound guidance, a 7 F introducer was placed in the left femoral artery using modified Seldinger technique. Under ultrasound guidance, a 7 F introducer was placed in the right femoral artery using modified Seldinger technique.    Two Perclose ProGlide devices were used to 'pre-close' the RFA access site. A 5F bipolar electrode temporary pacemaking wire was inserted via the left femoral venous sheath and positioned using fluoroscopy. Pacing threshold was tested. The temporary pacemaker wire was secured in place.    A 6F pigtail catheter was placed in the aortic root via the LFA sheath. The aortic valve was crossed retrograde with a 0.035 inch straight wire through a 6F AL1 catheter via the RFA 16Fr sheath. The AL1 catheter was advanced into the LV and a exchange length 0.035 inch lunderquist wire in the LV. The 16F sheath was removed and the  Medtronic delivery sheath was inserted over the lunderquist wire.    The TAVR procedure was then performed using a 26mm Evolut pro + device. It was advanced over the lunderquist wire to the aortic valve position. After confirming correct position of the valve by fluoroscopy, the valve was implanted with ventricular pacing and dynamic aortography. After valve deployment the valve was well-seated and there was no significant paravalvular leak noted.    At the end of the procedure, the Medtronic delivery system was removed over a guidewire. The Perclose devices were deployed to secure the arterial access on the TAVR side.    The 7F LFA sheath was removed and the arteriotomy was angiosealed. The 6F LFV sheath along with the temporary pacemaker wire were removed and manual pressure applied.  The patient was transferred to [facility]  in stable condition.    Pio Callejas and Dimitris performed the TAVR procedure. Dr. Ward assisted.    Estimated blood loss  20ml    Complications  None    Recommendations  Dual antiplatelet therapy  Cardiac rehab  Repeat echocardiogram in morning.    Electronically signed by Leandro Callejas MD, 05/20/24, 8:58 AM EDT.         Other:         ASSESSMENT & PLAN:    Principal Problem:    Atrial fibrillation with rapid ventricular response  Active Problems:    Pacemaker    Coronary artery disease involving native coronary artery of native heart without angina pectoris    Hyperlipidemia, mixed    Essential hypertension    Type 2 diabetes mellitus with stage 3a chronic kidney disease, without long-term current use of insulin    Presence of Watchman left atrial appendage closure device    Chronic heart failure with preserved ejection fraction (HFpEF)    S/P TAVR (transcatheter aortic valve replacement)    Elevated LFTs    Nausea    Acute diarrhea    Syncope and collapse      Atrial fibrillation with rapid ventricular response  Heart rate has improved and now in the 60s  Continue Toprol-XL and Cardizem  Bystolic  has been discontinued  [id number] score is 9  Unable to tolerate anticoagulation  Left atrial appendage closure with Watchman device in place for stroke prevention  Continue Plavix     Elevated LFTs  Alk phos 310, AST//132.  Bilirubin 1.5  AST/ALT AST/ALT 68/87 today.  Total bilirubin is 0.6  Nausea has improved  History of cholecystectomy  Statin has been on hold  Further workup per primary     Recent Syncope and collapse  Sick sinus syndrome.  Permanent pacemaker in place.  Medtronic.  Interrogate pacemaker     S/P TAVR (transcatheter aortic valve replacement)  Status post TAVR with 26 mm Medtronic valve on 5/20/2024  Postprocedure echocardiogram with well-seated valve no AI and mean gradient of 5 mmHg  Continue Plavix     Chronic heart failure with preserved ejection fraction (HFpEF)  Patient appears to be dry secondary to diarrhea  Lasix and Farxiga will be held  Encouraged hydration  Monitor I&Os and daily weight     Coronary artery disease involving native coronary artery of native heart without angina pectoris  Continue Plavix and beta-blocker  Holding statin due to transaminitis     Hyperlipidemia, mixed  Statins will be held due to transaminitis     Essential hypertension, chronic  Well-controlled on Cardizem  Discontinue Bystolic  Starting Toprol-XL     Type 2 diabetes mellitus with stage 3a chronic kidney disease, without long-term current use of insulin  A1c is 8.3  Farxiga on hold due to dehydration  Metformin is also on hold  Sliding scale insulin during inpatient stay    Will need inpatient rehab.  Patient lives alone and is requesting [facility] rehab.  Needs physical therapy.    Leandro Callejas MD  06/20/24  07:49 EDT

## 2024-06-20 NOTE — PLAN OF CARE
Goal Outcome Evaluation:      Patient reported loose BM many times today. Pt stated she has lost count but she is sure is more than 10x, provider notified and order for Imodium 2mg 4 TIMES DAILY PRN was placed. Stool sample still pending collection.

## 2024-06-20 NOTE — PLAN OF CARE
Goal Outcome Evaluation:         Pt A&Ox4,VS stable, pt on RA. Pt discharging to [facility] rehab. Pt educated on importance of taking her medications.

## 2024-06-20 NOTE — SIGNIFICANT NOTE
06/20/24 1329   OTHER   Discipline physical therapist   Rehab Time/Intention   Session Not Performed patient/family declined treatment;other (see comments)  (Pt was sleeping at entry and reports she is not feeling well enough for PT at this time. PT will follow up as time allows.)   Therapy Assessment/Plan (PT)   Criteria for Skilled Interventions Met (PT) yes;meets criteria   Recommendation   PT - Next Appointment 06/21/24

## 2024-06-21 NOTE — CASE MANAGEMENT/SOCIAL WORK
Case Management Discharge Note      Final Note: Inpatient rehab.    Provided Post Acute Provider List?: Yes  Post Acute Provider List: Inpatient Rehab  Delivered To: Patient  Method of Delivery: In person    Selected Continued Care - Discharged on 6/20/2024 Admission date: 6/18/2024 - Discharge disposition: Rehab Facility or Unit (DC - External)      Destination Coordination complete.      Service Provider Selected Services Address Phone Fax Patient Preferred    [facility] [address] [place] [id number] [phone] --                   Transportation Services  W/C Van: Other ([facility])    Final Discharge Disposition Code: 62 - inpatient rehab facility

## 2024-07-17 ENCOUNTER — OFFICE VISIT (OUTPATIENT)
Dept: CARDIAC REHAB | Facility: HOSPITAL | Age: 89
End: 2024-07-17
Payer: MEDICARE

## 2024-07-17 DIAGNOSIS — Z95.2 S/P TAVR (TRANSCATHETER AORTIC VALVE REPLACEMENT): Primary | ICD-10-CM

## 2024-07-17 PROCEDURE — 93798 PHYS/QHP OP CAR RHAB W/ECG: CPT

## 2024-07-18 ENCOUNTER — TREATMENT (OUTPATIENT)
Dept: CARDIAC REHAB | Facility: HOSPITAL | Age: 89
End: 2024-07-18
Payer: MEDICARE

## 2024-07-18 DIAGNOSIS — Z95.2 S/P TAVR (TRANSCATHETER AORTIC VALVE REPLACEMENT): Primary | ICD-10-CM

## 2024-07-18 PROCEDURE — 93798 PHYS/QHP OP CAR RHAB W/ECG: CPT

## 2024-07-23 ENCOUNTER — APPOINTMENT (OUTPATIENT)
Dept: CARDIAC REHAB | Facility: HOSPITAL | Age: 89
End: 2024-07-23
Payer: MEDICARE

## 2024-07-25 ENCOUNTER — APPOINTMENT (OUTPATIENT)
Dept: CARDIAC REHAB | Facility: HOSPITAL | Age: 89
End: 2024-07-25
Payer: MEDICARE

## 2024-07-30 ENCOUNTER — APPOINTMENT (OUTPATIENT)
Dept: CARDIAC REHAB | Facility: HOSPITAL | Age: 89
End: 2024-07-30
Payer: MEDICARE

## 2024-08-12 ENCOUNTER — CLINICAL SUPPORT NO REQUIREMENTS (OUTPATIENT)
Dept: CARDIOLOGY | Facility: CLINIC | Age: 89
End: 2024-08-12
Payer: MEDICARE

## 2024-08-12 ENCOUNTER — TELEPHONE (OUTPATIENT)
Dept: CARDIOLOGY | Facility: CLINIC | Age: 89
End: 2024-08-12
Payer: MEDICARE

## 2024-08-12 DIAGNOSIS — I49.5 SICK SINUS SYNDROME: ICD-10-CM

## 2024-08-12 DIAGNOSIS — Z95.0 PACEMAKER: Primary | Chronic | ICD-10-CM

## 2024-08-12 NOTE — TELEPHONE ENCOUNTER
Patient's remote monitor is not currently transmitting.    We will contact patient to try to restore connection.    After checking all connections if unable to transmit would recommend contacting device company for further assistance

## 2024-08-15 NOTE — TELEPHONE ENCOUNTER
Spoke to patient, monitor is at the foot of her bed, it blinks brightly and keeps her awake. Does not appear to be working for her. She wants to be checked in office only. Made appt to be seen for 6 month pacemaker check and continue to see Dr Callejas at year check.

## 2024-08-15 NOTE — TELEPHONE ENCOUNTER
Patient following up with Dr. Callejas. Recommended her to ask them to follow her pacemaker remotely. She said she had it checked in their office the other day and that she unplugged her home monitor because it didn't work half the time anyways.

## 2025-02-20 ENCOUNTER — TRANSCRIBE ORDERS (OUTPATIENT)
Dept: ADMINISTRATIVE | Facility: HOSPITAL | Age: OVER 89
End: 2025-02-20
Payer: MEDICARE

## 2025-02-20 DIAGNOSIS — I70.203: Primary | ICD-10-CM

## 2025-03-06 ENCOUNTER — HOSPITAL ENCOUNTER (OUTPATIENT)
Dept: CARDIOLOGY | Facility: HOSPITAL | Age: OVER 89
Discharge: HOME | End: 2025-03-06
Admitting: PODIATRIST
Payer: MEDICARE

## 2025-03-06 DIAGNOSIS — I70.203: ICD-10-CM

## 2025-03-06 LAB
BH CV LOWER ARTERIAL LEFT ABI RATIO: NORMAL
BH CV LOWER ARTERIAL LEFT DORSALIS PEDIS SYS MAX: NORMAL
BH CV LOWER ARTERIAL LEFT GREAT TOE SYS MAX: NORMAL
BH CV LOWER ARTERIAL LEFT POST TIBIAL SYS MAX: 138
BH CV LOWER ARTERIAL LEFT TBI RATIO: NORMAL
BH CV LOWER ARTERIAL RIGHT ABI RATIO: NORMAL
BH CV LOWER ARTERIAL RIGHT DORSALIS PEDIS SYS MAX: NORMAL
BH CV LOWER ARTERIAL RIGHT GREAT TOE SYS MAX: NORMAL
BH CV LOWER ARTERIAL RIGHT POST TIBIAL SYS MAX: NORMAL
BH CV LOWER ARTERIAL RIGHT TBI RATIO: NORMAL
UPPER ARTERIAL LEFT ARM BRACHIAL SYS MAX: 163
UPPER ARTERIAL RIGHT ARM BRACHIAL SYS MAX: 158

## 2025-03-06 PROCEDURE — 93922 UPR/L XTREMITY ART 2 LEVELS: CPT

## 2025-03-20 ENCOUNTER — TRANSCRIBE ORDERS (OUTPATIENT)
Dept: ADMINISTRATIVE | Facility: HOSPITAL | Age: OVER 89
End: 2025-03-20
Payer: MEDICARE

## 2025-03-20 DIAGNOSIS — G60.9 IDIOPATHIC PERIPHERAL NEUROPATHY: ICD-10-CM

## 2025-03-20 DIAGNOSIS — I70.249 ATHEROSCLEROSIS OF NATIVE ARTERY OF BOTH LOWER EXTREMITIES WITH BILATERAL ULCERATION, UNSPECIFIED ULCERATION SITE: Primary | ICD-10-CM

## 2025-03-20 DIAGNOSIS — M79.671 PAIN OF RIGHT FOOT: ICD-10-CM

## 2025-03-20 DIAGNOSIS — I70.239 ATHEROSCLEROSIS OF NATIVE ARTERY OF BOTH LOWER EXTREMITIES WITH BILATERAL ULCERATION, UNSPECIFIED ULCERATION SITE: Primary | ICD-10-CM

## 2025-03-20 DIAGNOSIS — R60.0 LOCALIZED EDEMA: ICD-10-CM

## 2025-04-18 ENCOUNTER — HOSPITAL ENCOUNTER (OUTPATIENT)
Dept: CT IMAGING | Facility: HOSPITAL | Age: OVER 89
Discharge: HOME | End: 2025-04-18
Payer: MEDICARE

## 2025-04-18 DIAGNOSIS — R60.0 LOCALIZED EDEMA: ICD-10-CM

## 2025-04-18 DIAGNOSIS — G60.9 IDIOPATHIC PERIPHERAL NEUROPATHY: ICD-10-CM

## 2025-04-18 DIAGNOSIS — I70.239 ATHEROSCLEROSIS OF NATIVE ARTERY OF BOTH LOWER EXTREMITIES WITH BILATERAL ULCERATION, UNSPECIFIED ULCERATION SITE: ICD-10-CM

## 2025-04-18 DIAGNOSIS — M79.671 PAIN OF RIGHT FOOT: ICD-10-CM

## 2025-04-18 DIAGNOSIS — I70.249 ATHEROSCLEROSIS OF NATIVE ARTERY OF BOTH LOWER EXTREMITIES WITH BILATERAL ULCERATION, UNSPECIFIED ULCERATION SITE: ICD-10-CM

## 2025-04-18 LAB
CREAT BLDA-MCNC: 1.2 MG/DL (ref 0.6–1.3)
EGFRCR SERPLBLD CKD-EPI 2021: 42.8 ML/MIN/1.73

## 2025-04-18 PROCEDURE — 75635 CT ANGIO ABDOMINAL ARTERIES: CPT

## 2025-04-18 PROCEDURE — 82565 ASSAY OF CREATININE: CPT

## 2025-04-18 PROCEDURE — 25510000001 IOPAMIDOL PER 1 ML: Performed by: PODIATRIST

## 2025-04-18 RX ORDER — IOPAMIDOL 755 MG/ML
100 INJECTION, SOLUTION INTRAVASCULAR
Status: COMPLETED | OUTPATIENT
Start: 2025-04-18 | End: 2025-04-18

## 2025-04-18 RX ADMIN — IOPAMIDOL 100 ML: 755 INJECTION, SOLUTION INTRAVENOUS at 13:17

## 2025-05-25 NOTE — PROGRESS NOTES
Encounter Date:06/04/2025        Patient ID: Cherie Hinton is a 89 y.o. female.      Chief Complaint:      History of Present Illness  Cherie Hinton is a 89 y.o. female with severe symptomatic aortic stenosis status post TAVR on 5/20/2024, chronic HFpEF, permanent atrial fibrillation, presence of Watchman, sick sinus syndrome status post permanent pacemaker placement, coronary artery disease, hypertension, hyperlipidemia and diabetes .    Previous hospital admission for A-fib RVR  She presents for 1 year TAVR follow-up.  Complains of leg swelling, chest pain.    Current cardiac medications include atorvastatin, Cardizem, Lasix    The following portions of the patient's history were reviewed and updated as appropriate: allergies, current medications, past family history, past medical history, past social history, past surgical history, and problem list.    Review of Systems   Constitutional: Positive for malaise/fatigue.   Cardiovascular:  Positive for chest pain and leg swelling. Negative for palpitations.   Respiratory:  Negative for shortness of breath.    Skin:  Negative for rash.   Neurological:  Negative for dizziness, light-headedness and numbness.       Current Outpatient Medications:     atorvastatin (LIPITOR) 10 MG tablet, Take 1 tablet by mouth Daily., Disp: 90 tablet, Rfl: 1    clopidogrel (PLAVIX) 75 MG tablet, Take 1 tablet by mouth Daily., Disp: 90 tablet, Rfl: 3    dapagliflozin Propanediol (Farxiga) 10 MG tablet, Take 10 mg by mouth Daily., Disp: 30 tablet, Rfl: 11    dilTIAZem CD (CARDIZEM CD) 240 MG 24 hr capsule, Take 1 capsule by mouth Daily., Disp: , Rfl:     ferrous sulfate 325 (65 FE) MG EC tablet, Take 1 tablet by mouth Daily With Breakfast., Disp: 90 tablet, Rfl: 1    furosemide (LASIX) 20 MG tablet, Take 1 tablet by mouth Daily., Disp: 30 tablet, Rfl: 11    insulin lispro (HUMALOG/ADMELOG) 100 UNIT/ML injection, Inject 2-7 Units under the skin into the appropriate area as directed 4  (Four) Times a Day Before Meals & at Bedtime., Disp: 3 mL, Rfl: 12    meclizine (ANTIVERT) 25 MG tablet, Take 1 tablet by mouth 3 (Three) Times a Day As Needed for Dizziness., Disp: 30 tablet, Rfl: 0    metFORMIN (Glucophage) 500 MG tablet, Take 1 tablet by mouth Daily With Breakfast. For diabetes, Disp: 30 tablet, Rfl: 6    metoprolol succinate XL (TOPROL-XL) 25 MG 24 hr tablet, Take 1 tablet by mouth Daily., Disp: 30 tablet, Rfl: 1    pantoprazole (PROTONIX) 40 MG EC tablet, Take 1 tablet by mouth Every Morning., Disp: 90 tablet, Rfl: 3    phenylephrine-mineral oil-petrolatum (PREPARATION H) 0.25-14-74.9 % ointment hemorrhoidal ointment, Insert 1 Application into the rectum 3 (Three) Times a Day As Needed (hemorrhoids)., Disp: 1 each, Rfl: 0    zolpidem (AMBIEN) 5 MG tablet, Take 1 tablet by mouth At Night As Needed for Sleep., Disp: , Rfl:     Current outpatient and discharge medications have been reconciled for the patient.  Reviewed by: Leandro Callejas MD       Allergies   Allergen Reactions    Contrast Dye (Echo Or Unknown Ct/Mr) Anaphylaxis    Adhesive Tape Itching    Iodinated Contrast Media Hives     Per pt on 4/18/25, she had a reaction of hives, NOT anaphylaxis, during a heart cath 20 years ago. She has not been premedicated for CT's with contrast since, including TAVR scan last year, and has had no reaction.     Latex Hives       Family History   Problem Relation Age of Onset    Hypertension Mother     Diabetes Mother     Coronary artery disease Mother     Other Father         CVA    No Known Problems Sister     No Known Problems Brother     No Known Problems Maternal Aunt     No Known Problems Maternal Uncle     No Known Problems Paternal Aunt     No Known Problems Paternal Uncle     No Known Problems Maternal Grandmother     No Known Problems Maternal Grandfather     No Known Problems Paternal Grandmother     No Known Problems Paternal Grandfather     Heart disease Other     Stroke Other     Hypertension  Other     Anemia Neg Hx     Arrhythmia Neg Hx     Asthma Neg Hx     Clotting disorder Neg Hx     Fainting Neg Hx     Heart attack Neg Hx     Heart failure Neg Hx     Hyperlipidemia Neg Hx        Past Surgical History:   Procedure Laterality Date    AORTIC VALVE REPAIR/REPLACEMENT N/A 5/20/2024    Procedure: Transfemoral Transcatheter Aortic Valve Replacement;  Surgeon: Leandro Callejas MD;  Location: [facility] HYBRID OR;  Service: Cardiovascular;  Laterality: N/A;  TAVR using 26mm Medtronic aortic valve.    AORTIC VALVE REPAIR/REPLACEMENT N/A 5/20/2024    Procedure: TRANSFEMORAL TRANSCATHETER AORTIC VALVE REPLACEMENT;  Surgeon: Yonatan Shanks MD;  Location: [facility] HYBRID OR;  Service: Cardiothoracic;  Laterality: N/A;    ATRIAL APPENDAGE EXCLUSION LEFT WITH TRANSESOPHAGEAL ECHOCARDIOGRAM N/A 10/10/2019    Procedure: Atrial Appendage Occlusion;  Surgeon: Richie Chapman MD;  Location: [facility] CATH INVASIVE LOCATION;  Service: Cardiovascular    ATRIAL APPENDAGE EXCLUSION LEFT WITH TRANSESOPHAGEAL ECHOCARDIOGRAM N/A 10/10/2019    Procedure: Atrial Appendage Occlusion;  Surgeon: Je Rivera MD;  Location: [facility] CATH INVASIVE LOCATION;  Service: Cardiology    BLADDER REPAIR      CARDIAC CATHETERIZATION  05/2010    CARDIAC CATHETERIZATION N/A 3/28/2024    Procedure: Left Heart Cath;  Surgeon: Mich Araujo MD;  Location: [facility] CATH INVASIVE LOCATION;  Service: Cardiology;  Laterality: N/A;    CHOLECYSTECTOMY      COLECTOMY PARTIAL / TOTAL      COLONOSCOPY  04/16/2018    negative     COLONOSCOPY N/A 7/1/2022    Procedure: COLONOSCOPY with polypectomy x1;  Surgeon: ADOLFO Boss MD;  Location: [facility] ENDOSCOPY;  Service: Gastroenterology;  Laterality: N/A;  post: diverticulosis, hemorrhoids, polyps    ENDOSCOPY  04/16/2018    negative     ENDOSCOPY N/A 6/29/2022    Procedure: ESOPHAGOGASTRODUODENOSCOPY with biopsy of duodenum r/o celiac;  Surgeon: ADOLFO Boss MD;  Location: [facility]  ENDOSCOPY;  Service: Gastroenterology;  Laterality: N/A;  hiatial hernia  Gastric Polyps        HYSTERECTOMY      INSERT / REPLACE / REMOVE PACEMAKER  09/2018    Pacemaker gen change 9/2018     OTHER SURGICAL HISTORY  04/25/2015    Exercise myoview, normal     PACEMAKER IMPLANTATION  01/22/2010    Dual Chamber - 1/22/2010; RA Lead Revision- 5/7/2012- BS     TOOTH EXTRACTION      TRANSESOPHAGEAL ECHOCARDIOGRAM (DHEERAJ)  07/2019       Past Medical History:   Diagnosis Date    Antral erosion     Aortic valve regurgitation 12/26/2018    Atrial fibrillation     Atrial fibrillation with controlled ventricular response     B12 deficiency     CAD (coronary artery disease)     Cellulitis 04/2011    on face     Cerebrovascular accident (CVA) 12/07/2016    CHF (congestive heart failure)     Colon cancer     Coronary artery disease involving native coronary artery of native heart without angina pectoris 06/18/2019    Depression     Diabetes mellitus, type 2     Essential hypertension 02/13/2012    Fatigue     GERD (gastroesophageal reflux disease)     Hiatal hernia     History of colonoscopy 04/2010    last done 4/10    History of esophagogastroduodenoscopy (EGD) 04/2010    last done 4/10    Hyperlipidemia, mixed 02/13/2012    Hypertension     white coat htn    HALEIGH (iron deficiency anemia)     LAD (lymphadenopathy)     40% lesion LAD     Left pontine CVA     Mitral regurgitation 05/21/2012    STORMY (obstructive sleep apnea)     not treating    Osteoarthritis     Pacemaker 06/18/2019    Permanent atrial fibrillation 06/18/2019    Presence of Watchman left atrial appendage closure device 01/04/2020    Prinzmetal's angina     Right kidney mass     1.4 cm- following urology     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    Sick sinus syndrome     Stroke 11/2016    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    TIA (transient ischemic attack)     left CVA, interrupted TPA; As (moderate-severe)     Vestibular nerve disease 2017        Family History   Problem Relation Age of Onset    Hypertension Mother     Diabetes Mother     Coronary artery disease Mother     Other Father         CVA    No Known Problems Sister     No Known Problems Brother     No Known Problems Maternal Aunt     No Known Problems Maternal Uncle     No Known Problems Paternal Aunt     No Known Problems Paternal Uncle     No Known Problems Maternal Grandmother     No Known Problems Maternal Grandfather     No Known Problems Paternal Grandmother     No Known Problems Paternal Grandfather     Heart disease Other     Stroke Other     Hypertension Other     Anemia Neg Hx     Arrhythmia Neg Hx     Asthma Neg Hx     Clotting disorder Neg Hx     Fainting Neg Hx     Heart attack Neg Hx     Heart failure Neg Hx     Hyperlipidemia Neg Hx        Social History     Socioeconomic History    Marital status:    Tobacco Use    Smoking status: Never     Passive exposure: Never    Smokeless tobacco: Never   Vaping Use    Vaping status: Never Used   Substance and Sexual Activity    Alcohol use: No    Drug use: Never    Sexual activity: Defer               Objective:       Physical Exam    There were no vitals taken for this visit.  The patient is alert, oriented and in no distress.    Vital signs as noted above.    Head and neck revealed no carotid bruits or jugular venous distension.  No thyromegaly or lymphadenopathy is present.    Lungs clear.  No wheezing.  Breath sounds are normal bilaterally.    Heart normal first and second heart sounds.  No murmur..  No pericardial rub is present.  No gallop is present.    Abdomen soft and nontender.  No organomegaly is present.    Extremities revealed good peripheral pulses without any pedal edema.    Skin warm and dry.    Musculoskeletal system is grossly normal.    CNS grossly normal.          No diagnosis found.LAB RESULTS (LAST 7 DAYS)    CBC        BMP        CMP         BNP        TROPONIN        CoAg        Creatinine Clearance  CrCl  cannot be calculated (Patient's most recent lab result is older than the maximum 30 days allowed.).    ABG        Radiology  No radiology results for the last day    EKG  Procedures    Stress test  Results for orders placed during the hospital encounter of 04/14/23    Stress Test With Myocardial Perfusion One Day    Interpretation Summary    Left ventricular ejection fraction is normal (Calculated EF = 58%).    Myocardial perfusion imaging indicates a normal myocardial perfusion study with no evidence of ischemia.    Impressions are consistent with a low risk study.      Echocardiogram  Results for orders placed during the hospital encounter of 06/18/24    Adult Transthoracic Echo Complete w/ Color, Spectral and Contrast if necessary per protocol    Interpretation Summary    Left ventricular ejection fraction appears to be 51 - 55%.    There is a TAVR valve present.    Estimated right ventricular systolic pressure from tricuspid regurgitation is normal (<35 mmHg).    26 mm Medtronic valve is well-seated.  There is no aortic insufficiency.  Mean gradient is 4 mmHg.      Cardiac catheterization  Results for orders placed during the hospital encounter of 05/20/24    Cardiac Catheterization/Vascular Study    Conclusion  Indication:  Severe symptomatic nonrheumatic aortic stenosis  Congestive heart failure, NYHA class III    Procedures performed  Ultrasound-guided vascular access  Common femoral angiography  Supravalvular aortogram  Temporary pacemaker placement  Transcatheter aortic valve replacement with 26 mm Evolut pro plus via right femoral arterial access  Perclose and angioseal deployment    Procedural Details  The procedure was performed under conscious sedation in the hybrid OR.    Under ultrasound guidance, a 6 F introducer was placed in the left femoral vein roberth modified Seldinger technique. Under ultrasound guidance, a 7 F introducer was placed in the left femoral artery using modified Seldinger technique.  Under ultrasound guidance, a 7 F introducer was placed in the right femoral artery using modified Seldinger technique.    Two Perclose ProGlide devices were used to 'pre-close' the RFA access site. A 5F bipolar electrode temporary pacemaking wire was inserted via the left femoral venous sheath and positioned using fluoroscopy. Pacing threshold was tested. The temporary pacemaker wire was secured in place.    A 6F pigtail catheter was placed in the aortic root via the LFA sheath. The aortic valve was crossed retrograde with a 0.035 inch straight wire through a 6F AL1 catheter via the RFA 16Fr sheath. The AL1 catheter was advanced into the LV and a exchange length 0.035 inch lunderquist wire in the LV. The 16F sheath was removed and the Medtronic delivery sheath was inserted over the lunderquist wire.    The TAVR procedure was then performed using a 26mm Evolut pro + device. It was advanced over the lunderquist wire to the aortic valve position. After confirming correct position of the valve by fluoroscopy, the valve was implanted with ventricular pacing and dynamic aortography. After valve deployment the valve was well-seated and there was no significant paravalvular leak noted.    At the end of the procedure, the Medtronic delivery system was removed over a guidewire. The Perclose devices were deployed to secure the arterial access on the TAVR side.    The 7F LFA sheath was removed and the arteriotomy was angiosealed. The 6F LFV sheath along with the temporary pacemaker wire were removed and manual pressure applied.  The patient was transferred to [facility]  in stable condition.    Pio Callejas and Dimitris performed the TAVR procedure. Dr. Ward assisted.    Estimated blood loss  20ml    Complications  None    Recommendations  Dual antiplatelet therapy  Cardiac rehab  Repeat echocardiogram in morning.    Electronically signed by Leandro Callejas MD, 05/20/24, 8:58 AM EDT.          Assessment and Plan       There are no  diagnoses linked to this encounter.     Atrial fibrillation   Heart rate has improved and now in the 60s  Currently on Cardizem  [id number] score is 9  Unable to tolerate anticoagulation  Left atrial appendage closure with Watchman device in place for stroke prevention  Starting Plavix     Elevated LFTs  Improved and resolved  Nausea has improved  History of cholecystectomy  Tolerating statin     Recent Syncope and collapse  Sick sinus syndrome.  Permanent pacemaker in place.  Medtronic.  Device check today     S/P TAVR (transcatheter aortic valve replacement)  Status post TAVR with 26 mm Medtronic valve on 5/20/2024  Postprocedure echocardiogram with well-seated valve no AI and mean gradient of 5 mmHg  Resume Plavix     Chronic heart failure with preserved ejection fraction (HFpEF)  Continue Lasix  Monitor I&Os and daily weight     Coronary artery disease involving native coronary artery of native heart without angina pectoris  Continue Plavix, statin  Not on a beta-blocker while on calcium channel blocker     Hyperlipidemia, mixed  Statins have been resumed     Essential hypertension, chronic  Well-controlled on Cardizem  Discontinue Bystolic     Type 2 diabetes mellitus with stage 3a chronic kidney disease, without long-term current use of insulin  A1c is 8.3  Back on metformin    Falls/imbalance  Physical therapy consultation  Encouraged to use walker  Fall precautions discussed with the patient while on Plavix

## 2025-06-04 ENCOUNTER — CLINICAL SUPPORT NO REQUIREMENTS (OUTPATIENT)
Dept: CARDIOLOGY | Facility: CLINIC | Age: OVER 89
End: 2025-06-04
Payer: MEDICARE

## 2025-06-04 ENCOUNTER — TELEPHONE (OUTPATIENT)
Dept: CARDIOLOGY | Facility: CLINIC | Age: OVER 89
End: 2025-06-04

## 2025-06-04 ENCOUNTER — OFFICE VISIT (OUTPATIENT)
Dept: CARDIOLOGY | Facility: CLINIC | Age: OVER 89
End: 2025-06-04
Payer: MEDICARE

## 2025-06-04 ENCOUNTER — HOSPITAL ENCOUNTER (OUTPATIENT)
Dept: CARDIOLOGY | Facility: HOSPITAL | Age: OVER 89
Discharge: HOME | End: 2025-06-04
Admitting: NURSE PRACTITIONER
Payer: MEDICARE

## 2025-06-04 VITALS
HEIGHT: 63 IN | DIASTOLIC BLOOD PRESSURE: 82 MMHG | HEART RATE: 69 BPM | SYSTOLIC BLOOD PRESSURE: 164 MMHG | OXYGEN SATURATION: 95 % | BODY MASS INDEX: 29.06 KG/M2 | WEIGHT: 164 LBS

## 2025-06-04 VITALS
HEIGHT: 63 IN | BODY MASS INDEX: 29.23 KG/M2 | DIASTOLIC BLOOD PRESSURE: 74 MMHG | SYSTOLIC BLOOD PRESSURE: 177 MMHG | HEART RATE: 76 BPM | WEIGHT: 165 LBS

## 2025-06-04 DIAGNOSIS — I35.0 NONRHEUMATIC AORTIC VALVE STENOSIS: ICD-10-CM

## 2025-06-04 DIAGNOSIS — E11.22 TYPE 2 DIABETES MELLITUS WITH STAGE 3A CHRONIC KIDNEY DISEASE, WITHOUT LONG-TERM CURRENT USE OF INSULIN: ICD-10-CM

## 2025-06-04 DIAGNOSIS — I42.0 DILATED CARDIOMYOPATHY: ICD-10-CM

## 2025-06-04 DIAGNOSIS — Z95.0 PACEMAKER: Chronic | ICD-10-CM

## 2025-06-04 DIAGNOSIS — N18.31 TYPE 2 DIABETES MELLITUS WITH STAGE 3A CHRONIC KIDNEY DISEASE, WITHOUT LONG-TERM CURRENT USE OF INSULIN: ICD-10-CM

## 2025-06-04 DIAGNOSIS — I48.21 PERMANENT ATRIAL FIBRILLATION: ICD-10-CM

## 2025-06-04 DIAGNOSIS — I70.203: ICD-10-CM

## 2025-06-04 DIAGNOSIS — Z95.0 PACEMAKER: ICD-10-CM

## 2025-06-04 DIAGNOSIS — I25.10 CORONARY ARTERY DISEASE INVOLVING NATIVE CORONARY ARTERY OF NATIVE HEART WITHOUT ANGINA PECTORIS: ICD-10-CM

## 2025-06-04 DIAGNOSIS — I35.0 AORTIC STENOSIS, SEVERE: ICD-10-CM

## 2025-06-04 DIAGNOSIS — I49.5 SICK SINUS SYNDROME: Primary | ICD-10-CM

## 2025-06-04 DIAGNOSIS — E78.2 HYPERLIPIDEMIA, MIXED: ICD-10-CM

## 2025-06-04 DIAGNOSIS — I50.32 CHRONIC HEART FAILURE WITH PRESERVED EJECTION FRACTION (HFPEF): Chronic | ICD-10-CM

## 2025-06-04 DIAGNOSIS — R26.89 IMBALANCE: ICD-10-CM

## 2025-06-04 DIAGNOSIS — Z91.81 AT HIGH RISK FOR INJURY RELATED TO FALL: ICD-10-CM

## 2025-06-04 DIAGNOSIS — Z95.818 PRESENCE OF WATCHMAN LEFT ATRIAL APPENDAGE CLOSURE DEVICE: ICD-10-CM

## 2025-06-04 DIAGNOSIS — I50.32 CHRONIC HEART FAILURE WITH PRESERVED EJECTION FRACTION (HFPEF): ICD-10-CM

## 2025-06-04 DIAGNOSIS — Z95.2 S/P TAVR (TRANSCATHETER AORTIC VALVE REPLACEMENT): ICD-10-CM

## 2025-06-04 DIAGNOSIS — E11.9 TYPE 2 DIABETES MELLITUS WITHOUT COMPLICATION, WITHOUT LONG-TERM CURRENT USE OF INSULIN: ICD-10-CM

## 2025-06-04 DIAGNOSIS — I50.32 CONGESTIVE HEART FAILURE, NYHA CLASS 3, CHRONIC, DIASTOLIC: ICD-10-CM

## 2025-06-04 DIAGNOSIS — I10 ESSENTIAL HYPERTENSION: ICD-10-CM

## 2025-06-04 LAB
AORTIC DIMENSIONLESS INDEX: 0.39 (DI)
AV MEAN PRESS GRAD SYS DOP V1V2: 5.3 MMHG
AV VMAX SYS DOP: 148.4 CM/SEC
BH CV ECHO MEAS - AO MAX PG: 9 MMHG
BH CV ECHO MEAS - AO ROOT DIAM: 2.42 CM
BH CV ECHO MEAS - AO V2 VTI: 30.6 CM
BH CV ECHO MEAS - AVA(I,D): 1.14 CM2
BH CV ECHO MEAS - EDV(CUBED): 81.3 ML
BH CV ECHO MEAS - EDV(MOD-SP4): 34.3 ML
BH CV ECHO MEAS - EF(MOD-SP4): 50.9 %
BH CV ECHO MEAS - ESV(CUBED): 24.3 ML
BH CV ECHO MEAS - ESV(MOD-SP4): 16.8 ML
BH CV ECHO MEAS - FS: 33.1 %
BH CV ECHO MEAS - IVS/LVPW: 0.89 CM
BH CV ECHO MEAS - IVSD: 1.03 CM
BH CV ECHO MEAS - LA DIMENSION: 4 CM
BH CV ECHO MEAS - LAT PEAK E' VEL: 7.4 CM/SEC
BH CV ECHO MEAS - LV MASS(C)D: 162.3 GRAMS
BH CV ECHO MEAS - LV MAX PG: 1.18 MMHG
BH CV ECHO MEAS - LV MEAN PG: 0.76 MMHG
BH CV ECHO MEAS - LV V1 MAX: 54.3 CM/SEC
BH CV ECHO MEAS - LV V1 VTI: 11.9 CM
BH CV ECHO MEAS - LVIDD: 4.3 CM
BH CV ECHO MEAS - LVIDS: 2.9 CM
BH CV ECHO MEAS - LVOT AREA: 2.9 CM2
BH CV ECHO MEAS - LVOT DIAM: 1.93 CM
BH CV ECHO MEAS - LVPWD: 1.15 CM
BH CV ECHO MEAS - MED PEAK E' VEL: 4.6 CM/SEC
BH CV ECHO MEAS - MR MAX PG: 146.2 MMHG
BH CV ECHO MEAS - MR MAX VEL: 604.6 CM/SEC
BH CV ECHO MEAS - MV A MAX VEL: 28.1 CM/SEC
BH CV ECHO MEAS - MV DEC SLOPE: 525.7 CM/SEC2
BH CV ECHO MEAS - MV DEC TIME: 0.19 SEC
BH CV ECHO MEAS - MV E MAX VEL: 100.6 CM/SEC
BH CV ECHO MEAS - MV E/A: 3.6
BH CV ECHO MEAS - MV MAX PG: 5.3 MMHG
BH CV ECHO MEAS - MV MEAN PG: 1.7 MMHG
BH CV ECHO MEAS - MV V2 VTI: 25.5 CM
BH CV ECHO MEAS - MVA(VTI): 1.36 CM2
BH CV ECHO MEAS - PA V2 MAX: 81.8 CM/SEC
BH CV ECHO MEAS - RAP SYSTOLE: 3 MMHG
BH CV ECHO MEAS - RV MAX PG: 1.04 MMHG
BH CV ECHO MEAS - RV V1 MAX: 51.1 CM/SEC
BH CV ECHO MEAS - RV V1 VTI: 9.1 CM
BH CV ECHO MEAS - RVSP: 26.5 MMHG
BH CV ECHO MEAS - SV(LVOT): 34.8 ML
BH CV ECHO MEAS - SV(MOD-SP4): 17.4 ML
BH CV ECHO MEAS - TAPSE (>1.6): 1.4 CM
BH CV ECHO MEAS - TR MAX PG: 23.5 MMHG
BH CV ECHO MEAS - TR MAX VEL: 242 CM/SEC
BH CV ECHO MEASUREMENTS AVERAGE E/E' RATIO: 16.77
LV EF BIPLANE MOD: 51 %

## 2025-06-04 PROCEDURE — 93306 TTE W/DOPPLER COMPLETE: CPT

## 2025-06-04 PROCEDURE — 93306 TTE W/DOPPLER COMPLETE: CPT | Performed by: INTERNAL MEDICINE

## 2025-06-04 RX ORDER — CLOPIDOGREL BISULFATE 75 MG/1
75 TABLET ORAL DAILY
Qty: 90 TABLET | Refills: 3 | Status: SHIPPED | OUTPATIENT
Start: 2025-06-04

## 2025-06-04 NOTE — LETTER
June 4, 2025     Andrew Antunez MD  [address] [facility] [place] [address]    Patient: Cherie Hinton   YOB: 1934   Date of Visit: 6/4/2025     Dear Andrew Antunez MD:       Please send EKG results.         Sincerely,    NicoleCOLEEN MD        CC: No Recipients

## 2025-07-14 NOTE — PROGRESS NOTES
"Chief Complaint  NEW PATIENT  (Occulusion of tibial artery with lower ext swelling )    Subjective      HPI: Cherie Hinton is a 89 y.o. female seen for evaluation of peripheral arterial disease. Getting knots in the left knots in the left calf for maybe 3 months. None now. Both feet mildly numb. Also with frequent falls due to legs just giving out. Walking is OK. No limitation in walking endurance. No rest pain, ulcers. Both noninvasive testing and CTA have been performed.    Objective   Vital Signs:  /85 (BP Location: Left arm)   Ht 160 cm (62.99\")   Wt 73 kg (161 lb)   BMI 28.53 kg/m²   Estimated body mass index is 28.53 kg/m² as calculated from the following:    Height as of this encounter: 160 cm (62.99\").    Weight as of this encounter: 73 kg (161 lb).      Cherie Hinton  reports that she has never smoked. She has never been exposed to tobacco smoke. She has never used smokeless tobacco..     Exam: Appears younger than her stated age.  Moves around the exam room without difficulty.  Perfectly lucid.  Easily palpable femoral artery pulses.  Bounding dorsalis pedis artery pulses bilaterally.  Skin of the feet pink, warm and well-perfused.  Hammertoe dorsal right second toe.  No varicose veins.  No palpable nodules in the subcutaneous tissue of the calf including varicose veins, superficial vein thrombosis or lipomas.  No skin discoloration or lipodermatosclerosis from chronic venous hypertension.    Personal review of data: Images and tracings of lower extremity arterial exam 3/6/2025, demonstrating incompressible tibial and digital arteries, but near-normal ankle waveforms and strongly pulsatile digital waveforms.  Images of CT angiogram of the aorta with bilateral lower extremity runoff 4/18/2025 demonstrating bilateral anterior and posterior tibial artery occlusions, with single-vessel runoff via large peroneal arteries bilaterally, which appear to provide good perfusion to the foot.     " "  Assessment and Plan     Diagnoses and all orders for this visit:    1. Frequent falls (Primary)    2. Diabetic polyneuropathy associated with type 2 diabetes mellitus    Summary: 89-year-old woman referred for evaluation of peripheral arterial disease of the lower extremities.  Her complaints include nonspecific \"knots\" in the subcutaneous tissue of the medial left calf and frequent falls.  She also has pedal numbness, likely related to diabetic neuropathy.  On exam, pedal anatomy is fairly normal, though she does have a hammertoe of the right second toe in particular.  No calluses, fissures or wounds.  She has easily palpable pedal artery pulses.  Noninvasive arterial study demonstrates incompressible tibial and digital arteries, but waveforms are strongly pulsatile.  CT angiogram demonstrates axial patency down the limb extending into large peroneal arteries, which reopacified the dorsalis pedis arteries.  She does not have significant ischemia.  She is reassured.  No further vascular testing or intervention needed.  Return on request.    Follow Up     Return if symptoms worsen or fail to improve.  Patient was given instructions and counseling regarding her condition or for health maintenance advice. Please see specific information pulled into the AVS if appropriate.   "

## 2025-07-15 ENCOUNTER — OFFICE VISIT (OUTPATIENT)
Age: OVER 89
End: 2025-07-15
Payer: MEDICARE

## 2025-07-15 VITALS
SYSTOLIC BLOOD PRESSURE: 157 MMHG | WEIGHT: 161 LBS | HEIGHT: 63 IN | DIASTOLIC BLOOD PRESSURE: 85 MMHG | BODY MASS INDEX: 28.53 KG/M2

## 2025-07-15 DIAGNOSIS — R29.6 FREQUENT FALLS: Primary | ICD-10-CM

## 2025-07-15 DIAGNOSIS — E11.42 DIABETIC POLYNEUROPATHY ASSOCIATED WITH TYPE 2 DIABETES MELLITUS: ICD-10-CM

## 2025-07-15 PROBLEM — R19.7 ACUTE DIARRHEA: Status: RESOLVED | Noted: 2024-06-19 | Resolved: 2025-07-15

## (undated) DEVICE — DESTINATION RENAL GUIDING SHEATH: Brand: DESTINATION

## (undated) DEVICE — BITEBLOCK ENDO W/STRAP 60F A/ LF DISP

## (undated) DEVICE — DRSNG SURESITE WNDW 4X4.5

## (undated) DEVICE — SINGLE-USE BIOPSY FORCEPS: Brand: RADIAL JAW 4

## (undated) DEVICE — SUT PROLN 5/0 RB2 D/A 30IN 8710H

## (undated) DEVICE — TOWEL,OR,DSP,ST,BLUE,STD,4/PK,20PK/CS: Brand: MEDLINE

## (undated) DEVICE — INTRO PERFORMER CHECKFLO/LG RAD/BND NO/GW 14F .038IN 30CM

## (undated) DEVICE — ST ACC MICROPUNCTURE STFF/CANN PLAT/TP 4F 21G 40CM

## (undated) DEVICE — TBG NAMIC PRESS MONTR A/ F/M 12IN

## (undated) DEVICE — PROVE COVER: Brand: UNBRANDED

## (undated) DEVICE — CATH TDILUT SWANGANZ VIP 7.5F 110CM

## (undated) DEVICE — CATH ART RADL 20GA 1 3/4IN LF

## (undated) DEVICE — CATH DIAG IMPULSE FL4 5F 100CM

## (undated) DEVICE — C-ARM: Brand: UNBRANDED

## (undated) DEVICE — ELECTRD DEFIB M/FUNC PROPADZ RADIOL 2PK

## (undated) DEVICE — 3M™ TEGADERM™ I.V. ADVANCED SECUREMENT DRESSING, 1685, 3-1/2 IN X 4-1/2 IN (8.5 CM X 11.5 CM), 50/CT 4CT/CASE: Brand: 3M™ TEGADERM™

## (undated) DEVICE — ST IV BLD W/HANDPUMP YSITE 125IN

## (undated) DEVICE — 3M™ TEGADERM™ IV TRANSPARENT FILM DRESSING WITH BORDER 100 BAGS/CARTON 4 CARTONS/CASE 1633: Brand: 3M™ TEGADERM™

## (undated) DEVICE — GW XCHG AMPLTZ XSTIF PTFE CRV .035 3X260

## (undated) DEVICE — 500ML,PRESSURE INFUSER W/STOPCOCK: Brand: MEDLINE

## (undated) DEVICE — TAVR: Brand: MEDLINE INDUSTRIES, INC.

## (undated) DEVICE — CATH DIAG IMPULSE AL1 6F 100CM

## (undated) DEVICE — CATH DIAG IMPULSE FR3.5 5F 100CM

## (undated) DEVICE — PK TRY HEART CATH 50

## (undated) DEVICE — KT CATH CV ACC MAC 2L SFTY 9F 4 1/2IN

## (undated) DEVICE — CAP SYS PROC ACC WATCHMAN LAA

## (undated) DEVICE — CATH DIAG IMPULSE FL3.5 5F 100CM

## (undated) DEVICE — Device: Brand: NRG TRANSSEPTAL NEEDLE

## (undated) DEVICE — DELIV SYS D-EVOLUTFX-2329: Brand: EVOLUT™ FX

## (undated) DEVICE — PK PERFUS CUST W/CARDIOPLEGIA

## (undated) DEVICE — LOADING SYS L-EVOLUTFX-2329: Brand: EVOLUT™ FX

## (undated) DEVICE — Device: Brand: RFP-100A CONNECTOR CABLE

## (undated) DEVICE — PERCLOSE™ PROSTYLE™ SUTURE-MEDIATED CLOSURE AND REPAIR SYSTEM: Brand: PERCLOSE™ PROSTYLE™

## (undated) DEVICE — ANGIO-SEAL VIP VASCULAR CLOSURE DEVICE: Brand: ANGIO-SEAL

## (undated) DEVICE — KT MANIFLD NAMIC HEART/LT INTERGRATED/COMPENSATOR W/SYR/10ML

## (undated) DEVICE — BIOPATCH™ ANTIMICROBIAL DRESSING WITH CHLORHEXIDINE GLUCONATE IS A HYDROPHILLIC POLYURETHANE ABSORPTIVE FOAM WITH CHLORHEXIDINE GLUCONATE (CHG) WHICH INHIBITS BACTERIAL GROWTH UNDER THE DRESSING. THE DRESSING IS INTENDED TO BE USED TO ABSORB EXUDATE, COVER A WOUND CAUSED BY VASCULAR AND NONVASCULAR PERCUTANEOUS MEDICAL DEVICES DURING SURGERY, AS WELL AS REDUCE LOCAL INFECTION AND COLONIZATION OF MICROORGANISMS.: Brand: BIOPATCH

## (undated) DEVICE — SUT SILK 0 CT1 CR8 18IN C021D

## (undated) DEVICE — Device

## (undated) DEVICE — PK ENDO GI 50

## (undated) DEVICE — SHEET,DRAPE,53X77,STERILE: Brand: MEDLINE

## (undated) DEVICE — ACCESS SHEATH WITH DILATOR: Brand: WATCHMAN® ACCESS SYSTEM

## (undated) DEVICE — KT VLV HEMO MAP ACC PLS LG/BORE MTL/INTRO W/TORQ/DEV

## (undated) DEVICE — SUP ARMBRD HANDAID ART/LINE 9IN

## (undated) DEVICE — DGW .035 FC J3MM 150CM TEF HEP: Brand: EMERALD

## (undated) DEVICE — SNAP KOVER: Brand: UNBRANDED

## (undated) DEVICE — RADIFOCUS GLIDEWIRE: Brand: GLIDEWIRE

## (undated) DEVICE — GW XCHG AMPLTZ XSTIF PTFE CRV .035IN 3X180CM

## (undated) DEVICE — HEMOCONCENTRATOR PERFUS LPS06

## (undated) DEVICE — NDLHLDR TEMP NEEDLENEST 2 W/SD/FM ADHS STRL

## (undated) DEVICE — DGW .035 FC J3MM 260CM TEF: Brand: EMERALD

## (undated) DEVICE — LN INJ CONTRST FLXCIL HP F/M LL 1200PSI72

## (undated) DEVICE — PINNACLE INTRODUCER SHEATH: Brand: PINNACLE

## (undated) DEVICE — CVR PROB ULTRASND CIVFLEX GEN/PURP TELESCP/FOLD 5.5X96IN LF

## (undated) DEVICE — CATH DIAG IMPULSE PIG 5F 100CM

## (undated) DEVICE — PREF.GUIDING SHEATH W/MULT.CRV: Brand: PREFACE

## (undated) DEVICE — RESTRNT LIMB FOAM 2STRAP

## (undated) DEVICE — SENSR CERBRL O2 PK/2

## (undated) DEVICE — GLIDESHEATH SLENDER STAINLESS STEEL KIT: Brand: GLIDESHEATH SLENDER

## (undated) DEVICE — CATH DIAG IMPULSE FR4 5F 100CM

## (undated) DEVICE — PRESSURE MONITORING SET: Brand: TRUWAVE, VAMP PLUS

## (undated) DEVICE — CATH DIAG IMPULSE PIG .056 6F 110CM

## (undated) DEVICE — SNAR POLYP HOTSNARE/BRAIDED OVL/MINI 7F 2.8X10MM 230CM 1P/U

## (undated) DEVICE — DRP INCISE CARDIO W/ADHS 115X85X151IN LG STRL

## (undated) DEVICE — TRAP WIDEEYE POLYP

## (undated) DEVICE — CABL BIPOL W/ALLGTR CLIP/SM 12FT

## (undated) DEVICE — SWAN-GANZ BIPOLAR PACING CATHETER: Brand: SWAN-GANZ

## (undated) DEVICE — SNAP KAP: Brand: UNBRANDED